# Patient Record
Sex: MALE | Race: WHITE | Employment: OTHER | URBAN - METROPOLITAN AREA
[De-identification: names, ages, dates, MRNs, and addresses within clinical notes are randomized per-mention and may not be internally consistent; named-entity substitution may affect disease eponyms.]

---

## 2018-12-06 ENCOUNTER — HOSPITAL ENCOUNTER (OUTPATIENT)
Dept: GENERAL RADIOLOGY | Age: 78
Discharge: HOME | End: 2018-12-06
Attending: FAMILY MEDICINE
Payer: COMMERCIAL

## 2018-12-06 ENCOUNTER — TELEPHONE (OUTPATIENT)
Dept: FAMILY MEDICINE CLINIC | Facility: CLINIC | Age: 78
End: 2018-12-06

## 2018-12-06 ENCOUNTER — OFFICE VISIT (OUTPATIENT)
Dept: FAMILY MEDICINE CLINIC | Facility: CLINIC | Age: 78
End: 2018-12-06
Payer: COMMERCIAL

## 2018-12-06 VITALS
SYSTOLIC BLOOD PRESSURE: 112 MMHG | HEIGHT: 72 IN | RESPIRATION RATE: 16 BRPM | DIASTOLIC BLOOD PRESSURE: 66 MMHG | WEIGHT: 294 LBS | BODY MASS INDEX: 39.82 KG/M2 | HEART RATE: 56 BPM | TEMPERATURE: 99 F

## 2018-12-06 DIAGNOSIS — M46.20 OSTEOMYELITIS OF SPINE (HCC): ICD-10-CM

## 2018-12-06 DIAGNOSIS — I71.4 AAA (ABDOMINAL AORTIC ANEURYSM) WITHOUT RUPTURE (HCC): ICD-10-CM

## 2018-12-06 DIAGNOSIS — R05.3 PERSISTENT DRY COUGH: ICD-10-CM

## 2018-12-06 DIAGNOSIS — K22.70 BARRETT'S ESOPHAGUS WITHOUT DYSPLASIA: ICD-10-CM

## 2018-12-06 DIAGNOSIS — I87.2 VENOUS INSUFFICIENCY: ICD-10-CM

## 2018-12-06 DIAGNOSIS — K21.00 GASTROESOPHAGEAL REFLUX DISEASE WITH ESOPHAGITIS: ICD-10-CM

## 2018-12-06 DIAGNOSIS — Z86.711 HISTORY OF PULMONARY EMBOLISM: Primary | ICD-10-CM

## 2018-12-06 PROCEDURE — 71046 X-RAY EXAM CHEST 2 VIEWS: CPT | Performed by: FAMILY MEDICINE

## 2018-12-06 PROCEDURE — 90653 IIV ADJUVANT VACCINE IM: CPT | Performed by: FAMILY MEDICINE

## 2018-12-06 PROCEDURE — 99204 OFFICE O/P NEW MOD 45 MIN: CPT | Performed by: FAMILY MEDICINE

## 2018-12-06 PROCEDURE — 90471 IMMUNIZATION ADMIN: CPT | Performed by: FAMILY MEDICINE

## 2018-12-06 RX ORDER — FUROSEMIDE 40 MG/1
20 TABLET ORAL 2 TIMES DAILY
COMMUNITY
End: 2019-08-07 | Stop reason: DRUGHIGH

## 2018-12-06 RX ORDER — LEVOFLOXACIN 500 MG/1
500 TABLET, FILM COATED ORAL DAILY
Qty: 7 TABLET | Refills: 0 | Status: SHIPPED | OUTPATIENT
Start: 2018-12-06 | End: 2018-12-13

## 2018-12-06 RX ORDER — SILODOSIN 8 MG/1
1 CAPSULE ORAL EVERY EVENING
COMMUNITY
Start: 2016-12-19 | End: 2021-03-10 | Stop reason: ALTCHOICE

## 2018-12-06 RX ORDER — ACETAMINOPHEN 500 MG
1500 TABLET ORAL DAILY
Status: ON HOLD | COMMUNITY
Start: 2016-03-01 | End: 2020-07-15

## 2018-12-06 RX ORDER — FUROSEMIDE 20 MG/1
1 TABLET ORAL
COMMUNITY
Start: 2016-03-01 | End: 2019-08-07

## 2018-12-06 RX ORDER — PANTOPRAZOLE SODIUM 40 MG/1
40 TABLET, DELAYED RELEASE ORAL 2 TIMES DAILY
Qty: 180 TABLET | Refills: 3 | Status: SHIPPED | OUTPATIENT
Start: 2018-12-06 | End: 2019-10-31

## 2018-12-06 RX ORDER — PRAMIPEXOLE DIHYDROCHLORIDE 0.5 MG/1
TABLET ORAL
COMMUNITY
End: 2019-06-20 | Stop reason: ALTCHOICE

## 2018-12-06 RX ORDER — PANTOPRAZOLE SODIUM 40 MG/1
1 TABLET, DELAYED RELEASE ORAL 2 TIMES DAILY
COMMUNITY
Start: 2018-09-11 | End: 2018-12-06

## 2018-12-06 NOTE — PATIENT INSTRUCTIONS
Cholesterol level should be fasting lab test within the next 6 months     Try nasal saline spray for post nasal drip and cough  Honey and tea can help with chronic cough    Wear compression socks and elevate your legs to decrease swelling

## 2018-12-06 NOTE — TELEPHONE ENCOUNTER
Left voice message for patient to return phone call to discuss chest xray. Left phone number and office hours.

## 2018-12-06 NOTE — PROGRESS NOTES
[facility] Family [facility] Office Note  Chief Complaint:   Patient presents with:  Establish Care  Cough: dry cough x2 weeks      HPI:   This is a 69year old male coming in for  HPI    Vertebral osteomyelitis  S/p spinal fusion L3-S1 in 2011 com by mouth daily.  Disp:  Rfl:    furosemide 20 MG Oral Tab Take 1 tablet by mouth every morning before breakfast. Disp:  Rfl:    Pramipexole Dihydrochloride 0.5 MG Oral Tab Take 2 tablets by mouth every morning and 3 tablets by mouth every evening Disp:  Rfl well groomed. Physical Exam   Constitutional: He is oriented to person, place, and time. He appears well-developed and well-nourished.    HENT:   Mouth/Throat: Oropharynx is clear and moist.   Eyes: EOM are normal. Pupils are equal, round, and reactive to understanding. Patient is notified to call with any questions, complications, allergies, or worsening or changing symptoms. Patient is to call with any side effects or complications from the treatments as a result of today.      Problem List:  Patient Acti

## 2018-12-06 NOTE — TELEPHONE ENCOUNTER
Patient returned phone call, informed patient chest xray came back with possible pneumonia, stated new medication was sent to the pharmacy to take daily for the next 7 days. Patient did not have any further questions at this time.

## 2018-12-09 PROBLEM — Z95.828 PRESENCE OF IVC FILTER: Status: ACTIVE | Noted: 2018-12-09

## 2018-12-20 ENCOUNTER — TELEPHONE (OUTPATIENT)
Dept: FAMILY MEDICINE CLINIC | Facility: CLINIC | Age: 78
End: 2018-12-20

## 2018-12-20 NOTE — TELEPHONE ENCOUNTER
Patient signed medical records authorization form for the below Facility to disclose health information to [facility]:      Facility / Provider Name:Dr. Katelyn Cummings Phone:   Facility Fax: [phone]    SPENCER sent to scanning.  Fax confirmation @ 12:5

## 2019-01-09 ENCOUNTER — APPOINTMENT (OUTPATIENT)
Dept: LAB | Age: 79
End: 2019-01-09
Attending: FAMILY MEDICINE
Payer: COMMERCIAL

## 2019-01-09 DIAGNOSIS — I71.4 AAA (ABDOMINAL AORTIC ANEURYSM) WITHOUT RUPTURE (HCC): ICD-10-CM

## 2019-01-09 LAB
CHOLEST SMN-MCNC: 188 MG/DL (ref ?–200)
HDLC SERPL-MCNC: 36 MG/DL (ref 40–59)
LDLC SERPL CALC-MCNC: 116 MG/DL (ref ?–100)
NONHDLC SERPL-MCNC: 152 MG/DL (ref ?–130)
TRIGL SERPL-MCNC: 178 MG/DL (ref 30–149)
VLDLC SERPL CALC-MCNC: 36 MG/DL (ref 0–30)

## 2019-01-09 PROCEDURE — 36415 COLL VENOUS BLD VENIPUNCTURE: CPT | Performed by: FAMILY MEDICINE

## 2019-01-09 PROCEDURE — 80061 LIPID PANEL: CPT | Performed by: FAMILY MEDICINE

## 2019-01-10 ENCOUNTER — TELEPHONE (OUTPATIENT)
Dept: FAMILY MEDICINE CLINIC | Facility: CLINIC | Age: 79
End: 2019-01-10

## 2019-01-10 DIAGNOSIS — I71.4 AAA (ABDOMINAL AORTIC ANEURYSM) WITHOUT RUPTURE (HCC): ICD-10-CM

## 2019-01-10 DIAGNOSIS — E78.2 HYPERLIPEMIA, MIXED: Primary | ICD-10-CM

## 2019-01-10 RX ORDER — ROSUVASTATIN CALCIUM 10 MG/1
10 TABLET, COATED ORAL NIGHTLY
Qty: 14 TABLET | Refills: 0 | Status: SHIPPED | OUTPATIENT
Start: 2019-01-10 | End: 2019-01-24

## 2019-01-10 RX ORDER — ROSUVASTATIN CALCIUM 10 MG/1
10 TABLET, COATED ORAL NIGHTLY
Qty: 90 TABLET | Refills: 0 | Status: SHIPPED | OUTPATIENT
Start: 2019-01-10 | End: 2019-04-05

## 2019-01-10 NOTE — TELEPHONE ENCOUNTER
----- Message from Marvel Negro MD sent at 1/10/2019 11:17 AM CST -----  Results reviewed.  Please inform patient with history of AAA - needs tight control of cholesterol   Start crestor 10mg qhs

## 2019-01-22 ENCOUNTER — TELEPHONE (OUTPATIENT)
Dept: FAMILY MEDICINE CLINIC | Facility: CLINIC | Age: 79
End: 2019-01-22

## 2019-01-22 NOTE — TELEPHONE ENCOUNTER
Patient signed medical records authorization form for the below Facility to disclose health information to [facility]:      Facility / Provider Name: Dr. Aydin Lopez Phone:   Facility Fax: [phone]    SPENCER sent to scanning.  Fax confirmation @ 2:03om

## 2019-02-11 ENCOUNTER — TELEPHONE (OUTPATIENT)
Dept: FAMILY MEDICINE CLINIC | Facility: CLINIC | Age: 79
End: 2019-02-11

## 2019-02-11 NOTE — TELEPHONE ENCOUNTER
Patient signed medical records authorization form for the below Facility to disclose health information to [facility]:      Facility / Provider Name:Dr. Ilsa [address] Phone:   Facility Fax: [phone]    SPENCER sent to scanning.  Fax confirmation 2/11/19 @

## 2019-02-27 ENCOUNTER — MED REC SCAN ONLY (OUTPATIENT)
Dept: FAMILY MEDICINE CLINIC | Facility: CLINIC | Age: 79
End: 2019-02-27

## 2019-03-13 ENCOUNTER — TELEPHONE (OUTPATIENT)
Dept: FAMILY MEDICINE CLINIC | Facility: CLINIC | Age: 79
End: 2019-03-13

## 2019-03-13 NOTE — TELEPHONE ENCOUNTER
Pt is having surgery in [facility] and then wants to do rehab here. He was informed to get an order from the surgeon but can use [id number]. Pt given the number to schedule and will use the [place] site.

## 2019-03-13 NOTE — TELEPHONE ENCOUNTER
Pt called and said that he will be having back surgery next week and will be in the hospital for a couple weeks.  He would like Dr. Lotus Wilder to recommend a physical therapist in the area that he can go to after he gets out of the hospital

## 2019-03-18 ENCOUNTER — MED REC SCAN ONLY (OUTPATIENT)
Dept: FAMILY MEDICINE CLINIC | Facility: CLINIC | Age: 79
End: 2019-03-18

## 2019-04-01 ENCOUNTER — OFFICE VISIT (OUTPATIENT)
Dept: FAMILY MEDICINE CLINIC | Facility: CLINIC | Age: 79
End: 2019-04-01
Payer: COMMERCIAL

## 2019-04-01 ENCOUNTER — APPOINTMENT (OUTPATIENT)
Dept: LAB | Age: 79
End: 2019-04-01
Attending: FAMILY MEDICINE
Payer: COMMERCIAL

## 2019-04-01 VITALS
HEIGHT: 72 IN | OXYGEN SATURATION: 99 % | HEART RATE: 87 BPM | SYSTOLIC BLOOD PRESSURE: 120 MMHG | BODY MASS INDEX: 42.66 KG/M2 | DIASTOLIC BLOOD PRESSURE: 60 MMHG | WEIGHT: 315 LBS | RESPIRATION RATE: 16 BRPM | TEMPERATURE: 98 F

## 2019-04-01 DIAGNOSIS — N17.9 ACUTE RENAL FAILURE, UNSPECIFIED ACUTE RENAL FAILURE TYPE (HCC): ICD-10-CM

## 2019-04-01 DIAGNOSIS — M48.062 SPINAL STENOSIS OF LUMBAR REGION WITH NEUROGENIC CLAUDICATION: ICD-10-CM

## 2019-04-01 DIAGNOSIS — I71.4 AAA (ABDOMINAL AORTIC ANEURYSM) WITHOUT RUPTURE (HCC): ICD-10-CM

## 2019-04-01 DIAGNOSIS — I87.2 VENOUS INSUFFICIENCY: ICD-10-CM

## 2019-04-01 DIAGNOSIS — E78.2 HYPERLIPEMIA, MIXED: ICD-10-CM

## 2019-04-01 DIAGNOSIS — Z86.711 HISTORY OF PULMONARY EMBOLISM: ICD-10-CM

## 2019-04-01 DIAGNOSIS — Z98.1 S/P SPINAL FUSION: Primary | ICD-10-CM

## 2019-04-01 DIAGNOSIS — I82.401 ACUTE DEEP VEIN THROMBOSIS (DVT) OF RIGHT LOWER EXTREMITY, UNSPECIFIED VEIN (HCC): ICD-10-CM

## 2019-04-01 DIAGNOSIS — G25.81 RESTLESS LEGS SYNDROME: ICD-10-CM

## 2019-04-01 DIAGNOSIS — I82.401 ACUTE DEEP VEIN THROMBOSIS (DVT) OF RIGHT LOWER EXTREMITY, UNSPECIFIED VEIN (HCC): Primary | ICD-10-CM

## 2019-04-01 DIAGNOSIS — Z86.718 HISTORY OF DVT (DEEP VEIN THROMBOSIS): ICD-10-CM

## 2019-04-01 PROCEDURE — 99214 OFFICE O/P EST MOD 30 MIN: CPT | Performed by: FAMILY MEDICINE

## 2019-04-01 PROCEDURE — 80048 BASIC METABOLIC PNL TOTAL CA: CPT | Performed by: FAMILY MEDICINE

## 2019-04-01 PROCEDURE — 36415 COLL VENOUS BLD VENIPUNCTURE: CPT | Performed by: FAMILY MEDICINE

## 2019-04-01 PROCEDURE — 84100 ASSAY OF PHOSPHORUS: CPT | Performed by: FAMILY MEDICINE

## 2019-04-01 RX ORDER — OXYCODONE HYDROCHLORIDE 5 MG/1
TABLET ORAL
COMMUNITY
Start: 2019-03-26 | End: 2019-06-20 | Stop reason: ALTCHOICE

## 2019-04-01 RX ORDER — PSEUDOEPHEDRINE HCL 30 MG
100 TABLET ORAL
COMMUNITY
Start: 2019-03-26

## 2019-04-01 NOTE — PROGRESS NOTES
HPI:    Ac Gupta is a 69year old male here today for hospital follow up. [facility]   Admit Date: 3/17/19  Discharge Date: 3/26/19  Hospital Discharge Diagnoses:      1.  Revision spinal fusion   Re minimal assistance            Allergies:  He has No Known Allergies.     Current Meds:    Current Outpatient Medications on File Prior to Visit:  apixaban (ELIQUIS) 5 MG Oral Tab Take two tablets (10mg) 2 times a day starting tonight through 4/1/19, then de vision  HEENT: denies nasal congestion, sinus pain or ST  LUNGS: denies shortness of breath with exertion  CARDIOVASCULAR: denies chest pain on exertion or palpitations  GI: denies abdominal pain, denies heartburn, denies diarrhea  MUSCULOSKELETAL: denies vein thrombosis (DVT) of right lower extremity, unspecified vein (HCC)  CONT eliquis 5mg bid    Restless legs syndrome  Cont pramipexole       Orders Placed This Encounter      Basic Metabolic Panel (8) [E]      Phosphorus [E]      Meds & Refills for this

## 2019-04-02 ENCOUNTER — MED REC SCAN ONLY (OUTPATIENT)
Dept: FAMILY MEDICINE CLINIC | Facility: CLINIC | Age: 79
End: 2019-04-02

## 2019-04-02 ENCOUNTER — TELEPHONE (OUTPATIENT)
Dept: FAMILY MEDICINE CLINIC | Facility: CLINIC | Age: 79
End: 2019-04-02

## 2019-04-02 DIAGNOSIS — N17.9 AKI (ACUTE KIDNEY INJURY) (HCC): Primary | ICD-10-CM

## 2019-04-02 DIAGNOSIS — R33.9 RETENTION OF URINE: ICD-10-CM

## 2019-04-02 NOTE — TELEPHONE ENCOUNTER
Renal function stable from discharge - goal was to diurese down to weight of 295 lbs  Maintenance furosemide is 20mg bid - add additional 20 for swelling daily until down to 295  Repeat bmp in 2 weeks

## 2019-04-02 NOTE — TELEPHONE ENCOUNTER
I called pt. He thinks he only took 20mg this am.  I spok to Dr Catalino Apodaca who states take 40mg am starting tomorrow and 20mg in the evening until weight is down to 295. (He was supposed to start that today.)   Then maintenance dose of 20mg bid.   BMP in 2 week

## 2019-04-03 NOTE — TELEPHONE ENCOUNTER
Medication(s) to Refill:   Requested Prescriptions     Pending Prescriptions Disp Refills   • ROSUVASTATIN CALCIUM 10 MG Oral Tab [Pharmacy Med Name: ROSUVASTATIN TABS 10MG] 90 tablet 0     Sig: TAKE 1 TABLET NIGHTLY         Reason for Medication Refill be

## 2019-04-05 RX ORDER — ROSUVASTATIN CALCIUM 10 MG/1
TABLET, COATED ORAL
Qty: 90 TABLET | Refills: 3 | Status: SHIPPED | OUTPATIENT
Start: 2019-04-05 | End: 2020-02-26

## 2019-04-06 PROBLEM — N17.9 ACUTE RENAL FAILURE (HCC): Status: ACTIVE | Noted: 2019-04-06

## 2019-04-06 PROBLEM — N17.9 ACUTE RENAL FAILURE: Status: ACTIVE | Noted: 2019-04-06

## 2019-04-06 PROBLEM — Z98.1 S/P SPINAL FUSION: Status: ACTIVE | Noted: 2019-04-06

## 2019-04-06 PROBLEM — I82.401 ACUTE DEEP VEIN THROMBOSIS (DVT) OF RIGHT LOWER EXTREMITY (HCC): Status: ACTIVE | Noted: 2019-04-06

## 2019-04-08 ENCOUNTER — TELEPHONE (OUTPATIENT)
Dept: FAMILY MEDICINE CLINIC | Facility: CLINIC | Age: 79
End: 2019-04-08

## 2019-04-08 NOTE — TELEPHONE ENCOUNTER
I called and explained to wife that they would not be able to use the rebate card with mail order. Its $50 for 1 month so not sure how much mail order would charge for 3 months.   She states her  did not understand that when I spoke to him earlier a

## 2019-04-08 NOTE — TELEPHONE ENCOUNTER
apixaban (ELIQUIS) 5 MG Oral Tab    This should have been sent to the mail order. Spouse would like this changed to mail order.  Would like a call when complete

## 2019-04-08 NOTE — TELEPHONE ENCOUNTER
PA needed for Eliquis. PA completed [email or web address] and was approved. Date:03/09/2019; Coverage End Date:04/07/2020    Pharmacy informed and they states it is approved for a $50 copay.   I have a rebate card ready at the  would make it

## 2019-04-15 DIAGNOSIS — Z86.711 HISTORY OF PULMONARY EMBOLISM: Primary | ICD-10-CM

## 2019-04-16 ENCOUNTER — LAB ENCOUNTER (OUTPATIENT)
Dept: LAB | Age: 79
End: 2019-04-16
Attending: FAMILY MEDICINE
Payer: COMMERCIAL

## 2019-04-16 DIAGNOSIS — E78.2 HYPERLIPEMIA, MIXED: ICD-10-CM

## 2019-04-16 DIAGNOSIS — N17.9 AKI (ACUTE KIDNEY INJURY) (HCC): ICD-10-CM

## 2019-04-16 DIAGNOSIS — I71.4 AAA (ABDOMINAL AORTIC ANEURYSM) WITHOUT RUPTURE (HCC): ICD-10-CM

## 2019-04-16 DIAGNOSIS — R33.9 RETENTION OF URINE: ICD-10-CM

## 2019-04-16 DIAGNOSIS — Z86.711 HISTORY OF PULMONARY EMBOLISM: ICD-10-CM

## 2019-04-16 PROCEDURE — 80061 LIPID PANEL: CPT | Performed by: FAMILY MEDICINE

## 2019-04-16 PROCEDURE — 80048 BASIC METABOLIC PNL TOTAL CA: CPT | Performed by: FAMILY MEDICINE

## 2019-04-16 PROCEDURE — 85025 COMPLETE CBC W/AUTO DIFF WBC: CPT | Performed by: FAMILY MEDICINE

## 2019-04-16 PROCEDURE — 36415 COLL VENOUS BLD VENIPUNCTURE: CPT | Performed by: FAMILY MEDICINE

## 2019-04-17 ENCOUNTER — PATIENT MESSAGE (OUTPATIENT)
Dept: FAMILY MEDICINE CLINIC | Facility: CLINIC | Age: 79
End: 2019-04-17

## 2019-04-18 ENCOUNTER — TELEPHONE (OUTPATIENT)
Dept: FAMILY MEDICINE CLINIC | Facility: CLINIC | Age: 79
End: 2019-04-18

## 2019-04-18 DIAGNOSIS — D64.9 ANEMIA, UNSPECIFIED TYPE: Primary | ICD-10-CM

## 2019-04-18 NOTE — TELEPHONE ENCOUNTER
----- Message from Joanna Shaw MD sent at 4/18/2019 12:35 PM CDT -----  Results reviewed. Tests show no significant abnormalities. Renal function improved. But anemia still present. Order occult stool test. Pt on eliquis. Repeat cbc in 2 weeks.  Please inf

## 2019-04-18 NOTE — TELEPHONE ENCOUNTER
Spoke with patient regarding results and recommendations below. Stool kit placed at . Patient verbalizes understanding to all instructions and has no further questions.

## 2019-04-18 NOTE — TELEPHONE ENCOUNTER
From: Aspen Recio  To: Donte Cat MD  Sent: 4/17/2019 7:02 PM CDT  Subject: Test Results Question    On Tuesday, April 16th my wife and I both had blood drawn. She has received her results but I have heard nothing? Is there a problem?

## 2019-04-22 ENCOUNTER — LAB ENCOUNTER (OUTPATIENT)
Dept: LAB | Age: 79
End: 2019-04-22
Attending: FAMILY MEDICINE
Payer: COMMERCIAL

## 2019-04-22 DIAGNOSIS — D64.9 ANEMIA, UNSPECIFIED TYPE: ICD-10-CM

## 2019-04-22 PROCEDURE — 82272 OCCULT BLD FECES 1-3 TESTS: CPT | Performed by: FAMILY MEDICINE

## 2019-05-01 ENCOUNTER — MED REC SCAN ONLY (OUTPATIENT)
Dept: FAMILY MEDICINE CLINIC | Facility: CLINIC | Age: 79
End: 2019-05-01

## 2019-05-12 ENCOUNTER — PATIENT MESSAGE (OUTPATIENT)
Dept: FAMILY MEDICINE CLINIC | Facility: CLINIC | Age: 79
End: 2019-05-12

## 2019-05-13 NOTE — PROGRESS NOTES
He should NOT wear at night  This is will increase risk of dvt  Elevate legs when sitting or lying   If he has any pain/redness of calf needs to go to IC or ER to rule out acute dvt

## 2019-05-13 NOTE — TELEPHONE ENCOUNTER
From: Cailin Recio  To: Janis Friedman MD  Sent: 5/12/2019 9:24 PM CDT  Subject: Non-Urgent Medical Question    I wear compression stockings - how many hours a day should I wear them?  I have been wearing them 24/7; however I feel they cause my legs to swell

## 2019-05-13 NOTE — TELEPHONE ENCOUNTER
Please see above message. Patient has been wearing compression stockings. Patient has been wearing them 24/7. However, patient read he should only wear them during the day and removed at night. Patient is asking for your recommendation on this.   [place]

## 2019-06-05 ENCOUNTER — LAB ENCOUNTER (OUTPATIENT)
Dept: LAB | Age: 79
End: 2019-06-05
Attending: FAMILY MEDICINE
Payer: COMMERCIAL

## 2019-06-05 DIAGNOSIS — D64.9 ANEMIA, UNSPECIFIED TYPE: ICD-10-CM

## 2019-06-05 PROCEDURE — 36415 COLL VENOUS BLD VENIPUNCTURE: CPT | Performed by: FAMILY MEDICINE

## 2019-06-05 PROCEDURE — 85025 COMPLETE CBC W/AUTO DIFF WBC: CPT | Performed by: FAMILY MEDICINE

## 2019-06-20 ENCOUNTER — OFFICE VISIT (OUTPATIENT)
Dept: FAMILY MEDICINE CLINIC | Facility: CLINIC | Age: 79
End: 2019-06-20
Payer: COMMERCIAL

## 2019-06-20 VITALS
OXYGEN SATURATION: 98 % | RESPIRATION RATE: 16 BRPM | WEIGHT: 305 LBS | HEART RATE: 65 BPM | HEIGHT: 72 IN | DIASTOLIC BLOOD PRESSURE: 60 MMHG | TEMPERATURE: 98 F | BODY MASS INDEX: 41.31 KG/M2 | SYSTOLIC BLOOD PRESSURE: 120 MMHG

## 2019-06-20 DIAGNOSIS — I83.019 VENOUS ULCER OF RIGHT LEG (HCC): ICD-10-CM

## 2019-06-20 DIAGNOSIS — Z97.4 DOES USE HEARING AID: ICD-10-CM

## 2019-06-20 DIAGNOSIS — K22.70 BARRETT'S ESOPHAGUS WITHOUT DYSPLASIA: Primary | ICD-10-CM

## 2019-06-20 DIAGNOSIS — L57.0 AK (ACTINIC KERATOSIS): ICD-10-CM

## 2019-06-20 DIAGNOSIS — Z12.11 SCREENING FOR COLON CANCER: ICD-10-CM

## 2019-06-20 DIAGNOSIS — L97.919 VENOUS ULCER OF RIGHT LEG (HCC): ICD-10-CM

## 2019-06-20 DIAGNOSIS — K63.5 HYPERPLASTIC COLONIC POLYP, UNSPECIFIED PART OF COLON: ICD-10-CM

## 2019-06-20 DIAGNOSIS — I89.0 LYMPHEDEMA OF BOTH LOWER EXTREMITIES: ICD-10-CM

## 2019-06-20 PROCEDURE — 99214 OFFICE O/P EST MOD 30 MIN: CPT | Performed by: FAMILY MEDICINE

## 2019-06-20 RX ORDER — LATANOPROST 50 UG/ML
1 SOLUTION/ DROPS OPHTHALMIC NIGHTLY
COMMUNITY

## 2019-06-20 NOTE — PATIENT INSTRUCTIONS
Keep an eye on blood pressure at home   If under 100/60 - call the office    Increase the furosemide to 40mg in the morning and 20mg at night for 1 week   Continue to monitor your weight daily   Monitor for fevers  Use non-stick gauze, rolled gauze and ACE

## 2019-06-20 NOTE — PROGRESS NOTES
[facility] Family Medicine Office Note  Chief Complaint:   Patient presents with:  Cholesterol: f/u  Derm Problem: cut right lower leg      HPI:   This is a 69year old male coming in for  HPI   Back pain  S/p spinal fusion - with chronic staph i acetaminophen 500 MG Oral Tab Take 1,500 mg by mouth daily. Disp:  Rfl:    aspirin 81 MG Oral Tab Take 81 mg by mouth daily. Disp:  Rfl:    Cholecalciferol (VITAMIN D) 1000 units Oral Tab Take 1,000 Units by mouth daily.  Disp:  Rfl:    furosemide 20 MG Ora Mouth/Throat: Oropharynx is clear and moist.   Eyes: Pupils are equal, round, and reactive to light. EOM are normal.   Neck: Neck supple. Cardiovascular: Normal rate and regular rhythm. No murmur heard.   Pulmonary/Chest: Effort normal and breath sounds Postoperative anemia due to acute blood loss     Spinal stenosis of lumbar region with neurogenic claudication     Venous insufficiency     Status post lumbar surgery     ALYSSA (acute kidney injury) ([facility] [address]     GERD (gastroesophageal reflux disease)     Cristiano

## 2019-08-05 ENCOUNTER — TELEPHONE (OUTPATIENT)
Dept: FAMILY MEDICINE CLINIC | Facility: CLINIC | Age: 79
End: 2019-08-05

## 2019-08-05 NOTE — TELEPHONE ENCOUNTER
LOV 6/20/19  \"5. Lymphedema of both lower extremities  6.  Venous ulcer of right leg (HCC)  Monitor weight  Inc diuretic x1 week  Elevate legs   Use ACE wrap over ulcer for compression     Return in about 2 weeks (around 7/3/2019), or if symptoms worsen or

## 2019-08-05 NOTE — TELEPHONE ENCOUNTER
Pt needs the following prescriptions that Dr. Ralph Erwin hasn't filled before. Furosemide 20 mg x2day  Pramieexole Dihydro 0.5mg 5xday  [facility].

## 2019-08-07 ENCOUNTER — PATIENT MESSAGE (OUTPATIENT)
Dept: FAMILY MEDICINE CLINIC | Facility: CLINIC | Age: 79
End: 2019-08-07

## 2019-08-07 RX ORDER — PRAMIPEXOLE DIHYDROCHLORIDE 0.5 MG/1
TABLET ORAL
Qty: 450 TABLET | Refills: 1 | Status: SHIPPED | OUTPATIENT
Start: 2019-08-07 | End: 2020-01-24

## 2019-08-07 RX ORDER — FUROSEMIDE 20 MG/1
20 TABLET ORAL
Qty: 90 TABLET | Refills: 1 | Status: SHIPPED | OUTPATIENT
Start: 2019-08-07 | End: 2019-08-07

## 2019-08-07 RX ORDER — FUROSEMIDE 20 MG/1
20 TABLET ORAL 2 TIMES DAILY
Qty: 180 TABLET | Refills: 1 | Status: SHIPPED | OUTPATIENT
Start: 2019-08-07 | End: 2020-01-24

## 2019-08-07 NOTE — TELEPHONE ENCOUNTER
From: Laina Recio  To: Sugar Gonzalez MD  Sent: 8/7/2019 11:14 AM CDT  Subject: Prescription Question    Dr. Obinna Du - I recently called your office and left a message asking that you place refill prescriptions with [facility].  I need you to place the o

## 2019-08-07 NOTE — TELEPHONE ENCOUNTER
Pt aware. I called [facility] and spoke to [place]. I cancelled the furosemide once daily RX and they will fill the bid RX.

## 2019-08-07 NOTE — TELEPHONE ENCOUNTER
The diuretic should be bid for up to 1 week at a time     If he notices increased swelling or gain of > 5lbs in a day then start the bid for 3-7 days     Pramipexole - ok to refill   0.5mg tab   2 tab every AM and 3 tab every PM

## 2019-08-07 NOTE — TELEPHONE ENCOUNTER
RX sent. I called pt to go over the furosemide dose. Pt and wife states he was doing 20mg bid for years.   Looking at the Pt Instructions section in June, it looks like you told him:  \"Increase the furosemide to 40mg in the morning and 20mg at night

## 2019-08-13 ENCOUNTER — MED REC SCAN ONLY (OUTPATIENT)
Dept: FAMILY MEDICINE CLINIC | Facility: CLINIC | Age: 79
End: 2019-08-13

## 2019-08-20 ENCOUNTER — OFFICE VISIT (OUTPATIENT)
Dept: SURGERY | Facility: CLINIC | Age: 79
End: 2019-08-20
Payer: COMMERCIAL

## 2019-08-20 VITALS
HEART RATE: 60 BPM | TEMPERATURE: 98 F | WEIGHT: 296 LBS | DIASTOLIC BLOOD PRESSURE: 68 MMHG | SYSTOLIC BLOOD PRESSURE: 109 MMHG | BODY MASS INDEX: 40 KG/M2

## 2019-08-20 DIAGNOSIS — E66.01 MORBID OBESITY WITH BODY MASS INDEX OF 40.0-44.9 IN ADULT (HCC): ICD-10-CM

## 2019-08-20 DIAGNOSIS — K21.00 GASTROESOPHAGEAL REFLUX DISEASE WITH ESOPHAGITIS: ICD-10-CM

## 2019-08-20 DIAGNOSIS — I71.4 AAA (ABDOMINAL AORTIC ANEURYSM) WITHOUT RUPTURE (HCC): ICD-10-CM

## 2019-08-20 DIAGNOSIS — Z86.010 PERSONAL HISTORY OF COLONIC POLYPS: Primary | ICD-10-CM

## 2019-08-20 DIAGNOSIS — K22.70 BARRETT'S ESOPHAGUS WITHOUT DYSPLASIA: ICD-10-CM

## 2019-08-20 PROBLEM — Z86.0100 PERSONAL HISTORY OF COLONIC POLYPS: Status: ACTIVE | Noted: 2019-08-20

## 2019-08-20 PROCEDURE — 99243 OFF/OP CNSLTJ NEW/EST LOW 30: CPT | Performed by: SURGERY

## 2019-08-20 NOTE — H&P
New Patient Visit Note       Active Problems      1. Personal history of colonic polyps    2. Bull's esophagus without dysplasia    3. Morbid obesity with body mass index of 40.0-44.9 in adult ([facility]    4.  Gastroesophageal reflux disease with esophagitis Spouse name: Not on file      Number of children: Not on file      Years of education: Not on file      Highest education level: Not on file    Tobacco Use      Smoking status: Former Smoker        Quit date: 2/3/1987        Years since quittin.5 swallowing. Respiratory: Negative for apnea, cough, shortness of breath and wheezing. Cardiovascular: Negative for chest pain, palpitations and leg swelling.    Gastrointestinal: Negative for abdominal distention, abdominal pain, anal bleeding, blood the patient and printed instructions provided. · Will need Eliquis management prior to the procedure if patient does not come off his Eliquis in 9/2019. · All questions answered. Wife present.                No orders of the defined types were placed in

## 2019-08-21 ENCOUNTER — TELEPHONE (OUTPATIENT)
Dept: FAMILY MEDICINE CLINIC | Facility: CLINIC | Age: 79
End: 2019-08-21

## 2019-08-21 DIAGNOSIS — I82.401 DVT, LOWER EXTREMITY, RECURRENT, RIGHT (HCC): Primary | ICD-10-CM

## 2019-08-21 NOTE — TELEPHONE ENCOUNTER
Please advise on POC-OV?  US? Dr Norma Baldwin plan:  Plan   · [facility] · Procedure discussed with risks, benefits, alternatives.   · Risks include but are not exclusive to infection, bleeding, p

## 2019-08-21 NOTE — TELEPHONE ENCOUNTER
Ordering venous US right leg - complete end of September - if clot resolved.  Then ok to stop eliquis - resume aspirin only (which he would hold 5 days prior to procedure)     Best to get US completed at same facility as last US if possible for comparison s

## 2019-08-21 NOTE — TELEPHONE ENCOUNTER
Patient is scheduled for a colonoscopy and endoscopy and surgeon would like him off of Eloquis prior to that. Patient's wife states that in March they talked about taking patient off of eloquis in 6 months and having an ultrasound of his leg.  They forgot t

## 2019-08-21 NOTE — TELEPHONE ENCOUNTER
The US was done at [facility]. Wife wants to know if you realize its that far? Or if she can get the films sent here?

## 2019-08-22 NOTE — TELEPHONE ENCOUNTER
Spoke to pt's wife Dex Bryan in person (on hipaa) regarding information below. SPENCER given to her for the patient to complete - in order to get previous US results from [facility].      Re-iterated instructions for repeat US at end of Sept and [place]

## 2019-08-27 ENCOUNTER — TELEPHONE (OUTPATIENT)
Dept: FAMILY MEDICINE CLINIC | Facility: CLINIC | Age: 79
End: 2019-08-27

## 2019-08-27 NOTE — TELEPHONE ENCOUNTER
Patient signed medical records authorization form for the below Facility to disclose health information to [facility]:      Facility / Provider Name: [address] [place] [address] Phone:   Facility Fax: [phone]    SPENCER sent to scanning.  Fax confirmation @ 8:55am

## 2019-09-06 ENCOUNTER — MED REC SCAN ONLY (OUTPATIENT)
Dept: FAMILY MEDICINE CLINIC | Facility: CLINIC | Age: 79
End: 2019-09-06

## 2019-09-27 ENCOUNTER — HOSPITAL ENCOUNTER (OUTPATIENT)
Dept: ULTRASOUND IMAGING | Age: 79
Discharge: HOME | End: 2019-09-27
Attending: FAMILY MEDICINE
Payer: COMMERCIAL

## 2019-09-27 DIAGNOSIS — I82.401 DVT, LOWER EXTREMITY, RECURRENT, RIGHT (HCC): ICD-10-CM

## 2019-09-27 PROCEDURE — 93971 EXTREMITY STUDY: CPT | Performed by: FAMILY MEDICINE

## 2019-10-01 ENCOUNTER — PATIENT MESSAGE (OUTPATIENT)
Dept: FAMILY MEDICINE CLINIC | Facility: CLINIC | Age: 79
End: 2019-10-01

## 2019-10-02 NOTE — TELEPHONE ENCOUNTER
From: Yamilet Recio  To: Jacquelyn West MD  Sent: 10/1/2019 4:42 PM CDT  Subject: Prescription Question    Dr. Felicia Moy - since my ultrasound last week showed no DVT / blood clots should I continue to take Eliquis?  If you tell me to stop taking it do I stop [place]

## 2019-10-02 NOTE — TELEPHONE ENCOUNTER
Pt would like to know if he should continue taking eliquis since his doppler ultrasound results were normal.   If pt is to stop eliquis please advise if he should wean off the Rx or not.    Thank you

## 2019-10-10 ENCOUNTER — PATIENT MESSAGE (OUTPATIENT)
Dept: FAMILY MEDICINE CLINIC | Facility: CLINIC | Age: 79
End: 2019-10-10

## 2019-10-10 NOTE — TELEPHONE ENCOUNTER
Spoke to pt to clarify symptoms - his wife noticed oozing and a foul odor from his right foot 1st digit 1 week ago when she was trying to cut his toenails. Pt was unable to tell me the color of the drainage because he said he's unable to bend over.  His wif

## 2019-10-10 NOTE — TELEPHONE ENCOUNTER
From: Yamilet Recio  To: Jacquelyn West MD  Sent: 10/10/2019 7:40 AM CDT  Subject: Referral Request    Doctor Patricia Alves Morning! Could provide me with the name of a podiatrist that you would recommend? Thank you!

## 2019-10-11 ENCOUNTER — PATIENT MESSAGE (OUTPATIENT)
Dept: FAMILY MEDICINE CLINIC | Facility: CLINIC | Age: 79
End: 2019-10-11

## 2019-10-11 ENCOUNTER — LAB ENCOUNTER (OUTPATIENT)
Dept: LAB | Age: 79
End: 2019-10-11
Attending: FAMILY MEDICINE
Payer: COMMERCIAL

## 2019-10-11 ENCOUNTER — OFFICE VISIT (OUTPATIENT)
Dept: FAMILY MEDICINE CLINIC | Facility: CLINIC | Age: 79
End: 2019-10-11
Payer: COMMERCIAL

## 2019-10-11 VITALS
WEIGHT: 307 LBS | OXYGEN SATURATION: 98 % | BODY MASS INDEX: 41.58 KG/M2 | HEART RATE: 83 BPM | TEMPERATURE: 98 F | RESPIRATION RATE: 16 BRPM | SYSTOLIC BLOOD PRESSURE: 120 MMHG | DIASTOLIC BLOOD PRESSURE: 64 MMHG | HEIGHT: 72 IN

## 2019-10-11 DIAGNOSIS — B35.1 ONYCHOMYCOSIS: Primary | ICD-10-CM

## 2019-10-11 DIAGNOSIS — L60.8 ONYCHOMADESIS: ICD-10-CM

## 2019-10-11 DIAGNOSIS — B35.1 ONYCHOMYCOSIS: ICD-10-CM

## 2019-10-11 PROCEDURE — 80076 HEPATIC FUNCTION PANEL: CPT | Performed by: FAMILY MEDICINE

## 2019-10-11 PROCEDURE — 85025 COMPLETE CBC W/AUTO DIFF WBC: CPT | Performed by: FAMILY MEDICINE

## 2019-10-11 PROCEDURE — 36415 COLL VENOUS BLD VENIPUNCTURE: CPT | Performed by: FAMILY MEDICINE

## 2019-10-11 PROCEDURE — 99214 OFFICE O/P EST MOD 30 MIN: CPT | Performed by: FAMILY MEDICINE

## 2019-10-11 RX ORDER — TERBINAFINE HYDROCHLORIDE 250 MG/1
250 TABLET ORAL DAILY
Qty: 90 TABLET | Refills: 1 | Status: ON HOLD | OUTPATIENT
Start: 2019-10-11 | End: 2020-07-15

## 2019-10-11 NOTE — TELEPHONE ENCOUNTER
From: Evette Recio  To: David Goins MD  Sent: 10/11/2019 5:09 PM CDT  Subject: Non-Urgent Medical Question    Doctor Ashleigh Simms - just picked up the medicine you prescribed for me today - TERBINAFINE 250 MG / 90 DAY SUPPLY.     I have a question I should have

## 2019-10-14 NOTE — PROGRESS NOTES
[facility] Family Medicine Office Note  Chief Complaint:   Patient presents with:   Toe Pain: x5 days, right toe, some discharge and odor      HPI:   This is a 67year old male coming in for  HPI  Toe nail issue  The patient complains of toenail i Disp: 180 tablet, Rfl: 1  latanoprost 0.005 % Ophthalmic Solution, Place 1 drop into the left eye nightly., Disp: , Rfl:   ROSUVASTATIN CALCIUM 10 MG Oral Tab, TAKE 1 TABLET NIGHTLY, Disp: 90 tablet, Rfl: 3  docusate sodium 100 MG Oral Cap, Take 100 mg by well-nourished. No distress. Cardiovascular: Normal rate and regular rhythm. Pulmonary/Chest: Effort normal and breath sounds normal.   Neurological: He is alert and oriented to person, place, and time. Psychiatric: He has a normal mood and affect.

## 2019-10-17 ENCOUNTER — TELEPHONE (OUTPATIENT)
Dept: SURGERY | Facility: CLINIC | Age: 79
End: 2019-10-17

## 2019-10-31 RX ORDER — PANTOPRAZOLE SODIUM 40 MG/1
TABLET, DELAYED RELEASE ORAL
Qty: 180 TABLET | Refills: 4 | Status: SHIPPED | OUTPATIENT
Start: 2019-10-31 | End: 2020-07-23

## 2019-10-31 NOTE — TELEPHONE ENCOUNTER
Medication(s) to Refill:   Requested Prescriptions     Pending Prescriptions Disp Refills   • PANTOPRAZOLE SODIUM 40 MG Oral Tab EC [Pharmacy Med Name: PANTOPRAZOLE SOD DR TABS 40MG] 180 tablet 4     Sig: TAKE 1 TABLET TWICE A DAY         Reason for Medica

## 2019-11-08 ENCOUNTER — LAB REQUISITION (OUTPATIENT)
Dept: LAB | Facility: HOSPITAL | Age: 79
End: 2019-11-08
Payer: COMMERCIAL

## 2019-11-08 DIAGNOSIS — K63.5 POLYP OF COLON: ICD-10-CM

## 2019-11-08 PROCEDURE — 88305 TISSUE EXAM BY PATHOLOGIST: CPT | Performed by: SURGERY

## 2019-11-08 PROCEDURE — 88342 IMHCHEM/IMCYTCHM 1ST ANTB: CPT | Performed by: SURGERY

## 2019-11-25 ENCOUNTER — PATIENT MESSAGE (OUTPATIENT)
Dept: FAMILY MEDICINE CLINIC | Facility: CLINIC | Age: 79
End: 2019-11-25

## 2019-11-26 NOTE — TELEPHONE ENCOUNTER
From: Anderson Recio  To: Bharat Busch MD  Sent: 11/25/2019 12:17 PM CST  Subject: Non-Urgent Medical Question    Sorry to bother you, but I have on my calendar that I have a Blood Draw scheduled for today Nov 25th.  I just had one on October 11th   Is there

## 2019-11-27 ENCOUNTER — APPOINTMENT (OUTPATIENT)
Dept: LAB | Age: 79
End: 2019-11-27
Attending: FAMILY MEDICINE
Payer: COMMERCIAL

## 2019-11-27 DIAGNOSIS — B35.1 ONYCHOMYCOSIS: ICD-10-CM

## 2019-11-27 PROCEDURE — 36415 COLL VENOUS BLD VENIPUNCTURE: CPT | Performed by: FAMILY MEDICINE

## 2019-11-27 PROCEDURE — 80076 HEPATIC FUNCTION PANEL: CPT | Performed by: FAMILY MEDICINE

## 2020-01-01 ENCOUNTER — EXTERNAL RECORD (OUTPATIENT)
Dept: CARDIOLOGY | Age: 80
End: 2020-01-01

## 2020-01-23 NOTE — TELEPHONE ENCOUNTER
Medication(s) to Refill:   Requested Prescriptions     Pending Prescriptions Disp Refills   • Pramipexole Dihydrochloride 0.5 MG Oral Tab [Pharmacy Med Name: PRAMIPEXOLE DIHYDRO TABS 0.5MG] 450 tablet 4     Sig: TAKE 2 TABLETS EVERY MORNING AND 3 TABLETS E

## 2020-01-24 RX ORDER — PRAMIPEXOLE DIHYDROCHLORIDE 0.5 MG/1
TABLET ORAL
Qty: 450 TABLET | Refills: 4 | Status: SHIPPED | OUTPATIENT
Start: 2020-01-24 | End: 2020-07-23

## 2020-01-24 RX ORDER — FUROSEMIDE 20 MG/1
TABLET ORAL
Qty: 180 TABLET | Refills: 4 | Status: SHIPPED | OUTPATIENT
Start: 2020-01-24 | End: 2020-07-23

## 2020-02-26 ENCOUNTER — PATIENT MESSAGE (OUTPATIENT)
Dept: FAMILY MEDICINE CLINIC | Facility: CLINIC | Age: 80
End: 2020-02-26

## 2020-02-26 RX ORDER — ROSUVASTATIN CALCIUM 10 MG/1
TABLET, COATED ORAL
Qty: 90 TABLET | Refills: 0 | Status: SHIPPED | OUTPATIENT
Start: 2020-02-26 | End: 2020-07-16

## 2020-02-26 NOTE — TELEPHONE ENCOUNTER
From: Erskin Brittle Nahs  To: Lee Vargas MD  Sent: 2/26/2020 11:44 AM CST  Subject: Non-Urgent Medical Question    I currently have an appointment scheduled for March 3rd @ 9:00 a.m.  I was not at home when I made this appointment and when I went to add to my c

## 2020-02-29 ENCOUNTER — PATIENT MESSAGE (OUTPATIENT)
Dept: FAMILY MEDICINE CLINIC | Facility: CLINIC | Age: 80
End: 2020-02-29

## 2020-03-02 NOTE — TELEPHONE ENCOUNTER
From: Stevie Recio  To: Andre Hooper MD  Sent: 2/29/2020 6:34 PM CST  Subject: Prescription Question    I am wondering if Dr. Radha Richard has sent a prescription refill to [facility] for ROVISTATIN / 10mg / 90 day supply. Thank you!

## 2020-07-02 ENCOUNTER — VIRTUAL PHONE E/M (OUTPATIENT)
Dept: FAMILY MEDICINE CLINIC | Facility: CLINIC | Age: 80
End: 2020-07-02
Payer: MEDICARE

## 2020-07-02 DIAGNOSIS — N40.1 BENIGN PROSTATIC HYPERPLASIA WITH URINARY OBSTRUCTION: Primary | ICD-10-CM

## 2020-07-02 DIAGNOSIS — M54.42 ACUTE MIDLINE LOW BACK PAIN WITH BILATERAL SCIATICA: ICD-10-CM

## 2020-07-02 DIAGNOSIS — N13.8 BENIGN PROSTATIC HYPERPLASIA WITH URINARY OBSTRUCTION: Primary | ICD-10-CM

## 2020-07-02 DIAGNOSIS — M54.41 ACUTE MIDLINE LOW BACK PAIN WITH BILATERAL SCIATICA: ICD-10-CM

## 2020-07-02 PROCEDURE — 99442 PHONE E/M BY PHYS 11-20 MIN: CPT | Performed by: FAMILY MEDICINE

## 2020-07-02 RX ORDER — TRAMADOL HYDROCHLORIDE 50 MG/1
TABLET ORAL EVERY 6 HOURS PRN
Qty: 30 TABLET | Refills: 0 | Status: SHIPPED | OUTPATIENT
Start: 2020-07-02 | End: 2020-08-20 | Stop reason: ALTCHOICE

## 2020-07-02 RX ORDER — CYCLOBENZAPRINE HCL 10 MG
10 TABLET ORAL 3 TIMES DAILY
Qty: 30 TABLET | Refills: 1 | Status: ON HOLD | OUTPATIENT
Start: 2020-07-02 | End: 2020-07-15

## 2020-07-02 NOTE — PROGRESS NOTES
Virtual Telephone Check-In    Mari Wilson verbally consents to a Virtual/Telephone Check-In visit on 07/02/20. Patient has been referred to the [facility] website at [email or web address] to review the yearly Consent to Treat document.     Patient understand Andre Hooper MD

## 2020-07-06 ENCOUNTER — TELEMEDICINE (OUTPATIENT)
Dept: FAMILY MEDICINE CLINIC | Facility: CLINIC | Age: 80
End: 2020-07-06
Payer: MEDICARE

## 2020-07-06 ENCOUNTER — APPOINTMENT (OUTPATIENT)
Dept: CT IMAGING | Age: 80
DRG: 270 | End: 2020-07-06
Attending: EMERGENCY MEDICINE
Payer: MEDICARE

## 2020-07-06 ENCOUNTER — APPOINTMENT (OUTPATIENT)
Dept: GENERAL RADIOLOGY | Age: 80
DRG: 270 | End: 2020-07-06
Attending: PHYSICIAN ASSISTANT
Payer: MEDICARE

## 2020-07-06 ENCOUNTER — HOSPITAL ENCOUNTER (INPATIENT)
Facility: HOSPITAL | Age: 80
LOS: 9 days | Discharge: HOME HEALTH | DRG: 270 | End: 2020-07-15
Attending: EMERGENCY MEDICINE | Admitting: HOSPITALIST
Payer: MEDICARE

## 2020-07-06 ENCOUNTER — APPOINTMENT (OUTPATIENT)
Dept: ULTRASOUND IMAGING | Age: 80
DRG: 270 | End: 2020-07-06
Attending: PHYSICIAN ASSISTANT
Payer: MEDICARE

## 2020-07-06 DIAGNOSIS — K59.00 CONSTIPATION, UNSPECIFIED CONSTIPATION TYPE: ICD-10-CM

## 2020-07-06 DIAGNOSIS — M79.662 PAIN AND SWELLING OF LEFT LOWER LEG: Primary | ICD-10-CM

## 2020-07-06 DIAGNOSIS — M79.89 PAIN AND SWELLING OF LEFT LOWER LEG: Primary | ICD-10-CM

## 2020-07-06 DIAGNOSIS — I87.2 VENOUS INSUFFICIENCY: ICD-10-CM

## 2020-07-06 DIAGNOSIS — M54.42 ACUTE MIDLINE LOW BACK PAIN WITH BILATERAL SCIATICA: ICD-10-CM

## 2020-07-06 DIAGNOSIS — I26.94 MULTIPLE SUBSEGMENTAL PULMONARY EMBOLI WITHOUT ACUTE COR PULMONALE (HCC): ICD-10-CM

## 2020-07-06 DIAGNOSIS — Z98.890 STATUS POST LUMBAR SURGERY: ICD-10-CM

## 2020-07-06 DIAGNOSIS — N30.01 ACUTE CYSTITIS WITH HEMATURIA: ICD-10-CM

## 2020-07-06 DIAGNOSIS — I82.4Y3 ACUTE DEEP VEIN THROMBOSIS (DVT) OF PROXIMAL VEIN OF BOTH LOWER EXTREMITIES (HCC): Primary | ICD-10-CM

## 2020-07-06 DIAGNOSIS — M54.41 ACUTE MIDLINE LOW BACK PAIN WITH BILATERAL SCIATICA: ICD-10-CM

## 2020-07-06 PROBLEM — I82.403 LEG DVT (DEEP VENOUS THROMBOEMBOLISM), ACUTE, BILATERAL (HCC): Status: ACTIVE | Noted: 2020-07-06

## 2020-07-06 PROBLEM — I82.413 DVT FEMORAL (DEEP VENOUS THROMBOSIS) WITH THROMBOPHLEBITIS, BILATERAL (HCC): Status: ACTIVE | Noted: 2020-07-06

## 2020-07-06 LAB
ALBUMIN SERPL-MCNC: 3.1 G/DL (ref 3.4–5)
ALBUMIN/GLOB SERPL: 0.7 {RATIO} (ref 1–2)
ALP LIVER SERPL-CCNC: 175 U/L (ref 45–117)
ALT SERPL-CCNC: 21 U/L (ref 16–61)
ANION GAP SERPL CALC-SCNC: 1 MMOL/L (ref 0–18)
ANION GAP SERPL CALC-SCNC: 6 MMOL/L (ref 0–18)
APTT PPP: 183.1 SECONDS (ref 25.4–36.1)
APTT PPP: 44.8 SECONDS (ref 25.4–36.1)
AST SERPL-CCNC: 20 U/L (ref 15–37)
ATRIAL RATE: 87 BPM
BASOPHILS # BLD AUTO: 0.02 X10(3) UL (ref 0–0.2)
BASOPHILS NFR BLD AUTO: 0.2 %
BILIRUB SERPL-MCNC: 1.3 MG/DL (ref 0.1–2)
BUN BLD-MCNC: 22 MG/DL (ref 7–18)
BUN BLD-MCNC: 23 MG/DL (ref 7–18)
BUN/CREAT SERPL: 12.6 (ref 10–20)
BUN/CREAT SERPL: 15.4 (ref 10–20)
CALCIUM BLD-MCNC: 9 MG/DL (ref 8.5–10.1)
CALCIUM BLD-MCNC: 9.1 MG/DL (ref 8.5–10.1)
CHLORIDE SERPL-SCNC: 100 MMOL/L (ref 98–112)
CHLORIDE SERPL-SCNC: 102 MMOL/L (ref 98–112)
CO2 SERPL-SCNC: 27 MMOL/L (ref 21–32)
CO2 SERPL-SCNC: 29 MMOL/L (ref 21–32)
COLOR UR AUTO: YELLOW
CREAT BLD-MCNC: 1.49 MG/DL (ref 0.7–1.3)
CREAT BLD-MCNC: 1.75 MG/DL (ref 0.7–1.3)
DEPRECATED RDW RBC AUTO: 52.5 FL (ref 35.1–46.3)
EOSINOPHIL # BLD AUTO: 0.2 X10(3) UL (ref 0–0.7)
EOSINOPHIL NFR BLD AUTO: 2.2 %
ERYTHROCYTE [DISTWIDTH] IN BLOOD BY AUTOMATED COUNT: 17 % (ref 11–15)
GLOBULIN PLAS-MCNC: 4.5 G/DL (ref 2.8–4.4)
GLUCOSE BLD-MCNC: 121 MG/DL (ref 70–99)
GLUCOSE BLD-MCNC: 148 MG/DL (ref 70–99)
GLUCOSE UR STRIP.AUTO-MCNC: NEGATIVE MG/DL
HCT VFR BLD AUTO: 36.4 % (ref 39–53)
HGB BLD-MCNC: 10.9 G/DL (ref 13–17.5)
HYALINE CASTS #/AREA URNS AUTO: PRESENT /LPF
IMM GRANULOCYTES # BLD AUTO: 0.07 X10(3) UL (ref 0–1)
IMM GRANULOCYTES NFR BLD: 0.8 %
KETONES UR STRIP.AUTO-MCNC: NEGATIVE MG/DL
LYMPHOCYTES # BLD AUTO: 0.43 X10(3) UL (ref 1–4)
LYMPHOCYTES NFR BLD AUTO: 4.7 %
M PROTEIN MFR SERPL ELPH: 7.6 G/DL (ref 6.4–8.2)
MCH RBC QN AUTO: 25.9 PG (ref 26–34)
MCHC RBC AUTO-ENTMCNC: 29.9 G/DL (ref 31–37)
MCV RBC AUTO: 86.5 FL (ref 80–100)
MONOCYTES # BLD AUTO: 0.76 X10(3) UL (ref 0.1–1)
MONOCYTES NFR BLD AUTO: 8.2 %
NEUTROPHILS # BLD AUTO: 7.75 X10 (3) UL (ref 1.5–7.7)
NEUTROPHILS # BLD AUTO: 7.75 X10(3) UL (ref 1.5–7.7)
NEUTROPHILS NFR BLD AUTO: 83.9 %
NITRITE UR QL STRIP.AUTO: NEGATIVE
NT-PROBNP SERPL-MCNC: 130 PG/ML (ref ?–450)
OSMOLALITY SERPL CALC.SUM OF ELEC: 279 MOSM/KG (ref 275–295)
OSMOLALITY SERPL CALC.SUM OF ELEC: 282 MOSM/KG (ref 275–295)
P AXIS: 49 DEGREES
P-R INTERVAL: 208 MS
PH UR STRIP.AUTO: 5.5 [PH] (ref 4.5–8)
PLATELET # BLD AUTO: 113 10(3)UL (ref 150–450)
POTASSIUM SERPL-SCNC: 4.4 MMOL/L (ref 3.5–5.1)
POTASSIUM SERPL-SCNC: 4.5 MMOL/L (ref 3.5–5.1)
Q-T INTERVAL: 352 MS
QRS DURATION: 90 MS
QTC CALCULATION (BEZET): 423 MS
R AXIS: -9 DEGREES
RBC # BLD AUTO: 4.21 X10(6)UL (ref 3.8–5.8)
RBC #/AREA URNS AUTO: >10 /HPF
SARS-COV-2 RNA RESP QL NAA+PROBE: NOT DETECTED
SODIUM SERPL-SCNC: 132 MMOL/L (ref 136–145)
SODIUM SERPL-SCNC: 133 MMOL/L (ref 136–145)
SP GR UR STRIP.AUTO: 1.01 (ref 1–1.03)
T AXIS: 22 DEGREES
TROPONIN I SERPL-MCNC: <0.045 NG/ML (ref ?–0.04)
UROBILINOGEN UR STRIP.AUTO-MCNC: 0.2 MG/DL
VENTRICULAR RATE: 87 BPM
WBC # BLD AUTO: 9.2 X10(3) UL (ref 4–11)

## 2020-07-06 PROCEDURE — 74177 CT ABD & PELVIS W/CONTRAST: CPT | Performed by: EMERGENCY MEDICINE

## 2020-07-06 PROCEDURE — 71045 X-RAY EXAM CHEST 1 VIEW: CPT | Performed by: PHYSICIAN ASSISTANT

## 2020-07-06 PROCEDURE — 93970 EXTREMITY STUDY: CPT | Performed by: PHYSICIAN ASSISTANT

## 2020-07-06 PROCEDURE — 99214 OFFICE O/P EST MOD 30 MIN: CPT | Performed by: FAMILY MEDICINE

## 2020-07-06 PROCEDURE — 99223 1ST HOSP IP/OBS HIGH 75: CPT | Performed by: HOSPITALIST

## 2020-07-06 PROCEDURE — 71275 CT ANGIOGRAPHY CHEST: CPT | Performed by: EMERGENCY MEDICINE

## 2020-07-06 RX ORDER — ASPIRIN 81 MG/1
81 TABLET, CHEWABLE ORAL DAILY
Status: DISCONTINUED | OUTPATIENT
Start: 2020-07-07 | End: 2020-07-10

## 2020-07-06 RX ORDER — PANTOPRAZOLE SODIUM 40 MG/1
40 TABLET, DELAYED RELEASE ORAL
Status: DISCONTINUED | OUTPATIENT
Start: 2020-07-07 | End: 2020-07-15

## 2020-07-06 RX ORDER — HEPARIN SODIUM 5000 [USP'U]/ML
80 INJECTION INTRAVENOUS; SUBCUTANEOUS ONCE
Status: COMPLETED | OUTPATIENT
Start: 2020-07-06 | End: 2020-07-06

## 2020-07-06 RX ORDER — ROSUVASTATIN CALCIUM 10 MG/1
10 TABLET, COATED ORAL NIGHTLY
Status: DISCONTINUED | OUTPATIENT
Start: 2020-07-06 | End: 2020-07-15

## 2020-07-06 RX ORDER — HYDROMORPHONE HYDROCHLORIDE 1 MG/ML
0.5 INJECTION, SOLUTION INTRAMUSCULAR; INTRAVENOUS; SUBCUTANEOUS ONCE
Status: COMPLETED | OUTPATIENT
Start: 2020-07-06 | End: 2020-07-06

## 2020-07-06 RX ORDER — TAMSULOSIN HYDROCHLORIDE 0.4 MG/1
0.4 CAPSULE ORAL EVERY EVENING
Status: DISCONTINUED | OUTPATIENT
Start: 2020-07-06 | End: 2020-07-07

## 2020-07-06 RX ORDER — TRAMADOL HYDROCHLORIDE 50 MG/1
TABLET ORAL EVERY 6 HOURS PRN
Status: DISCONTINUED | OUTPATIENT
Start: 2020-07-06 | End: 2020-07-15

## 2020-07-06 RX ORDER — HEPARIN SODIUM AND DEXTROSE 10000; 5 [USP'U]/100ML; G/100ML
18 INJECTION INTRAVENOUS ONCE
Status: COMPLETED | OUTPATIENT
Start: 2020-07-06 | End: 2020-07-06

## 2020-07-06 RX ORDER — ONDANSETRON 2 MG/ML
4 INJECTION INTRAMUSCULAR; INTRAVENOUS EVERY 6 HOURS PRN
Status: DISCONTINUED | OUTPATIENT
Start: 2020-07-06 | End: 2020-07-15

## 2020-07-06 RX ORDER — ACETAMINOPHEN 325 MG/1
650 TABLET ORAL EVERY 6 HOURS PRN
Status: DISCONTINUED | OUTPATIENT
Start: 2020-07-06 | End: 2020-07-15

## 2020-07-06 RX ORDER — TERBINAFINE HYDROCHLORIDE 250 MG/1
250 TABLET ORAL DAILY
Status: DISCONTINUED | OUTPATIENT
Start: 2020-07-06 | End: 2020-07-07

## 2020-07-06 RX ORDER — LATANOPROST 50 UG/ML
1 SOLUTION/ DROPS OPHTHALMIC NIGHTLY
Status: DISCONTINUED | OUTPATIENT
Start: 2020-07-06 | End: 2020-07-15

## 2020-07-06 RX ORDER — HEPARIN SODIUM AND DEXTROSE 10000; 5 [USP'U]/100ML; G/100ML
INJECTION INTRAVENOUS CONTINUOUS
Status: DISCONTINUED | OUTPATIENT
Start: 2020-07-06 | End: 2020-07-15

## 2020-07-06 RX ORDER — METOCLOPRAMIDE HYDROCHLORIDE 5 MG/ML
10 INJECTION INTRAMUSCULAR; INTRAVENOUS EVERY 8 HOURS PRN
Status: DISCONTINUED | OUTPATIENT
Start: 2020-07-06 | End: 2020-07-15

## 2020-07-06 RX ORDER — CEFAZOLIN SODIUM/WATER 2 G/20 ML
2 SYRINGE (ML) INTRAVENOUS EVERY 8 HOURS
Status: DISCONTINUED | OUTPATIENT
Start: 2020-07-07 | End: 2020-07-14

## 2020-07-06 RX ORDER — CYCLOBENZAPRINE HCL 10 MG
10 TABLET ORAL 3 TIMES DAILY
Status: DISCONTINUED | OUTPATIENT
Start: 2020-07-06 | End: 2020-07-13

## 2020-07-06 RX ORDER — DICLOXACILLIN SODIUM 250 MG/1
500 CAPSULE ORAL 3 TIMES DAILY
Status: DISCONTINUED | OUTPATIENT
Start: 2020-07-06 | End: 2020-07-07

## 2020-07-06 RX ORDER — PRAMIPEXOLE DIHYDROCHLORIDE 0.25 MG/1
1 TABLET ORAL DAILY
Status: DISCONTINUED | OUTPATIENT
Start: 2020-07-07 | End: 2020-07-15

## 2020-07-06 RX ORDER — TRAZODONE HYDROCHLORIDE 50 MG/1
50 TABLET ORAL NIGHTLY PRN
Status: DISCONTINUED | OUTPATIENT
Start: 2020-07-06 | End: 2020-07-15

## 2020-07-06 NOTE — PROGRESS NOTES
This is a telemedicine visit with live, interactive video and audio. Patient understands and accepts financial responsibility for any deductible, co-insurance and/or co-pays associated with this service.     SUBJECTIVE    66 yo male with hyperlipidemia, date: 2/3/1987        Years since quittin.4      Smokeless tobacco: Never Used    Alcohol use: Yes      Comment: rarely    Drug use: No       No Known Allergies   Current Outpatient Medications   Medication Sig Dispense Refill   • traMADol HCl 50 MG O visit:    Pain and swelling of left lower leg  -     US VENOUS DOPPLER LEG LEFT - DIAG IMG (CPT=93971);  Future    Venous insufficiency    Acute midline low back pain with bilateral sciatica    Status post lumbar surgery    Complicated PMH with hx of verteb

## 2020-07-06 NOTE — ED INITIAL ASSESSMENT (HPI)
Patient here with swelling to bilateral legs, history of DVT/PE. Patient states SOB with exertion which is normal.  Patient is not on any blood thinners.

## 2020-07-06 NOTE — ED PROVIDER NOTES
Patient Seen in: [facility] Emergency Department In [place]      History   Patient presents with:  Swelling Edema    Stated Complaint: LEG SWELLING - BACK PAIN    HPI    73-year-old white male who presents emergency room today for complaint of leg swellin systems reviewed and negative except as noted above.     Physical Exam     ED Triage Vitals [07/06/20 1248]   /64   Pulse 95   Resp 24   Temp 97.8 °F (36.6 °C)   Temp src Temporal   SpO2 100 %   O2 Device None (Room air)       Current:/73   Puls PTT 44.8 (*)     All other components within normal limits   URINE MICROSCOPIC W REFLEX CULTURE - Abnormal; Notable for the following components:    WBC Urine 5-10 (*)     RBC URINE >10 (*)     Bacteria Urine 1+ (*)     Hyaline Casts Present (*)     All ot persistent clinical concern then consider CT. CT scan of chest abdomen pelvis reveals:    FINDINGS:    LUNGS:  Scattered atelectasis/scarring in the bilateral lungs, most pronounced in the lower lobes. No lobar consolidation.   Large airways are wid Urinary bladder wall is thickened and there is air in the urinary bladder. There is surrounding fatty induration. This may be due to underlying cystitis, however clinical correlation is   recommended. Unremarkable prostate gland.   LYMPH NODES:  There ar vein, and right calf veins. COMPRESSION:  Noncompressible segments as described above. OTHER:  Some portions of the IVC were imaged and are grossly patent although evaluation is limited due to body habitus.      CONCLUSION:  Extensive bilateral lower extr without acute cor pulmonale (HCC)  Acute cystitis with hematuria    Disposition:  Admit  7/6/2020  5:18 pm    Follow-up:  No follow-up provider specified.         Medications Prescribed:  Current Discharge Medication List                       Present on Ad

## 2020-07-06 NOTE — ED NOTES
Report called to [facility] [place]. Called placed to [facility] for transport. Covid specimen to go with patient.

## 2020-07-06 NOTE — ED PROVIDER NOTES
Patient Seen in: [facility] In [place]      History   Patient presents with:  Swelling Edema    Stated Complaint: LEG SWELLING - BACK PAIN    HPI    71-year-old male with past medical history noted below presents to the ED for evaluat Smoking status: Former Smoker        Quit date: 2/3/1987        Years since quittin.4      Smokeless tobacco: Never Used    Alcohol use: Yes      Comment: rarely    Drug use:  No             Review of Systems    Positive for stated complaint: LEG SWELL (BLE) present. Neurological:      Mental Status: He is alert and oriented to person, place, and time. Sensory: No sensory deficit. Motor: No weakness.       Coordination: Coordination normal.   Psychiatric:         Mood and Affect: Mood normal. Status                     ---------                               -----------         ------                     CBC W/ DIFFERENTIAL[[id number]]          Abnormal            Final result                 Please view results for these tests on the individua deep vein thrombosis (DVT) of proximal vein of both lower extremities ([facility] [address]  (primary encounter diagnosis)    Disposition:  Admit  7/6/2020  5:18 pm    Follow-up:  No follow-up provider specified.         Medications Prescribed:  Current Discharge Medicatio

## 2020-07-07 ENCOUNTER — APPOINTMENT (OUTPATIENT)
Dept: CV DIAGNOSTICS | Facility: HOSPITAL | Age: 80
DRG: 270 | End: 2020-07-07
Attending: HOSPITALIST
Payer: MEDICARE

## 2020-07-07 LAB
ANION GAP SERPL CALC-SCNC: 3 MMOL/L (ref 0–18)
APTT PPP: 117.1 SECONDS (ref 25.4–36.1)
APTT PPP: 77.5 SECONDS (ref 25.4–36.1)
BASOPHILS # BLD AUTO: 0.03 X10(3) UL (ref 0–0.2)
BASOPHILS NFR BLD AUTO: 0.3 %
BUN BLD-MCNC: 23 MG/DL (ref 7–18)
BUN/CREAT SERPL: 13.4 (ref 10–20)
CALCIUM BLD-MCNC: 9.2 MG/DL (ref 8.5–10.1)
CHLORIDE SERPL-SCNC: 102 MMOL/L (ref 98–112)
CO2 SERPL-SCNC: 29 MMOL/L (ref 21–32)
CREAT BLD-MCNC: 1.72 MG/DL (ref 0.7–1.3)
CRP SERPL-MCNC: 18.7 MG/DL (ref ?–0.3)
DEPRECATED HBV CORE AB SER IA-ACNC: 104.7 NG/ML (ref 30–530)
DEPRECATED RDW RBC AUTO: 52.1 FL (ref 35.1–46.3)
EOSINOPHIL # BLD AUTO: 0.33 X10(3) UL (ref 0–0.7)
EOSINOPHIL NFR BLD AUTO: 3.6 %
ERYTHROCYTE [DISTWIDTH] IN BLOOD BY AUTOMATED COUNT: 17 % (ref 11–15)
GLUCOSE BLD-MCNC: 111 MG/DL (ref 70–99)
HCT VFR BLD AUTO: 34 % (ref 39–53)
HGB BLD-MCNC: 10.1 G/DL (ref 13–17.5)
HGB RETIC QN AUTO: 28.9 PG (ref 28.2–36.6)
IMM GRANULOCYTES # BLD AUTO: 0.06 X10(3) UL (ref 0–1)
IMM GRANULOCYTES NFR BLD: 0.7 %
IMM RETICS NFR: 0.33 RATIO (ref 0.1–0.3)
LDH SERPL L TO P-CCNC: 290 U/L
LYMPHOCYTES # BLD AUTO: 0.59 X10(3) UL (ref 1–4)
LYMPHOCYTES NFR BLD AUTO: 6.4 %
MCH RBC QN AUTO: 25.6 PG (ref 26–34)
MCHC RBC AUTO-ENTMCNC: 29.7 G/DL (ref 31–37)
MCV RBC AUTO: 86.3 FL (ref 80–100)
MONOCYTES # BLD AUTO: 1.01 X10(3) UL (ref 0.1–1)
MONOCYTES NFR BLD AUTO: 11 %
NEUTROPHILS # BLD AUTO: 7.2 X10 (3) UL (ref 1.5–7.7)
NEUTROPHILS # BLD AUTO: 7.2 X10(3) UL (ref 1.5–7.7)
NEUTROPHILS NFR BLD AUTO: 78 %
OSMOLALITY SERPL CALC.SUM OF ELEC: 282 MOSM/KG (ref 275–295)
PLATELET # BLD AUTO: 128 10(3)UL (ref 150–450)
POTASSIUM SERPL-SCNC: 4.7 MMOL/L (ref 3.5–5.1)
RBC # BLD AUTO: 3.94 X10(6)UL (ref 3.8–5.8)
RETICS # AUTO: 76 X10(3) UL (ref 22.5–147.5)
RETICS/RBC NFR AUTO: 1.9 % (ref 0.5–2.5)
SED RATE-ML: 23 MM/HR (ref 0–12)
SODIUM SERPL-SCNC: 134 MMOL/L (ref 136–145)
WBC # BLD AUTO: 9.2 X10(3) UL (ref 4–11)

## 2020-07-07 PROCEDURE — 99232 SBSQ HOSP IP/OBS MODERATE 35: CPT | Performed by: HOSPITALIST

## 2020-07-07 PROCEDURE — 99223 1ST HOSP IP/OBS HIGH 75: CPT | Performed by: INTERNAL MEDICINE

## 2020-07-07 PROCEDURE — 99223 1ST HOSP IP/OBS HIGH 75: CPT | Performed by: UROLOGY

## 2020-07-07 PROCEDURE — 93306 TTE W/DOPPLER COMPLETE: CPT | Performed by: HOSPITALIST

## 2020-07-07 RX ORDER — SILODOSIN 8 MG/1
8 CAPSULE ORAL DAILY
Status: DISCONTINUED | OUTPATIENT
Start: 2020-07-07 | End: 2020-07-15

## 2020-07-07 RX ORDER — BISACODYL 10 MG
10 SUPPOSITORY, RECTAL RECTAL
Status: DISCONTINUED | OUTPATIENT
Start: 2020-07-07 | End: 2020-07-15

## 2020-07-07 RX ORDER — POLYETHYLENE GLYCOL 3350 17 G/17G
17 POWDER, FOR SOLUTION ORAL DAILY
Status: DISCONTINUED | OUTPATIENT
Start: 2020-07-07 | End: 2020-07-15

## 2020-07-07 RX ORDER — FINASTERIDE 5 MG/1
5 TABLET, FILM COATED ORAL DAILY
Status: DISCONTINUED | OUTPATIENT
Start: 2020-07-07 | End: 2020-07-15

## 2020-07-07 NOTE — H&P
[facility]  History and Physical     Florentin Randy Recio Patient Status:  Inpatient    1940 MRN [id number]   [facility] 7NE-A Attending Greta Gregory MD   Hosp Day # 0 PCP Viet Iniguez MD     Chief Complaint: back pain    History tablet, Rfl: 4  FUROSEMIDE 20 MG Oral Tab, TAKE 1 TABLET TWICE A DAY (CANCEL PRESCRIPTION FOR ONE DAILY), Disp: 180 tablet, Rfl: 4  PANTOPRAZOLE SODIUM 40 MG Oral Tab EC, TAKE 1 TABLET TWICE A DAY, Disp: 180 tablet, Rfl: 4  latanoprost 0.005 % Ophthalmic S WBC 9.2   HGB 10.9*   MCV 86.5   .0*       Recent Labs   Lab 07/06/20  1328   *   BUN 23*   CREATSERUM 1.49*   GFRAA 51*   GFRNAA 44*   CA 9.0   ALB 3.1*   *   K 4.5      CO2 27.0   ALKPHO 175*   AST 20   ALT 21   BILT 1.3   TP

## 2020-07-07 NOTE — ED NOTES
[facility] given to [facility].  Pt transferred to [facility] at this time by ambulance

## 2020-07-07 NOTE — CONSULTS
Hematology/Oncology Initial Consultation Note    Patient Name: Valery Chaudhry  Medical Record Number: [id number]    YOB: 1940   Date of Consultation: 7/7/2020   Physician requesting consultation: Dr. Akhil Greene    Reason for Consultation:  D Oglesby catheter in the urinary bladder. Urinary bladder wall is thickened and there is air in the urinary bladder. There is surrounding fatty induration. This may be due to underlying cystitis, however clinical correlation is recommended.  Enlarged pelvic IVC filter 02/09/2015    cook celect filter.   Had 3 different IVC filters placed previous to current filter   • Pulmonary embolism ([address] 2011     Past Surgical History:   Procedure Laterality Date   • CATARACT  2012   • COLONOSCOPY  09/12/2016   • KNEE REP Pantoprazole Sodium  40 mg Oral BID AC   • Pramipexole Dihydrochloride  1 mg Oral Daily   • Rosuvastatin Calcium  10 mg Oral Nightly   • Silodosin  8 mg Oral QPM   • cyclobenzaprine  10 mg Oral TID   • Pramipexole Dihydrochloride  1.5 mg Oral Nightly     • Examination:  General: Patient is alert and oriented, not in acute distress  Psych: Mood and affect are appropriate  Eyes: EOMI  ENT: Oropharynx is clear  CV: Regular rate and rhythm, no murmurs  Respiratory: Lungs clear to auscultation bilaterally  GI/Abd LDH  -repeat CBC tomorrow AM    *thrombocytopenia  -relatively mild, may be due to consumption from clot. -monitor CBC    *CKD  -baseline cre 1.3-1.9. *chronic spinal hardware infection   -no recent issues per discussion with wife.  On suppressive dic

## 2020-07-07 NOTE — CONSULTS
[facility]  Report of Consultation    Janny Cancer Nahs Patient Status:  Inpatient    1940 MRN [id number]   [facility] 7NE-A Attending Анна Reeves MD   Hosp Day # 1 PCP Jose Parada MD     Reason for Consultation:  Bilateral DVT 09/12/2016   • KNEE REPLACEMENT SURGERY Right 04/04/2016   • SPINAL FUSION  10/12/2018   • TONSILLECTOMY       Family History   Problem Relation Age of Onset   • Cancer Father    • Cancer Mother    • Cancer Brother       reports that he quit smoking about Max:98.9 °F (37.2 °C)    Wt Readings from Last 3 Encounters:  07/06/20 : (!) 312 lb (141.5 kg)  10/11/19 : (!) 307 lb (139.3 kg)  08/20/19 : 296 lb (134.3 kg)      Telemetry: Sinus  General: Alert and oriented in no apparent distress.   HEENT: EOMI, no scle may require multiple sessions on multiple days. On exam today, patient has bright red urine out of his Oglesby. This may be Oglesby trauma.   Given this, I think we need to have the patient seen by urology before proceeding with lytic therapy.   -Await urolog

## 2020-07-07 NOTE — PROGRESS NOTES
Case discussed with patient and wife. They would like an attempt at CDT.        Will schedule for am

## 2020-07-07 NOTE — PLAN OF CARE
Pt is alert and oriented x4, NSR, heparin gtt infusing. PTT therapeutic. Small amount of bright red blood noted in german this AM. Dr. Shelby Ellis notified, urology consulted. Per Dr. Mahad Sanders, urine cx repeated, stat lock moved, proscar started.  BLE +3-4 edema,

## 2020-07-07 NOTE — CONSULTS
[facility]     Report of Consultation    [address]  [phone]       Sebas Recio Patient Status:  Inpatient    1940 MRN [id number]   [facility] 7NE-A Attending Joceline Rivera MD   Hosp Day # 1 PCP Estephania Stearns MD 04/04/2016   • OTHER  06/2012    aortic and iliac artery stent   • SPINAL FUSION  10/12/2018   • TONSILLECTOMY       Family History   Problem Relation Age of Onset   • Cancer Father    • Cancer Mother    • Cancer Brother    • DVT/VTE Neg       reports that Intake/Output Summary (Last 24 hours) at 7/7/2020 1329  Last data filed at 7/7/2020 1300  Gross per 24 hour   Intake 380 ml   Output 950 ml   Net -570 ml       General Appearance:    Alert, cooperative, obese white male resting in bed with no distress, 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 Unremarkable left adrenal gland. KIDNEYS:  2.4 cm probable cyst in the upper pole left kidney.   AORTA/VASCULAR:  Endovascular aortic stent graft noted in the infrarenal abd this exam due to phase of imaging. IVC thrombus is not entirely excluded. The common iliac veins and external iliac veins are dilated/distended and demonstrate surrounding fatty infiltration which   may be due to underlying thrombus.      3. Oglesby cathete

## 2020-07-07 NOTE — OCCUPATIONAL THERAPY NOTE
Received order for OT evaluation. Positive for PE and B LE DVT. Per department protocol, hold therapy for 24 hrs from initial dose of heparin. Per Epic, initial heparin dose was ordered on 15:45 on 7/6.

## 2020-07-07 NOTE — PROGRESS NOTES
[facility]  Progress Note     Matthew Recio Patient Status:  Inpatient    1940 MRN [id number]   [facility] 7NE-A Attending Raissa Olivarez MD   Hosp Day # 1 PCP Rachel Fernández MD     Chief Complaint: DVT, hematuria    S: Patient h hours. Recent Labs   Lab 07/06/20  1328   TROP <0.045            Imaging: Imaging data reviewed in Epic.     Medications:   • Silodosin  8 mg Oral Daily   • PEG 3350  17 g Oral Daily   • finasteride  5 mg Oral Daily   • ceFAZolin  2 g Intravenous Q8H   •

## 2020-07-07 NOTE — PROGRESS NOTES
[facility] Pharmacy Note: Antimicrobial Weight Based Dose Adjustment for: cefazolin (ANCEF)    Theodora Patterson is a 67year old patient who has been prescribed cefazolin (ANCEF) 1 g every 8 hours.     Estimated Creatinine Clearance: 47.5 mL/min (A) (based on SCr of 1

## 2020-07-08 ENCOUNTER — APPOINTMENT (OUTPATIENT)
Dept: INTERVENTIONAL RADIOLOGY/VASCULAR | Facility: HOSPITAL | Age: 80
DRG: 270 | End: 2020-07-08
Attending: NURSE PRACTITIONER
Payer: MEDICARE

## 2020-07-08 LAB
ANION GAP SERPL CALC-SCNC: 5 MMOL/L (ref 0–18)
ANTIBODY SCREEN: NEGATIVE
APTT PPP: 59.8 SECONDS (ref 25.4–36.1)
APTT PPP: 63.2 SECONDS (ref 25.4–36.1)
APTT PPP: 70.2 SECONDS (ref 25.4–36.1)
BASOPHILS # BLD AUTO: 0.01 X10(3) UL (ref 0–0.2)
BASOPHILS NFR BLD AUTO: 0.1 %
BILIRUB UR QL STRIP.AUTO: NEGATIVE
BUN BLD-MCNC: 20 MG/DL (ref 7–18)
BUN/CREAT SERPL: 13.7 (ref 10–20)
CALCIUM BLD-MCNC: 9.4 MG/DL (ref 8.5–10.1)
CHLORIDE SERPL-SCNC: 101 MMOL/L (ref 98–112)
CO2 SERPL-SCNC: 27 MMOL/L (ref 21–32)
CREAT BLD-MCNC: 1.46 MG/DL (ref 0.7–1.3)
CREAT UR-SCNC: 42.7 MG/DL
DEPRECATED RDW RBC AUTO: 52.3 FL (ref 35.1–46.3)
DEPRECATED RDW RBC AUTO: 52.7 FL (ref 35.1–46.3)
DEPRECATED RDW RBC AUTO: 53.1 FL (ref 35.1–46.3)
EOSINOPHIL # BLD AUTO: 0.42 X10(3) UL (ref 0–0.7)
EOSINOPHIL NFR BLD AUTO: 4.9 %
ERYTHROCYTE [DISTWIDTH] IN BLOOD BY AUTOMATED COUNT: 16.8 % (ref 11–15)
ERYTHROCYTE [DISTWIDTH] IN BLOOD BY AUTOMATED COUNT: 16.9 % (ref 11–15)
ERYTHROCYTE [DISTWIDTH] IN BLOOD BY AUTOMATED COUNT: 16.9 % (ref 11–15)
FIBRINOGEN: 168 MG/DL (ref 200–446)
FIBRINOGEN: 240 MG/DL (ref 200–446)
GLUCOSE BLD-MCNC: 104 MG/DL (ref 70–99)
GLUCOSE UR STRIP.AUTO-MCNC: 50 MG/DL
HCT VFR BLD AUTO: 31.1 % (ref 39–53)
HCT VFR BLD AUTO: 32.9 % (ref 39–53)
HCT VFR BLD AUTO: 34.4 % (ref 39–53)
HGB BLD-MCNC: 10.2 G/DL (ref 13–17.5)
HGB BLD-MCNC: 10.3 G/DL (ref 13–17.5)
HGB BLD-MCNC: 9.5 G/DL (ref 13–17.5)
IMM GRANULOCYTES # BLD AUTO: 0.08 X10(3) UL (ref 0–1)
IMM GRANULOCYTES NFR BLD: 0.9 %
INR BLD: 1.52 (ref 0.89–1.11)
INR BLD: 1.53 (ref 0.89–1.11)
KETONES UR STRIP.AUTO-MCNC: NEGATIVE MG/DL
LEUKOCYTE ESTERASE UR QL STRIP.AUTO: NEGATIVE
LYMPHOCYTES # BLD AUTO: 0.55 X10(3) UL (ref 1–4)
LYMPHOCYTES NFR BLD AUTO: 6.4 %
MCH RBC QN AUTO: 25.9 PG (ref 26–34)
MCH RBC QN AUTO: 26 PG (ref 26–34)
MCH RBC QN AUTO: 27.1 PG (ref 26–34)
MCHC RBC AUTO-ENTMCNC: 29.9 G/DL (ref 31–37)
MCHC RBC AUTO-ENTMCNC: 30.5 G/DL (ref 31–37)
MCHC RBC AUTO-ENTMCNC: 31 G/DL (ref 31–37)
MCV RBC AUTO: 85.2 FL (ref 80–100)
MCV RBC AUTO: 86.4 FL (ref 80–100)
MCV RBC AUTO: 87.5 FL (ref 80–100)
MONOCYTES # BLD AUTO: 0.87 X10(3) UL (ref 0.1–1)
MONOCYTES NFR BLD AUTO: 10.1 %
NEUTROPHILS # BLD AUTO: 6.72 X10 (3) UL (ref 1.5–7.7)
NEUTROPHILS # BLD AUTO: 6.72 X10(3) UL (ref 1.5–7.7)
NEUTROPHILS NFR BLD AUTO: 77.6 %
NITRITE UR QL STRIP.AUTO: NEGATIVE
OSMOLALITY SERPL CALC.SUM OF ELEC: 279 MOSM/KG (ref 275–295)
PH UR STRIP.AUTO: 6 [PH] (ref 4.5–8)
PLATELET # BLD AUTO: 138 10(3)UL (ref 150–450)
PLATELET # BLD AUTO: 143 10(3)UL (ref 150–450)
PLATELET # BLD AUTO: 154 10(3)UL (ref 150–450)
POTASSIUM SERPL-SCNC: 4.5 MMOL/L (ref 3.5–5.1)
PROT UR STRIP.AUTO-MCNC: 100 MG/DL
PROT UR-MCNC: 684.2 MG/DL
PSA SERPL DL<=0.01 NG/ML-MCNC: 18.7 SECONDS (ref 12.4–14.6)
PSA SERPL DL<=0.01 NG/ML-MCNC: 18.8 SECONDS (ref 12.4–14.6)
RBC # BLD AUTO: 3.65 X10(6)UL (ref 3.8–5.8)
RBC # BLD AUTO: 3.76 X10(6)UL (ref 3.8–5.8)
RBC # BLD AUTO: 3.98 X10(6)UL (ref 3.8–5.8)
RBC #/AREA URNS AUTO: >10 /HPF
RH BLOOD TYPE: NEGATIVE
SODIUM SERPL-SCNC: 133 MMOL/L (ref 136–145)
SP GR UR STRIP.AUTO: 1.01 (ref 1–1.03)
UROBILINOGEN UR STRIP.AUTO-MCNC: <2 MG/DL
WBC # BLD AUTO: 14.1 X10(3) UL (ref 4–11)
WBC # BLD AUTO: 14.2 X10(3) UL (ref 4–11)
WBC # BLD AUTO: 8.7 X10(3) UL (ref 4–11)

## 2020-07-08 PROCEDURE — 06C03ZZ EXTIRPATION OF MATTER FROM INFERIOR VENA CAVA, PERCUTANEOUS APPROACH: ICD-10-PCS | Performed by: INTERNAL MEDICINE

## 2020-07-08 PROCEDURE — B51HYZZ FLUOROSCOPY OF BILATERAL PELVIC (ILIAC) VEINS USING OTHER CONTRAST: ICD-10-PCS | Performed by: INTERNAL MEDICINE

## 2020-07-08 PROCEDURE — 99152 MOD SED SAME PHYS/QHP 5/>YRS: CPT | Performed by: INTERNAL MEDICINE

## 2020-07-08 PROCEDURE — 06CY3ZZ EXTIRPATION OF MATTER FROM LOWER VEIN, PERCUTANEOUS APPROACH: ICD-10-PCS | Performed by: INTERNAL MEDICINE

## 2020-07-08 PROCEDURE — 36005 INJECTION EXT VENOGRAPHY: CPT | Performed by: INTERNAL MEDICINE

## 2020-07-08 PROCEDURE — 99232 SBSQ HOSP IP/OBS MODERATE 35: CPT | Performed by: INTERNAL MEDICINE

## 2020-07-08 PROCEDURE — 06CN3ZZ EXTIRPATION OF MATTER FROM LEFT FEMORAL VEIN, PERCUTANEOUS APPROACH: ICD-10-PCS | Performed by: INTERNAL MEDICINE

## 2020-07-08 PROCEDURE — 99222 1ST HOSP IP/OBS MODERATE 55: CPT | Performed by: INTERNAL MEDICINE

## 2020-07-08 PROCEDURE — 75825 VEIN X-RAY TRUNK: CPT | Performed by: INTERNAL MEDICINE

## 2020-07-08 PROCEDURE — 36010 PLACE CATHETER IN VEIN: CPT | Performed by: INTERNAL MEDICINE

## 2020-07-08 PROCEDURE — 06CF3ZZ EXTIRPATION OF MATTER FROM RIGHT EXTERNAL ILIAC VEIN, PERCUTANEOUS APPROACH: ICD-10-PCS | Performed by: INTERNAL MEDICINE

## 2020-07-08 PROCEDURE — B51DYZZ FLUOROSCOPY OF BILATERAL LOWER EXTREMITY VEINS USING OTHER CONTRAST: ICD-10-PCS | Performed by: INTERNAL MEDICINE

## 2020-07-08 PROCEDURE — B519YZZ FLUOROSCOPY OF INFERIOR VENA CAVA USING OTHER CONTRAST: ICD-10-PCS | Performed by: INTERNAL MEDICINE

## 2020-07-08 PROCEDURE — 06CM3ZZ EXTIRPATION OF MATTER FROM RIGHT FEMORAL VEIN, PERCUTANEOUS APPROACH: ICD-10-PCS | Performed by: INTERNAL MEDICINE

## 2020-07-08 PROCEDURE — 3E03317 INTRODUCTION OF OTHER THROMBOLYTIC INTO PERIPHERAL VEIN, PERCUTANEOUS APPROACH: ICD-10-PCS | Performed by: INTERNAL MEDICINE

## 2020-07-08 PROCEDURE — 75822 VEIN X-RAY ARMS/LEGS: CPT | Performed by: INTERNAL MEDICINE

## 2020-07-08 PROCEDURE — 37212 THROMBOLYTIC VENOUS THERAPY: CPT | Performed by: INTERNAL MEDICINE

## 2020-07-08 PROCEDURE — 99232 SBSQ HOSP IP/OBS MODERATE 35: CPT | Performed by: HOSPITALIST

## 2020-07-08 PROCEDURE — 37187 VENOUS MECH THROMBECTOMY: CPT | Performed by: INTERNAL MEDICINE

## 2020-07-08 RX ORDER — MIDAZOLAM HYDROCHLORIDE 1 MG/ML
INJECTION INTRAMUSCULAR; INTRAVENOUS
Status: DISCONTINUED
Start: 2020-07-08 | End: 2020-07-08 | Stop reason: WASHOUT

## 2020-07-08 RX ORDER — HEPARIN SODIUM 1000 [USP'U]/ML
INJECTION, SOLUTION INTRAVENOUS; SUBCUTANEOUS
Status: COMPLETED
Start: 2020-07-08 | End: 2020-07-08

## 2020-07-08 RX ORDER — HEPARIN SODIUM 5000 [USP'U]/ML
INJECTION, SOLUTION INTRAVENOUS; SUBCUTANEOUS
Status: COMPLETED
Start: 2020-07-08 | End: 2020-07-08

## 2020-07-08 RX ORDER — HYDROMORPHONE HYDROCHLORIDE 1 MG/ML
0.5 INJECTION, SOLUTION INTRAMUSCULAR; INTRAVENOUS; SUBCUTANEOUS EVERY 2 HOUR PRN
Status: DISCONTINUED | OUTPATIENT
Start: 2020-07-08 | End: 2020-07-15

## 2020-07-08 RX ORDER — MIDAZOLAM HYDROCHLORIDE 1 MG/ML
INJECTION INTRAMUSCULAR; INTRAVENOUS
Status: COMPLETED
Start: 2020-07-08 | End: 2020-07-08

## 2020-07-08 RX ORDER — LIDOCAINE HYDROCHLORIDE 10 MG/ML
INJECTION, SOLUTION EPIDURAL; INFILTRATION; INTRACAUDAL; PERINEURAL
Status: COMPLETED
Start: 2020-07-08 | End: 2020-07-08

## 2020-07-08 RX ORDER — SODIUM CHLORIDE 9 MG/ML
INJECTION, SOLUTION INTRAVENOUS CONTINUOUS
Status: DISCONTINUED | OUTPATIENT
Start: 2020-07-08 | End: 2020-07-09

## 2020-07-08 NOTE — PLAN OF CARE
Assumed care at [address]. AOx4. C/o chronic back pain. Tramadol given. Still constipated. Doculax suppository given. Continue on Heparin gtt. Scheduled for thrombolytics tomorrow. NPO after midnight. Plan of care discussed with pt.  Call light within reach emptying  7/7/2020 2352 by Eulalia Sy, RN  Outcome: Progressing  7/7/2020 2352 by Eulalia Sy, RN  Outcome: Progressing     Problem: METABOLIC/FLUID AND ELECTROLYTES - ADULT  Goal: Electrolytes maintained within normal limits  Description  INTERVENTIONS adequate hemostasis  - Assess for signs and symptoms of internal bleeding  - Monitor lab trends  - Patient is to report abnormal signs of bleeding to staff  - Avoid use of toothpicks and dental floss  - Use electric shaver for shaving  - Use soft bristle t INFECTION - ADULT  Goal: Absence of fever/infection during anticipated neutropenic period  Description  INTERVENTIONS  - Monitor WBC  - Administer growth factors as ordered  - Implement neutropenic guidelines  7/7/2020 2352 by Aniyah Weiss, RN  Outcome: Pr RN  Outcome: Progressing  7/7/2020 2352 by Cherelle Agee RN  Outcome: Progressing

## 2020-07-08 NOTE — PROGRESS NOTES
Hematology Progress Note    Patient Name: Jordana Egan  Medical Record Number: [id number]    YOB: 1940     Reason for Consultation:  Jordana Egan was seen today for the diagnosis of recurrent DVT/PE    Interval events: Patient reports feeling 10.3*   HCT 36.4* 34.0* 34.4*   .0* 128.0* 154.0   MCV 86.5 86.3 86.4   RDW 17.0* 17.0* 16.9*   NEPRELIM 7.75* 7.20 6.72       Recent Labs   Lab 07/06/20  1328 07/06/20  2119 07/07/20  0338 07/08/20  0531   * 132* 134* 133*   K 4.5 4.4 4.7 4.5 warfarin with dose adjustment to INR 2-3. Given his CKD and weight greater than 120 kg a DOAC is not favored.   -last IVC filter placed in 2015. Given how long this has been in place it will not be able to be removed.      *normocytic anemia  -likely d/t

## 2020-07-08 NOTE — PROGRESS NOTES
Urology progress note. Patient is unavailable for examination at the time of rounding. He does have a history of urinary retention recently with an indwelling Oglesby catheter.   Under the current circumstances patient needs aggressive therapy for his D

## 2020-07-08 NOTE — PROGRESS NOTES
[facility]  Progress Note     Cleveland Serranoick Almita Patient Status:  Inpatient    1940 MRN [id number]   [facility] 7NE-A Attending Cristiana Day MD   Hosp Day # 2 PCP Kevin Gallegos MD     Chief Complaint: DVT, hematuria    S: Patient w SCr of 1.46 mg/dL (H)). No results for input(s): PTP, INR in the last 168 hours. Recent Labs   Lab 07/06/20  1328   TROP <0.045            Imaging: Imaging data reviewed in Epic.     Medications:   • Silodosin  8 mg Oral Daily   • PEG 3350  17 g Oral

## 2020-07-08 NOTE — OCCUPATIONAL THERAPY NOTE
Pt is scheduled for CDT procedure today. OT will initiate therapy once activity level is clarified post-procedure. Spoke with RN.

## 2020-07-08 NOTE — PLAN OF CARE
Assumed care of patient at 0700  Patient scheduled for cath at 1000  NPO status maintained, sips of water with morning meds  PT/OT to see patient post procedure  Denies pain but is very constipated.  Per hospitalist, holding off on miralax until post proced

## 2020-07-08 NOTE — PHYSICAL THERAPY NOTE
Consult received, chart reviewed. Pt with acute DVTs and PE, undergoing CDT today.   PT to hold and evaluate when pt able to participate in mobility safely  AE PT DPT NCS

## 2020-07-08 NOTE — OPERATIVE REPORT
Full note dictated,    Bilateral popliteal vein access    Complete occlusion from the IVC filter down into the superficial femoral veins into the thigh    S/P recanalization with AngioJet thrombectomy with return of flow on the right but improvement but no

## 2020-07-08 NOTE — CONSULTS
[facility]  Report of Consultation    Mary Recio Patient Status:  Inpatient    1940 MRN [id number]   [facility] 6NE-A Attending Joan Holm MD   Hosp Day # 2 PCP Lenin Alaniz MD       Assessment / Plan:    1) CKD 3- baseline Procedure Laterality Date   • CATARACT  2012   • COLONOSCOPY  09/12/2016   • KNEE REPLACEMENT SURGERY Right 04/04/2016   • OTHER  06/2012    aortic and iliac artery stent   • SPINAL FUSION  10/12/2018   • TONSILLECTOMY       Family History   Problem Rela injection 10 mg, 10 mg, Intravenous, Q8H PRN  •  aspirin chewable tab 81 mg, 81 mg, Oral, Daily  •  latanoprost (XALATAN) 0.005 % ophthalmic solution 1 drop, 1 drop, Left Eye, Nightly  •  Pantoprazole Sodium (PROTONIX) EC tab 40 mg, 40 mg, Oral, BID AC  • 07/08/2020     07/08/2020    K 4.5 07/08/2020     07/08/2020    CO2 27.0 07/08/2020     07/08/2020    CA 9.4 07/08/2020    PTT 70.2 07/08/2020       Imaging: All imaging studies reviewed.       Thank you for allowing me to participate in

## 2020-07-08 NOTE — PLAN OF CARE
Received patient from cath lab at [phone] bilateral fountain catheters in popiteal vein. TPA at 0.5 mg/hr and Heparin at 100 units/hr in both fountain catheters. Upper airway wheezes. Oglesby with cherry colored urine. Page out to Dr. Drew Mccullough.   Wife at beds blowing  Outcome: Progressing  Goal: Free from bleeding injury  Description  (Example usage: patient with low platelets)  INTERVENTIONS:  - Avoid intramuscular injections, enemas and rectal medication administration  - Ensure safe mobilization of patient

## 2020-07-09 ENCOUNTER — APPOINTMENT (OUTPATIENT)
Dept: INTERVENTIONAL RADIOLOGY/VASCULAR | Facility: HOSPITAL | Age: 80
DRG: 270 | End: 2020-07-09
Attending: NURSE PRACTITIONER
Payer: MEDICARE

## 2020-07-09 LAB
ANION GAP SERPL CALC-SCNC: 2 MMOL/L (ref 0–18)
ANION GAP SERPL CALC-SCNC: 3 MMOL/L (ref 0–18)
APTT PPP: 51.2 SECONDS (ref 25.4–36.1)
APTT PPP: 54.1 SECONDS (ref 25.4–36.1)
APTT PPP: 59.3 SECONDS (ref 25.4–36.1)
APTT PPP: 62.6 SECONDS (ref 25.4–36.1)
APTT PPP: 63 SECONDS (ref 25.4–36.1)
BASOPHILS # BLD AUTO: 0.02 X10(3) UL (ref 0–0.2)
BASOPHILS NFR BLD AUTO: 0.2 %
BUN BLD-MCNC: 20 MG/DL (ref 7–18)
BUN BLD-MCNC: 24 MG/DL (ref 7–18)
BUN/CREAT SERPL: 12.8 (ref 10–20)
BUN/CREAT SERPL: 17 (ref 10–20)
CALCIUM BLD-MCNC: 8.5 MG/DL (ref 8.5–10.1)
CALCIUM BLD-MCNC: 8.9 MG/DL (ref 8.5–10.1)
CHLORIDE SERPL-SCNC: 104 MMOL/L (ref 98–112)
CHLORIDE SERPL-SCNC: 106 MMOL/L (ref 98–112)
CO2 SERPL-SCNC: 28 MMOL/L (ref 21–32)
CO2 SERPL-SCNC: 32 MMOL/L (ref 21–32)
CREAT BLD-MCNC: 1.41 MG/DL (ref 0.7–1.3)
CREAT BLD-MCNC: 1.56 MG/DL (ref 0.7–1.3)
CREAT UR-SCNC: <13 MG/DL
DEPRECATED RDW RBC AUTO: 51.4 FL (ref 35.1–46.3)
DEPRECATED RDW RBC AUTO: 51.9 FL (ref 35.1–46.3)
DEPRECATED RDW RBC AUTO: 51.9 FL (ref 35.1–46.3)
DEPRECATED RDW RBC AUTO: 52.8 FL (ref 35.1–46.3)
EOSINOPHIL # BLD AUTO: 0.35 X10(3) UL (ref 0–0.7)
EOSINOPHIL NFR BLD AUTO: 3.3 %
ERYTHROCYTE [DISTWIDTH] IN BLOOD BY AUTOMATED COUNT: 16.9 % (ref 11–15)
ERYTHROCYTE [DISTWIDTH] IN BLOOD BY AUTOMATED COUNT: 17 % (ref 11–15)
FIBRINOGEN: 101 MG/DL (ref 200–446)
FIBRINOGEN: 132 MG/DL (ref 200–446)
FIBRINOGEN: 91 MG/DL (ref 200–446)
GLUCOSE BLD-MCNC: 102 MG/DL (ref 70–99)
GLUCOSE BLD-MCNC: 95 MG/DL (ref 70–99)
HCT VFR BLD AUTO: 29.2 % (ref 39–53)
HCT VFR BLD AUTO: 30 % (ref 39–53)
HCT VFR BLD AUTO: 30.1 % (ref 39–53)
HCT VFR BLD AUTO: 30.1 % (ref 39–53)
HGB BLD-MCNC: 8.7 G/DL (ref 13–17.5)
HGB BLD-MCNC: 9.1 G/DL (ref 13–17.5)
HGB BLD-MCNC: 9.1 G/DL (ref 13–17.5)
HGB BLD-MCNC: 9.2 G/DL (ref 13–17.5)
IMM GRANULOCYTES # BLD AUTO: 0.1 X10(3) UL (ref 0–1)
IMM GRANULOCYTES NFR BLD: 0.9 %
INR BLD: 1.56 (ref 0.89–1.11)
INR BLD: 1.61 (ref 0.89–1.11)
INR BLD: 1.62 (ref 0.89–1.11)
ISTAT ACTIVATED CLOTTING TIME: 142 SECONDS (ref 74–137)
LYMPHOCYTES # BLD AUTO: 0.4 X10(3) UL (ref 1–4)
LYMPHOCYTES NFR BLD AUTO: 3.7 %
MCH RBC QN AUTO: 25.7 PG (ref 26–34)
MCH RBC QN AUTO: 26.1 PG (ref 26–34)
MCHC RBC AUTO-ENTMCNC: 29.8 G/DL (ref 31–37)
MCHC RBC AUTO-ENTMCNC: 30.2 G/DL (ref 31–37)
MCHC RBC AUTO-ENTMCNC: 30.2 G/DL (ref 31–37)
MCHC RBC AUTO-ENTMCNC: 30.7 G/DL (ref 31–37)
MCV RBC AUTO: 85 FL (ref 80–100)
MCV RBC AUTO: 86.1 FL (ref 80–100)
MCV RBC AUTO: 86.2 FL (ref 80–100)
MCV RBC AUTO: 86.2 FL (ref 80–100)
MICROALBUMIN UR-MCNC: 1.77 MG/DL
MONOCYTES # BLD AUTO: 0.99 X10(3) UL (ref 0.1–1)
MONOCYTES NFR BLD AUTO: 9.3 %
NEUTROPHILS # BLD AUTO: 8.81 X10 (3) UL (ref 1.5–7.7)
NEUTROPHILS # BLD AUTO: 8.81 X10(3) UL (ref 1.5–7.7)
NEUTROPHILS NFR BLD AUTO: 82.6 %
OSMOLALITY SERPL CALC.SUM OF ELEC: 288 MOSM/KG (ref 275–295)
OSMOLALITY SERPL CALC.SUM OF ELEC: 289 MOSM/KG (ref 275–295)
PLATELET # BLD AUTO: 126 10(3)UL (ref 150–450)
PLATELET # BLD AUTO: 132 10(3)UL (ref 150–450)
PLATELET # BLD AUTO: 132 10(3)UL (ref 150–450)
PLATELET # BLD AUTO: 135 10(3)UL (ref 150–450)
POTASSIUM SERPL-SCNC: 3.5 MMOL/L (ref 3.5–5.1)
POTASSIUM SERPL-SCNC: 4 MMOL/L (ref 3.5–5.1)
POTASSIUM SERPL-SCNC: 4 MMOL/L (ref 3.5–5.1)
PSA SERPL DL<=0.01 NG/ML-MCNC: 19.1 SECONDS (ref 12.4–14.6)
PSA SERPL DL<=0.01 NG/ML-MCNC: 19.6 SECONDS (ref 12.4–14.6)
PSA SERPL DL<=0.01 NG/ML-MCNC: 19.7 SECONDS (ref 12.4–14.6)
RBC # BLD AUTO: 3.39 X10(6)UL (ref 3.8–5.8)
RBC # BLD AUTO: 3.49 X10(6)UL (ref 3.8–5.8)
RBC # BLD AUTO: 3.49 X10(6)UL (ref 3.8–5.8)
RBC # BLD AUTO: 3.53 X10(6)UL (ref 3.8–5.8)
SODIUM SERPL-SCNC: 137 MMOL/L (ref 136–145)
SODIUM SERPL-SCNC: 138 MMOL/L (ref 136–145)
WBC # BLD AUTO: 10 X10(3) UL (ref 4–11)
WBC # BLD AUTO: 10.7 X10(3) UL (ref 4–11)
WBC # BLD AUTO: 10.7 X10(3) UL (ref 4–11)
WBC # BLD AUTO: 12.2 X10(3) UL (ref 4–11)

## 2020-07-09 PROCEDURE — B51HYZZ FLUOROSCOPY OF BILATERAL PELVIC (ILIAC) VEINS USING OTHER CONTRAST: ICD-10-PCS | Performed by: INTERNAL MEDICINE

## 2020-07-09 PROCEDURE — 37214 CESSJ THERAPY CATH REMOVAL: CPT | Performed by: INTERNAL MEDICINE

## 2020-07-09 PROCEDURE — B549ZZ3 ULTRASONOGRAPHY OF INFERIOR VENA CAVA, INTRAVASCULAR: ICD-10-PCS | Performed by: INTERNAL MEDICINE

## 2020-07-09 PROCEDURE — 99233 SBSQ HOSP IP/OBS HIGH 50: CPT | Performed by: INTERNAL MEDICINE

## 2020-07-09 PROCEDURE — B519YZZ FLUOROSCOPY OF INFERIOR VENA CAVA USING OTHER CONTRAST: ICD-10-PCS | Performed by: INTERNAL MEDICINE

## 2020-07-09 PROCEDURE — 99232 SBSQ HOSP IP/OBS MODERATE 35: CPT | Performed by: INTERNAL MEDICINE

## 2020-07-09 PROCEDURE — B51DYZZ FLUOROSCOPY OF BILATERAL LOWER EXTREMITY VEINS USING OTHER CONTRAST: ICD-10-PCS | Performed by: INTERNAL MEDICINE

## 2020-07-09 PROCEDURE — 99232 SBSQ HOSP IP/OBS MODERATE 35: CPT | Performed by: HOSPITALIST

## 2020-07-09 PROCEDURE — 37252 INTRVASC US NONCORONARY 1ST: CPT | Performed by: INTERNAL MEDICINE

## 2020-07-09 PROCEDURE — 99152 MOD SED SAME PHYS/QHP 5/>YRS: CPT | Performed by: INTERNAL MEDICINE

## 2020-07-09 RX ORDER — HEPARIN SODIUM 5000 [USP'U]/ML
30 INJECTION, SOLUTION INTRAVENOUS; SUBCUTANEOUS ONCE
Status: COMPLETED | OUTPATIENT
Start: 2020-07-10 | End: 2020-07-10

## 2020-07-09 RX ORDER — FUROSEMIDE 10 MG/ML
40 INJECTION INTRAMUSCULAR; INTRAVENOUS ONCE
Status: COMPLETED | OUTPATIENT
Start: 2020-07-09 | End: 2020-07-09

## 2020-07-09 RX ORDER — LIDOCAINE HYDROCHLORIDE 10 MG/ML
INJECTION, SOLUTION EPIDURAL; INFILTRATION; INTRACAUDAL; PERINEURAL
Status: COMPLETED
Start: 2020-07-09 | End: 2020-07-09

## 2020-07-09 RX ORDER — MIDAZOLAM HYDROCHLORIDE 1 MG/ML
INJECTION INTRAMUSCULAR; INTRAVENOUS
Status: COMPLETED
Start: 2020-07-09 | End: 2020-07-09

## 2020-07-09 RX ORDER — POTASSIUM CHLORIDE 20 MEQ/1
40 TABLET, EXTENDED RELEASE ORAL EVERY 4 HOURS
Status: COMPLETED | OUTPATIENT
Start: 2020-07-09 | End: 2020-07-10

## 2020-07-09 RX ORDER — HEPARIN SODIUM 5000 [USP'U]/ML
INJECTION, SOLUTION INTRAVENOUS; SUBCUTANEOUS
Status: COMPLETED
Start: 2020-07-09 | End: 2020-07-09

## 2020-07-09 RX ORDER — CEFAZOLIN SODIUM/WATER 2 G/20 ML
SYRINGE (ML) INTRAVENOUS
Status: COMPLETED
Start: 2020-07-09 | End: 2020-07-09

## 2020-07-09 NOTE — PLAN OF CARE
Received pt at 1930. Pt alert and oriented x4. Pt on 3LNC, lungs diminished with some upper respiratory congestion. Pt VSS, +3 pitting edema in LE. Pt catheter insertion sites have fluids infusing per protocol.  R site has some old drainage and is unchanged

## 2020-07-09 NOTE — PHYSICAL THERAPY NOTE
Physical Therapy    Attempted eval of pt today, but currently having procedure and remains on bedrest at this time. Will re-assess medical stability tomorrow for evaluation.

## 2020-07-09 NOTE — OCCUPATIONAL THERAPY NOTE
Attempted eval of pt today, but currently having procedure and remains on bedrest at this time. Will re-assess medical stability tomorrow for evaluation.

## 2020-07-09 NOTE — PLAN OF CARE
0730-received pt awakea lert oriented x4, denies headache or other pains at this time. tpa via bilat fountain catheters, heparin via right pop sheath. Right site oozing noted at insertion site-reinforced.   Left insertion site with pressure dressing intac

## 2020-07-09 NOTE — PROGRESS NOTES
[facility]  Nephrology Progress Note    Kalen Recio Attending:  Nora Tanner MD       Assessment and Plan:    1) CKD 3- baseline Cr approx 1.5 mg/dl likely due to hypertensive and age-related nephrosclerosis; does not have other risk factors for ki 126.0 07/09/2020    CREATSERUM 1.41 07/09/2020    BUN 24 07/09/2020     07/09/2020    K 4.0 07/09/2020     07/09/2020    CO2 28.0 07/09/2020    GLU 95 07/09/2020    CA 8.5 07/09/2020    PTT 59.3 07/09/2020    INR 1.61 07/09/2020    PTP 19.6 07/

## 2020-07-09 NOTE — OPERATIVE REPORT
Full note dictated,    Widely patent superficial femoral, common femoral, iliac and common iliac veins as well as inferior vena cava. Some thrombus is noted with the tip of the filter. Flow was noted around the filter.   Patient will need to be anticoagul

## 2020-07-09 NOTE — PROGRESS NOTES
[facility]  Progress Note     Delta Recio Patient Status:  Inpatient    1940 MRN [id number]   [facility] 7NE-A Attending Emily Salas MD   Hosp Day # 3 PCP Jacquelin Farias MD     Chief Complaint: DVT, hematuria    S: Patient w 106   CO2 27.0   < > 29.0 27.0 28.0   ALKPHO 175*  --   --   --   --    AST 20  --   --   --   --    ALT 21  --   --   --   --    BILT 1.3  --   --   --   --    TP 7.6  --   --   --   --     < > = values in this interval not displayed.        Estimated Crea

## 2020-07-09 NOTE — PROGRESS NOTES
[facility]    Progress Note    Robe Recio Patient Status:  Inpatient    1940 MRN [id number]   [facility] 6NE-A Attending Herminio Rossi MD   Hosp Day # 3 PCP Raina Hylton MD        Subjective:   Pt feels well today.  No complaint expect hematuria during this time. If catheter becomes obstructed can switch to 24 or 25 F three-way german catheter and initiate CBI.   - Continue rapaflo, finasteride  - Could perhaps attempt voiding trial prior to discharge if urine clear and patient is a

## 2020-07-09 NOTE — PROGRESS NOTES
Hematology Progress Note    Patient Name: Kristie Oviedo  Medical Record Number: [id number]    YOB: 1940     Reason for Consultation:  Kristie Oviedo was seen today for the diagnosis of recurrent DVT/PE    Interval events: Patient reports feeling oriented, not in acute distress  CV: Regular rate and rhythm, no murmurs  Respiratory: Lungs clear to auscultation bilaterally  GI/Abd: Soft, non-tender  Skin: +mild erythematous skin changes at distal BLE, L>R  Ext: +BLE edema, L>R.   CDT catheters in plac Ascending aorta diameter was 4cm.   3. Aortic root: The aortic root was at the upper limits of normal.  4. Right ventricle: Systolic function was normal.  5. Pulmonary arteries: Systolic pressure was at the upper limits of normal to, at most, mildly increas

## 2020-07-09 NOTE — PROCEDURES
[facility]    PATIENT'S NAME: VANESA SANTIAGO J   ATTENDING PHYSICIAN: Rishabh Farias M.D. OPERATING PHYSICIAN: Elizabeth Moncada M.D.    PATIENT ACCOUNT#:   [de-identified]    LOCATION:  [address] [place] [address]  MEDICAL RECORD #:   [id number]       DATE OF BIRTH:  [birth year] micropuncture sheath was placed. Injections through the sheath revealed that we were in the popliteal vein. Next, this was upsized to a 5-Greek sheath.   Similarly, the right popliteal vein was also identified under ultrasound and entered using direct vi Magic Torque wires. Next, an 8-Bhutanese AngioJet thrombectomy catheter was prepared and brought into the level of the filter from the left popliteal vein access.   Approximately 60 mL of saline infused with tPA were infused from the filter down into the supe secured, and the patient transported back to the intensive care unit. There were no apparent acute complications noted, and the patient tolerated the procedure well.     SUMMARY:  In summary, the patient today was demonstrated to have a completely occlud

## 2020-07-10 LAB
ANION GAP SERPL CALC-SCNC: 3 MMOL/L (ref 0–18)
APTT PPP: 41.4 SECONDS (ref 25.4–36.1)
APTT PPP: 52.8 SECONDS (ref 25.4–36.1)
BASOPHILS # BLD AUTO: 0.01 X10(3) UL (ref 0–0.2)
BASOPHILS NFR BLD AUTO: 0.1 %
BUN BLD-MCNC: 16 MG/DL (ref 7–18)
BUN/CREAT SERPL: 11.9 (ref 10–20)
CALCIUM BLD-MCNC: 8.4 MG/DL (ref 8.5–10.1)
CHLORIDE SERPL-SCNC: 106 MMOL/L (ref 98–112)
CO2 SERPL-SCNC: 29 MMOL/L (ref 21–32)
CREAT BLD-MCNC: 1.35 MG/DL (ref 0.7–1.3)
DEPRECATED RDW RBC AUTO: 52.3 FL (ref 35.1–46.3)
DEPRECATED RDW RBC AUTO: 52.5 FL (ref 35.1–46.3)
EOSINOPHIL # BLD AUTO: 0.48 X10(3) UL (ref 0–0.7)
EOSINOPHIL NFR BLD AUTO: 4.5 %
ERYTHROCYTE [DISTWIDTH] IN BLOOD BY AUTOMATED COUNT: 17 % (ref 11–15)
ERYTHROCYTE [DISTWIDTH] IN BLOOD BY AUTOMATED COUNT: 17.2 % (ref 11–15)
GLUCOSE BLD-MCNC: 101 MG/DL (ref 70–99)
HCT VFR BLD AUTO: 28.5 % (ref 39–53)
HCT VFR BLD AUTO: 31 % (ref 39–53)
HGB BLD-MCNC: 8.8 G/DL (ref 13–17.5)
HGB BLD-MCNC: 9.3 G/DL (ref 13–17.5)
IMM GRANULOCYTES # BLD AUTO: 0.13 X10(3) UL (ref 0–1)
IMM GRANULOCYTES NFR BLD: 1.2 %
INR BLD: 1.13 (ref 0.89–1.11)
ISTAT ACTIVATED CLOTTING TIME: 158 SECONDS (ref 74–137)
LYMPHOCYTES # BLD AUTO: 0.48 X10(3) UL (ref 1–4)
LYMPHOCYTES NFR BLD AUTO: 4.5 %
MCH RBC QN AUTO: 25.8 PG (ref 26–34)
MCH RBC QN AUTO: 26.4 PG (ref 26–34)
MCHC RBC AUTO-ENTMCNC: 30 G/DL (ref 31–37)
MCHC RBC AUTO-ENTMCNC: 30.9 G/DL (ref 31–37)
MCV RBC AUTO: 85.6 FL (ref 80–100)
MCV RBC AUTO: 85.9 FL (ref 80–100)
MONOCYTES # BLD AUTO: 0.93 X10(3) UL (ref 0.1–1)
MONOCYTES NFR BLD AUTO: 8.6 %
NEUTROPHILS # BLD AUTO: 8.75 X10 (3) UL (ref 1.5–7.7)
NEUTROPHILS # BLD AUTO: 8.75 X10(3) UL (ref 1.5–7.7)
NEUTROPHILS NFR BLD AUTO: 81.1 %
OSMOLALITY SERPL CALC.SUM OF ELEC: 287 MOSM/KG (ref 275–295)
PLATELET # BLD AUTO: 143 10(3)UL (ref 150–450)
PLATELET # BLD AUTO: 163 10(3)UL (ref 150–450)
POTASSIUM SERPL-SCNC: 4 MMOL/L (ref 3.5–5.1)
PSA SERPL DL<=0.01 NG/ML-MCNC: 14.9 SECONDS (ref 12.4–14.6)
RBC # BLD AUTO: 3.33 X10(6)UL (ref 3.8–5.8)
RBC # BLD AUTO: 3.61 X10(6)UL (ref 3.8–5.8)
SODIUM SERPL-SCNC: 138 MMOL/L (ref 136–145)
WBC # BLD AUTO: 10.8 X10(3) UL (ref 4–11)
WBC # BLD AUTO: 11 X10(3) UL (ref 4–11)

## 2020-07-10 PROCEDURE — 99233 SBSQ HOSP IP/OBS HIGH 50: CPT | Performed by: INTERNAL MEDICINE

## 2020-07-10 PROCEDURE — 99232 SBSQ HOSP IP/OBS MODERATE 35: CPT | Performed by: INTERNAL MEDICINE

## 2020-07-10 PROCEDURE — 99232 SBSQ HOSP IP/OBS MODERATE 35: CPT | Performed by: HOSPITALIST

## 2020-07-10 RX ORDER — HEPARIN SODIUM 5000 [USP'U]/ML
30 INJECTION, SOLUTION INTRAVENOUS; SUBCUTANEOUS ONCE
Status: COMPLETED | OUTPATIENT
Start: 2020-07-10 | End: 2020-07-10

## 2020-07-10 RX ORDER — WARFARIN SODIUM 5 MG/1
5 TABLET ORAL NIGHTLY
Status: DISCONTINUED | OUTPATIENT
Start: 2020-07-10 | End: 2020-07-13

## 2020-07-10 RX ORDER — FUROSEMIDE 10 MG/ML
40 INJECTION INTRAMUSCULAR; INTRAVENOUS
Status: DISCONTINUED | OUTPATIENT
Start: 2020-07-10 | End: 2020-07-11

## 2020-07-10 RX ORDER — POTASSIUM CHLORIDE 20 MEQ/1
40 TABLET, EXTENDED RELEASE ORAL ONCE
Status: DISCONTINUED | OUTPATIENT
Start: 2020-07-10 | End: 2020-07-13

## 2020-07-10 NOTE — PLAN OF CARE
Assumed care of pt who is A & Ox 4  Pt states he has not had BM since June 29, Up to commode with 1 assist. Slight light headedness when first getting up. Resolved. Physical therapy worked with pt & he is up in chair.  Upper airway wheeze which resolved wit

## 2020-07-10 NOTE — OCCUPATIONAL THERAPY NOTE
OCCUPATIONAL THERAPY EVALUATION - INPATIENT     Room Number: 3125/1391-S  Evaluation Date: 7/10/2020  Type of Evaluation: Initial  Presenting Problem: acute B LE DVT, PE     Physician Order: IP Consult to Occupational Therapy  Reason for Therapy: ADL/IADL CATARACT  2012   • COLONOSCOPY  09/12/2016   • KNEE REPLACEMENT SURGERY Right 04/04/2016   • OTHER  06/2012    aortic and iliac artery stent   • SPINAL FUSION  10/12/2018   • TONSILLECTOMY         OCCUPATIONAL PROFILE    HOME SITUATION  Type of Home: House regular lower body clothing?: A Little  -   Bathing (including washing, rinsing, drying)?: A Little  -   Toileting, which includes using toilet, bedpan or urinal? : A Little  -   Putting on and taking off regular upper body clothing?: None  -   Taking care impact the patient’s ability to participate in self-care, functional mobility, instrumental activities of daily living, rest and sleep, work, leisure and social participation.      The patient is functioning below his previous functional level and would arturo

## 2020-07-10 NOTE — PHYSICAL THERAPY NOTE
PHYSICAL THERAPY EVALUATION - INPATIENT     Room Number: 1072/7962-I  Evaluation Date: 7/10/2020  Type of Evaluation: Initial  Physician Order: PT Eval and Treat    Presenting Problem: B PEs and DVTs  Reason for Therapy: Mobility Dysfunction and Disc 04/04/2016   • OTHER  06/2012    aortic and iliac artery stent   • SPINAL FUSION  10/12/2018   • TONSILLECTOMY         HOME SITUATION  Type of Home: House   Home Layout: One level(3 steps in)  Stairs to Enter : 3  Railing: No  Stairs to Bedroom: 0       Grayce Expose over in bed (including adjusting bedclothes, sheets and blankets)?: A Little   -   Sitting down on and standing up from a chair with arms (e.g., wheelchair, bedside commode, etc.): A Lot   -   Moving from lying on back to sitting on the side of the bed?: A comorbidities and personal factors impacting therapy include multiple spinal surgeries, s/p AAA stent, R TKR, PE, obesity.   In this PT evaluation, the patient presents with the following impairments decreased cardiopulmonary endurance, le edema, decreased

## 2020-07-10 NOTE — PROGRESS NOTES
[facility]  Nephrology Progress Note    Yany Recio Attending:  Bean Amador MD       Assessment and Plan:    1) CKD 3- baseline Cr approx 1.5 mg/dl likely due to hypertensive and age-related nephrosclerosis; does not have other risk factors for ki 07/10/2020    HCT 28.5 07/10/2020    .0 07/10/2020    CREATSERUM 1.35 07/10/2020    BUN 16 07/10/2020     07/10/2020    K 4.0 07/10/2020     07/10/2020    CO2 29.0 07/10/2020     07/10/2020    CA 8.4 07/10/2020    PTT 41.4 07/10/2

## 2020-07-10 NOTE — PLAN OF CARE
Pt up to [facility] with 2 assist. States he was slightly lightheaded which resolved quickly. Will place pt in chair if he is agreeable.

## 2020-07-10 NOTE — PROCEDURES
[address]    PATIENT'S NAME: VANESA SANTIAGO   ATTENDING PHYSICIAN: Mackenzie Norris M.D. OPERATING PHYSICIAN: Sabine Santos M.D.    PATIENT ACCOUNT#:   [de-identified]    LOCATION:  [address] [place] [address]  MEDICAL RECORD #:   [id number]       DATE OF BIRTH:  [birth year] and below the filter there was no thrombus. There was minimal, if any, thrombus in the vena cava and iliac and common femoral systems.   Next, the same 6-Persian catheter was placed into the right common femoral.  Injection again revealed no significant dis

## 2020-07-10 NOTE — PROGRESS NOTES
[facility]  Progress Note     Luis Mixons Patient Status:  Inpatient    1940 MRN [id number]   [facility] 7NE-A Attending Graciela Jackman MD   Hosp Day # 4 PCP Britton Andrew MD     Chief Complaint: DVT, hematuria    S: Patient w *   < > 137  --  138 138   K 4.5   < > 4.0 4.0 3.5 4.0      < > 106  --  104 106   CO2 27.0   < > 28.0  --  32.0 29.0   ALKPHO 175*  --   --   --   --   --    AST 20  --   --   --   --   --    ALT 21  --   --   --   --   --    BILT 1.3  -- yes  Estimated date of discharge: TBD  Discharge is dependent on: progress  At this point Mr. Recio is expected to be discharge to: TBD, will need PT re-eval    Plan of care discussed with patient, RN.     Ngozi Hernandez MD  [facility]

## 2020-07-10 NOTE — PROGRESS NOTES
[facility]  Cardiology Critical Care Progress Note    Son Recio Patient Status:  Inpatient    1940 MRN [id number]   [facility] 6NE-A Attending Moncho Joe MD   Hosp Day # 4 PCP Isela Martin MD       Subjective:  Denies any c 1 drop Left Eye Nightly   • Pantoprazole Sodium  40 mg Oral BID AC   • Pramipexole Dihydrochloride  1 mg Oral Daily   • Rosuvastatin Calcium  10 mg Oral Nightly   • cyclobenzaprine  10 mg Oral TID   • Pramipexole Dihydrochloride  1.5 mg Oral Nightly     •

## 2020-07-10 NOTE — PROGRESS NOTES
Hematology Progress Note    Patient Name: Dora Gayle  Medical Record Number: [id number]    YOB: 1940     Reason for Consultation:  Dora Gayle was seen today for the diagnosis of recurrent DVT/PE    Interval events: Patient reports feeling skin changes at distal BLE, L>R  Ext: +BLE edema.   Dressings from sites of prior CDT catheters are c/d/i.   yellow urine in Oglesby bag    Laboratory:  Recent Labs   Lab 07/08/20  0531  07/09/20  0420 07/09/20  0757 07/10/20  0641   WBC 8.7   < > 10.7  10.7 The aortic root was at the upper limits of normal.  4. Right ventricle: Systolic function was normal.  5. Pulmonary arteries: Systolic pressure was at the upper limits of normal to, at most, mildly increased; in the range of 35 mm Hg to 40 mm Hg.  Estimated

## 2020-07-10 NOTE — PLAN OF CARE
Assumed care of pt at [address] [place] [address]. Hemodynamically stable, neuros intact. SBP in low 100's all night. HR in 80s. BLE are kaila, with +3 edema, see flowsheet. Pt states he has not had a bowel movement since 6/29, tried multiple times during night, no success.  Julieta

## 2020-07-10 NOTE — PLAN OF CARE
Cardiology will sign off for the weekend and see again Monday. Please call if any acute issues or needs before then.      Cali Wood NP  7/10/2020  5:02 PM  [phone]

## 2020-07-11 LAB
ANION GAP SERPL CALC-SCNC: 2 MMOL/L (ref 0–18)
APTT PPP: 60.1 SECONDS (ref 25.4–36.1)
APTT PPP: 73.4 SECONDS (ref 25.4–36.1)
BASOPHILS # BLD AUTO: 0.02 X10(3) UL (ref 0–0.2)
BASOPHILS NFR BLD AUTO: 0.2 %
BUN BLD-MCNC: 19 MG/DL (ref 7–18)
BUN/CREAT SERPL: 13.5 (ref 10–20)
CALCIUM BLD-MCNC: 8.6 MG/DL (ref 8.5–10.1)
CHLORIDE SERPL-SCNC: 104 MMOL/L (ref 98–112)
CO2 SERPL-SCNC: 32 MMOL/L (ref 21–32)
CREAT BLD-MCNC: 1.41 MG/DL (ref 0.7–1.3)
CREAT UR-SCNC: 126 MG/DL
DEPRECATED RDW RBC AUTO: 53.7 FL (ref 35.1–46.3)
EOSINOPHIL # BLD AUTO: 0.48 X10(3) UL (ref 0–0.7)
EOSINOPHIL NFR BLD AUTO: 4.5 %
ERYTHROCYTE [DISTWIDTH] IN BLOOD BY AUTOMATED COUNT: 17.3 % (ref 11–15)
GLUCOSE BLD-MCNC: 111 MG/DL (ref 70–99)
HCT VFR BLD AUTO: 29.6 % (ref 39–53)
HGB BLD-MCNC: 8.7 G/DL (ref 13–17.5)
IMM GRANULOCYTES # BLD AUTO: 0.12 X10(3) UL (ref 0–1)
IMM GRANULOCYTES NFR BLD: 1.1 %
INR BLD: 1.11 (ref 0.89–1.11)
LYMPHOCYTES # BLD AUTO: 0.61 X10(3) UL (ref 1–4)
LYMPHOCYTES NFR BLD AUTO: 5.7 %
MCH RBC QN AUTO: 25.4 PG (ref 26–34)
MCHC RBC AUTO-ENTMCNC: 29.4 G/DL (ref 31–37)
MCV RBC AUTO: 86.5 FL (ref 80–100)
MONOCYTES # BLD AUTO: 0.89 X10(3) UL (ref 0.1–1)
MONOCYTES NFR BLD AUTO: 8.3 %
NEUTROPHILS # BLD AUTO: 8.54 X10 (3) UL (ref 1.5–7.7)
NEUTROPHILS # BLD AUTO: 8.54 X10(3) UL (ref 1.5–7.7)
NEUTROPHILS NFR BLD AUTO: 80.2 %
OSMOLALITY SERPL CALC.SUM OF ELEC: 289 MOSM/KG (ref 275–295)
PLATELET # BLD AUTO: 179 10(3)UL (ref 150–450)
POTASSIUM SERPL-SCNC: 3.8 MMOL/L (ref 3.5–5.1)
PROT UR-MCNC: 73.4 MG/DL
PSA SERPL DL<=0.01 NG/ML-MCNC: 14.6 SECONDS (ref 12.4–14.6)
RBC # BLD AUTO: 3.42 X10(6)UL (ref 3.8–5.8)
SODIUM SERPL-SCNC: 138 MMOL/L (ref 136–145)
WBC # BLD AUTO: 10.7 X10(3) UL (ref 4–11)

## 2020-07-11 PROCEDURE — 99232 SBSQ HOSP IP/OBS MODERATE 35: CPT | Performed by: INTERNAL MEDICINE

## 2020-07-11 PROCEDURE — 99233 SBSQ HOSP IP/OBS HIGH 50: CPT | Performed by: INTERNAL MEDICINE

## 2020-07-11 RX ORDER — FUROSEMIDE 10 MG/ML
40 INJECTION INTRAMUSCULAR; INTRAVENOUS 3 TIMES DAILY
Status: DISCONTINUED | OUTPATIENT
Start: 2020-07-11 | End: 2020-07-15

## 2020-07-11 RX ORDER — POTASSIUM CHLORIDE 20 MEQ/1
40 TABLET, EXTENDED RELEASE ORAL ONCE
Status: COMPLETED | OUTPATIENT
Start: 2020-07-11 | End: 2020-07-11

## 2020-07-11 RX ORDER — SPIRONOLACTONE 25 MG/1
25 TABLET ORAL DAILY
Status: DISCONTINUED | OUTPATIENT
Start: 2020-07-11 | End: 2020-07-15

## 2020-07-11 NOTE — PROGRESS NOTES
[facility]  Nephrology Progress Note    Magalis Recio Attending:  Elizabeth Blum MD       Assessment and Plan:    1) CKD 3- baseline Cr approx 1.5 mg/dl likely due to hypertensive and age-related nephrosclerosis; does not have other risk factors for ki 10.7 07/11/2020    HGB 8.7 07/11/2020    HCT 29.6 07/11/2020    .0 07/11/2020    CREATSERUM 1.41 07/11/2020    BUN 19 07/11/2020     07/11/2020    K 3.8 07/11/2020     07/11/2020    CO2 32.0 07/11/2020     07/11/2020    CA 8.6 07/ were discussed with patient and/or family by bedside.           Isaiah Shin  7/11/2020  727 AM

## 2020-07-11 NOTE — PROGRESS NOTES
[facility]    Progress Note    Kalen Recio Patient Status:  Inpatient    1940 MRN [id number]   [facility] 6NE-A Attending Nora Tanner MD   Hosp Day # 5 PCP Tom Cancino MD        Subjective:   Pt feels good.  Still undergoing d Maintain german catheter while undergoing diuresis. Urine is currently lizette/blood tinged but if catheter becomes obstructed can switch to 24 or 22 F three-way german catheter and initiate CBI.   - Continue rapaflo, finasteride  - Could perhaps attempt voidin

## 2020-07-11 NOTE — PROGRESS NOTES
Hematology Progress Note    Patient Name: Ac Gupta  Medical Record Number: [id number]    YOB: 1940     Reason for Consultation:  Ac Gupta was seen today for the diagnosis of recurrent DVT/PE    Interval events: Patient reports feeling +mild erythematous skin changes at distal BLE, L>R  Ext: +BLE edema.       Laboratory:  Recent Labs   Lab 07/09/20  0420  07/10/20  0641 07/10/20  1954 07/11/20  0202   WBC 10.7  10.7   < > 10.8 11.0 10.7   HGB 9.1*  9.1*   < > 8.8* 9.3* 8.7*   HCT 30.1*  3 was normal.  5. Pulmonary arteries: Systolic pressure was at the upper limits of normal to, at most, mildly increased; in the range of 35 mm Hg to 40 mm Hg. Estimated pulmonary artery diastolic pressure was 02DU Hg.   6. Pericardium, extracardiac: A trivial

## 2020-07-11 NOTE — CM/SW NOTE
Patient wants to wait to decide on [address] closer to dc. If he is agreeable to [address], [facility] is available in his area.

## 2020-07-11 NOTE — HOME CARE LIAISON
Received referral from Alessia Tripp. Spoke with patient regarding home health services at discharge. Patient & his spouse undecided at this time. Patient would like follow up closer to discharge. Informed Amara BARBOUR.

## 2020-07-11 NOTE — PROGRESS NOTES
[facility]  Progress Note    Aspen Recio Patient Status:  Inpatient    1940 MRN [id number]   [facility] 8NE-A Attending Ajay Campo MD   Hosp Day # 5 PCP Cesar Barton MD       Assessment and Plan:    Asked to see patient fo 07/10/20  0641 07/10/20  1544 07/11/20  0202 07/11/20  0900   PTP 19.6*  --  14.9*  --  14.6  --    INR 1.61*  --  1.13*  --  1.11  --    PTT 59.3*   < > 41.4* 52.8* 60.1* 73.4*    < > = values in this interval not displayed.        Recent Labs   Lab 07/06/ Pantoprazole Sodium  40 mg Oral BID AC   • Pramipexole Dihydrochloride  1 mg Oral Daily   • Rosuvastatin Calcium  10 mg Oral Nightly   • cyclobenzaprine  10 mg Oral TID   • Pramipexole Dihydrochloride  1.5 mg Oral Nightly           Pilo Hinson MD

## 2020-07-11 NOTE — PROGRESS NOTES
[facility]  Progress Note     Son Recio Patient Status:  Inpatient    1940 MRN [id number]   [facility] 8NE-A Attending Ilene Flores MD   Hosp Day # 5 PCP Isela Martin MD     Chief Complaint: LE edema     S: Patient without 104 106 104   CO2 27.0   < > 32.0 29.0 32.0   ALKPHO 175*  --   --   --   --    AST 20  --   --   --   --    ALT 21  --   --   --   --    BILT 1.3  --   --   --   --    TP 7.6  --   --   --   --     < > = values in this interval not displayed.        Estima discharge: TBD  Discharge is dependent on: progress  At this point Mr. Recio is expected to be discharge to: TBD, will need PT re-eval     Plan of care discussed with patient, RN.  Mj Puentes MD

## 2020-07-11 NOTE — PROGRESS NOTES
Assumed care at 1200. Patient laying in bed, denies CP, SOB and dizziness. Heparin gtt infusing per protocol. Bilateral pressure dressing to popliteal site, site soft, bilateral pedal pulse palpable +1. Edema noted to BLE.  Venous stasis changes to BLE, LLE

## 2020-07-12 LAB
ANION GAP SERPL CALC-SCNC: 3 MMOL/L (ref 0–18)
APTT PPP: 72.5 SECONDS (ref 25.4–36.1)
BASOPHILS # BLD AUTO: 0.01 X10(3) UL (ref 0–0.2)
BASOPHILS NFR BLD AUTO: 0.1 %
BUN BLD-MCNC: 17 MG/DL (ref 7–18)
BUN/CREAT SERPL: 12.4 (ref 10–20)
CALCIUM BLD-MCNC: 8.6 MG/DL (ref 8.5–10.1)
CHLORIDE SERPL-SCNC: 102 MMOL/L (ref 98–112)
CO2 SERPL-SCNC: 32 MMOL/L (ref 21–32)
CREAT BLD-MCNC: 1.37 MG/DL (ref 0.7–1.3)
DEPRECATED RDW RBC AUTO: 53.8 FL (ref 35.1–46.3)
EOSINOPHIL # BLD AUTO: 0.46 X10(3) UL (ref 0–0.7)
EOSINOPHIL NFR BLD AUTO: 5.2 %
ERYTHROCYTE [DISTWIDTH] IN BLOOD BY AUTOMATED COUNT: 17.3 % (ref 11–15)
GLUCOSE BLD-MCNC: 107 MG/DL (ref 70–99)
HCT VFR BLD AUTO: 28.3 % (ref 39–53)
HGB BLD-MCNC: 8.6 G/DL (ref 13–17.5)
IMM GRANULOCYTES # BLD AUTO: 0.11 X10(3) UL (ref 0–1)
IMM GRANULOCYTES NFR BLD: 1.2 %
INR BLD: 1.81 (ref 0.89–1.11)
LYMPHOCYTES # BLD AUTO: 0.65 X10(3) UL (ref 1–4)
LYMPHOCYTES NFR BLD AUTO: 7.4 %
MCH RBC QN AUTO: 26.1 PG (ref 26–34)
MCHC RBC AUTO-ENTMCNC: 30.4 G/DL (ref 31–37)
MCV RBC AUTO: 85.8 FL (ref 80–100)
MONOCYTES # BLD AUTO: 0.72 X10(3) UL (ref 0.1–1)
MONOCYTES NFR BLD AUTO: 8.2 %
NEUTROPHILS # BLD AUTO: 6.88 X10 (3) UL (ref 1.5–7.7)
NEUTROPHILS # BLD AUTO: 6.88 X10(3) UL (ref 1.5–7.7)
NEUTROPHILS NFR BLD AUTO: 77.9 %
OSMOLALITY SERPL CALC.SUM OF ELEC: 286 MOSM/KG (ref 275–295)
PLATELET # BLD AUTO: 227 10(3)UL (ref 150–450)
POTASSIUM SERPL-SCNC: 3.6 MMOL/L (ref 3.5–5.1)
PSA SERPL DL<=0.01 NG/ML-MCNC: 21.5 SECONDS (ref 12.4–14.6)
RBC # BLD AUTO: 3.3 X10(6)UL (ref 3.8–5.8)
SODIUM SERPL-SCNC: 137 MMOL/L (ref 136–145)
WBC # BLD AUTO: 8.8 X10(3) UL (ref 4–11)

## 2020-07-12 PROCEDURE — 99232 SBSQ HOSP IP/OBS MODERATE 35: CPT | Performed by: INTERNAL MEDICINE

## 2020-07-12 PROCEDURE — 99233 SBSQ HOSP IP/OBS HIGH 50: CPT | Performed by: UROLOGY

## 2020-07-12 PROCEDURE — 51700 IRRIGATION OF BLADDER: CPT | Performed by: UROLOGY

## 2020-07-12 RX ORDER — POTASSIUM CHLORIDE 20 MEQ/1
40 TABLET, EXTENDED RELEASE ORAL ONCE
Status: COMPLETED | OUTPATIENT
Start: 2020-07-12 | End: 2020-07-12

## 2020-07-12 NOTE — PLAN OF CARE
Patient alert and oriented x4. Wife was at bedside today. Vital signs stable. Saturating at 96% on room air, lung sounds diminished. Patient converted to Afib at 0836: Converted to Afib with rates ranging from  at rest, EKG to confirm.  Dr. Aneesh Azar void and empty bladder  - Monitor intake/output and perform bladder scan as needed  - Follow urinary retention protocol/standard of care  - Consider collaborating with pharmacy to review patient's medication profile  - Implement strategies to promote bladd

## 2020-07-12 NOTE — PROGRESS NOTES
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           Isaiah Shin  7/12/2020  726 AM

## 2020-07-12 NOTE — PROGRESS NOTES
Hematology Progress Note    Patient Name: Brittanie Vázquez  Medical Record Number: [id number]    YOB: 1940     Reason for Consultation:  Brittanie Vázquez was seen today for the diagnosis of recurrent DVT/PE    Interval events: No new events.     Songza L>R  Ext: +BLE edema improving.      Laboratory:  Recent Labs   Lab 07/10/20  0641 07/10/20  1954 07/11/20  0202 07/12/20  0549   WBC 10.8 11.0 10.7 8.8   HGB 8.8* 9.3* 8.7* 8.6*   HCT 28.5* 31.0* 29.6* 28.3*   .0* 163.0 179.0 227.0   MCV 85.6 85.9 8 increased; in the range of 35 mm Hg to 40 mm Hg. Estimated pulmonary artery diastolic pressure was 58DE Hg. 6. Pericardium, extracardiac: A trivial pericardial effusion was identified anterior to the heart. There was no evidence of hemodynamic compromise.

## 2020-07-12 NOTE — PROGRESS NOTES
[facility]  Progress Note     Kalen Ray Apurvas Patient Status:  Inpatient    1940 MRN [id number]   [facility] 8NE-A Attending Lyndon Bauer MD   Hosp Day # 6 PCP Tom Cancino MD     Chief Complaint: VTE    S: Patient without CP/SO 100   < > 106 104 102   CO2 27.0   < > 29.0 32.0 32.0   ALKPHO 175*  --   --   --   --    AST 20  --   --   --   --    ALT 21  --   --   --   --    BILT 1.3  --   --   --   --    TP 7.6  --   --   --   --     < > = values in this interval not displayed. referred to [facility] on discharge?: yes  Estimated date of discharge: TBD  Discharge is dependent on: progress  At this point Mr. Recio is expected to be discharge to: TBD, will need PT re-eval     Plan of care discussed with patient, RN.  Kaylee Toure MD

## 2020-07-12 NOTE — PLAN OF CARE
Assumed care of pt at 0730. Denies any pain or SOB. Sinus rhythm. Sats 96% on RA. Heparin gtt infusing at 1500 units/hr. PTT therapeutic 72.5. Oglesby catheter draining yellow to light pink urine. UO adequate. No clots noted. Will continue to monitor.

## 2020-07-12 NOTE — PLAN OF CARE
Assumed care @0030. Patient currently without complaints. Patient with hematuria. All MD's aware. No clots noted and draining well. Patient with IV heparin infusing per protocol. Coumadin/heparin bridge. INR currently 1.1. Patient up in chair.  Will monitor

## 2020-07-12 NOTE — PROGRESS NOTES
07/12/20 0659 07/12/20 0700 07/12/20 0701   Oxygen Therapy   SpO2 (!) 84 % (!) 82 % (!) 69 %   O2 Device None (Room air) None (Room air) None (Room air)      07/12/20 0702 07/12/20 0703   Oxygen Therapy   SpO2 (!) 64 % (!) 64 %   O2 Device None (Room ai

## 2020-07-12 NOTE — PROGRESS NOTES
[facility]    Progress and Bedside Procedure Note    Matt Recio Patient Status:  Inpatient    1940 MRN [id number]   [facility] 6NE-A Attending Vanessa Miller MD   Hosp Day # 6 PCP Kitty Duckworth MD        Subjective:   Pt feels OK prior IVC filter placements), chronic spinal hardware fusion, CKD. 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 History of weak stream (on rapaflo for awhile) and went into urinary retention on 7/3.  Urol

## 2020-07-13 LAB
ANION GAP SERPL CALC-SCNC: 6 MMOL/L (ref 0–18)
APTT PPP: 80.4 SECONDS (ref 25.4–36.1)
ATRIAL RATE: 127 BPM
ATRIAL RATE: 70 BPM
BUN BLD-MCNC: 16 MG/DL (ref 7–18)
BUN/CREAT SERPL: 11.1 (ref 10–20)
CALCIUM BLD-MCNC: 8.9 MG/DL (ref 8.5–10.1)
CHLORIDE SERPL-SCNC: 101 MMOL/L (ref 98–112)
CO2 SERPL-SCNC: 31 MMOL/L (ref 21–32)
CREAT BLD-MCNC: 1.44 MG/DL (ref 0.7–1.3)
DEPRECATED RDW RBC AUTO: 53.8 FL (ref 35.1–46.3)
ERYTHROCYTE [DISTWIDTH] IN BLOOD BY AUTOMATED COUNT: 17.2 % (ref 11–15)
GLUCOSE BLD-MCNC: 113 MG/DL (ref 70–99)
HCT VFR BLD AUTO: 30.4 % (ref 39–53)
HGB BLD-MCNC: 9.3 G/DL (ref 13–17.5)
INR BLD: 2.28 (ref 0.89–1.11)
MCH RBC QN AUTO: 26.3 PG (ref 26–34)
MCHC RBC AUTO-ENTMCNC: 30.6 G/DL (ref 31–37)
MCV RBC AUTO: 85.9 FL (ref 80–100)
OSMOLALITY SERPL CALC.SUM OF ELEC: 288 MOSM/KG (ref 275–295)
P AXIS: 38 DEGREES
P-R INTERVAL: 214 MS
PLATELET # BLD AUTO: 284 10(3)UL (ref 150–450)
POTASSIUM SERPL-SCNC: 3.8 MMOL/L (ref 3.5–5.1)
PSA SERPL DL<=0.01 NG/ML-MCNC: 25.7 SECONDS (ref 12.4–14.6)
Q-T INTERVAL: 368 MS
Q-T INTERVAL: 402 MS
QRS DURATION: 90 MS
QRS DURATION: 90 MS
QTC CALCULATION (BEZET): 434 MS
QTC CALCULATION (BEZET): 437 MS
R AXIS: -10 DEGREES
R AXIS: -6 DEGREES
RBC # BLD AUTO: 3.54 X10(6)UL (ref 3.8–5.8)
SODIUM SERPL-SCNC: 138 MMOL/L (ref 136–145)
T AXIS: 11 DEGREES
T AXIS: 23 DEGREES
VENTRICULAR RATE: 70 BPM
VENTRICULAR RATE: 85 BPM
WBC # BLD AUTO: 8.9 X10(3) UL (ref 4–11)

## 2020-07-13 PROCEDURE — 99232 SBSQ HOSP IP/OBS MODERATE 35: CPT | Performed by: INTERNAL MEDICINE

## 2020-07-13 PROCEDURE — 99232 SBSQ HOSP IP/OBS MODERATE 35: CPT | Performed by: UROLOGY

## 2020-07-13 RX ORDER — WARFARIN SODIUM 2.5 MG/1
2.5 TABLET ORAL NIGHTLY
Status: DISCONTINUED | OUTPATIENT
Start: 2020-07-13 | End: 2020-07-15

## 2020-07-13 RX ORDER — POTASSIUM CHLORIDE 20 MEQ/1
40 TABLET, EXTENDED RELEASE ORAL ONCE
Status: COMPLETED | OUTPATIENT
Start: 2020-07-13 | End: 2020-07-13

## 2020-07-13 NOTE — PROGRESS NOTES
[facility]  Nephrology Progress Note    Laina Recio Patient Status:  Inpatient    1940 MRN [id number]   [facility] 8NE-A Attending Ese Saavedra MD   Hosp Day # 7 PCP Prince Cunha MD       SUBJECTIVE:  Feels that edema is cont t 07/11/20  0202 07/12/20  0549 07/13/20  0501    138 138 137 138   K 3.5 4.0 3.8 3.6 3.8    106 104 102 101   CO2 32.0 29.0 32.0 32.0 31.0   BUN 20* 16 19* 17 16   CREATSERUM 1.56* 1.35* 1.41* 1.37* 1.44*   CA 8.9 8.4* 8.6 8.6 8.9   * 101 III- longstanding and due to HTN with b/l creat 1.5 mg/dl or so. Cont to follow with diuresis    #2. Edema- has ongoing sig BLE edema in setting of DVTs/CKD/venous insuff. Wt better and edema slowly improving. Cont tid IV lasix    #3.  Urinary retention

## 2020-07-13 NOTE — PLAN OF CARE
Pt A&O x 4, SPO2 93% on RA, sinus rhythm with 1st degree AVB & occ PVC on tele, up with standby assist with walker. Ambulated in the hallway, tolerated well. Maintained on Heparin infusion, PTT therapeutic.  On Coumadin, INR 2.28 today, will bridge with Hep Follow urinary retention protocol/standard of care  - Consider collaborating with pharmacy to review patient's medication profile  - Implement strategies to promote bladder emptying  Outcome: Progressing     Problem: METABOLIC/FLUID AND ELECTROLYTES - ADUL floss  - Use electric shaver for shaving  - Use soft bristle tooth brush  - Limit straining and forceful nose blowing  Outcome: Progressing  Goal: Free from bleeding injury  Description  (Example usage: patient with low platelets)  INTERVENTIONS:  - Avoid patient reports new pain  - Anticipate increased pain with activity and pre-medicate as appropriate  Outcome: Progressing     Problem: RISK FOR INFECTION - ADULT  Goal: Absence of fever/infection during anticipated neutropenic period  Description  INTERVEN Living  Goal: Achieve highest/safest level of independence in self care  Description  Interventions:  - Assess ability and encourage patient to participate in ADLs to maximize function  - Promote sitting position while performing ADLs such as feeding, groo

## 2020-07-13 NOTE — DIETARY NOTE
[address] [facility]     Admitting diagnosis:  Acute cystitis with hematuria [N30.01]  Acute deep vein thrombosis (DVT) of proximal vein of both lower extremities (HCC) [I82.4Y3]  Multiple subsegmental pulmonary emboli witho

## 2020-07-13 NOTE — PROGRESS NOTES
[facility] [place] [facility]  Progress Note     Svitlana Recio Patient Status:  Inpatient    1940 MRN [id number]   [facility] 8NE-A Attending Aaliyah Styles MD   Hosp Day # 7 PCP Radha Hernandez MD     Chief Complaint: Melissa Pressman edema     S: Patient without < > 32.0 32.0 31.0   ALKPHO 175*  --   --   --   --    AST 20  --   --   --   --    ALT 21  --   --   --   --    BILT 1.3  --   --   --   --    TP 7.6  --   --   --   --     < > = values in this interval not displayed.        Estimated Creatinine Clearance: discharge?: yes  Estimated date of discharge: ~2 days  Discharge is dependent on: progress  At this point Mr. Recio is expected to be discharge to: TBD     Plan of care discussed with patient, RN.  Ara Romero MD

## 2020-07-13 NOTE — PROGRESS NOTES
Pt. Alert and o x 4 , breathing pattern easy and non labored , denies SOB. Tele shows SR on the monitor. Heparin gtt / Coumadin for PE and DVT. Cont. IV Ancef for left leg cellulitis. Still on IV Laisx TID. Cont. Monitor per tele/labs/v/s.  Fa

## 2020-07-13 NOTE — PROGRESS NOTES
[facility]  Cardiology Progress Note    Subjective:  No chest pain or shortness of breath.     Objective:  BP 95/41 (BP Location: Right arm)   Pulse 63   Temp 98.7 °F (37.1 °C) (Oral)   Resp 18   Ht 6' 2\" (1.88 m)   Wt (!) 319 lb 3.6 oz (144.8 kg)   S DANGELO Albert  7/13/2020  1:36 PM  Patient seen and examined. Note reviewed and labs reviewed. Agree. No sob. Patient feels well;. INR therapeutic but still needs overlap with heparin 1-2 more days.    Annie Fox MD

## 2020-07-13 NOTE — PROGRESS NOTES
Hematology Progress Note    Patient Name: Eliane Medeiros  Medical Record Number: [id number]    YOB: 1940     Reason for Consultation:  Eliane Medeiros was seen today for the diagnosis of recurrent DVT/PE    Interval events: Patient reports feeling bilaterally  GI/Abd: Soft, non-tender  Skin: +mild erythematous skin changes at distal BLE, L>R  Ext: +mild symmetric BLE edema.    Light yellow urine in Oglesby bag    Laboratory:  Recent Labs   Lab 07/10/20  0641  07/11/20  0202 07/12/20  0549 07/13/20  050 Ascending aorta diameter was 4cm.   3. Aortic root: The aortic root was at the upper limits of normal.  4. Right ventricle: Systolic function was normal.  5. Pulmonary arteries: Systolic pressure was at the upper limits of normal to, at most, mildly increas suppressive dicloxacillin.        Ivan Lundborg, MD  Hematology/Medical Oncology  [facility]

## 2020-07-13 NOTE — PROGRESS NOTES
[facility]    Progress Note    Nia Recio Patient Status:  Inpatient    1940 MRN [id number]   [facility] 6NE-A Attending Gadiel Valdez MD   Hosp Day # 7 PCP Parvez Rodriguez MD        Subjective:   Pt feels OK.  Urine is now clear s (multiple prior IVC filter placements), chronic spinal hardware fusion, CKD. 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  History of weak stream (on rapaflo for awhile) and went into urinary retention on

## 2020-07-14 LAB
ANION GAP SERPL CALC-SCNC: 5 MMOL/L (ref 0–18)
APTT PPP: 99 SECONDS (ref 25.4–36.1)
BUN BLD-MCNC: 16 MG/DL (ref 7–18)
BUN/CREAT SERPL: 11.3 (ref 10–20)
CALCIUM BLD-MCNC: 9 MG/DL (ref 8.5–10.1)
CHLORIDE SERPL-SCNC: 100 MMOL/L (ref 98–112)
CO2 SERPL-SCNC: 32 MMOL/L (ref 21–32)
CREAT BLD-MCNC: 1.41 MG/DL (ref 0.7–1.3)
GLUCOSE BLD-MCNC: 117 MG/DL (ref 70–99)
INR BLD: 2.65 (ref 0.89–1.11)
OSMOLALITY SERPL CALC.SUM OF ELEC: 286 MOSM/KG (ref 275–295)
POTASSIUM SERPL-SCNC: 4 MMOL/L (ref 3.5–5.1)
PSA SERPL DL<=0.01 NG/ML-MCNC: 28.9 SECONDS (ref 12.4–14.6)
SODIUM SERPL-SCNC: 137 MMOL/L (ref 136–145)

## 2020-07-14 PROCEDURE — 99232 SBSQ HOSP IP/OBS MODERATE 35: CPT | Performed by: INTERNAL MEDICINE

## 2020-07-14 PROCEDURE — 99232 SBSQ HOSP IP/OBS MODERATE 35: CPT | Performed by: UROLOGY

## 2020-07-14 NOTE — HOME CARE LIAISON
Received referral from [place], [place]   . Met with patient and wife at the bedside to discuss home health services and offer choice. Patient is agreeable to [facility] services at discharge-PT only. Brochure and contact information provided.  Any questions addressed

## 2020-07-14 NOTE — PROGRESS NOTES
[facility]    Progress Note    Cailin Recio Patient Status:  Inpatient    1940 MRN [id number]   [facility] 6NE-A Attending Alissa Vilchis MD   Hosp Day # 8 PCP Bakari Marin MD        Subjective:   Pt feels OK.  Urine clear x 48 octavia 7/12.    Recommendations:  - Voiding trial today. - Continue rapaflo, finasteride.  - Encourage ambulation.  - Will continue to follow while in house. Please call if questions.        Buddy Cantu MD, FACS  Urologist  [facility]  Office: [phone]

## 2020-07-14 NOTE — PROGRESS NOTES
[facility]  Nephrology Progress Note    Nelltimothy Danni Almita Patient Status:  Inpatient    1940 MRN [id number]   [facility] 8NE-A Attending Leo Munoz MD   Hosp Day # 8 PCP Karissa Mota MD       SUBJECTIVE:  Up in chair, feels better Recent Labs   Lab 07/10/20  0646 07/11/20  0202 07/12/20  0549 07/13/20  0501 07/14/20  0622    138 137 138 137   K 4.0 3.8 3.6 3.8 4.0    104 102 101 100   CO2 29.0 32.0 32.0 31.0 32.0   BUN 16 19* 17 16 16   CREATSERUM 1.35* 1.41* 1.37* mg,  mg, Oral, Q6H PRN  Pramipexole Dihydrochloride (MIRAPEX) tab 1.5 mg, 1.5 mg, Oral, Nightly          Impression/Plan:    #1. CKD III- longstanding and due to HTN with b/l creat 1.5 mg/dl or so. Cont to follow with diuresis    #2.   Edema- has on

## 2020-07-14 NOTE — OCCUPATIONAL THERAPY NOTE
OCCUPATIONAL THERAPY TREATMENT NOTE - INPATIENT     Room Number: 9500/6235-T  Session: 1   Number of Visits to Meet Established Goals: 4    Presenting Problem: acute B LE DVT, PE     History related to current admission: Patient is a 80y/o male admitted 7/ OTHER  06/2012    aortic and iliac artery stent   • SPINAL FUSION  10/12/2018   • TONSILLECTOMY         SUBJECTIVE  \"I use that all the time. \" Referring to hip kit. Patient self-stated goal is go home.     OBJECTIVE  Precautions: Hard of hearing;Cardia performing ADL at modified independent level. For fall prevention purposes, supervision. Recommend discharge home with home OT and family assistance for household tasks.       OT Discharge Recommendations: Home with home health PT/OT  OT Device Recommenda

## 2020-07-14 NOTE — CM/SW NOTE
SW met with pt and spouse at bedside to follow up on PT recommendation. Pt agreeable to [facility] at time of discharge. Noted that [facility] saw pt to provide services. SW informed liaison via bubble chat for services. AVS updated.     [facility]  P:8

## 2020-07-14 NOTE — PLAN OF CARE
Assumed care of patient at [address]. Patient resting in bed, AOX4. Oxygenating >90% on RA. NSR w/ 1st degree AVB on tele. VSS. Denies pain or SOB. Heparin gtt infusing per DVT/PE protocol. Coumadin given. Continuing IV Ancef per orders.  Oglesby in place, jean RN  Outcome: Progressing  7/14/2020 0020 by Tashia Galeana RN  Outcome: Progressing     Problem: GASTROINTESTINAL - ADULT  Goal: Maintains or returns to baseline bowel function  Description  INTERVENTIONS:  - Assess bowel function  - Maintain adequate risk factors for pressure ulcer development  - Assess and document skin integrity  - Monitor for areas of redness and/or skin breakdown  - Initiate interventions, skin care algorithm/standards of care as needed  7/14/2020 0023 by SIENA Cruz report abnormal signs of bleeding to staff  - Avoid use of toothpicks and dental floss  - Use electric shaver for shaving  - Use soft bristle tooth brush  - Limit straining and forceful nose blowing  7/14/2020 0023 by Nabor Lazcano RN  Outcome: Progr neutropenic period  Description  INTERVENTIONS  - Monitor WBC  - Administer growth factors as ordered  - Implement neutropenic guidelines  7/14/2020 0023 by Alex Dent RN  Outcome: Progressing  7/14/2020 0020 by Alex Dent, RN  Outcome: Pr 0020 by Nabor Lazcano RN  Outcome: Progressing     Problem: Impaired Activities of Daily Living  Goal: Achieve highest/safest level of independence in self care  Description  Interventions:  - Assess ability and encourage patient to participate in AD

## 2020-07-14 NOTE — PROGRESS NOTES
Hematology Progress Note    Patient Name: Garrison Chiu  Medical Record Number: [id number]    YOB: 1940     Reason for Consultation:  Garrison Chiu was seen today for the diagnosis of recurrent DVT/PE    Interval events: Patient reports feeling bilaterally  GI/Abd: Soft, non-tender  Skin: +erythematous skin changes at distal BLE, L>R  Ext: +mild symmetric BLE edema.    Light yellow urine in Oglesby bag    Laboratory:  Recent Labs   Lab 07/10/20  0641  07/11/20  0202 07/12/20  0549 07/13/20  0501   W Pericardium, extracardiac: A trivial pericardial effusion was identified anterior to the heart. There was no evidence of hemodynamic compromise.     Impression & Plan:     *Extensive BLE DVT to iliac veins + PE  -While his other recurrent events were provok

## 2020-07-14 NOTE — PROGRESS NOTES
[facility]  Cardiology Progress Note    Subjective:  No chest pain or shortness of breath.     Objective:  /62 (BP Location: Left arm)   Pulse 66   Temp 98.3 °F (36.8 °C) (Oral)   Resp 18   Ht 6' 2\" (1.88 m)   Wt (!) 314 lb 9.5 oz (142.7 kg)   S AM    Patient seen and examined    Feels significantly better. States this is the best he has felt in a while.    : Clear, CV: Regular rate and rhythm, abdomen: Soft, extremity: Less edema    Will defer diuretics to Dr. Issac Jules. INR is therapeutic.   We will

## 2020-07-14 NOTE — PROGRESS NOTES
[facility]  Progress Note     Stevie Uzmatanya Recio Patient Status:  Inpatient    1940 MRN [id number]   [place] 8NE-A Attending Sebastián Bedolla MD   Hosp Day # 8 PCP Andre Hooper MD     Chief Complaint: No complains     S: Patient had (H)).    Recent Labs   Lab 07/12/20  0549 07/13/20  0501 07/14/20  0622   PTP 21.5* 25.7* 28.9*   INR 1.81* 2.28* 2.65*       No results for input(s): TROP, CK in the last 168 hours. Imaging: Imaging data reviewed in Epic.     Medications:   • Warfa

## 2020-07-14 NOTE — PLAN OF CARE
Pt A&O x 4, SPO2 96% on RA, sinus rhythm with 1st degree AV block, up with standby assist with walker. Maintained on Heparin infusion, PTT therapeutic, INR 2.65. Getting Lasix 40 mg IVP TID, diuresing well, creatinine 1.41.  Reny discontinued @ 0845, due t PO as ordered and tolerated  - Evaluate effectiveness of GI medications  - Encourage mobilization and activity  - Obtain nutritional consult as needed  - Establish a toileting routine/schedule  - Consider collaborating with pharmacy to review patient's med and appropriate  Outcome: Progressing  Goal: Free from bleeding injury  Description  (Example usage: patient with low platelets)  INTERVENTIONS:  - Avoid intramuscular injections, enemas and rectal medication administration  - Ensure safe mobilization of p goal  Description  INTERVENTIONS:  - Encourage pt to monitor pain and request assistance  - Assess pain using appropriate pain scale  - Administer analgesics based on type and severity of pain and evaluate response  - Implement non-pharmacological measures to assist at discharge as needed  - Consider post-discharge preferences of patient/family/discharge partner  - Complete POLST form as appropriate  - Assess patient's ability to be responsible for managing their own health  - Refer to Case Management Depart

## 2020-07-15 ENCOUNTER — TELEPHONE (OUTPATIENT)
Dept: SURGERY | Facility: CLINIC | Age: 80
End: 2020-07-15

## 2020-07-15 VITALS
DIASTOLIC BLOOD PRESSURE: 39 MMHG | HEART RATE: 72 BPM | SYSTOLIC BLOOD PRESSURE: 100 MMHG | RESPIRATION RATE: 18 BRPM | WEIGHT: 313.06 LBS | OXYGEN SATURATION: 93 % | TEMPERATURE: 99 F | BODY MASS INDEX: 40.18 KG/M2 | HEIGHT: 74 IN

## 2020-07-15 DIAGNOSIS — I82.4Y3 ACUTE DEEP VEIN THROMBOSIS (DVT) OF PROXIMAL VEIN OF BOTH LOWER EXTREMITIES (HCC): Primary | ICD-10-CM

## 2020-07-15 DIAGNOSIS — D64.9 NORMOCYTIC ANEMIA: ICD-10-CM

## 2020-07-15 DIAGNOSIS — R74.02 ELEVATED LDH: ICD-10-CM

## 2020-07-15 LAB
ANION GAP SERPL CALC-SCNC: 3 MMOL/L (ref 0–18)
APTT PPP: 175.1 SECONDS (ref 25.4–36.1)
BUN BLD-MCNC: 17 MG/DL (ref 7–18)
BUN/CREAT SERPL: 11.5 (ref 10–20)
CALCIUM BLD-MCNC: 9.1 MG/DL (ref 8.5–10.1)
CHLORIDE SERPL-SCNC: 100 MMOL/L (ref 98–112)
CO2 SERPL-SCNC: 33 MMOL/L (ref 21–32)
CREAT BLD-MCNC: 1.48 MG/DL (ref 0.7–1.3)
GLUCOSE BLD-MCNC: 106 MG/DL (ref 70–99)
INR BLD: 2.64 (ref 0.89–1.11)
OSMOLALITY SERPL CALC.SUM OF ELEC: 284 MOSM/KG (ref 275–295)
POTASSIUM SERPL-SCNC: 4 MMOL/L (ref 3.5–5.1)
PSA SERPL DL<=0.01 NG/ML-MCNC: 28.8 SECONDS (ref 12.4–14.6)
SODIUM SERPL-SCNC: 136 MMOL/L (ref 136–145)

## 2020-07-15 PROCEDURE — 99239 HOSP IP/OBS DSCHRG MGMT >30: CPT | Performed by: INTERNAL MEDICINE

## 2020-07-15 PROCEDURE — 99232 SBSQ HOSP IP/OBS MODERATE 35: CPT | Performed by: INTERNAL MEDICINE

## 2020-07-15 RX ORDER — FINASTERIDE 5 MG/1
5 TABLET, FILM COATED ORAL DAILY
Qty: 30 TABLET | Refills: 0 | Status: SHIPPED | OUTPATIENT
Start: 2020-07-16 | End: 2020-08-20

## 2020-07-15 RX ORDER — WARFARIN SODIUM 5 MG/1
5 TABLET ORAL NIGHTLY
Qty: 90 TABLET | Refills: 3 | Status: SHIPPED | OUTPATIENT
Start: 2020-07-15 | End: 2020-08-27

## 2020-07-15 RX ORDER — ACETAMINOPHEN 500 MG
500 TABLET ORAL EVERY 6 HOURS PRN
Refills: 0 | Status: SHIPPED | COMMUNITY
Start: 2020-07-15

## 2020-07-15 NOTE — PROGRESS NOTES
[facility]    Progress Note    Mary Civil Nahs Patient Status:  Inpatient    1940 MRN [id number]   [facility] 6NE-A Attending Joan Holm MD   Hosp Day # 9 PCP Lenin Alaniz MD        Subjective:   Pt feels well.  Voiding clear urin Patient should f/u with me in a few weeks for urinary symptom check and office cysto. Our office will call. -  signing off. Thank you again for this consult. Please call if questions.        Anderson Mayers MD, [facility]  Urologist  [facility]

## 2020-07-15 NOTE — PROGRESS NOTES
Hematology Progress Note    Patient Name: Deon Wharton  Medical Record Number: [id number]    YOB: 1940     Reason for Consultation:  Deon Wharton was seen today for the diagnosis of recurrent DVT/PE    Interval events: Patient reports feeling edema.      Laboratory:  Recent Labs   Lab 07/10/20  0641  07/11/20  0202 07/12/20  0549 07/13/20  0501   WBC 10.8   < > 10.7 8.8 8.9   HGB 8.8*   < > 8.7* 8.6* 9.3*   HCT 28.5*   < > 29.6* 28.3* 30.4*   .0*   < > 179.0 227.0 284.0   MCV 85.6   < > 8 compromise. Impression & Plan:     *Extensive BLE DVT to iliac veins + PE  -While his other recurrent events were provoked by surgery, current event is unprovoked.   Based on his prior history, I recommend indefinite duration anticoagulation going forwar

## 2020-07-15 NOTE — DISCHARGE SUMMARY
[facility]  DISCHARGE SUMMARY     Moriah Recio Patient Status:  Inpatient    1940 MRN [id number]   [facility] 8NE-A Attending No att. providers found   Hosp Day # 9 PCP Alejandro Chester MD     Date of Admission: 2020  Date of D 25 MG Tabs  Commonly known as:  LOPRESSOR      Take 1 tablet (25 mg total) by mouth 2 (two) times daily.    Quantity:  60 tablet  Refills:  3     Warfarin Sodium 5 MG Tabs  Commonly known as:  COUMADIN  Notes to patient:  2.5mg: tues and sat  5mg: cristela goodson, total) by mouth every 6 (six) hours as needed for Pain.    Quantity:  30 tablet  Refills:  0        STOP taking these medications    aspirin 81 MG Tabs        cyclobenzaprine 10 MG Tabs  Commonly known as:  FLEXERIL        Terbinafine HCl 250 MG Tabs  Commo

## 2020-07-15 NOTE — PLAN OF CARE
Assumed care of patient at [address]. Patient resting in bed, AOX4. Oxygenating >90% on RA. NSR with 1st degree AVB on tele. C/o mild generalized achiness controlled with PRN Tylenol. Heparin gtt infusing per DVT/PE protocol. Coumadin given per orders.  Reny garcia tolerated  - Evaluate effectiveness of GI medications  - Encourage mobilization and activity  - Obtain nutritional consult as needed  - Establish a toileting routine/schedule  - Consider collaborating with pharmacy to review patient's medication profile  O appropriate  Outcome: Progressing  Goal: Free from bleeding injury  Description  (Example usage: patient with low platelets)  INTERVENTIONS:  - Avoid intramuscular injections, enemas and rectal medication administration  - Ensure safe mobilization of patie goal  Description  INTERVENTIONS:  - Encourage pt to monitor pain and request assistance  - Assess pain using appropriate pain scale  - Administer analgesics based on type and severity of pain and evaluate response  - Implement non-pharmacological measures to assist at discharge as needed  - Consider post-discharge preferences of patient/family/discharge partner  - Complete POLST form as appropriate  - Assess patient's ability to be responsible for managing their own health  - Refer to Case Management Depart

## 2020-07-15 NOTE — TELEPHONE ENCOUNTER
Hi,  This patient is probably being discharged today or tomorrow. Please call to schedule a CYSTO appointment in ~ 2-3 weeks time with PVR.  Thanks

## 2020-07-15 NOTE — PROGRESS NOTES
Explained discharge instructions including medications, warfarin labs and follow ups to the patient and his wife, verbalize understanding. Three medications provided to pt by meds-to-beds service. Home medication returned to wife.  PIV removed, tele monitor

## 2020-07-15 NOTE — PLAN OF CARE
Assumed patient care as 0730 this AM. Patient A&O x4, glasses and hearing aids. Reviewed safety precautions, alarms in place, pt verbalize understanding. SPO2 maintained on RA, no c/o SOB. Mild BLE edema- diuresing with IV Lasix and PO spironolactone.  NSR function  Description  INTERVENTIONS:  - Assess bowel function  - Maintain adequate hydration with IV or PO as ordered and tolerated  - Evaluate effectiveness of GI medications  - Encourage mobilization and activity  - Obtain nutritional consult as needed and medications as ordered and appropriate  - Administer supportive blood products/factors as ordered and appropriate  Outcome: Progressing  Goal: Free from bleeding injury  Description  (Example usage: patient with low platelets)  INTERVENTIONS:  - Avoid Activities of Daily Living  Goal: Achieve highest/safest level of independence in self care  Description  Interventions:  - Assess ability and encourage patient to participate in ADLs to maximize function  - Promote sitting position while performing ADLs s frequently for physical needs  - Identify cognitive and physical deficits and behaviors that affect risk of falls.   - Haskell fall precautions as indicated by assessment.  - Educate pt/family on patient safety including physical limitations  - Instruct p

## 2020-07-15 NOTE — PROGRESS NOTES
[facility]  Nephrology Progress Note    Too Recio Patient Status:  Inpatient    1940 MRN [id number]   [facility] 8NE-A Attending Lukas Cantor MD   Hosp Day # 9 PCP Carmen Ya MD       SUBJECTIVE:  Up in chair, feels better 163.0 179.0 227.0 284.0  --   --    INR 1.13*  --  1.11 1.81* 2.28* 2.65* 2.64*       Recent Labs   Lab 07/11/20  0202 07/12/20  0549 07/13/20  0501 07/14/20  0622 07/15/20  0541    137 138 137 136   K 3.8 3.6 3.8 4.0 4.0    102 101 100 100   C Nightly          Impression/Plan:    #1. CKD III- longstanding and due to HTN with b/l creat 1.5 mg/dl or so. Cont to follow with diuresis    #2. Edema- has ongoing sig BLE edema in setting of DVTs/CKD/venous insuff.   Wt better and edema slowly improvin

## 2020-07-16 ENCOUNTER — PATIENT OUTREACH (OUTPATIENT)
Dept: CASE MANAGEMENT | Age: 80
End: 2020-07-16

## 2020-07-16 ENCOUNTER — TELEPHONE (OUTPATIENT)
Dept: FAMILY MEDICINE CLINIC | Facility: CLINIC | Age: 80
End: 2020-07-16

## 2020-07-16 DIAGNOSIS — Z02.9 ENCOUNTERS FOR UNSPECIFIED ADMINISTRATIVE PURPOSE: ICD-10-CM

## 2020-07-16 PROCEDURE — 1111F DSCHRG MED/CURRENT MED MERGE: CPT

## 2020-07-16 NOTE — TELEPHONE ENCOUNTER
RN called and spoke to patient. Arranged appointment with Dr Kelsey Swann on 8/4 11:30am cystoscopy. Cysto Procedure Instructions sent by mail. Patient agreeable to plans and verbalized understanding. No questions at this time.

## 2020-07-16 NOTE — PROGRESS NOTES
Initial Post Discharge Follow Up   Discharge Date: 7/15/20  Contact Date: 7/16/2020    Consent Verification:  Assessment Completed With: Patient  HIPAA Verified?   Yes    Discharge Dx:     recurrent DVT/PE    Was TCC ordered: yes    General:   • How have TABLETS EVERY EVENING 450 tablet 4   • FUROSEMIDE 20 MG Oral Tab TAKE 1 TABLET TWICE A DAY (CANCEL PRESCRIPTION FOR ONE DAILY) 180 tablet 4   • PANTOPRAZOLE SODIUM 40 MG Oral Tab EC TAKE 1 TABLET TWICE A  tablet 4   • latanoprost 0.005 % Ophthalmic your PCP?  (DME, meds, disease concerns, Etc): No     Follow up appointments:      Your appointments     Date & Time Appointment Department ([facility])    Jul 20, 2020 10:00 AM CDT PT/INR VISIT with STEWART Sutherland 46 [place] [facility] - hospital?  excellent)        Interventions by NCM:   All d/c instructions reviewed with pt. Reviewed when to call MD vs when to go to ER/call 911. Educated pt on the importance of taking all meds as prescribed as well as close f/u with PCP/specialists.

## 2020-07-16 NOTE — TELEPHONE ENCOUNTER
PSR: can you confirm the mail order, we see pt uses [facility]. Please double check.   thanks        LOV 7/2/20  Last lipid 4/16/19  LF 2/26/20

## 2020-07-16 NOTE — TELEPHONE ENCOUNTER
Spoke to pt for TCM today. Pt does not have HFU appt scheduled at this time. TCM/HFU appt recommended by 7/29 as pt is a moderate risk for readmission. Please advise. BOOK BY DATE (last date for TCM): 7/29    TRIAGE:  Please f/u with pt and try to get them to schedule as pt would greatly benefit from TCM/HFU appt.   Thank you

## 2020-07-17 ENCOUNTER — TELEPHONE (OUTPATIENT)
Dept: FAMILY MEDICINE CLINIC | Facility: CLINIC | Age: 80
End: 2020-07-17

## 2020-07-17 RX ORDER — MOMETASONE FUROATE 1 MG/G
OINTMENT TOPICAL
COMMUNITY
Start: 2020-05-11

## 2020-07-17 RX ORDER — ROSUVASTATIN CALCIUM 10 MG/1
10 TABLET, COATED ORAL NIGHTLY
Qty: 90 TABLET | Refills: 1 | Status: SHIPPED | OUTPATIENT
Start: 2020-07-17 | End: 2020-11-30

## 2020-07-17 NOTE — TELEPHONE ENCOUNTER
Jagdeep Espinoza informed of Dr Qi Alexander response. Jagdeep Espinoza states pt only needs PT, no nursing care needed so he has an appt with the coumadin clinic. FYI.

## 2020-07-17 NOTE — TELEPHONE ENCOUNTER
Agree with HH. No concerns with medication interaction - recommended by specialists. Will they be drawing INR's?  Will they report to Heme/Onc?

## 2020-07-17 NOTE — TELEPHONE ENCOUNTER
Received a call from [address] [facility]     1. Will you sign HH orders (okay given per Dr. Jesus Peres)  2. Que Peres that the POC is 2 times a week for a total of 2 weeks  3. He received a level 2 interaction between warfarin and dicloxacillin. Do you have any concerns with this?     Triage: If there are any concerns with #3 - call Jamie Salazar back at [phone]

## 2020-07-20 ENCOUNTER — ANTI-COAG VISIT (OUTPATIENT)
Dept: HEMATOLOGY/ONCOLOGY | Age: 80
End: 2020-07-20
Attending: INTERNAL MEDICINE
Payer: MEDICARE

## 2020-07-20 DIAGNOSIS — I26.94 MULTIPLE SUBSEGMENTAL PULMONARY EMBOLI WITHOUT ACUTE COR PULMONALE (HCC): ICD-10-CM

## 2020-07-20 DIAGNOSIS — I82.4Y3 ACUTE DEEP VEIN THROMBOSIS (DVT) OF PROXIMAL VEIN OF BOTH LOWER EXTREMITIES (HCC): ICD-10-CM

## 2020-07-20 LAB — INR: 2.2 (ref 0.8–1.3)

## 2020-07-20 PROCEDURE — 36416 COLLJ CAPILLARY BLOOD SPEC: CPT

## 2020-07-20 PROCEDURE — 85610 PROTHROMBIN TIME: CPT

## 2020-07-21 PROBLEM — I82.441 ACUTE DEEP VEIN THROMBOSIS (DVT) OF TIBIAL VEIN OF RIGHT LOWER EXTREMITY (HCC): Status: ACTIVE | Noted: 2019-03-21

## 2020-07-23 ENCOUNTER — OFFICE VISIT (OUTPATIENT)
Dept: FAMILY MEDICINE CLINIC | Facility: CLINIC | Age: 80
End: 2020-07-23
Payer: MEDICARE

## 2020-07-23 VITALS
RESPIRATION RATE: 18 BRPM | OXYGEN SATURATION: 96 % | SYSTOLIC BLOOD PRESSURE: 120 MMHG | WEIGHT: 314 LBS | DIASTOLIC BLOOD PRESSURE: 68 MMHG | TEMPERATURE: 98 F | BODY MASS INDEX: 40 KG/M2 | HEART RATE: 65 BPM

## 2020-07-23 DIAGNOSIS — I26.99 ACUTE PULMONARY EMBOLISM, UNSPECIFIED PULMONARY EMBOLISM TYPE, UNSPECIFIED WHETHER ACUTE COR PULMONALE PRESENT (HCC): ICD-10-CM

## 2020-07-23 DIAGNOSIS — N18.30 CKD (CHRONIC KIDNEY DISEASE) STAGE 3, GFR 30-59 ML/MIN (HCC): ICD-10-CM

## 2020-07-23 DIAGNOSIS — R60.0 PEDAL EDEMA: ICD-10-CM

## 2020-07-23 DIAGNOSIS — G25.81 RESTLESS LEGS SYNDROME: ICD-10-CM

## 2020-07-23 DIAGNOSIS — I26.99 PULMONARY EMBOLISM, UNSPECIFIED CHRONICITY, UNSPECIFIED PULMONARY EMBOLISM TYPE, UNSPECIFIED WHETHER ACUTE COR PULMONALE PRESENT (HCC): ICD-10-CM

## 2020-07-23 DIAGNOSIS — I82.401 DVT, LOWER EXTREMITY, RECURRENT, RIGHT (HCC): Primary | ICD-10-CM

## 2020-07-23 DIAGNOSIS — I48.0 PAROXYSMAL ATRIAL FIBRILLATION (HCC): ICD-10-CM

## 2020-07-23 PROCEDURE — 1111F DSCHRG MED/CURRENT MED MERGE: CPT | Performed by: EMERGENCY MEDICINE

## 2020-07-23 PROCEDURE — 99495 TRANSJ CARE MGMT MOD F2F 14D: CPT | Performed by: EMERGENCY MEDICINE

## 2020-07-23 RX ORDER — PANTOPRAZOLE SODIUM 40 MG/1
40 TABLET, DELAYED RELEASE ORAL 2 TIMES DAILY
Qty: 180 TABLET | Refills: 1 | Status: SHIPPED | OUTPATIENT
Start: 2020-07-23 | End: 2020-11-30

## 2020-07-23 RX ORDER — PRAMIPEXOLE DIHYDROCHLORIDE 0.5 MG/1
TABLET ORAL
Qty: 450 TABLET | Refills: 4 | Status: SHIPPED | OUTPATIENT
Start: 2020-07-23 | End: 2021-02-17

## 2020-07-23 RX ORDER — FAMOTIDINE 20 MG
TABLET ORAL
COMMUNITY

## 2020-07-23 RX ORDER — FUROSEMIDE 20 MG/1
TABLET ORAL
Qty: 120 TABLET | Refills: 0 | Status: SHIPPED | OUTPATIENT
Start: 2020-07-23 | End: 2020-08-20

## 2020-07-23 NOTE — PROGRESS NOTES
HPI:    Katya Mena is a 79year old male here today for hospital follow up. He was discharged from Inpatient hospital, [facility] to Home   Admission Date: 7/6/20   Discharge Date: 7/15/20    Hospital Discharge Diagnoses (since 6/23/2020)       1. D, Cholecalciferol, 25 MCG (1000 UT) Oral Cap, Take by mouth. Rosuvastatin Calcium 10 MG Oral Tab, Take 1 tablet (10 mg total) by mouth nightly.   mometasone 0.1 % External Ointment, ABIMAEL 1 APPLICATION TO SKIN ON HANDS BID  Warfarin Sodium 5 MG Oral Tab, Ta does not use drugs.      ROS:   GENERAL: weight stable, energy stable, no sweating  SKIN: denies any unusual skin lesions  EYES: denies blurred vision or double vision  HEENT: denies nasal congestion, sinus pain or ST  LUNGS: denies shortness of breath with eliquis  Follow-up with heme-onc as scheduled. 3. Paroxysmal atrial fibrillation ([address]  Will refer to cardiology. - CARDIO - INTERNAL    4.  Restless legs syndrome  - Pramipexole Dihydrochloride 0.5 MG Oral Tab; TAKE 2 TABLETS EVERY MORNING AND 3 TABLET severe    Overall Risk:   severe    Patient seen within 14 days from date of discharge.      Mackenzie García MD, 7/23/2020

## 2020-07-23 NOTE — PATIENT INSTRUCTIONS
Thank you for choosing [address] [place] [address] Group  To Do:  FOR VANESA SANTIAGO        1. Follow up with Cardiology, Dr. Ledy Zuniga, or Dr. Melissa Funes  2. Continue with all medicatons  3. Follow up in 1 month for Medicare annual wellness physical  4. Dr. Arleth Santos.  Donita Issa rate  · Preventing the risk for blood clot and stroke using blood-thinning medicines, such as aspirin or clopidogrel. · Preventing the risk of blood clot and stroke with procedures such as left atrial appendage closure.  In this procedure, a small pouch in AFib. If you are taking blood-thinner medicines, alcohol may interfere with them by increasing their effect. · Never take stimulants such as amphetamines or cocaine. These drugs can speed up your heart rate and trigger AFib. · Manage your weight.  If you and lungs. With AFib, the atria receive abnormal signals. This causes them to contract in a fast and irregular way, and out of sync with the ventricles. When this happens, the atria also have a harder time moving blood into the ventricles.  Blood may then the brain and causing a stroke. · Electrical cardioversion. Your healthcare provider uses special pads or paddles to send one or more brief electrical shocks to the heart. This can help reset the heartbeat to normal.  · Ablation.  Long, thin tubes (cathet blood well. When should I call my healthcare provider?   Call your healthcare provider right away if you have any of these:  · Symptoms that don’t get better with treatment, or get worse  · New symptoms  Emiliano last reviewed this educational content on 4 larger. · Thrombolysis. Thrombolytic medicines are used to quickly dissolve a blood clot.  A long, narrow tube (catheter) is used to deliver medicine directly to the clot. Thrombolytic medicines increase the risk of bleeding so they are used very carefully clots. Call your healthcare provider if you have signs or symptoms of bleeding.  This includes:  · Blood in the urine  · Bleeding with bowel movements  · Bleeding from the nose, gums, a cut, or vagina   Emiliano last reviewed this educational content on 2/ period, such as when you’re in the hospital, or traveling by plane or car  · Injury to a vein from an accident, a broken bone, or surgery  · Having blood clots in the past  · Personal or family history of a blood-clotting disorder  · Recent surgery  · Canc may or may not be necessary. · Surgery for some people, like those who can't take blood-thinning medicines. Preventing DVT  Many people who are in the hospital are at an increased risk of developing blood clots.  So, preventing blood clots is an important

## 2020-07-24 ENCOUNTER — TELEPHONE (OUTPATIENT)
Dept: CARDIOLOGY | Age: 80
End: 2020-07-24

## 2020-07-24 ENCOUNTER — TELEPHONE (OUTPATIENT)
Dept: FAMILY MEDICINE CLINIC | Facility: CLINIC | Age: 80
End: 2020-07-24

## 2020-07-24 ENCOUNTER — PATIENT MESSAGE (OUTPATIENT)
Dept: FAMILY MEDICINE CLINIC | Facility: CLINIC | Age: 80
End: 2020-07-24

## 2020-07-24 NOTE — TELEPHONE ENCOUNTER
From: Jeanna Recio  To: Jannifer Crigler, MD  Sent: 7/24/2020 3:42 PM CDT  Subject: Other    There is an order written for me to have an X-ray of my spine.  Please cancel thios request. Thank!!

## 2020-07-24 NOTE — TELEPHONE ENCOUNTER
Leslie Valencia from [facility] stated that patient will be discharged from home health next week because he is doing very well and meeting his goals.

## 2020-07-28 ENCOUNTER — OFFICE VISIT (OUTPATIENT)
Dept: HEMATOLOGY/ONCOLOGY | Age: 80
End: 2020-07-28
Attending: INTERNAL MEDICINE
Payer: MEDICARE

## 2020-07-28 ENCOUNTER — ANTI-COAG VISIT (OUTPATIENT)
Dept: HEMATOLOGY/ONCOLOGY | Facility: HOSPITAL | Age: 80
End: 2020-07-28

## 2020-07-28 VITALS
HEART RATE: 69 BPM | SYSTOLIC BLOOD PRESSURE: 124 MMHG | RESPIRATION RATE: 20 BRPM | WEIGHT: 314.81 LBS | TEMPERATURE: 98 F | DIASTOLIC BLOOD PRESSURE: 71 MMHG | OXYGEN SATURATION: 95 % | BODY MASS INDEX: 40 KG/M2

## 2020-07-28 DIAGNOSIS — I82.4Y3 ACUTE DEEP VEIN THROMBOSIS (DVT) OF PROXIMAL VEIN OF BOTH LOWER EXTREMITIES (HCC): Primary | ICD-10-CM

## 2020-07-28 DIAGNOSIS — N18.30 CKD (CHRONIC KIDNEY DISEASE) STAGE 3, GFR 30-59 ML/MIN (HCC): ICD-10-CM

## 2020-07-28 DIAGNOSIS — I26.99 OTHER ACUTE PULMONARY EMBOLISM WITHOUT ACUTE COR PULMONALE (HCC): ICD-10-CM

## 2020-07-28 DIAGNOSIS — I82.409 RECURRENT DEEP VEIN THROMBOSIS (DVT) (HCC): ICD-10-CM

## 2020-07-28 DIAGNOSIS — R74.02 ELEVATED LDH: ICD-10-CM

## 2020-07-28 DIAGNOSIS — D64.9 NORMOCYTIC ANEMIA: ICD-10-CM

## 2020-07-28 DIAGNOSIS — R60.0 BILATERAL LEG EDEMA: ICD-10-CM

## 2020-07-28 DIAGNOSIS — Z95.828 PRESENCE OF IVC FILTER: ICD-10-CM

## 2020-07-28 PROBLEM — I26.94 MULTIPLE SUBSEGMENTAL PULMONARY EMBOLI WITHOUT ACUTE COR PULMONALE (HCC): Status: RESOLVED | Noted: 2020-07-06 | Resolved: 2020-07-28

## 2020-07-28 LAB
ALBUMIN SERPL-MCNC: 3.4 G/DL (ref 3.4–5)
ALBUMIN/GLOB SERPL: 0.8 {RATIO} (ref 1–2)
ALP LIVER SERPL-CCNC: 129 U/L (ref 45–117)
ALT SERPL-CCNC: 12 U/L (ref 16–61)
ANION GAP SERPL CALC-SCNC: 1 MMOL/L (ref 0–18)
AST SERPL-CCNC: 14 U/L (ref 15–37)
BASOPHILS # BLD AUTO: 0.06 X10(3) UL (ref 0–0.2)
BASOPHILS NFR BLD AUTO: 1 %
BILIRUB SERPL-MCNC: 0.5 MG/DL (ref 0.1–2)
BUN BLD-MCNC: 26 MG/DL (ref 7–18)
BUN/CREAT SERPL: 22 (ref 10–20)
CALCIUM BLD-MCNC: 9.4 MG/DL (ref 8.5–10.1)
CHLORIDE SERPL-SCNC: 110 MMOL/L (ref 98–112)
CO2 SERPL-SCNC: 29 MMOL/L (ref 21–32)
CREAT BLD-MCNC: 1.18 MG/DL (ref 0.7–1.3)
DEPRECATED RDW RBC AUTO: 55.8 FL (ref 35.1–46.3)
EOSINOPHIL # BLD AUTO: 0.27 X10(3) UL (ref 0–0.7)
EOSINOPHIL NFR BLD AUTO: 4.4 %
ERYTHROCYTE [DISTWIDTH] IN BLOOD BY AUTOMATED COUNT: 17.2 % (ref 11–15)
GLOBULIN PLAS-MCNC: 4.2 G/DL (ref 2.8–4.4)
GLUCOSE BLD-MCNC: 99 MG/DL (ref 70–99)
HCT VFR BLD AUTO: 38.3 % (ref 39–53)
HGB BLD-MCNC: 11.2 G/DL (ref 13–17.5)
IMM GRANULOCYTES # BLD AUTO: 0.01 X10(3) UL (ref 0–1)
IMM GRANULOCYTES NFR BLD: 0.2 %
INR BLD: 1.73 (ref 0.88–1.11)
LDH SERPL L TO P-CCNC: 183 U/L
LYMPHOCYTES # BLD AUTO: 0.76 X10(3) UL (ref 1–4)
LYMPHOCYTES NFR BLD AUTO: 12.3 %
M PROTEIN MFR SERPL ELPH: 7.6 G/DL (ref 6.4–8.2)
MCH RBC QN AUTO: 26 PG (ref 26–34)
MCHC RBC AUTO-ENTMCNC: 29.2 G/DL (ref 31–37)
MCV RBC AUTO: 89.1 FL (ref 80–100)
MONOCYTES # BLD AUTO: 0.58 X10(3) UL (ref 0.1–1)
MONOCYTES NFR BLD AUTO: 9.4 %
NEUTROPHILS # BLD AUTO: 4.48 X10 (3) UL (ref 1.5–7.7)
NEUTROPHILS # BLD AUTO: 4.48 X10(3) UL (ref 1.5–7.7)
NEUTROPHILS NFR BLD AUTO: 72.7 %
OSMOLALITY SERPL CALC.SUM OF ELEC: 295 MOSM/KG (ref 275–295)
PATIENT FASTING Y/N/NP: NO
PLATELET # BLD AUTO: 229 10(3)UL (ref 150–450)
POTASSIUM SERPL-SCNC: 4.4 MMOL/L (ref 3.5–5.1)
PSA SERPL DL<=0.01 NG/ML-MCNC: 20.1 SECONDS (ref 12–14.3)
RBC # BLD AUTO: 4.3 X10(6)UL (ref 3.8–5.8)
SODIUM SERPL-SCNC: 140 MMOL/L (ref 136–145)
WBC # BLD AUTO: 6.2 X10(3) UL (ref 4–11)

## 2020-07-28 PROCEDURE — 99215 OFFICE O/P EST HI 40 MIN: CPT | Performed by: INTERNAL MEDICINE

## 2020-07-28 NOTE — PROGRESS NOTES
Education Record    Learner:  Patient and Spouse    Disease / Javier Safer VTE    Barriers / Limitations:  None   Comments:    Method:  Brief focused   Comments:    General Topics:  Plan of care reviewed   Comments:    Outcome:  Shows understanding

## 2020-07-28 NOTE — PROGRESS NOTES
Hematology Clinic Follow Up Visit    Patient Name: Edison Hoffmann  Medical Record Number: [id number]    YOB: 1940    PCP: Dr. Abigail Huertas   Other providers:  Dr. Nelida Ontiveros (urology), Dr. Beatriz Jain (renal)    Reason for Consultation:  Neil Harmon may be due to underlying thrombus. . Oglesby catheter in the urinary bladder.  Urinary bladder wall is thickened and there is air in the urinary bladder. Maliha Dragon is surrounding fatty induration.  This may be due to underlying cystitis, however clinical correla Procedure Laterality Date   • CATARACT  2012   • COLONOSCOPY  09/12/2016   • KNEE REPLACEMENT SURGERY Right 04/04/2016   • OTHER  06/2012    aortic and iliac artery stent   • SPINAL FUSION  10/12/2018   • TONSILLECTOMY       Home Medications:  Vitamin D, History:  Social History    Patient does not qualify to have social determinant information on file (likely too young). Social History Narrative      , lives with wife. Has 4 children, 1 of which is with his current wife.   Has grandchildren and changes noted    Laboratory:  Lab Results   Component Value Date    WBC 6.2 07/28/2020    WBC 8.9 07/13/2020    WBC 8.8 07/12/2020    HGB 11.2 (L) 07/28/2020    HGB 9.3 (L) 07/13/2020    HGB 8.6 (L) 07/12/2020    HCT 38.3 (L) 07/28/2020    MCV 89.1 07/28/2 found for: T3TOT      Imaging:    As reviewed above    Impression & Plan:     *Extensive BLE DVT to iliac veins + PE  -While his other recurrent events were provoked by surgery, most recent event is unprovoked.   Indefinite duration anticoagulation going fo

## 2020-07-29 PROBLEM — R31.0 GROSS HEMATURIA: Status: ACTIVE | Noted: 2020-07-29

## 2020-07-29 PROBLEM — R59.1 LYMPHADENOPATHY: Status: ACTIVE | Noted: 2020-07-29

## 2020-07-29 NOTE — PROGRESS NOTES
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 We discussed that it may take up to 6 mo to reach full effect. He is pretty unhappy with urinary symptoms at this time.     He also has small bladder lesions which are hopefully just inflammatory/reactive but malign was examined and the scope was retroflexed to examine the bladder neck. Findings: Office cysto today shows moderate BPH with bladder trabeculation noted.  He also has several (~ 10) small (~2-3 mm) erythematous lesions on the posterior bladder wall whic Dispense Refill   • finasteride 5 MG Oral Tab Take 1 tablet (5 mg total) by mouth daily. 90 tablet 6   • Vitamin D, Cholecalciferol, 25 MCG (1000 UT) Oral Cap Take by mouth.      • furosemide 20 MG Oral Tab TAKE 1 TABLET TWICE A DAY (CANCEL PRESCRIPTION FOR non-icteric  EARS: hearing intact  ORAL CAVITY: mucosa moist  NECK/THYROID: no obvious goiter or masses  LUNGS: nonlabored breathing  ABDOMEN: soft, no obvious masses or tenderness  SKIN: no obvious rashes     ASSESSMENT/PLAN:   Diagnoses and all orders fo

## 2020-08-03 ENCOUNTER — TELEPHONE (OUTPATIENT)
Dept: SURGERY | Facility: CLINIC | Age: 80
End: 2020-08-03

## 2020-08-03 NOTE — TELEPHONE ENCOUNTER
Patient has cysto scheduled in the office tomorrow 8/4. Patient wanted Dr Joaquin Garrett to know that he has artificial joints. He takes amoxicillin everyday due to a staph infection he had several years ago from back surgery.   I told him I would call him back with

## 2020-08-03 NOTE — TELEPHONE ENCOUNTER
Per pt has an artificial joint replacement on the right knee and back had 4 metal rods that goes from neck area to buttocks. Has procedure scheduled for 8/4/20.  Please advise

## 2020-08-03 NOTE — TELEPHONE ENCOUNTER
If he takes amoxicillin every day then he should be fine. He would not need to take the cipro we typically give as long as he takes his amoxicillin tomorrow.     Thanks,  MPH

## 2020-08-04 ENCOUNTER — PROCEDURE (OUTPATIENT)
Dept: SURGERY | Facility: CLINIC | Age: 80
End: 2020-08-04
Payer: MEDICARE

## 2020-08-04 ENCOUNTER — TELEPHONE (OUTPATIENT)
Dept: SURGERY | Facility: CLINIC | Age: 80
End: 2020-08-04

## 2020-08-04 DIAGNOSIS — R33.9 URINARY RETENTION: ICD-10-CM

## 2020-08-04 DIAGNOSIS — N40.1 BPH WITH OBSTRUCTION/LOWER URINARY TRACT SYMPTOMS: ICD-10-CM

## 2020-08-04 DIAGNOSIS — R59.1 LYMPHADENOPATHY: ICD-10-CM

## 2020-08-04 DIAGNOSIS — R31.0 GROSS HEMATURIA: Primary | ICD-10-CM

## 2020-08-04 DIAGNOSIS — N13.8 BPH WITH OBSTRUCTION/LOWER URINARY TRACT SYMPTOMS: ICD-10-CM

## 2020-08-04 DIAGNOSIS — Z12.5 SCREENING PSA (PROSTATE SPECIFIC ANTIGEN): ICD-10-CM

## 2020-08-04 LAB
APPEARANCE: CLEAR
MULTISTIX LOT#: 1044 NUMERIC
PH, URINE: 6 (ref 4.5–8)
SPECIFIC GRAVITY: 1.02 (ref 1–1.03)
URINE-COLOR: YELLOW
UROBILINOGEN,SEMI-QN: 0.2 MG/DL (ref 0–1.9)

## 2020-08-04 PROCEDURE — 52000 CYSTOURETHROSCOPY: CPT | Performed by: UROLOGY

## 2020-08-04 PROCEDURE — 99215 OFFICE O/P EST HI 40 MIN: CPT | Performed by: UROLOGY

## 2020-08-04 PROCEDURE — 81003 URINALYSIS AUTO W/O SCOPE: CPT | Performed by: UROLOGY

## 2020-08-04 PROCEDURE — 51798 US URINE CAPACITY MEASURE: CPT | Performed by: UROLOGY

## 2020-08-04 RX ORDER — FINASTERIDE 5 MG/1
5 TABLET, FILM COATED ORAL DAILY
Qty: 90 TABLET | Refills: 6 | Status: SHIPPED | OUTPATIENT
Start: 2020-08-04 | End: 2021-01-11

## 2020-08-04 NOTE — TELEPHONE ENCOUNTER
Guanakito Bull,  Could you please schedule this patient for surgery? This is not urgent and he will need clearance from Dr Moncho Vann and Landy Harrison due to complex medical issues.  They may also only allow him to do TURBT at this time rather than TURBT with TURP as h

## 2020-08-06 ENCOUNTER — APPOINTMENT (OUTPATIENT)
Dept: LAB | Age: 80
End: 2020-08-06
Attending: UROLOGY
Payer: MEDICARE

## 2020-08-06 ENCOUNTER — OFFICE VISIT (OUTPATIENT)
Dept: CARDIOLOGY | Age: 80
End: 2020-08-06

## 2020-08-06 VITALS
DIASTOLIC BLOOD PRESSURE: 64 MMHG | HEIGHT: 74 IN | WEIGHT: 315 LBS | BODY MASS INDEX: 40.43 KG/M2 | SYSTOLIC BLOOD PRESSURE: 110 MMHG | HEART RATE: 62 BPM

## 2020-08-06 DIAGNOSIS — Z12.5 SCREENING PSA (PROSTATE SPECIFIC ANTIGEN): ICD-10-CM

## 2020-08-06 DIAGNOSIS — I48.91 ATRIAL FIBRILLATION, UNSPECIFIED TYPE (CMD): Primary | ICD-10-CM

## 2020-08-06 LAB — COMPLEXED PSA SERPL-MCNC: 3.23 NG/ML (ref ?–4)

## 2020-08-06 PROCEDURE — 99205 OFFICE O/P NEW HI 60 MIN: CPT | Performed by: INTERNAL MEDICINE

## 2020-08-06 PROCEDURE — 36415 COLL VENOUS BLD VENIPUNCTURE: CPT

## 2020-08-06 PROCEDURE — 93000 ELECTROCARDIOGRAM COMPLETE: CPT | Performed by: INTERNAL MEDICINE

## 2020-08-06 RX ORDER — PSEUDOEPHEDRINE HCL 30 MG
100 TABLET ORAL
COMMUNITY
Start: 2019-03-26

## 2020-08-06 RX ORDER — PANTOPRAZOLE SODIUM 40 MG/1
40 TABLET, DELAYED RELEASE ORAL 2 TIMES DAILY
COMMUNITY
Start: 2016-03-01

## 2020-08-06 RX ORDER — TRAMADOL HYDROCHLORIDE 50 MG/1
TABLET ORAL
COMMUNITY
Start: 2020-07-02 | End: 2020-08-06 | Stop reason: ALTCHOICE

## 2020-08-06 RX ORDER — ROSUVASTATIN CALCIUM 10 MG/1
10 TABLET, COATED ORAL AT BEDTIME
COMMUNITY
Start: 2020-07-17

## 2020-08-06 RX ORDER — SILODOSIN 8 MG/1
1 CAPSULE ORAL AT BEDTIME
COMMUNITY
Start: 2016-12-19

## 2020-08-06 RX ORDER — ACETAMINOPHEN 500 MG
500 TABLET ORAL PRN
COMMUNITY
Start: 2016-03-01

## 2020-08-06 RX ORDER — MELATONIN
1000 DAILY
COMMUNITY

## 2020-08-06 RX ORDER — PRAMIPEXOLE DIHYDROCHLORIDE 0.5 MG/1
TABLET ORAL
COMMUNITY
Start: 2020-07-23

## 2020-08-06 RX ORDER — FUROSEMIDE 20 MG/1
TABLET ORAL 2 TIMES DAILY
COMMUNITY
Start: 2018-07-03 | End: 2020-10-07 | Stop reason: DRUGHIGH

## 2020-08-06 RX ORDER — MOMETASONE FUROATE 1 MG/G
OINTMENT TOPICAL
COMMUNITY
Start: 2020-05-11

## 2020-08-06 RX ORDER — FINASTERIDE 5 MG/1
TABLET, FILM COATED ORAL
COMMUNITY
Start: 2020-07-15

## 2020-08-06 RX ORDER — LATANOPROST 50 UG/ML
1 SOLUTION/ DROPS OPHTHALMIC
COMMUNITY

## 2020-08-06 RX ORDER — WARFARIN SODIUM 5 MG/1
TABLET ORAL
COMMUNITY
Start: 2020-07-15

## 2020-08-06 SDOH — SOCIAL STABILITY: SOCIAL NETWORK: ARE YOU MARRIED, WIDOWED, DIVORCED, SEPARATED, NEVER MARRIED, OR LIVING WITH A PARTNER?: MARRIED

## 2020-08-06 SDOH — HEALTH STABILITY: PHYSICAL HEALTH: ON AVERAGE, HOW MANY DAYS PER WEEK DO YOU ENGAGE IN MODERATE TO STRENUOUS EXERCISE (LIKE A BRISK WALK)?: 5 DAYS

## 2020-08-06 SDOH — HEALTH STABILITY: PHYSICAL HEALTH: ON AVERAGE, HOW MANY MINUTES DO YOU ENGAGE IN EXERCISE AT THIS LEVEL?: 10 MIN

## 2020-08-06 ASSESSMENT — ENCOUNTER SYMPTOMS
HEMOPTYSIS: 0
WEIGHT GAIN: 0
BRUISES/BLEEDS EASILY: 0
COUGH: 0
ALLERGIC/IMMUNOLOGIC COMMENTS: NO NEW FOOD ALLERGIES
HEMATOCHEZIA: 0
CHILLS: 0
SUSPICIOUS LESIONS: 0
WEIGHT LOSS: 0
FEVER: 0

## 2020-08-06 ASSESSMENT — PATIENT HEALTH QUESTIONNAIRE - PHQ9
1. LITTLE INTEREST OR PLEASURE IN DOING THINGS: NOT AT ALL
CLINICAL INTERPRETATION OF PHQ2 SCORE: NO FURTHER SCREENING NEEDED
SUM OF ALL RESPONSES TO PHQ9 QUESTIONS 1 AND 2: 0
2. FEELING DOWN, DEPRESSED OR HOPELESS: NOT AT ALL
SUM OF ALL RESPONSES TO PHQ9 QUESTIONS 1 AND 2: 0
CLINICAL INTERPRETATION OF PHQ9 SCORE: NO FURTHER SCREENING NEEDED

## 2020-08-07 ENCOUNTER — ANTI-COAG VISIT (OUTPATIENT)
Dept: HEMATOLOGY/ONCOLOGY | Age: 80
End: 2020-08-07
Attending: INTERNAL MEDICINE
Payer: MEDICARE

## 2020-08-07 DIAGNOSIS — I82.4Y3 ACUTE DEEP VEIN THROMBOSIS (DVT) OF PROXIMAL VEIN OF BOTH LOWER EXTREMITIES (HCC): ICD-10-CM

## 2020-08-07 LAB — INR: 1.8 (ref 0.8–1.3)

## 2020-08-07 PROCEDURE — 36416 COLLJ CAPILLARY BLOOD SPEC: CPT

## 2020-08-07 PROCEDURE — 85610 PROTHROMBIN TIME: CPT

## 2020-08-12 ENCOUNTER — HOSPITAL ENCOUNTER (OUTPATIENT)
Dept: CV DIAGNOSTICS | Age: 80
Discharge: HOME | End: 2020-08-12
Attending: INTERNAL MEDICINE
Payer: MEDICARE

## 2020-08-12 DIAGNOSIS — I48.91 ATRIAL FIBRILLATION, UNSPECIFIED TYPE (HCC): ICD-10-CM

## 2020-08-14 ENCOUNTER — HOSPITAL ENCOUNTER (OUTPATIENT)
Dept: CV DIAGNOSTICS | Age: 80
Discharge: HOME | End: 2020-08-14
Attending: INTERNAL MEDICINE
Payer: MEDICARE

## 2020-08-14 PROCEDURE — 93272 ECG/REVIEW INTERPRET ONLY: CPT | Performed by: INTERNAL MEDICINE

## 2020-08-14 PROCEDURE — 93270 REMOTE 30 DAY ECG REV/REPORT: CPT | Performed by: INTERNAL MEDICINE

## 2020-08-18 ENCOUNTER — ANTI-COAG VISIT (OUTPATIENT)
Dept: HEMATOLOGY/ONCOLOGY | Age: 80
End: 2020-08-18
Attending: INTERNAL MEDICINE
Payer: MEDICARE

## 2020-08-18 DIAGNOSIS — I82.4Y3 ACUTE DEEP VEIN THROMBOSIS (DVT) OF PROXIMAL VEIN OF BOTH LOWER EXTREMITIES (HCC): ICD-10-CM

## 2020-08-18 LAB — INR: 1.5 (ref 0.8–1.3)

## 2020-08-18 PROCEDURE — 36416 COLLJ CAPILLARY BLOOD SPEC: CPT

## 2020-08-18 PROCEDURE — 85610 PROTHROMBIN TIME: CPT

## 2020-08-20 ENCOUNTER — OFFICE VISIT (OUTPATIENT)
Dept: NEPHROLOGY | Facility: CLINIC | Age: 80
End: 2020-08-20
Payer: MEDICARE

## 2020-08-20 VITALS
SYSTOLIC BLOOD PRESSURE: 122 MMHG | WEIGHT: 315 LBS | HEART RATE: 78 BPM | TEMPERATURE: 98 F | DIASTOLIC BLOOD PRESSURE: 70 MMHG | BODY MASS INDEX: 41 KG/M2 | OXYGEN SATURATION: 98 % | RESPIRATION RATE: 16 BRPM

## 2020-08-20 DIAGNOSIS — R60.0 PEDAL EDEMA: ICD-10-CM

## 2020-08-20 DIAGNOSIS — I10 ESSENTIAL HYPERTENSION: ICD-10-CM

## 2020-08-20 DIAGNOSIS — N18.30 CKD (CHRONIC KIDNEY DISEASE) STAGE 3, GFR 30-59 ML/MIN (HCC): Primary | ICD-10-CM

## 2020-08-20 DIAGNOSIS — R60.9 EDEMA, UNSPECIFIED TYPE: ICD-10-CM

## 2020-08-20 PROCEDURE — 99214 OFFICE O/P EST MOD 30 MIN: CPT | Performed by: INTERNAL MEDICINE

## 2020-08-20 RX ORDER — FUROSEMIDE 20 MG/1
TABLET ORAL
Qty: 180 TABLET | Refills: 0 | Status: SHIPPED | OUTPATIENT
Start: 2020-08-20 | End: 2020-09-08 | Stop reason: ALTCHOICE

## 2020-08-20 NOTE — PROGRESS NOTES
Nephrology Progress Note      ASSESSMENT/PLAN:        1) CKD 3- baseline Cr approx 1.5 mg/dl due to hypertensive + age-related nephrosclerosis. UA bland; no other risk factors for CKD.  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

## 2020-08-20 NOTE — TELEPHONE ENCOUNTER
Medication(s) to Refill:   Requested Prescriptions     Pending Prescriptions Disp Refills   • furosemide 20 MG Oral Tab [Pharmacy Med Name: FUROSEMIDE  20MG  TAB] 120 tablet 0     Sig: TAKE 1 TABLET BY MOUTH  TWICE DAILY         Reason for Medication Refil

## 2020-08-25 ENCOUNTER — ANTI-COAG VISIT (OUTPATIENT)
Dept: HEMATOLOGY/ONCOLOGY | Age: 80
End: 2020-08-25
Attending: INTERNAL MEDICINE
Payer: MEDICARE

## 2020-08-25 DIAGNOSIS — I82.4Y3 ACUTE DEEP VEIN THROMBOSIS (DVT) OF PROXIMAL VEIN OF BOTH LOWER EXTREMITIES (HCC): ICD-10-CM

## 2020-08-25 LAB — INR: 1.7 (ref 0.8–1.3)

## 2020-08-25 PROCEDURE — 85610 PROTHROMBIN TIME: CPT

## 2020-08-25 PROCEDURE — 36416 COLLJ CAPILLARY BLOOD SPEC: CPT

## 2020-08-27 ENCOUNTER — TELEPHONE (OUTPATIENT)
Dept: NEPHROLOGY | Facility: CLINIC | Age: 80
End: 2020-08-27

## 2020-08-27 DIAGNOSIS — I82.4Y3 ACUTE DEEP VEIN THROMBOSIS (DVT) OF PROXIMAL VEIN OF BOTH LOWER EXTREMITIES (HCC): ICD-10-CM

## 2020-08-27 RX ORDER — FUROSEMIDE 40 MG/1
80 TABLET ORAL 2 TIMES DAILY
Qty: 120 TABLET | Refills: 11 | Status: SHIPPED | OUTPATIENT
Start: 2020-08-27 | End: 2021-02-17

## 2020-08-27 RX ORDER — WARFARIN SODIUM 5 MG/1
5 TABLET ORAL NIGHTLY
Qty: 90 TABLET | Refills: 3 | Status: SHIPPED | OUTPATIENT
Start: 2020-08-27 | End: 2020-09-18

## 2020-09-01 ENCOUNTER — ANTI-COAG VISIT (OUTPATIENT)
Dept: HEMATOLOGY/ONCOLOGY | Age: 80
End: 2020-09-01
Attending: INTERNAL MEDICINE
Payer: MEDICARE

## 2020-09-01 DIAGNOSIS — I82.4Y3 ACUTE DEEP VEIN THROMBOSIS (DVT) OF PROXIMAL VEIN OF BOTH LOWER EXTREMITIES (HCC): ICD-10-CM

## 2020-09-01 LAB — INR: 2.1 (ref 0.8–1.3)

## 2020-09-01 PROCEDURE — 85610 PROTHROMBIN TIME: CPT

## 2020-09-01 PROCEDURE — 36416 COLLJ CAPILLARY BLOOD SPEC: CPT

## 2020-09-08 ENCOUNTER — OFFICE VISIT (OUTPATIENT)
Dept: FAMILY MEDICINE CLINIC | Facility: CLINIC | Age: 80
End: 2020-09-08
Payer: MEDICARE

## 2020-09-08 ENCOUNTER — APPOINTMENT (OUTPATIENT)
Dept: HEMATOLOGY/ONCOLOGY | Age: 80
End: 2020-09-08
Attending: INTERNAL MEDICINE
Payer: MEDICARE

## 2020-09-08 ENCOUNTER — ANTI-COAG VISIT (OUTPATIENT)
Dept: HEMATOLOGY/ONCOLOGY | Age: 80
End: 2020-09-08
Attending: INTERNAL MEDICINE
Payer: MEDICARE

## 2020-09-08 ENCOUNTER — TELEPHONE (OUTPATIENT)
Dept: NEPHROLOGY | Facility: CLINIC | Age: 80
End: 2020-09-08

## 2020-09-08 VITALS
TEMPERATURE: 97 F | OXYGEN SATURATION: 96 % | SYSTOLIC BLOOD PRESSURE: 124 MMHG | WEIGHT: 304 LBS | HEART RATE: 68 BPM | DIASTOLIC BLOOD PRESSURE: 70 MMHG | RESPIRATION RATE: 17 BRPM | BODY MASS INDEX: 40.73 KG/M2 | HEIGHT: 72.5 IN

## 2020-09-08 DIAGNOSIS — I82.4Y3 ACUTE DEEP VEIN THROMBOSIS (DVT) OF PROXIMAL VEIN OF BOTH LOWER EXTREMITIES (HCC): ICD-10-CM

## 2020-09-08 DIAGNOSIS — N18.30 CKD (CHRONIC KIDNEY DISEASE) STAGE 3, GFR 30-59 ML/MIN (HCC): ICD-10-CM

## 2020-09-08 DIAGNOSIS — Z13.228 SCREENING FOR ENDOCRINE, NUTRITIONAL, METABOLIC AND IMMUNITY DISORDER: ICD-10-CM

## 2020-09-08 DIAGNOSIS — Z13.0 SCREENING FOR ENDOCRINE, NUTRITIONAL, METABOLIC AND IMMUNITY DISORDER: ICD-10-CM

## 2020-09-08 DIAGNOSIS — Z23 NEED FOR VACCINATION: ICD-10-CM

## 2020-09-08 DIAGNOSIS — I82.401 DVT, LOWER EXTREMITY, RECURRENT, RIGHT (HCC): ICD-10-CM

## 2020-09-08 DIAGNOSIS — Z00.00 ENCOUNTER FOR ANNUAL HEALTH EXAMINATION: Primary | ICD-10-CM

## 2020-09-08 DIAGNOSIS — Z95.828 HISTORY OF ENDOVASCULAR STENT GRAFT FOR ABDOMINAL AORTIC ANEURYSM (AAA): ICD-10-CM

## 2020-09-08 DIAGNOSIS — Z13.29 SCREENING FOR ENDOCRINE, NUTRITIONAL, METABOLIC AND IMMUNITY DISORDER: ICD-10-CM

## 2020-09-08 DIAGNOSIS — E66.01 MORBID OBESITY WITH BODY MASS INDEX OF 40.0-44.9 IN ADULT (HCC): ICD-10-CM

## 2020-09-08 DIAGNOSIS — Z23 NEEDS FLU SHOT: ICD-10-CM

## 2020-09-08 DIAGNOSIS — I26.99 ACUTE PULMONARY EMBOLISM, UNSPECIFIED PULMONARY EMBOLISM TYPE, UNSPECIFIED WHETHER ACUTE COR PULMONALE PRESENT (HCC): ICD-10-CM

## 2020-09-08 DIAGNOSIS — I48.0 PAROXYSMAL ATRIAL FIBRILLATION (HCC): ICD-10-CM

## 2020-09-08 DIAGNOSIS — R29.818 SUSPECTED SLEEP APNEA: ICD-10-CM

## 2020-09-08 DIAGNOSIS — Z13.21 SCREENING FOR ENDOCRINE, NUTRITIONAL, METABOLIC AND IMMUNITY DISORDER: ICD-10-CM

## 2020-09-08 LAB — INR: 1.9 (ref 0.8–1.3)

## 2020-09-08 PROCEDURE — 99214 OFFICE O/P EST MOD 30 MIN: CPT | Performed by: EMERGENCY MEDICINE

## 2020-09-08 PROCEDURE — G0008 ADMIN INFLUENZA VIRUS VAC: HCPCS | Performed by: EMERGENCY MEDICINE

## 2020-09-08 PROCEDURE — 36416 COLLJ CAPILLARY BLOOD SPEC: CPT

## 2020-09-08 PROCEDURE — 90662 IIV NO PRSV INCREASED AG IM: CPT | Performed by: EMERGENCY MEDICINE

## 2020-09-08 PROCEDURE — 85610 PROTHROMBIN TIME: CPT

## 2020-09-08 NOTE — TELEPHONE ENCOUNTER
Pt's weight at home today was 309. He was also weighed today at his doctor's office. That weight was 304.

## 2020-09-08 NOTE — PROGRESS NOTES
HPI:   Kamlesh Ritter is a 79year old male who presents for a Medicare Subsequent Annual Wellness visit (Pt already had Initial Annual Wellness).       His last annual assessment has been over 1 year: Annual Physical due on 07/30/1943        CARDIAC    Britt Valente status.    Hearing Problems?: Yes      Depression Screening (PHQ-2/PHQ-9): Over the LAST 2 WEEKS   Little interest or pleasure in doing things (over the last two weeks)?: Not at all  Feeling down, depressed, or hopeless (over the last two weeks)?: Not at al Restless legs syndrome     Urinary retention     Presence of IVC filter     Acute deep vein thrombosis (DVT) of tibial vein of right lower extremity (HCC)     Acute renal failure (HCC)     S/P spinal fusion     Personal history of colonic polyps     Lidnen Take by mouth. •  Pramipexole Dihydrochloride 0.5 MG Oral Tab, TAKE 2 TABLETS EVERY MORNING AND 3 TABLETS EVERY EVENING    •  Pantoprazole Sodium 40 MG Oral Tab EC, Take 1 tablet (40 mg total) by mouth 2 (two) times daily.     •  Rosuvastatin Calcium 10 pain or ST  LUNGS: denies shortness of breath with exertion  CARDIOVASCULAR: denies chest pain on exertion  GI: denies abdominal pain, denies heartburn  :  no complaint of urinary incontinence  MUSCULOSKELETAL: denies back pain  NEURO: denies headaches Pulses: 2+ and symmetric   Skin: Skin color, texture, turgor normal, no rashes or lesions   Lymph nodes: Cervical, supraclavicular, and axillary nodes normal   Neurologic: Normal            Vaccination History     Immunization History   Administered Date plan.  Reinforced healthy diet, lifestyle, and exercise. No follow-ups on file.      Edith Waggoner MD, 9/8/2020     General Health     In the past six months, have you lost more than 10 pounds without trying?: 2 - No  Has your appetite been poor?: No 01/21/2016 Please get once after your 65th birthday    Pneumococcal 23 (Pneumovax)  Covered Once after 65 11/02/2011 Please get once after your 65th birthday    Hepatitis B for Moderate/High Risk No vaccine history found Medium/high risk factors:   End-sta

## 2020-09-08 NOTE — PATIENT INSTRUCTIONS
Thank you for choosing [address] [place] [address] Group  To Do:  FOR VANESA SANTIAGO        1. Contionue with all meds and follow up with various specialists  2. Ok to follow up in 6 months  3. Consider sleep study  4. Have blood tests done af ter fasting  5.  Need to los patients who meet one of the following criteria:   • Men who are 73-68 years old and have smoked more than 100 cigarettes in their lifetime   • Anyone with a family history    Colorectal Cancer Screening Covered up to Age 76     Colonoscopy Screen   Covere mentally retarded   Persons who live in the same house as a HepB virus carrier   Homosexual men   Illicit injectable drug abusers     Tetanus Toxoid- Only covered with a cut with metal- TD and TDaP Not covered by Medicare Part B) No orders found for this o

## 2020-09-09 ENCOUNTER — LAB ENCOUNTER (OUTPATIENT)
Dept: LAB | Age: 80
End: 2020-09-09
Attending: EMERGENCY MEDICINE
Payer: MEDICARE

## 2020-09-09 ENCOUNTER — TELEPHONE (OUTPATIENT)
Dept: FAMILY MEDICINE CLINIC | Facility: CLINIC | Age: 80
End: 2020-09-09

## 2020-09-09 DIAGNOSIS — Z13.21 SCREENING FOR ENDOCRINE, NUTRITIONAL, METABOLIC AND IMMUNITY DISORDER: ICD-10-CM

## 2020-09-09 DIAGNOSIS — E78.2 HYPERLIPEMIA, MIXED: ICD-10-CM

## 2020-09-09 DIAGNOSIS — E78.2 HYPERLIPEMIA, MIXED: Primary | ICD-10-CM

## 2020-09-09 DIAGNOSIS — Z13.228 SCREENING FOR ENDOCRINE, NUTRITIONAL, METABOLIC AND IMMUNITY DISORDER: ICD-10-CM

## 2020-09-09 DIAGNOSIS — Z13.29 SCREENING FOR ENDOCRINE, NUTRITIONAL, METABOLIC AND IMMUNITY DISORDER: ICD-10-CM

## 2020-09-09 DIAGNOSIS — Z13.0 SCREENING FOR ENDOCRINE, NUTRITIONAL, METABOLIC AND IMMUNITY DISORDER: ICD-10-CM

## 2020-09-09 DIAGNOSIS — I48.0 PAROXYSMAL ATRIAL FIBRILLATION (HCC): ICD-10-CM

## 2020-09-09 LAB
CHOLEST SMN-MCNC: 119 MG/DL (ref ?–200)
HDLC SERPL-MCNC: 40 MG/DL (ref 40–59)
LDLC SERPL CALC-MCNC: 51 MG/DL (ref ?–100)
NONHDLC SERPL-MCNC: 79 MG/DL (ref ?–130)
PATIENT FASTING Y/N/NP: YES
TRIGL SERPL-MCNC: 142 MG/DL (ref 30–149)
TSI SER-ACNC: 2.29 MIU/ML (ref 0.36–3.74)
VLDLC SERPL CALC-MCNC: 28 MG/DL (ref 0–30)

## 2020-09-09 PROCEDURE — 80061 LIPID PANEL: CPT

## 2020-09-09 PROCEDURE — 36415 COLL VENOUS BLD VENIPUNCTURE: CPT

## 2020-09-09 PROCEDURE — 84443 ASSAY THYROID STIM HORMONE: CPT

## 2020-09-09 NOTE — TELEPHONE ENCOUNTER
D/w pt- still edematous but weight better and edema less than on admit- will increase lasix to 80 mg bid- neftali montes

## 2020-09-14 ENCOUNTER — TELEPHONE (OUTPATIENT)
Dept: NEPHROLOGY | Facility: CLINIC | Age: 80
End: 2020-09-14

## 2020-09-14 NOTE — TELEPHONE ENCOUNTER
D/w pt- weight stable approx 310#; minimal edema per pt- continue lasix 80 mg bid to call if weight creeps up- thx simon

## 2020-09-16 ENCOUNTER — TELEPHONE (OUTPATIENT)
Dept: HEMATOLOGY/ONCOLOGY | Facility: HOSPITAL | Age: 80
End: 2020-09-16

## 2020-09-16 NOTE — TELEPHONE ENCOUNTER
Elissa from opt called for further clarification on the patient's warfarin. She says they were told 7.5 Mon, Wed, Fri, and 5 mg every other day, but they do not have an rx for 7.5 mg.  Reference number [id number]

## 2020-09-18 ENCOUNTER — TELEPHONE (OUTPATIENT)
Dept: NEPHROLOGY | Facility: CLINIC | Age: 80
End: 2020-09-18

## 2020-09-18 DIAGNOSIS — I82.4Y3 ACUTE DEEP VEIN THROMBOSIS (DVT) OF PROXIMAL VEIN OF BOTH LOWER EXTREMITIES (HCC): ICD-10-CM

## 2020-09-18 RX ORDER — WARFARIN SODIUM 5 MG/1
5 TABLET ORAL NIGHTLY
Qty: 36 TABLET | Refills: 0 | Status: SHIPPED | OUTPATIENT
Start: 2020-09-18 | End: 2020-09-18

## 2020-09-18 RX ORDER — WARFARIN SODIUM 5 MG/1
5 TABLET ORAL NIGHTLY
Qty: 108 TABLET | Refills: 0 | Status: SHIPPED | OUTPATIENT
Start: 2020-09-18 | End: 2020-12-07

## 2020-09-22 ENCOUNTER — ANTI-COAG VISIT (OUTPATIENT)
Dept: HEMATOLOGY/ONCOLOGY | Age: 80
End: 2020-09-22
Attending: INTERNAL MEDICINE
Payer: MEDICARE

## 2020-09-22 DIAGNOSIS — I82.4Y3 ACUTE DEEP VEIN THROMBOSIS (DVT) OF PROXIMAL VEIN OF BOTH LOWER EXTREMITIES (HCC): ICD-10-CM

## 2020-09-22 LAB — INR: 1.9 (ref 0.8–1.3)

## 2020-09-22 PROCEDURE — 36416 COLLJ CAPILLARY BLOOD SPEC: CPT

## 2020-09-22 PROCEDURE — 85610 PROTHROMBIN TIME: CPT

## 2020-09-25 ENCOUNTER — TELEPHONE (OUTPATIENT)
Dept: NEPHROLOGY | Facility: CLINIC | Age: 80
End: 2020-09-25

## 2020-09-29 NOTE — TELEPHONE ENCOUNTER
Spoke to pt- weight stable, minimal edema per pt- continue lasix 80 mg bid ongoing; to call if weight / edema goes up- neftali montes

## 2020-10-02 ENCOUNTER — HOSPITAL ENCOUNTER (OUTPATIENT)
Dept: ULTRASOUND IMAGING | Age: 80
Discharge: HOME | End: 2020-10-02
Attending: FAMILY MEDICINE
Payer: MEDICARE

## 2020-10-02 DIAGNOSIS — M79.662 PAIN AND SWELLING OF LEFT LOWER LEG: ICD-10-CM

## 2020-10-02 DIAGNOSIS — M79.89 PAIN AND SWELLING OF LEFT LOWER LEG: ICD-10-CM

## 2020-10-02 PROCEDURE — 93971 EXTREMITY STUDY: CPT | Performed by: FAMILY MEDICINE

## 2020-10-06 ENCOUNTER — ANTI-COAG VISIT (OUTPATIENT)
Dept: HEMATOLOGY/ONCOLOGY | Facility: HOSPITAL | Age: 80
End: 2020-10-06

## 2020-10-06 ENCOUNTER — OFFICE VISIT (OUTPATIENT)
Dept: HEMATOLOGY/ONCOLOGY | Age: 80
End: 2020-10-06
Attending: INTERNAL MEDICINE
Payer: MEDICARE

## 2020-10-06 VITALS
SYSTOLIC BLOOD PRESSURE: 113 MMHG | RESPIRATION RATE: 20 BRPM | DIASTOLIC BLOOD PRESSURE: 57 MMHG | TEMPERATURE: 97 F | OXYGEN SATURATION: 94 % | HEART RATE: 63 BPM | WEIGHT: 312 LBS | BODY MASS INDEX: 42 KG/M2

## 2020-10-06 DIAGNOSIS — I82.403 LEG DVT (DEEP VENOUS THROMBOEMBOLISM), ACUTE, BILATERAL (HCC): ICD-10-CM

## 2020-10-06 DIAGNOSIS — Z95.828 PRESENCE OF IVC FILTER: ICD-10-CM

## 2020-10-06 DIAGNOSIS — M79.89 LEG SWELLING: ICD-10-CM

## 2020-10-06 DIAGNOSIS — I26.99 OTHER ACUTE PULMONARY EMBOLISM WITHOUT ACUTE COR PULMONALE (HCC): ICD-10-CM

## 2020-10-06 DIAGNOSIS — I82.409 RECURRENT DEEP VEIN THROMBOSIS (DVT) (HCC): Primary | ICD-10-CM

## 2020-10-06 DIAGNOSIS — I82.4Y3 ACUTE DEEP VEIN THROMBOSIS (DVT) OF PROXIMAL VEIN OF BOTH LOWER EXTREMITIES (HCC): ICD-10-CM

## 2020-10-06 DIAGNOSIS — D64.9 NORMOCYTIC ANEMIA: ICD-10-CM

## 2020-10-06 DIAGNOSIS — D69.6 THROMBOCYTOPENIA (HCC): ICD-10-CM

## 2020-10-06 DIAGNOSIS — Z86.711 HISTORY OF PULMONARY EMBOLISM: ICD-10-CM

## 2020-10-06 DIAGNOSIS — N18.30 STAGE 3 CHRONIC KIDNEY DISEASE, UNSPECIFIED WHETHER STAGE 3A OR 3B CKD (HCC): ICD-10-CM

## 2020-10-06 PROBLEM — N17.9 ACUTE RENAL FAILURE: Status: RESOLVED | Noted: 2019-04-06 | Resolved: 2020-10-06

## 2020-10-06 PROBLEM — N17.9 ACUTE RENAL FAILURE (HCC): Status: RESOLVED | Noted: 2019-04-06 | Resolved: 2020-10-06

## 2020-10-06 PROBLEM — I82.413 DVT FEMORAL (DEEP VENOUS THROMBOSIS) WITH THROMBOPHLEBITIS, BILATERAL (HCC): Status: RESOLVED | Noted: 2020-07-06 | Resolved: 2020-10-06

## 2020-10-06 PROCEDURE — 99214 OFFICE O/P EST MOD 30 MIN: CPT | Performed by: INTERNAL MEDICINE

## 2020-10-06 NOTE — PROGRESS NOTES
Education Record    Learner:  Patient and Spouse    Disease / Diagnosis:DVT/PE    Barriers / Limitations:  None   Comments:    Method:  Brief focused   Comments:    General Topics:  Plan of care reviewed   Comments:    Outcome:  Shows understanding   Sloan Lira

## 2020-10-06 NOTE — PROGRESS NOTES
Hematology Clinic Follow Up Visit    Patient Name: Eliane Medeiros  Medical Record Number: [id number]    YOB: 1940    PCP: Dr. Shana Albert   Other providers:  Dr. Paulette Reddy (urology), Dr. Gina Colorado (renal)    Reason for Consultation:  Dolores Nogueira may be due to underlying thrombus. . Oglesby catheter in the urinary bladder.  Urinary bladder wall is thickened and there is air in the urinary bladder. Jaelyn Brochure is surrounding fatty induration.  This may be due to underlying cystitis, however clinical correla pulm emb-IVC filter Overview:  3/11 after back surgery pulm emb-IVC filter   • Hx of endoscopy 09/12/2016   • Migraines    • Pleurisy with effusion    • Presence of IVC filter 02/09/2015    cook celect filter.   Had 3 different IVC filters placed previous t Cap, Take 100 mg by mouth., Disp: , Rfl:     •  Dicloxacillin Sodium 500 MG Oral Cap, Take 1 capsule by mouth 3 (three) times daily. , Disp: , Rfl:     •  Silodosin 8 MG Oral Cap, Take 1 capsule by mouth every evening., Disp: , Rfl:       Allergies:   No Kn hepatosplenomegaly  Neurological: Grossly intact   Lymphatics: No palpable lymphadenopathy  Skin: no rashes or petechiae  Ext: 1-2+ symmetric BLE edema. +Chronic venous stasis skin changes noted    Laboratory:  Lab Results   Component Value Date    WBC 5. for: ELECTMS1  No results found for: Lawanda Marrero   No results found for: [facility]  No results found for: [facility]  Lab Results   Component Value Date    TSH 2.290 09/09/2020     No results found for: T4F  No results found for: T3TOT    Lab Results   Co

## 2020-10-07 ENCOUNTER — OFFICE VISIT (OUTPATIENT)
Dept: CARDIOLOGY | Age: 80
End: 2020-10-07

## 2020-10-07 ENCOUNTER — HOSPITAL ENCOUNTER (OUTPATIENT)
Dept: ULTRASOUND IMAGING | Age: 80
Discharge: HOME | End: 2020-10-07
Attending: INTERNAL MEDICINE
Payer: MEDICARE

## 2020-10-07 VITALS
BODY MASS INDEX: 40.3 KG/M2 | DIASTOLIC BLOOD PRESSURE: 64 MMHG | HEART RATE: 56 BPM | WEIGHT: 314 LBS | SYSTOLIC BLOOD PRESSURE: 126 MMHG | HEIGHT: 74 IN

## 2020-10-07 DIAGNOSIS — Z95.828 PRESENCE OF IVC FILTER: ICD-10-CM

## 2020-10-07 DIAGNOSIS — I82.409 RECURRENT DEEP VEIN THROMBOSIS (DVT) (HCC): ICD-10-CM

## 2020-10-07 DIAGNOSIS — Z86.711 HISTORY OF PULMONARY EMBOLISM: ICD-10-CM

## 2020-10-07 DIAGNOSIS — M79.89 LEG SWELLING: ICD-10-CM

## 2020-10-07 DIAGNOSIS — I48.0 PAROXYSMAL ATRIAL FIBRILLATION (CMD): Primary | ICD-10-CM

## 2020-10-07 DIAGNOSIS — I82.4Y3 ACUTE DEEP VEIN THROMBOSIS (DVT) OF PROXIMAL VEIN OF BOTH LOWER EXTREMITIES (HCC): ICD-10-CM

## 2020-10-07 PROCEDURE — 93971 EXTREMITY STUDY: CPT | Performed by: INTERNAL MEDICINE

## 2020-10-07 PROCEDURE — 99213 OFFICE O/P EST LOW 20 MIN: CPT | Performed by: NURSE PRACTITIONER

## 2020-10-07 RX ORDER — FUROSEMIDE 40 MG/1
40 TABLET ORAL 2 TIMES DAILY
COMMUNITY

## 2020-10-07 RX ORDER — FUROSEMIDE 40 MG/1
TABLET ORAL DAILY
COMMUNITY
Start: 2020-08-27 | End: 2020-10-07 | Stop reason: DRUGHIGH

## 2020-10-07 ASSESSMENT — ENCOUNTER SYMPTOMS
NEUROLOGICAL NEGATIVE: 1
WEIGHT LOSS: 0
DECREASED APPETITE: 0
GASTROINTESTINAL NEGATIVE: 1
DIAPHORESIS: 0
SHORTNESS OF BREATH: 0
SYNCOPE: 0
WEIGHT GAIN: 0

## 2020-10-07 ASSESSMENT — PATIENT HEALTH QUESTIONNAIRE - PHQ9
1. LITTLE INTEREST OR PLEASURE IN DOING THINGS: NOT AT ALL
CLINICAL INTERPRETATION OF PHQ9 SCORE: NO FURTHER SCREENING NEEDED
CLINICAL INTERPRETATION OF PHQ2 SCORE: NO FURTHER SCREENING NEEDED
SUM OF ALL RESPONSES TO PHQ9 QUESTIONS 1 AND 2: 0
2. FEELING DOWN, DEPRESSED OR HOPELESS: NOT AT ALL
SUM OF ALL RESPONSES TO PHQ9 QUESTIONS 1 AND 2: 0

## 2020-10-12 ENCOUNTER — TELEPHONE (OUTPATIENT)
Dept: NEPHROLOGY | Facility: CLINIC | Age: 80
End: 2020-10-12

## 2020-10-19 ENCOUNTER — TELEPHONE (OUTPATIENT)
Dept: NEPHROLOGY | Facility: CLINIC | Age: 80
End: 2020-10-19

## 2020-11-03 ENCOUNTER — ANTI-COAG VISIT (OUTPATIENT)
Dept: HEMATOLOGY/ONCOLOGY | Age: 80
End: 2020-11-03
Attending: INTERNAL MEDICINE
Payer: MEDICARE

## 2020-11-03 DIAGNOSIS — I82.4Y3 ACUTE DEEP VEIN THROMBOSIS (DVT) OF PROXIMAL VEIN OF BOTH LOWER EXTREMITIES (HCC): ICD-10-CM

## 2020-11-03 PROCEDURE — 36416 COLLJ CAPILLARY BLOOD SPEC: CPT

## 2020-11-03 PROCEDURE — 85610 PROTHROMBIN TIME: CPT

## 2020-11-30 RX ORDER — ROSUVASTATIN CALCIUM 10 MG/1
TABLET, COATED ORAL
Qty: 90 TABLET | Refills: 3 | Status: SHIPPED | OUTPATIENT
Start: 2020-11-30 | End: 2021-01-19

## 2020-11-30 RX ORDER — PANTOPRAZOLE SODIUM 40 MG/1
40 TABLET, DELAYED RELEASE ORAL 2 TIMES DAILY
Qty: 180 TABLET | Refills: 1 | Status: SHIPPED | OUTPATIENT
Start: 2020-11-30 | End: 2021-01-28

## 2020-12-01 ENCOUNTER — APPOINTMENT (OUTPATIENT)
Dept: HEMATOLOGY/ONCOLOGY | Age: 80
End: 2020-12-01
Attending: INTERNAL MEDICINE
Payer: MEDICARE

## 2020-12-02 ENCOUNTER — ANTI-COAG VISIT (OUTPATIENT)
Dept: HEMATOLOGY/ONCOLOGY | Age: 80
End: 2020-12-02
Attending: INTERNAL MEDICINE
Payer: MEDICARE

## 2020-12-02 DIAGNOSIS — I82.4Y3 ACUTE DEEP VEIN THROMBOSIS (DVT) OF PROXIMAL VEIN OF BOTH LOWER EXTREMITIES (HCC): ICD-10-CM

## 2020-12-02 PROCEDURE — 36416 COLLJ CAPILLARY BLOOD SPEC: CPT

## 2020-12-02 PROCEDURE — 85610 PROTHROMBIN TIME: CPT

## 2020-12-05 DIAGNOSIS — I82.4Y3 ACUTE DEEP VEIN THROMBOSIS (DVT) OF PROXIMAL VEIN OF BOTH LOWER EXTREMITIES (HCC): ICD-10-CM

## 2020-12-07 RX ORDER — WARFARIN SODIUM 5 MG/1
TABLET ORAL
Qty: 108 TABLET | Refills: 0 | Status: SHIPPED | OUTPATIENT
Start: 2020-12-07 | End: 2020-12-14

## 2020-12-09 ENCOUNTER — ANTI-COAG VISIT (OUTPATIENT)
Dept: HEMATOLOGY/ONCOLOGY | Age: 80
End: 2020-12-09
Attending: INTERNAL MEDICINE
Payer: MEDICARE

## 2020-12-09 DIAGNOSIS — I82.4Y3 ACUTE DEEP VEIN THROMBOSIS (DVT) OF PROXIMAL VEIN OF BOTH LOWER EXTREMITIES (HCC): ICD-10-CM

## 2020-12-09 PROCEDURE — 36416 COLLJ CAPILLARY BLOOD SPEC: CPT

## 2020-12-09 PROCEDURE — 85610 PROTHROMBIN TIME: CPT

## 2020-12-14 DIAGNOSIS — I82.4Y3 ACUTE DEEP VEIN THROMBOSIS (DVT) OF PROXIMAL VEIN OF BOTH LOWER EXTREMITIES (HCC): ICD-10-CM

## 2020-12-14 RX ORDER — WARFARIN SODIUM 5 MG/1
TABLET ORAL
Qty: 108 TABLET | Refills: 0 | Status: SHIPPED | OUTPATIENT
Start: 2020-12-14 | End: 2021-03-09

## 2020-12-16 ENCOUNTER — ANTI-COAG VISIT (OUTPATIENT)
Dept: HEMATOLOGY/ONCOLOGY | Age: 80
End: 2020-12-16
Attending: INTERNAL MEDICINE
Payer: MEDICARE

## 2020-12-16 DIAGNOSIS — I82.4Y3 ACUTE DEEP VEIN THROMBOSIS (DVT) OF PROXIMAL VEIN OF BOTH LOWER EXTREMITIES (HCC): ICD-10-CM

## 2020-12-16 PROCEDURE — 85610 PROTHROMBIN TIME: CPT

## 2020-12-16 PROCEDURE — 36416 COLLJ CAPILLARY BLOOD SPEC: CPT

## 2020-12-28 ENCOUNTER — PATIENT MESSAGE (OUTPATIENT)
Dept: FAMILY MEDICINE CLINIC | Facility: CLINIC | Age: 80
End: 2020-12-28

## 2020-12-28 NOTE — TELEPHONE ENCOUNTER
Pt requesting for XR Lumbar Spine to be removed d/t having xray done with orthopedic doctor which was okay-no broken rods. Okay to cancel order? Please advise. Thank you.    KATIE:[id number]

## 2020-12-28 NOTE — TELEPHONE ENCOUNTER
From: Elysia Recio  To: Dandre Nelson MD  Sent: 12/28/2020 10:59 AM CST  Subject: Other    Good Morning! Back in July, Dr. Li Arellano placed an order for me to get a spinal X-ray. After this order was placed I went into the hospital and never got the X-ray.  [facility]

## 2020-12-30 ENCOUNTER — ANTI-COAG VISIT (OUTPATIENT)
Dept: HEMATOLOGY/ONCOLOGY | Age: 80
End: 2020-12-30
Attending: INTERNAL MEDICINE
Payer: MEDICARE

## 2020-12-30 DIAGNOSIS — I82.4Y3 ACUTE DEEP VEIN THROMBOSIS (DVT) OF PROXIMAL VEIN OF BOTH LOWER EXTREMITIES (HCC): ICD-10-CM

## 2020-12-30 PROCEDURE — 85610 PROTHROMBIN TIME: CPT

## 2020-12-30 PROCEDURE — 36416 COLLJ CAPILLARY BLOOD SPEC: CPT

## 2020-12-30 NOTE — TELEPHONE ENCOUNTER
Regarding: Other  Contact: [phone]  ----- Message from Marvel Negro MD sent at 12/29/2020 10:07 PM CST -----       ----- Message from Ed Dave to Sugar Gonzalez MD sent at 12/28/2020 10:59 AM -----   Good Morning!   Back in July, Dr. Obinna Du placed a

## 2020-12-30 NOTE — TELEPHONE ENCOUNTER
Ok to cancel order    If unable to remove - just reassure patient that it will  in 365 days from order.

## 2021-01-11 ENCOUNTER — TELEPHONE (OUTPATIENT)
Dept: SURGERY | Facility: CLINIC | Age: 81
End: 2021-01-11

## 2021-01-11 RX ORDER — FINASTERIDE 5 MG/1
5 TABLET, FILM COATED ORAL DAILY
Qty: 90 TABLET | Refills: 3 | Status: SHIPPED | OUTPATIENT
Start: 2021-01-11 | End: 2021-06-30

## 2021-01-11 NOTE — TELEPHONE ENCOUNTER
Per wife 90day refill of finesteride 5mg tablet needs to be faxed in to [facility]  Fax: [phone]; phone: [phone]

## 2021-01-11 NOTE — TELEPHONE ENCOUNTER
Per pt request I resent prescription  of finesteride 5mg tablet to [facility]  Fax: [phone]; phone: [phone]

## 2021-01-18 RX ORDER — ROSUVASTATIN CALCIUM 10 MG/1
10 TABLET, COATED ORAL NIGHTLY
Qty: 90 TABLET | Refills: 3 | OUTPATIENT
Start: 2021-01-18

## 2021-01-27 ENCOUNTER — ANTI-COAG VISIT (OUTPATIENT)
Dept: HEMATOLOGY/ONCOLOGY | Age: 81
End: 2021-01-27
Attending: INTERNAL MEDICINE
Payer: MEDICARE

## 2021-01-27 DIAGNOSIS — I82.4Y3 ACUTE DEEP VEIN THROMBOSIS (DVT) OF PROXIMAL VEIN OF BOTH LOWER EXTREMITIES (HCC): ICD-10-CM

## 2021-01-27 LAB — INR: 3 (ref 0.8–1.3)

## 2021-01-27 PROCEDURE — 85610 PROTHROMBIN TIME: CPT

## 2021-01-27 PROCEDURE — 36416 COLLJ CAPILLARY BLOOD SPEC: CPT

## 2021-01-28 RX ORDER — PANTOPRAZOLE SODIUM 40 MG/1
40 TABLET, DELAYED RELEASE ORAL 2 TIMES DAILY
Qty: 180 TABLET | Refills: 1 | Status: SHIPPED | OUTPATIENT
Start: 2021-01-28 | End: 2021-07-06

## 2021-02-11 ENCOUNTER — OFFICE VISIT (OUTPATIENT)
Dept: CARDIOLOGY | Age: 81
End: 2021-02-11

## 2021-02-11 VITALS
SYSTOLIC BLOOD PRESSURE: 130 MMHG | HEART RATE: 72 BPM | DIASTOLIC BLOOD PRESSURE: 68 MMHG | WEIGHT: 315 LBS | BODY MASS INDEX: 40.44 KG/M2

## 2021-02-11 DIAGNOSIS — I48.0 PAROXYSMAL ATRIAL FIBRILLATION (CMD): Primary | ICD-10-CM

## 2021-02-11 PROCEDURE — 99215 OFFICE O/P EST HI 40 MIN: CPT | Performed by: INTERNAL MEDICINE

## 2021-02-11 ASSESSMENT — ENCOUNTER SYMPTOMS
BRUISES/BLEEDS EASILY: 0
FEVER: 0
ALLERGIC/IMMUNOLOGIC COMMENTS: NO NEW FOOD ALLERGIES
CHILLS: 0
WEIGHT GAIN: 0
HEMATOCHEZIA: 0
COUGH: 0
HEMOPTYSIS: 0
SUSPICIOUS LESIONS: 0
WEIGHT LOSS: 0

## 2021-02-11 ASSESSMENT — PATIENT HEALTH QUESTIONNAIRE - PHQ9
SUM OF ALL RESPONSES TO PHQ9 QUESTIONS 1 AND 2: 0
2. FEELING DOWN, DEPRESSED OR HOPELESS: NOT AT ALL
CLINICAL INTERPRETATION OF PHQ2 SCORE: NO FURTHER SCREENING NEEDED
CLINICAL INTERPRETATION OF PHQ9 SCORE: NO FURTHER SCREENING NEEDED
SUM OF ALL RESPONSES TO PHQ9 QUESTIONS 1 AND 2: 0
1. LITTLE INTEREST OR PLEASURE IN DOING THINGS: NOT AT ALL

## 2021-02-16 DIAGNOSIS — G25.81 RESTLESS LEGS SYNDROME: ICD-10-CM

## 2021-02-16 RX ORDER — PRAMIPEXOLE DIHYDROCHLORIDE 0.5 MG/1
TABLET ORAL
Qty: 450 TABLET | Refills: 0 | Status: CANCELLED | OUTPATIENT
Start: 2021-02-16

## 2021-02-16 NOTE — TELEPHONE ENCOUNTER
Medication(s) to Refill:   Requested Prescriptions     Pending Prescriptions Disp Refills   • Pramipexole Dihydrochloride 0.5 MG Oral Tab 450 tablet 0     Sig: TAKE 2 TABLETS EVERY MORNING AND 3 TABLETS EVERY EVENING         Reason for Medication Refill be

## 2021-02-17 ENCOUNTER — TELEPHONE (OUTPATIENT)
Dept: FAMILY MEDICINE CLINIC | Facility: CLINIC | Age: 81
End: 2021-02-17

## 2021-02-17 DIAGNOSIS — G25.81 RESTLESS LEGS SYNDROME: ICD-10-CM

## 2021-02-17 RX ORDER — FUROSEMIDE 40 MG/1
80 TABLET ORAL 2 TIMES DAILY
Qty: 360 TABLET | Refills: 1 | Status: SHIPPED | OUTPATIENT
Start: 2021-02-17 | End: 2021-08-08

## 2021-02-17 RX ORDER — PRAMIPEXOLE DIHYDROCHLORIDE 0.5 MG/1
TABLET ORAL
Qty: 150 TABLET | Refills: 0 | Status: SHIPPED | OUTPATIENT
Start: 2021-02-17 | End: 2021-03-10

## 2021-02-17 NOTE — TELEPHONE ENCOUNTER
Can approve a 30 day at local rx until pt seen on 3/10/21 and pt verbalized understanding.   Refill sent to [facility]

## 2021-02-17 NOTE — TELEPHONE ENCOUNTER
Last office visit:   9/8/20    Appointment scheduled with: 3/10/21    Requested medication: Pramipexole Dihydrochloride 0.5 MG Oral Tab    Pharmacy: [facility] [address].

## 2021-02-24 ENCOUNTER — ANTI-COAG VISIT (OUTPATIENT)
Dept: HEMATOLOGY/ONCOLOGY | Age: 81
End: 2021-02-24
Attending: INTERNAL MEDICINE
Payer: MEDICARE

## 2021-02-24 DIAGNOSIS — I82.4Y3 ACUTE DEEP VEIN THROMBOSIS (DVT) OF PROXIMAL VEIN OF BOTH LOWER EXTREMITIES (HCC): ICD-10-CM

## 2021-02-24 LAB — INR: 3 (ref 0.8–1.3)

## 2021-02-24 PROCEDURE — 36416 COLLJ CAPILLARY BLOOD SPEC: CPT

## 2021-02-24 PROCEDURE — 85610 PROTHROMBIN TIME: CPT

## 2021-03-01 RX ORDER — SILODOSIN 8 MG/1
8 CAPSULE ORAL DAILY
Qty: 90 CAPSULE | Refills: 5 | Status: SHIPPED | OUTPATIENT
Start: 2021-03-01 | End: 2021-06-30

## 2021-03-01 NOTE — TELEPHONE ENCOUNTER
I called the pt and discussed message from MPH:    \"I refilled. We should probably make an appointment to talk as it has been a bit since I last saw him.  He had bladder lesions on cystoscopy and we talked about cysto with biopsy of those lesions last visi

## 2021-03-01 NOTE — TELEPHONE ENCOUNTER
Per pt requesting refill on silodosin 8mg tablets, states it was not originally prescribed by Dr. Zonia Gilford, but states he is aware he was taking this medication. Please advise thank you.

## 2021-03-03 ENCOUNTER — E-ADVICE (OUTPATIENT)
Dept: CARDIOLOGY | Age: 81
End: 2021-03-03

## 2021-03-03 ENCOUNTER — OFFICE VISIT (OUTPATIENT)
Dept: CARDIOLOGY | Age: 81
End: 2021-03-03

## 2021-03-03 VITALS
SYSTOLIC BLOOD PRESSURE: 128 MMHG | WEIGHT: 315 LBS | BODY MASS INDEX: 40.43 KG/M2 | DIASTOLIC BLOOD PRESSURE: 64 MMHG | HEART RATE: 66 BPM | HEIGHT: 74 IN

## 2021-03-03 DIAGNOSIS — R60.9 EDEMA, UNSPECIFIED TYPE: ICD-10-CM

## 2021-03-03 DIAGNOSIS — Z86.711 HISTORY OF PULMONARY EMBOLISM: ICD-10-CM

## 2021-03-03 DIAGNOSIS — I82.5Y3 CHRONIC DEEP VEIN THROMBOSIS (DVT) OF PROXIMAL VEIN OF BOTH LOWER EXTREMITIES (CMD): ICD-10-CM

## 2021-03-03 DIAGNOSIS — I48.0 PAROXYSMAL ATRIAL FIBRILLATION (CMD): Primary | ICD-10-CM

## 2021-03-03 DIAGNOSIS — Z95.828 PRESENCE OF IVC FILTER: ICD-10-CM

## 2021-03-03 PROCEDURE — 99214 OFFICE O/P EST MOD 30 MIN: CPT | Performed by: INTERNAL MEDICINE

## 2021-03-03 RX ORDER — FUROSEMIDE 40 MG/1
80 TABLET ORAL 2 TIMES DAILY
COMMUNITY
Start: 2021-02-17

## 2021-03-03 SDOH — SOCIAL STABILITY: SOCIAL NETWORK: ARE YOU MARRIED, WIDOWED, DIVORCED, SEPARATED, NEVER MARRIED, OR LIVING WITH A PARTNER?: MARRIED

## 2021-03-03 SDOH — HEALTH STABILITY: PHYSICAL HEALTH: ON AVERAGE, HOW MANY DAYS PER WEEK DO YOU ENGAGE IN MODERATE TO STRENUOUS EXERCISE (LIKE A BRISK WALK)?: 4 DAYS

## 2021-03-03 SDOH — HEALTH STABILITY: PHYSICAL HEALTH: ON AVERAGE, HOW MANY MINUTES DO YOU ENGAGE IN EXERCISE AT THIS LEVEL?: 20 MIN

## 2021-03-03 ASSESSMENT — PATIENT HEALTH QUESTIONNAIRE - PHQ9
CLINICAL INTERPRETATION OF PHQ2 SCORE: NO FURTHER SCREENING NEEDED
SUM OF ALL RESPONSES TO PHQ9 QUESTIONS 1 AND 2: 0
1. LITTLE INTEREST OR PLEASURE IN DOING THINGS: NOT AT ALL
2. FEELING DOWN, DEPRESSED OR HOPELESS: NOT AT ALL
CLINICAL INTERPRETATION OF PHQ9 SCORE: NO FURTHER SCREENING NEEDED
SUM OF ALL RESPONSES TO PHQ9 QUESTIONS 1 AND 2: 0

## 2021-03-03 ASSESSMENT — ENCOUNTER SYMPTOMS
CHILLS: 0
HEMOPTYSIS: 0
WEIGHT GAIN: 0
ALLERGIC/IMMUNOLOGIC COMMENTS: NO NEW FOOD ALLERGIES
HEMATOCHEZIA: 0
BRUISES/BLEEDS EASILY: 0
WEIGHT LOSS: 0
SUSPICIOUS LESIONS: 0
COUGH: 0
FEVER: 0

## 2021-03-05 DIAGNOSIS — Z23 NEED FOR VACCINATION: ICD-10-CM

## 2021-03-09 ENCOUNTER — TELEPHONE (OUTPATIENT)
Dept: HEMATOLOGY/ONCOLOGY | Facility: HOSPITAL | Age: 81
End: 2021-03-09

## 2021-03-09 DIAGNOSIS — I82.4Y3 ACUTE DEEP VEIN THROMBOSIS (DVT) OF PROXIMAL VEIN OF BOTH LOWER EXTREMITIES (HCC): ICD-10-CM

## 2021-03-09 RX ORDER — WARFARIN SODIUM 5 MG/1
TABLET ORAL
Qty: 108 TABLET | Refills: 5 | Status: SHIPPED | OUTPATIENT
Start: 2021-03-09

## 2021-03-09 NOTE — TELEPHONE ENCOUNTER
Nae Richards says they switched mail order pharmacies to [facility]. He wanted to let the office know.

## 2021-03-10 ENCOUNTER — LAB ENCOUNTER (OUTPATIENT)
Dept: LAB | Age: 81
End: 2021-03-10
Attending: FAMILY MEDICINE
Payer: MEDICARE

## 2021-03-10 ENCOUNTER — OFFICE VISIT (OUTPATIENT)
Dept: FAMILY MEDICINE CLINIC | Facility: CLINIC | Age: 81
End: 2021-03-10
Payer: MEDICARE

## 2021-03-10 VITALS
WEIGHT: 313 LBS | DIASTOLIC BLOOD PRESSURE: 78 MMHG | SYSTOLIC BLOOD PRESSURE: 122 MMHG | TEMPERATURE: 97 F | RESPIRATION RATE: 18 BRPM | HEART RATE: 60 BPM | OXYGEN SATURATION: 97 % | HEIGHT: 72.5 IN | BODY MASS INDEX: 41.93 KG/M2

## 2021-03-10 DIAGNOSIS — I82.4Y3 ACUTE DEEP VEIN THROMBOSIS (DVT) OF PROXIMAL VEIN OF BOTH LOWER EXTREMITIES (HCC): ICD-10-CM

## 2021-03-10 DIAGNOSIS — I26.99 OTHER ACUTE PULMONARY EMBOLISM WITHOUT ACUTE COR PULMONALE (HCC): ICD-10-CM

## 2021-03-10 DIAGNOSIS — I48.0 PAROXYSMAL ATRIAL FIBRILLATION (HCC): ICD-10-CM

## 2021-03-10 DIAGNOSIS — I87.2 VENOUS INSUFFICIENCY: Primary | ICD-10-CM

## 2021-03-10 DIAGNOSIS — R60.0 LOCALIZED EDEMA: ICD-10-CM

## 2021-03-10 DIAGNOSIS — I87.2 VENOUS INSUFFICIENCY: ICD-10-CM

## 2021-03-10 DIAGNOSIS — I82.409 RECURRENT DEEP VEIN THROMBOSIS (DVT) (HCC): ICD-10-CM

## 2021-03-10 DIAGNOSIS — D64.9 NORMOCYTIC ANEMIA: ICD-10-CM

## 2021-03-10 DIAGNOSIS — R60.0 BILATERAL LEG EDEMA: ICD-10-CM

## 2021-03-10 DIAGNOSIS — G25.81 RESTLESS LEGS SYNDROME: ICD-10-CM

## 2021-03-10 DIAGNOSIS — I87.2 VENOUS STASIS DERMATITIS OF BOTH LOWER EXTREMITIES: ICD-10-CM

## 2021-03-10 LAB
ALBUMIN SERPL-MCNC: 3.5 G/DL (ref 3.4–5)
ALBUMIN/GLOB SERPL: 0.9 {RATIO} (ref 1–2)
ALP LIVER SERPL-CCNC: 120 U/L
ALT SERPL-CCNC: 19 U/L
ANION GAP SERPL CALC-SCNC: 5 MMOL/L (ref 0–18)
AST SERPL-CCNC: 16 U/L (ref 15–37)
BASOPHILS # BLD AUTO: 0.04 X10(3) UL (ref 0–0.2)
BASOPHILS NFR BLD AUTO: 0.6 %
BILIRUB SERPL-MCNC: 0.4 MG/DL (ref 0.1–2)
BUN BLD-MCNC: 22 MG/DL (ref 7–18)
BUN/CREAT SERPL: 17.9 (ref 10–20)
CALCIUM BLD-MCNC: 9.8 MG/DL (ref 8.5–10.1)
CHLORIDE SERPL-SCNC: 107 MMOL/L (ref 98–112)
CO2 SERPL-SCNC: 30 MMOL/L (ref 21–32)
CREAT BLD-MCNC: 1.23 MG/DL
DEPRECATED RDW RBC AUTO: 49.6 FL (ref 35.1–46.3)
EOSINOPHIL # BLD AUTO: 0.28 X10(3) UL (ref 0–0.7)
EOSINOPHIL NFR BLD AUTO: 4.1 %
ERYTHROCYTE [DISTWIDTH] IN BLOOD BY AUTOMATED COUNT: 16 % (ref 11–15)
GLOBULIN PLAS-MCNC: 4 G/DL (ref 2.8–4.4)
GLUCOSE BLD-MCNC: 92 MG/DL (ref 70–99)
HCT VFR BLD AUTO: 40.9 %
HGB BLD-MCNC: 12.4 G/DL
IMM GRANULOCYTES # BLD AUTO: 0.01 X10(3) UL (ref 0–1)
IMM GRANULOCYTES NFR BLD: 0.1 %
INR BLD: 2.26 (ref 0.89–1.11)
LYMPHOCYTES # BLD AUTO: 0.69 X10(3) UL (ref 1–4)
LYMPHOCYTES NFR BLD AUTO: 10.2 %
M PROTEIN MFR SERPL ELPH: 7.5 G/DL (ref 6.4–8.2)
MCH RBC QN AUTO: 25.6 PG (ref 26–34)
MCHC RBC AUTO-ENTMCNC: 30.3 G/DL (ref 31–37)
MCV RBC AUTO: 84.5 FL
MONOCYTES # BLD AUTO: 0.58 X10(3) UL (ref 0.1–1)
MONOCYTES NFR BLD AUTO: 8.6 %
NEUTROPHILS # BLD AUTO: 5.17 X10 (3) UL (ref 1.5–7.7)
NEUTROPHILS # BLD AUTO: 5.17 X10(3) UL (ref 1.5–7.7)
NEUTROPHILS NFR BLD AUTO: 76.4 %
OSMOLALITY SERPL CALC.SUM OF ELEC: 297 MOSM/KG (ref 275–295)
PATIENT FASTING Y/N/NP: NO
PLATELET # BLD AUTO: 221 10(3)UL (ref 150–450)
POTASSIUM SERPL-SCNC: 4.1 MMOL/L (ref 3.5–5.1)
PSA SERPL DL<=0.01 NG/ML-MCNC: 25.5 SECONDS (ref 12.4–14.6)
RBC # BLD AUTO: 4.84 X10(6)UL
SODIUM SERPL-SCNC: 142 MMOL/L (ref 136–145)
WBC # BLD AUTO: 6.8 X10(3) UL (ref 4–11)

## 2021-03-10 PROCEDURE — 80053 COMPREHEN METABOLIC PANEL: CPT

## 2021-03-10 PROCEDURE — 99214 OFFICE O/P EST MOD 30 MIN: CPT | Performed by: FAMILY MEDICINE

## 2021-03-10 PROCEDURE — 36415 COLL VENOUS BLD VENIPUNCTURE: CPT

## 2021-03-10 PROCEDURE — 85025 COMPLETE CBC W/AUTO DIFF WBC: CPT

## 2021-03-10 PROCEDURE — 85610 PROTHROMBIN TIME: CPT

## 2021-03-10 RX ORDER — PRAMIPEXOLE DIHYDROCHLORIDE 0.5 MG/1
TABLET ORAL
Qty: 60 TABLET | Refills: 0 | Status: SHIPPED | OUTPATIENT
Start: 2021-03-10 | End: 2021-03-17

## 2021-03-10 NOTE — PATIENT INSTRUCTIONS
Follow up with nephology - Dr. Rakan Laura an EKG at [address]     We can change the pramipexole to a different medication after weaning off

## 2021-03-10 NOTE — PROGRESS NOTES
[facility] Family Medicine Office Note  Chief Complaint:   Patient presents with:   Follow - Up      HPI:   This is a 79year old male coming in for  HPI    History of PE and multiple DVT's   Saw cardiology - paroxysmal afib, on metoprolol   Event Meds:  Current Outpatient Medications   Medication Sig Dispense Refill   • Pramipexole Dihydrochloride 0.5 MG Oral Tab Take 1 tablet (0.5 mg total) by mouth every morning AND 2 tablets (1 mg total) every evening. Do all this for 7 days.  then 1 tablet twice pain, nausea and vomiting. Genitourinary: Negative for dysuria, frequency and urgency. Musculoskeletal: Negative for arthralgias and myalgias. Skin: Negative for pallor and rash. Neurological: Negative for dizziness and headaches.         EXAM:   BP INTERNAL  - BASIC METABOLIC PANEL (8); Future  - US VEIN MAP LOWER EXTREMITY BILAT (CPT=93970); Future    3. Paroxysmal atrial fibrillation (HCC)  - EKG 12-LEAD; Future    4.  Recurrent deep vein thrombosis (DVT) (HCC)  - CBC WITH DIFFERENTIAL WITH PLATELET

## 2021-03-11 ENCOUNTER — TELEPHONE (OUTPATIENT)
Dept: SURGERY | Facility: CLINIC | Age: 81
End: 2021-03-11

## 2021-03-11 NOTE — TELEPHONE ENCOUNTER
I left VM for him. I'm OK keeping appointment as it stands. Vasectomy add on may need to wait a little bit. I also offered to do video visit for this patient tomorrow if he'd like (OK to add on) or he can come in for a visit too if he'd like.  Told him to josue

## 2021-03-11 NOTE — TELEPHONE ENCOUNTER
I called the pt to ask if his video visit could be moved from 3/19/2021 at 10:00am to 12:00pm. Per pt he is picking up his grandkids at that time. Per pt he could move to late afternoon after 2:00. MPH please advise, thanks.

## 2021-03-19 ENCOUNTER — OFFICE VISIT (OUTPATIENT)
Dept: SURGERY | Facility: CLINIC | Age: 81
End: 2021-03-19
Payer: MEDICARE

## 2021-03-19 ENCOUNTER — TELEMEDICINE (OUTPATIENT)
Dept: SURGERY | Facility: CLINIC | Age: 81
End: 2021-03-19
Payer: MEDICARE

## 2021-03-19 VITALS — TEMPERATURE: 97 F | DIASTOLIC BLOOD PRESSURE: 74 MMHG | HEART RATE: 63 BPM | SYSTOLIC BLOOD PRESSURE: 120 MMHG

## 2021-03-19 DIAGNOSIS — R59.1 LYMPHADENOPATHY: ICD-10-CM

## 2021-03-19 DIAGNOSIS — N13.8 BPH WITH OBSTRUCTION/LOWER URINARY TRACT SYMPTOMS: Primary | ICD-10-CM

## 2021-03-19 DIAGNOSIS — N39.41 URGE INCONTINENCE: ICD-10-CM

## 2021-03-19 DIAGNOSIS — N40.1 BPH WITH OBSTRUCTION/LOWER URINARY TRACT SYMPTOMS: Primary | ICD-10-CM

## 2021-03-19 DIAGNOSIS — N39.0 RECURRENT UTI: ICD-10-CM

## 2021-03-19 DIAGNOSIS — R31.0 GROSS HEMATURIA: ICD-10-CM

## 2021-03-19 DIAGNOSIS — R33.9 URINARY RETENTION: ICD-10-CM

## 2021-03-19 DIAGNOSIS — N13.8 BPH WITH OBSTRUCTION/LOWER URINARY TRACT SYMPTOMS: ICD-10-CM

## 2021-03-19 DIAGNOSIS — R31.0 GROSS HEMATURIA: Primary | ICD-10-CM

## 2021-03-19 DIAGNOSIS — N40.1 BPH WITH OBSTRUCTION/LOWER URINARY TRACT SYMPTOMS: ICD-10-CM

## 2021-03-19 PROCEDURE — 99214 OFFICE O/P EST MOD 30 MIN: CPT | Performed by: UROLOGY

## 2021-03-19 RX ORDER — MIRABEGRON 50 MG/1
50 TABLET, FILM COATED, EXTENDED RELEASE ORAL DAILY
Qty: 90 TABLET | Refills: 5 | Status: SHIPPED | OUTPATIENT
Start: 2021-03-19 | End: 2021-06-30

## 2021-03-19 RX ORDER — MIRABEGRON 50 MG/1
50 TABLET, FILM COATED, EXTENDED RELEASE ORAL DAILY
Qty: 30 TABLET | Refills: 11 | Status: SHIPPED | OUTPATIENT
Start: 2021-03-19 | End: 2021-03-19

## 2021-03-19 NOTE — PROGRESS NOTES
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 - Also discussed kegels again and PFT. He does not seem interested in PFT right now but requests referral in case he changes his mind.   - For bladder lesions on prior cysto he would like to check again in a couple months with another off are hopefully just inflammatory/reactive but malignancy is possible. He would like to proceed with cysto, TUR of bladder lesions when safe to do so from medical/cardiac standpoint.  He would need to stop coumadin prior but he could probably resume as soon a erythematous lesions on the posterior bladder wall which may be inflammatory lesions versus malignancy.     The patient tolerated the procedure well, suffered no complications, was able to void spontaneously after completion of the procedure in the office, CLINIC 108 tablet 5   • Silodosin 8 MG Oral Cap Take 1 capsule (8 mg total) by mouth daily. 90 capsule 5   • furosemide 40 MG Oral Tab Take 2 tablets (80 mg total) by mouth 2 (two) times daily.  360 tablet 1   • Pantoprazole Sodium 40 MG Oral Tab EC Take 1 and PFT. He does not seem interested in PFT right now but requests referral in case he changes his mind. - For bladder lesions on prior cysto he would like to check again in a couple months with another office cysto.  If these persist he would be open to b

## 2021-03-24 ENCOUNTER — ANTI-COAG VISIT (OUTPATIENT)
Dept: HEMATOLOGY/ONCOLOGY | Age: 81
End: 2021-03-24
Attending: INTERNAL MEDICINE
Payer: MEDICARE

## 2021-03-24 ENCOUNTER — EKG ENCOUNTER (OUTPATIENT)
Dept: LAB | Age: 81
End: 2021-03-24
Attending: FAMILY MEDICINE
Payer: MEDICARE

## 2021-03-24 DIAGNOSIS — I48.0 PAROXYSMAL ATRIAL FIBRILLATION (HCC): Primary | ICD-10-CM

## 2021-03-24 DIAGNOSIS — I82.4Y3 ACUTE DEEP VEIN THROMBOSIS (DVT) OF PROXIMAL VEIN OF BOTH LOWER EXTREMITIES (HCC): ICD-10-CM

## 2021-03-24 LAB — INR: 2.9 (ref 0.8–1.3)

## 2021-03-24 PROCEDURE — 93005 ELECTROCARDIOGRAM TRACING: CPT

## 2021-03-24 PROCEDURE — 85610 PROTHROMBIN TIME: CPT

## 2021-03-24 PROCEDURE — 36416 COLLJ CAPILLARY BLOOD SPEC: CPT

## 2021-03-25 ENCOUNTER — TELEPHONE (OUTPATIENT)
Dept: FAMILY MEDICINE CLINIC | Facility: CLINIC | Age: 81
End: 2021-03-25

## 2021-03-25 NOTE — TELEPHONE ENCOUNTER
----- Message from Rama Pizano MD sent at 3/25/2021  1:12 PM CDT -----  Results reviewed. Released to [address] Tests show no significant abnormalities.   Recommend decreasing metoprolol to 12.5mg bid     Dose change sent to rx

## 2021-03-26 ENCOUNTER — PATIENT MESSAGE (OUTPATIENT)
Dept: FAMILY MEDICINE CLINIC | Facility: CLINIC | Age: 81
End: 2021-03-26

## 2021-03-26 NOTE — TELEPHONE ENCOUNTER
From: Anderson Recio  To: Bharat Busch MD  Sent: 3/26/2021 2:40 PM CDT  Subject: Prescription Question    Reviewing my EKG 12 LEAD test results you indicated that I have a slightly low hear rate and suggested that I decrease the metoprotol medication I am ta

## 2021-03-26 NOTE — TELEPHONE ENCOUNTER
See TE message 3/25/21    ----- Message from Heather Johnston MD sent at 3/25/2021  1:12 PM CDT -----  Results reviewed. Released to [facility]. Tests show no significant abnormalities.   Recommend decreasing metoprolol to 12.5mg bid     New rx sent

## 2021-04-01 ENCOUNTER — HOSPITAL ENCOUNTER (OUTPATIENT)
Dept: CT IMAGING | Age: 81
Discharge: HOME | End: 2021-04-01
Attending: UROLOGY
Payer: MEDICARE

## 2021-04-01 DIAGNOSIS — R59.1 LYMPHADENOPATHY: ICD-10-CM

## 2021-04-01 PROCEDURE — 74178 CT ABD&PLV WO CNTR FLWD CNTR: CPT | Performed by: UROLOGY

## 2021-04-01 NOTE — PROGRESS NOTES
Jim Joel,  I just spoke to this patient regarding his CT. He still has pelvic lymph nodes. Slightly decreased in size from CT in July.  He is supposed to see you later this month and told him to talk to you about what, if any, further work-up or follow up sh

## 2021-04-07 ENCOUNTER — HOSPITAL ENCOUNTER (OUTPATIENT)
Dept: ULTRASOUND IMAGING | Age: 81
Discharge: HOME | End: 2021-04-07
Attending: INTERNAL MEDICINE
Payer: MEDICARE

## 2021-04-07 DIAGNOSIS — I82.4Y3 ACUTE DEEP VEIN THROMBOSIS (DVT) OF PROXIMAL VEIN OF BOTH LOWER EXTREMITIES (HCC): ICD-10-CM

## 2021-04-07 PROCEDURE — 93970 EXTREMITY STUDY: CPT | Performed by: INTERNAL MEDICINE

## 2021-04-09 ENCOUNTER — APPOINTMENT (OUTPATIENT)
Dept: HEMATOLOGY/ONCOLOGY | Age: 81
End: 2021-04-09
Attending: INTERNAL MEDICINE
Payer: MEDICARE

## 2021-04-21 ENCOUNTER — ANTI-COAG VISIT (OUTPATIENT)
Dept: HEMATOLOGY/ONCOLOGY | Age: 81
End: 2021-04-21
Attending: INTERNAL MEDICINE
Payer: MEDICARE

## 2021-04-21 DIAGNOSIS — I82.4Y3 ACUTE DEEP VEIN THROMBOSIS (DVT) OF PROXIMAL VEIN OF BOTH LOWER EXTREMITIES (HCC): ICD-10-CM

## 2021-04-21 PROCEDURE — 85610 PROTHROMBIN TIME: CPT

## 2021-04-21 PROCEDURE — 36416 COLLJ CAPILLARY BLOOD SPEC: CPT

## 2021-04-27 ENCOUNTER — OFFICE VISIT (OUTPATIENT)
Dept: HEMATOLOGY/ONCOLOGY | Age: 81
End: 2021-04-27
Attending: INTERNAL MEDICINE
Payer: MEDICARE

## 2021-04-27 VITALS
TEMPERATURE: 98 F | DIASTOLIC BLOOD PRESSURE: 73 MMHG | HEART RATE: 72 BPM | SYSTOLIC BLOOD PRESSURE: 120 MMHG | RESPIRATION RATE: 20 BRPM | OXYGEN SATURATION: 94 %

## 2021-04-27 DIAGNOSIS — D64.9 NORMOCYTIC ANEMIA: Primary | ICD-10-CM

## 2021-04-27 DIAGNOSIS — R59.1 LYMPHADENOPATHY: ICD-10-CM

## 2021-04-27 DIAGNOSIS — R60.0 BILATERAL LEG EDEMA: ICD-10-CM

## 2021-04-27 DIAGNOSIS — Z95.828 PRESENCE OF IVC FILTER: ICD-10-CM

## 2021-04-27 DIAGNOSIS — D69.6 THROMBOCYTOPENIA (HCC): ICD-10-CM

## 2021-04-27 DIAGNOSIS — N18.30 STAGE 3 CHRONIC KIDNEY DISEASE, UNSPECIFIED WHETHER STAGE 3A OR 3B CKD (HCC): ICD-10-CM

## 2021-04-27 DIAGNOSIS — I82.409 RECURRENT DEEP VEIN THROMBOSIS (DVT) (HCC): ICD-10-CM

## 2021-04-27 DIAGNOSIS — Z86.711 HISTORY OF PULMONARY EMBOLISM: ICD-10-CM

## 2021-04-27 DIAGNOSIS — N32.9 LESION OF BLADDER: ICD-10-CM

## 2021-04-27 PROCEDURE — G2212 PROLONG OUTPT/OFFICE VIS: HCPCS | Performed by: INTERNAL MEDICINE

## 2021-04-27 PROCEDURE — 99215 OFFICE O/P EST HI 40 MIN: CPT | Performed by: INTERNAL MEDICINE

## 2021-04-27 NOTE — PROGRESS NOTES
Hematology Clinic Follow Up Visit    Patient Name: Liana Carlton  Medical Record Number: [id number]    YOB: 1940    PCP: Dr. Masood Winkler   Other providers:  Dr. Samantha Lilly (urology), Dr. Kelly Frias (renal)    Reason for Consultation:  Khalif Cuevas may be due to underlying thrombus. . Oglesby catheter in the urinary bladder.  Urinary bladder wall is thickened and there is air in the urinary bladder. Kemi [place] is surrounding fatty induration.  This may be due to underlying cystitis, however clinical correla pains or changes from his baseline. He has follow-up with vascular clinic. Of note he has a history of an aortic and iliac arterial stent that was placed in 2012. 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   He remains on Calcium 10 MG Oral Tab, Take 1 tablet (10 mg total) by mouth nightly., Disp: 90 tablet, Rfl: 2  finasteride 5 MG Oral Tab, Take 1 tablet (5 mg total) by mouth daily. , Disp: 90 tablet, Rfl: 3  Vitamin D, Cholecalciferol, 25 MCG (1000 UT) Oral Cap, Take by m : (!) 141.5 kg (312 lb)  03/10/21 : (!) 142 kg (313 lb)  10/06/20 : (!) 141.5 kg (312 lb)  09/08/20 : (!) 137.9 kg (304 lb)  08/20/20 : (!) 144.2 kg (318 lb)  07/28/20 : (!) 142.8 kg (314 lb 12.8 oz)    Physical Examination:  General: Patient is alert and RETHE 28.9 07/07/2020     Lab Results   Component Value Date    KAREN 104.7 07/07/2020     No results found for: SAT  Soluble transferrin receptor  No results found for: STFRNR  No results found for: B12  No results found for: MMA  No results found for: F are stable.  No free fluid in the pelvis. BONES:  Stable postsurgical changes of gabriele and pedicle screw fixation of the visualized spine.    Impression   CONCLUSION:  Slight interval regression in dominant left external iliac and inguinal lymph nodes when are still enlarged. Will monitor- repeat CT a/p in 6 mths, if stable/improving at that point can stop monitoring scans for this. *erythematous bladder lesion  -seen on cysto on 3/19/21. Upcoming repeat cysto with possible biopsy with Dr. Marielle Ruby.   Pt ad

## 2021-04-27 NOTE — PROGRESS NOTES
Education Record     Learner:  Patient and Spouse     Disease / Diagnosis:DVT/PE     Barriers / Limitations:  None                Comments:     Method:  Brief focused                Comments:     General Topics:  Plan of care reviewed                Commen

## 2021-05-07 ENCOUNTER — TELEPHONE (OUTPATIENT)
Dept: HEMATOLOGY/ONCOLOGY | Facility: HOSPITAL | Age: 81
End: 2021-05-07

## 2021-05-07 DIAGNOSIS — D64.9 NORMOCYTIC ANEMIA: Primary | ICD-10-CM

## 2021-05-07 DIAGNOSIS — Z86.711 HISTORY OF PULMONARY EMBOLISM: ICD-10-CM

## 2021-05-07 DIAGNOSIS — D47.2 MGUS (MONOCLONAL GAMMOPATHY OF UNKNOWN SIGNIFICANCE): ICD-10-CM

## 2021-05-07 DIAGNOSIS — D50.0 IRON DEFICIENCY ANEMIA SECONDARY TO BLOOD LOSS (CHRONIC): ICD-10-CM

## 2021-05-07 NOTE — TELEPHONE ENCOUNTER
Labs reviewed. Ferritin came back low indicating iron deficiency. 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 He is no longer anemic. I called Catheryn Ganser and spoke to him and his wife.   He is fatigued and therefore it makes sense to replace his

## 2021-05-13 ENCOUNTER — OFFICE VISIT (OUTPATIENT)
Dept: HEMATOLOGY/ONCOLOGY | Age: 81
End: 2021-05-13
Attending: INTERNAL MEDICINE
Payer: MEDICARE

## 2021-05-13 VITALS
RESPIRATION RATE: 20 BRPM | HEART RATE: 63 BPM | DIASTOLIC BLOOD PRESSURE: 79 MMHG | TEMPERATURE: 96 F | OXYGEN SATURATION: 96 % | SYSTOLIC BLOOD PRESSURE: 130 MMHG

## 2021-05-13 DIAGNOSIS — D50.0 IRON DEFICIENCY ANEMIA SECONDARY TO BLOOD LOSS (CHRONIC): Primary | ICD-10-CM

## 2021-05-13 PROCEDURE — 96376 TX/PRO/DX INJ SAME DRUG ADON: CPT

## 2021-05-13 PROCEDURE — 96365 THER/PROPH/DIAG IV INF INIT: CPT

## 2021-05-13 NOTE — PROGRESS NOTES
Education Record    Learner:  Patient    Disease / Diagnosis: INFED    Barriers / Limitations:  None   Comments:    Method:  Discussion   Comments:    General Topics:  Medication, Side effects and symptom management and Plan of care reviewed   Comments:

## 2021-05-19 ENCOUNTER — ANTI-COAG VISIT (OUTPATIENT)
Dept: HEMATOLOGY/ONCOLOGY | Age: 81
End: 2021-05-19
Attending: INTERNAL MEDICINE
Payer: MEDICARE

## 2021-05-19 DIAGNOSIS — I82.4Y3 ACUTE DEEP VEIN THROMBOSIS (DVT) OF PROXIMAL VEIN OF BOTH LOWER EXTREMITIES (HCC): ICD-10-CM

## 2021-05-19 PROCEDURE — 36416 COLLJ CAPILLARY BLOOD SPEC: CPT

## 2021-05-19 PROCEDURE — 85610 PROTHROMBIN TIME: CPT

## 2021-05-24 ENCOUNTER — TELEPHONE (OUTPATIENT)
Dept: SURGERY | Facility: CLINIC | Age: 81
End: 2021-05-24

## 2021-05-24 NOTE — TELEPHONE ENCOUNTER
Patient is scheduled for in office cystoscopy CPT 02281 on Wednesday, June 30th.     Patient insurance is Medicare part B primary with [employer] supplemental. Prior authorization is not required for CPT 61896 when medicare prime with supplemental. Justin

## 2021-05-27 ENCOUNTER — PATIENT OUTREACH (OUTPATIENT)
Dept: CASE MANAGEMENT | Age: 81
End: 2021-05-27

## 2021-05-28 ENCOUNTER — ANTI-COAG VISIT (OUTPATIENT)
Dept: HEMATOLOGY/ONCOLOGY | Age: 81
End: 2021-05-28
Attending: INTERNAL MEDICINE
Payer: MEDICARE

## 2021-05-28 DIAGNOSIS — I82.4Y3 ACUTE DEEP VEIN THROMBOSIS (DVT) OF PROXIMAL VEIN OF BOTH LOWER EXTREMITIES (HCC): ICD-10-CM

## 2021-05-28 PROCEDURE — 36416 COLLJ CAPILLARY BLOOD SPEC: CPT

## 2021-05-28 PROCEDURE — 85610 PROTHROMBIN TIME: CPT

## 2021-06-04 ENCOUNTER — ANTI-COAG VISIT (OUTPATIENT)
Dept: HEMATOLOGY/ONCOLOGY | Age: 81
End: 2021-06-04
Attending: INTERNAL MEDICINE
Payer: MEDICARE

## 2021-06-04 DIAGNOSIS — I82.4Y3 ACUTE DEEP VEIN THROMBOSIS (DVT) OF PROXIMAL VEIN OF BOTH LOWER EXTREMITIES (HCC): ICD-10-CM

## 2021-06-04 PROCEDURE — 85610 PROTHROMBIN TIME: CPT

## 2021-06-04 PROCEDURE — 36416 COLLJ CAPILLARY BLOOD SPEC: CPT

## 2021-06-11 ENCOUNTER — ANTI-COAG VISIT (OUTPATIENT)
Dept: HEMATOLOGY/ONCOLOGY | Age: 81
End: 2021-06-11
Attending: INTERNAL MEDICINE
Payer: MEDICARE

## 2021-06-11 DIAGNOSIS — I82.4Y3 ACUTE DEEP VEIN THROMBOSIS (DVT) OF PROXIMAL VEIN OF BOTH LOWER EXTREMITIES (HCC): ICD-10-CM

## 2021-06-11 PROCEDURE — 85610 PROTHROMBIN TIME: CPT

## 2021-06-11 PROCEDURE — 36416 COLLJ CAPILLARY BLOOD SPEC: CPT

## 2021-06-16 ENCOUNTER — APPOINTMENT (OUTPATIENT)
Dept: HEMATOLOGY/ONCOLOGY | Age: 81
End: 2021-06-16
Attending: INTERNAL MEDICINE
Payer: MEDICARE

## 2021-06-21 ENCOUNTER — ANTI-COAG VISIT (OUTPATIENT)
Dept: HEMATOLOGY/ONCOLOGY | Age: 81
End: 2021-06-21
Attending: INTERNAL MEDICINE
Payer: MEDICARE

## 2021-06-21 ENCOUNTER — OFFICE VISIT (OUTPATIENT)
Dept: FAMILY MEDICINE CLINIC | Facility: CLINIC | Age: 81
End: 2021-06-21
Payer: MEDICARE

## 2021-06-21 VITALS
OXYGEN SATURATION: 97 % | BODY MASS INDEX: 40.43 KG/M2 | WEIGHT: 315 LBS | SYSTOLIC BLOOD PRESSURE: 110 MMHG | HEART RATE: 65 BPM | HEIGHT: 74 IN | TEMPERATURE: 98 F | RESPIRATION RATE: 19 BRPM | DIASTOLIC BLOOD PRESSURE: 70 MMHG

## 2021-06-21 DIAGNOSIS — M86.68 OTHER CHRONIC OSTEOMYELITIS, OTHER SITE (HCC): ICD-10-CM

## 2021-06-21 DIAGNOSIS — R60.0 BILATERAL LEG EDEMA: ICD-10-CM

## 2021-06-21 DIAGNOSIS — G89.29 CHRONIC MIDLINE BACK PAIN, UNSPECIFIED BACK LOCATION: ICD-10-CM

## 2021-06-21 DIAGNOSIS — I82.4Y3 ACUTE DEEP VEIN THROMBOSIS (DVT) OF PROXIMAL VEIN OF BOTH LOWER EXTREMITIES (HCC): ICD-10-CM

## 2021-06-21 DIAGNOSIS — I87.2 VENOUS INSUFFICIENCY: ICD-10-CM

## 2021-06-21 DIAGNOSIS — I82.409 RECURRENT DEEP VEIN THROMBOSIS (DVT) (HCC): ICD-10-CM

## 2021-06-21 DIAGNOSIS — E78.2 HYPERLIPEMIA, MIXED: Primary | ICD-10-CM

## 2021-06-21 DIAGNOSIS — M54.9 CHRONIC MIDLINE BACK PAIN, UNSPECIFIED BACK LOCATION: ICD-10-CM

## 2021-06-21 PROCEDURE — 36416 COLLJ CAPILLARY BLOOD SPEC: CPT

## 2021-06-21 PROCEDURE — 99214 OFFICE O/P EST MOD 30 MIN: CPT | Performed by: FAMILY MEDICINE

## 2021-06-21 PROCEDURE — 85610 PROTHROMBIN TIME: CPT

## 2021-06-21 NOTE — PROGRESS NOTES
[facility] Family Medicine Office Note  Chief Complaint:   Patient presents with:   Follow - Up      HPI:   This is a 79year old male coming in for  HPI    Follow up - back pain, BP, afib,   Wt Readings from Last 6 Encounters:  06/21/21 : (!) 317 since quittin.4      Smokeless tobacco: Never Used    Vaping Use      Vaping Use: Never used    Alcohol use: Yes      Comment: rarely, no hx of excessive use    Drug use: No    Family History:  Family History   Problem Relation Age of Onset   • Cancer Eyes: Negative for pain and visual disturbance. Respiratory: Negative for cough and shortness of breath. Cardiovascular: Negative for chest pain and palpitations. Gastrointestinal: Negative for abdominal pain, nausea and vomiting.    Genitourinary: ordered in this encounter           Return in about 3 months (around 9/21/2021) for medicare wellness.

## 2021-06-22 NOTE — PROGRESS NOTES
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     Following for: 7/6/20: 2.4 cm LUP renal cyst and enlarged pelvic and inguinal lymph nodes measuring up to 3 cm which may be reactive. CT A/P with IVC 4/1/21: stable to slightly decreased iliac and inguinal LN size.   He has another CT planned with Dr Sumit Sandhu in Oct and if note:    LOV 8/4/20. Recommended he increase water intake, start Kegels. He wanted to continue rapaflo/proscar. He feels well. Reports no further instances of hematuria. Reports no interim UTIs.   Obstructive symptoms are better with rapaflo, proscar but taking both rapaflo and proscar and reports urinary stream is still pretty weak. He has had three UTIs over the past year. AUA SS is 25/35 with 4/5 frequency, intermittency, urgency, weak stream; 3/5 nocturia, straining, MARIA T. He is unhappy with LUTS. Vaping Use: Never used    Alcohol use: Yes      Comment: rarely, no hx of excessive use    Drug use: No       Medications (Active prior to today's visit):  Current Outpatient Medications   Medication Sig Dispense Refill   • Silodosin 8 MG Oral Cap Take 1 c moist  NECK/THYROID: no obvious goiter or masses  LUNGS: nonlabored breathing  ABDOMEN: soft, no obvious masses or tenderness  SKIN: no obvious rashes     ASSESSMENT/PLAN:   Diagnoses and all orders for this visit:    BPH with obstruction/lower urinary tra

## 2021-06-28 ENCOUNTER — ANTI-COAG VISIT (OUTPATIENT)
Dept: HEMATOLOGY/ONCOLOGY | Age: 81
End: 2021-06-28
Attending: INTERNAL MEDICINE
Payer: MEDICARE

## 2021-06-28 DIAGNOSIS — I82.4Y3 ACUTE DEEP VEIN THROMBOSIS (DVT) OF PROXIMAL VEIN OF BOTH LOWER EXTREMITIES (HCC): ICD-10-CM

## 2021-06-28 PROCEDURE — 36416 COLLJ CAPILLARY BLOOD SPEC: CPT

## 2021-06-28 PROCEDURE — 85610 PROTHROMBIN TIME: CPT

## 2021-06-30 ENCOUNTER — PROCEDURE (OUTPATIENT)
Dept: SURGERY | Facility: CLINIC | Age: 81
End: 2021-06-30
Payer: MEDICARE

## 2021-06-30 VITALS — TEMPERATURE: 97 F | HEART RATE: 62 BPM | SYSTOLIC BLOOD PRESSURE: 106 MMHG | DIASTOLIC BLOOD PRESSURE: 67 MMHG

## 2021-06-30 DIAGNOSIS — N40.1 BPH WITH OBSTRUCTION/LOWER URINARY TRACT SYMPTOMS: Primary | ICD-10-CM

## 2021-06-30 DIAGNOSIS — N39.0 RECURRENT UTI: ICD-10-CM

## 2021-06-30 DIAGNOSIS — N39.41 URGE INCONTINENCE: ICD-10-CM

## 2021-06-30 DIAGNOSIS — N13.8 BPH WITH OBSTRUCTION/LOWER URINARY TRACT SYMPTOMS: Primary | ICD-10-CM

## 2021-06-30 DIAGNOSIS — R31.0 GROSS HEMATURIA: ICD-10-CM

## 2021-06-30 DIAGNOSIS — R33.9 URINARY RETENTION: ICD-10-CM

## 2021-06-30 DIAGNOSIS — R59.1 LYMPHADENOPATHY: ICD-10-CM

## 2021-06-30 PROCEDURE — 99214 OFFICE O/P EST MOD 30 MIN: CPT | Performed by: UROLOGY

## 2021-06-30 PROCEDURE — 81002 URINALYSIS NONAUTO W/O SCOPE: CPT | Performed by: UROLOGY

## 2021-06-30 PROCEDURE — 52000 CYSTOURETHROSCOPY: CPT | Performed by: UROLOGY

## 2021-06-30 RX ORDER — SILODOSIN 8 MG/1
8 CAPSULE ORAL DAILY
Qty: 90 CAPSULE | Refills: 5 | Status: SHIPPED | OUTPATIENT
Start: 2021-06-30 | End: 2022-01-19

## 2021-06-30 RX ORDER — CIPROFLOXACIN 500 MG/1
500 TABLET, FILM COATED ORAL ONCE
Status: COMPLETED | OUTPATIENT
Start: 2021-06-30 | End: 2021-06-30

## 2021-06-30 RX ORDER — FINASTERIDE 5 MG/1
5 TABLET, FILM COATED ORAL DAILY
Qty: 90 TABLET | Refills: 3 | Status: SHIPPED | OUTPATIENT
Start: 2021-06-30 | End: 2022-01-19

## 2021-06-30 RX ORDER — MIRABEGRON 50 MG/1
50 TABLET, FILM COATED, EXTENDED RELEASE ORAL DAILY
Qty: 90 TABLET | Refills: 5 | Status: SHIPPED | OUTPATIENT
Start: 2021-06-30 | End: 2022-01-19

## 2021-06-30 RX ADMIN — CIPROFLOXACIN 500 MG: 500 TABLET, FILM COATED ORAL at 11:00:00

## 2021-07-01 ENCOUNTER — TELEPHONE (OUTPATIENT)
Dept: SURGERY | Facility: CLINIC | Age: 81
End: 2021-07-01

## 2021-07-01 NOTE — PROGRESS NOTES
Results reviewed. Urine cytology is negative for cancer which is great news. No changes to plan as we had discussed in the office. Please let patient know.     Thank you,  MPH

## 2021-07-01 NOTE — TELEPHONE ENCOUNTER
Called patient, relayed msg, patient understood     ----- Message from Anthony Arnold MD sent at 7/1/2021 11:06 AM CDT -----  Results reviewed. Urine cytology is negative for cancer which is great news. No changes to plan as we had discussed in the office.

## 2021-07-06 ENCOUNTER — ANTI-COAG VISIT (OUTPATIENT)
Dept: HEMATOLOGY/ONCOLOGY | Age: 81
End: 2021-07-06
Attending: INTERNAL MEDICINE
Payer: MEDICARE

## 2021-07-06 DIAGNOSIS — I82.4Y3 ACUTE DEEP VEIN THROMBOSIS (DVT) OF PROXIMAL VEIN OF BOTH LOWER EXTREMITIES (HCC): ICD-10-CM

## 2021-07-06 LAB — INR: 3.1 (ref 0.8–1.3)

## 2021-07-06 PROCEDURE — 85610 PROTHROMBIN TIME: CPT

## 2021-07-06 PROCEDURE — 36416 COLLJ CAPILLARY BLOOD SPEC: CPT

## 2021-07-06 RX ORDER — PANTOPRAZOLE SODIUM 40 MG/1
TABLET, DELAYED RELEASE ORAL
Qty: 180 TABLET | Refills: 1 | Status: SHIPPED | OUTPATIENT
Start: 2021-07-06 | End: 2021-11-29

## 2021-07-06 NOTE — TELEPHONE ENCOUNTER
Medication(s) to Refill:   Requested Prescriptions     Pending Prescriptions Disp Refills   • PANTOPRAZOLE SODIUM 40 MG Oral Tab EC [Pharmacy Med Name: PANTOPRAZOLE TAB 40MG DR] 180 tablet 1     Sig: TAKE 1 TABLET TWICE A DAY         Reason for Medication

## 2021-07-20 ENCOUNTER — ANTI-COAG VISIT (OUTPATIENT)
Dept: HEMATOLOGY/ONCOLOGY | Age: 81
End: 2021-07-20
Attending: INTERNAL MEDICINE
Payer: MEDICARE

## 2021-07-20 DIAGNOSIS — I82.4Y3 ACUTE DEEP VEIN THROMBOSIS (DVT) OF PROXIMAL VEIN OF BOTH LOWER EXTREMITIES (HCC): ICD-10-CM

## 2021-07-20 LAB — INR: 3.2 (ref 0.8–1.3)

## 2021-07-20 PROCEDURE — 36416 COLLJ CAPILLARY BLOOD SPEC: CPT

## 2021-07-20 PROCEDURE — 85610 PROTHROMBIN TIME: CPT

## 2021-07-27 ENCOUNTER — ANTI-COAG VISIT (OUTPATIENT)
Dept: HEMATOLOGY/ONCOLOGY | Age: 81
End: 2021-07-27
Attending: INTERNAL MEDICINE
Payer: MEDICARE

## 2021-07-27 DIAGNOSIS — I82.4Y3 ACUTE DEEP VEIN THROMBOSIS (DVT) OF PROXIMAL VEIN OF BOTH LOWER EXTREMITIES (HCC): ICD-10-CM

## 2021-07-27 LAB — INR: 2.7 (ref 0.8–1.3)

## 2021-07-27 PROCEDURE — 36416 COLLJ CAPILLARY BLOOD SPEC: CPT

## 2021-07-27 PROCEDURE — 85610 PROTHROMBIN TIME: CPT

## 2021-08-03 ENCOUNTER — APPOINTMENT (OUTPATIENT)
Dept: HEMATOLOGY/ONCOLOGY | Age: 81
End: 2021-08-03
Attending: INTERNAL MEDICINE
Payer: MEDICARE

## 2021-08-04 ENCOUNTER — ANTI-COAG VISIT (OUTPATIENT)
Dept: HEMATOLOGY/ONCOLOGY | Age: 81
End: 2021-08-04
Attending: INTERNAL MEDICINE
Payer: MEDICARE

## 2021-08-04 DIAGNOSIS — I82.4Y3 ACUTE DEEP VEIN THROMBOSIS (DVT) OF PROXIMAL VEIN OF BOTH LOWER EXTREMITIES (HCC): ICD-10-CM

## 2021-08-04 LAB — INR: 3.1 (ref 0.8–1.3)

## 2021-08-04 PROCEDURE — 36416 COLLJ CAPILLARY BLOOD SPEC: CPT

## 2021-08-04 PROCEDURE — 85610 PROTHROMBIN TIME: CPT

## 2021-08-08 RX ORDER — FUROSEMIDE 40 MG/1
TABLET ORAL
Qty: 360 TABLET | Refills: 1 | Status: SHIPPED | OUTPATIENT
Start: 2021-08-08 | End: 2021-10-29

## 2021-08-17 ENCOUNTER — ANTI-COAG VISIT (OUTPATIENT)
Dept: HEMATOLOGY/ONCOLOGY | Age: 81
End: 2021-08-17
Attending: INTERNAL MEDICINE
Payer: MEDICARE

## 2021-08-17 DIAGNOSIS — I82.4Y3 ACUTE DEEP VEIN THROMBOSIS (DVT) OF PROXIMAL VEIN OF BOTH LOWER EXTREMITIES (HCC): ICD-10-CM

## 2021-08-17 LAB — INR: 3.1 (ref 0.8–1.3)

## 2021-08-17 PROCEDURE — 36416 COLLJ CAPILLARY BLOOD SPEC: CPT

## 2021-08-17 PROCEDURE — 85610 PROTHROMBIN TIME: CPT

## 2021-08-18 ENCOUNTER — APPOINTMENT (OUTPATIENT)
Dept: HEMATOLOGY/ONCOLOGY | Age: 81
End: 2021-08-18
Attending: INTERNAL MEDICINE
Payer: MEDICARE

## 2021-09-15 ENCOUNTER — ANTI-COAG VISIT (OUTPATIENT)
Dept: HEMATOLOGY/ONCOLOGY | Age: 81
End: 2021-09-15
Attending: INTERNAL MEDICINE
Payer: MEDICARE

## 2021-09-15 DIAGNOSIS — I82.4Y3 ACUTE DEEP VEIN THROMBOSIS (DVT) OF PROXIMAL VEIN OF BOTH LOWER EXTREMITIES (HCC): ICD-10-CM

## 2021-09-15 LAB — INR: 3.6 (ref 0.8–1.3)

## 2021-09-15 PROCEDURE — 85610 PROTHROMBIN TIME: CPT

## 2021-09-15 PROCEDURE — 36416 COLLJ CAPILLARY BLOOD SPEC: CPT

## 2021-09-23 ENCOUNTER — ANTI-COAG VISIT (OUTPATIENT)
Dept: HEMATOLOGY/ONCOLOGY | Age: 81
End: 2021-09-23
Attending: INTERNAL MEDICINE
Payer: MEDICARE

## 2021-09-23 DIAGNOSIS — I82.4Y3 ACUTE DEEP VEIN THROMBOSIS (DVT) OF PROXIMAL VEIN OF BOTH LOWER EXTREMITIES (HCC): ICD-10-CM

## 2021-09-23 LAB — INR: 3.6 (ref 0.8–1.3)

## 2021-09-23 PROCEDURE — 85610 PROTHROMBIN TIME: CPT

## 2021-09-23 PROCEDURE — 36416 COLLJ CAPILLARY BLOOD SPEC: CPT

## 2021-09-30 ENCOUNTER — ANTI-COAG VISIT (OUTPATIENT)
Dept: HEMATOLOGY/ONCOLOGY | Age: 81
End: 2021-09-30
Attending: INTERNAL MEDICINE
Payer: MEDICARE

## 2021-09-30 DIAGNOSIS — I82.4Y3 ACUTE DEEP VEIN THROMBOSIS (DVT) OF PROXIMAL VEIN OF BOTH LOWER EXTREMITIES (HCC): ICD-10-CM

## 2021-09-30 LAB — INR: 2 (ref 0.8–1.3)

## 2021-09-30 PROCEDURE — 36416 COLLJ CAPILLARY BLOOD SPEC: CPT

## 2021-09-30 PROCEDURE — 85610 PROTHROMBIN TIME: CPT

## 2021-10-07 ENCOUNTER — ANTI-COAG VISIT (OUTPATIENT)
Dept: HEMATOLOGY/ONCOLOGY | Age: 81
End: 2021-10-07
Attending: INTERNAL MEDICINE
Payer: MEDICARE

## 2021-10-07 DIAGNOSIS — I82.4Y3 ACUTE DEEP VEIN THROMBOSIS (DVT) OF PROXIMAL VEIN OF BOTH LOWER EXTREMITIES (HCC): ICD-10-CM

## 2021-10-07 PROCEDURE — 85610 PROTHROMBIN TIME: CPT

## 2021-10-07 PROCEDURE — 36416 COLLJ CAPILLARY BLOOD SPEC: CPT

## 2021-10-14 ENCOUNTER — ANTI-COAG VISIT (OUTPATIENT)
Dept: HEMATOLOGY/ONCOLOGY | Age: 81
End: 2021-10-14
Attending: INTERNAL MEDICINE
Payer: MEDICARE

## 2021-10-14 DIAGNOSIS — I82.4Y3 ACUTE DEEP VEIN THROMBOSIS (DVT) OF PROXIMAL VEIN OF BOTH LOWER EXTREMITIES (HCC): ICD-10-CM

## 2021-10-14 PROCEDURE — 85610 PROTHROMBIN TIME: CPT

## 2021-10-14 PROCEDURE — 36416 COLLJ CAPILLARY BLOOD SPEC: CPT

## 2021-10-20 ENCOUNTER — HOSPITAL ENCOUNTER (OUTPATIENT)
Dept: CT IMAGING | Age: 81
Discharge: HOME | End: 2021-10-20
Attending: INTERNAL MEDICINE
Payer: MEDICARE

## 2021-10-20 DIAGNOSIS — R59.1 LYMPHADENOPATHY: ICD-10-CM

## 2021-10-20 PROCEDURE — 74177 CT ABD & PELVIS W/CONTRAST: CPT | Performed by: INTERNAL MEDICINE

## 2021-10-20 PROCEDURE — 82565 ASSAY OF CREATININE: CPT

## 2021-10-21 ENCOUNTER — ANTI-COAG VISIT (OUTPATIENT)
Dept: HEMATOLOGY/ONCOLOGY | Age: 81
End: 2021-10-21
Attending: INTERNAL MEDICINE
Payer: MEDICARE

## 2021-10-21 DIAGNOSIS — I82.4Y3 ACUTE DEEP VEIN THROMBOSIS (DVT) OF PROXIMAL VEIN OF BOTH LOWER EXTREMITIES (HCC): ICD-10-CM

## 2021-10-21 PROCEDURE — 85610 PROTHROMBIN TIME: CPT

## 2021-10-21 PROCEDURE — 36416 COLLJ CAPILLARY BLOOD SPEC: CPT

## 2021-10-26 ENCOUNTER — ANTI-COAG VISIT (OUTPATIENT)
Dept: HEMATOLOGY/ONCOLOGY | Facility: HOSPITAL | Age: 81
End: 2021-10-26

## 2021-10-26 ENCOUNTER — OFFICE VISIT (OUTPATIENT)
Dept: HEMATOLOGY/ONCOLOGY | Age: 81
End: 2021-10-26
Attending: INTERNAL MEDICINE
Payer: MEDICARE

## 2021-10-26 VITALS
OXYGEN SATURATION: 93 % | WEIGHT: 315 LBS | BODY MASS INDEX: 41 KG/M2 | SYSTOLIC BLOOD PRESSURE: 132 MMHG | RESPIRATION RATE: 20 BRPM | DIASTOLIC BLOOD PRESSURE: 73 MMHG | TEMPERATURE: 97 F | HEART RATE: 63 BPM

## 2021-10-26 DIAGNOSIS — I82.4Y3 ACUTE DEEP VEIN THROMBOSIS (DVT) OF PROXIMAL VEIN OF BOTH LOWER EXTREMITIES (HCC): ICD-10-CM

## 2021-10-26 DIAGNOSIS — D50.0 IRON DEFICIENCY ANEMIA SECONDARY TO BLOOD LOSS (CHRONIC): ICD-10-CM

## 2021-10-26 DIAGNOSIS — D64.9 NORMOCYTIC ANEMIA: ICD-10-CM

## 2021-10-26 DIAGNOSIS — Z95.828 PRESENCE OF IVC FILTER: ICD-10-CM

## 2021-10-26 DIAGNOSIS — I82.409 RECURRENT DEEP VEIN THROMBOSIS (DVT) (HCC): Primary | ICD-10-CM

## 2021-10-26 DIAGNOSIS — Z86.711 HISTORY OF PULMONARY EMBOLISM: ICD-10-CM

## 2021-10-26 DIAGNOSIS — I26.99 OTHER ACUTE PULMONARY EMBOLISM WITHOUT ACUTE COR PULMONALE (HCC): ICD-10-CM

## 2021-10-26 DIAGNOSIS — R59.1 LYMPHADENOPATHY: ICD-10-CM

## 2021-10-26 DIAGNOSIS — D47.2 MGUS (MONOCLONAL GAMMOPATHY OF UNKNOWN SIGNIFICANCE): ICD-10-CM

## 2021-10-26 PROCEDURE — 99215 OFFICE O/P EST HI 40 MIN: CPT | Performed by: INTERNAL MEDICINE

## 2021-10-26 PROCEDURE — G2212 PROLONG OUTPT/OFFICE VIS: HCPCS | Performed by: INTERNAL MEDICINE

## 2021-10-26 NOTE — PROGRESS NOTES
Education Record     Learner:  Patient and Spouse     Disease / Diagnosis:DVT/PE     Barriers / Christina Jennings                XORFDVDH:     Method:  Brief focused                GNAHKOYO:     General Mel Iyer of care reviewed                NSAMANTHALANETTE

## 2021-10-26 NOTE — PROGRESS NOTES
Hematology Clinic Follow Up Visit    Patient Name: Gia Sweeney  Medical Record Number: [id number]    YOB: 1940    PCP: Dr. Fabio Campbell   Other providers:  Dr. Earl Oseguera (urology), Dr. Jenna Juárez (renal)    Reason for Consultation:  Dede Oglesby infiltration which may be due to underlying thrombus. . Oglesby catheter in the urinary bladder.  Urinary bladder wall is thickened and there is air in the urinary bladder. Milta Child is surrounding fatty induration.  This may be due to underlying cystitis, howev diminished. Overall his energy is fair. He denies any bleeding or dark stools. Has some baseline dyspnea with exertion that is stable. No chest pain or lightheadedness. His lower extremity swelling is stable. No new aches or pains.   He remains on war MG Oral Tab, Take 1 tablet (5 mg total) by mouth daily. , Disp: 90 tablet, Rfl: 3  metoprolol Tartrate 25 MG Oral Tab, Take 1/2 tablet twice a day, Disp: 45 tablet, Rfl: 0  Warfarin Sodium 5 MG Oral Tab, TAKE 1 AND 1/2 TABLETS  (7.5MG) BY MOUTH  MON/WED/FRI HPI    Vital Signs:  Height: --  Weight: 144.7 kg (319 lb) (10/26 1046)  BSA (Calculated - sq m): --  Pulse: 63 (10/26 1046)  BP: 132/73 (10/26 1046)  Temp: 97.1 °F (36.2 °C) (10/26 1046)  Do Not Use - Resp Rate: --  SpO2: 93 % (10/26 1046)    Wt Readings 04/27/2021     04/27/2021    CO2 30.0 04/27/2021     Lab Results   Component Value Date    .1 (H) 07/15/2020    INR 2.00 (A) 10/21/2021     Lab Results   Component Value Date    REITCPERCENT 1.5 10/26/2021    REITCPERCENT 1.1 04/27/2021    NASRIN BILIARY:  No visible dilatation or calcification.     PANCREAS:  No lesion, fluid collection, ductal dilatation, or atrophy.     SPLEEN:  No enlargement or focal lesion.     KIDNEYS:  No mass, obstruction, or calcification.  Stable upper pole cyst of lef CBC and CMP at least q6mths while remains on anticoagulation     *normocytic anemia- KVNG, AoCD/inflammation  -has a component of AoCD/inflammation with elevated CRP.  Iron deficiency replaced with IV iron with improvement.   -Hemoglobin is now normal, howev

## 2021-10-27 ENCOUNTER — TELEPHONE (OUTPATIENT)
Dept: HEMATOLOGY/ONCOLOGY | Age: 81
End: 2021-10-27

## 2021-10-29 ENCOUNTER — OFFICE VISIT (OUTPATIENT)
Dept: FAMILY MEDICINE CLINIC | Facility: CLINIC | Age: 81
End: 2021-10-29
Payer: MEDICARE

## 2021-10-29 VITALS
SYSTOLIC BLOOD PRESSURE: 110 MMHG | WEIGHT: 315 LBS | HEIGHT: 74 IN | TEMPERATURE: 98 F | OXYGEN SATURATION: 98 % | DIASTOLIC BLOOD PRESSURE: 66 MMHG | RESPIRATION RATE: 20 BRPM | BODY MASS INDEX: 40.43 KG/M2 | HEART RATE: 67 BPM

## 2021-10-29 DIAGNOSIS — M50.90 CERVICAL DISC DISEASE: ICD-10-CM

## 2021-10-29 DIAGNOSIS — K21.00 GASTROESOPHAGEAL REFLUX DISEASE WITH ESOPHAGITIS, UNSPECIFIED WHETHER HEMORRHAGE: ICD-10-CM

## 2021-10-29 DIAGNOSIS — N40.1 BPH WITH OBSTRUCTION/LOWER URINARY TRACT SYMPTOMS: ICD-10-CM

## 2021-10-29 DIAGNOSIS — Z23 NEED FOR VACCINATION: ICD-10-CM

## 2021-10-29 DIAGNOSIS — M48.062 SPINAL STENOSIS OF LUMBAR REGION WITH NEUROGENIC CLAUDICATION: ICD-10-CM

## 2021-10-29 DIAGNOSIS — Z98.890 STATUS POST LUMBAR SURGERY: ICD-10-CM

## 2021-10-29 DIAGNOSIS — I71.4 AAA (ABDOMINAL AORTIC ANEURYSM) WITHOUT RUPTURE (HCC): ICD-10-CM

## 2021-10-29 DIAGNOSIS — R60.0 LOCALIZED EDEMA: ICD-10-CM

## 2021-10-29 DIAGNOSIS — Z86.711 HISTORY OF PULMONARY EMBOLISM: ICD-10-CM

## 2021-10-29 DIAGNOSIS — Z00.00 ENCOUNTER FOR ANNUAL HEALTH EXAMINATION: Primary | ICD-10-CM

## 2021-10-29 DIAGNOSIS — N13.8 BPH WITH OBSTRUCTION/LOWER URINARY TRACT SYMPTOMS: ICD-10-CM

## 2021-10-29 PROCEDURE — G0008 ADMIN INFLUENZA VIRUS VAC: HCPCS | Performed by: FAMILY MEDICINE

## 2021-10-29 PROCEDURE — G0439 PPPS, SUBSEQ VISIT: HCPCS | Performed by: FAMILY MEDICINE

## 2021-10-29 PROCEDURE — 90662 IIV NO PRSV INCREASED AG IM: CPT | Performed by: FAMILY MEDICINE

## 2021-10-29 RX ORDER — FUROSEMIDE 40 MG/1
40 TABLET ORAL 2 TIMES DAILY
Qty: 180 TABLET | Refills: 1 | COMMUNITY
Start: 2021-10-29

## 2021-10-29 NOTE — PATIENT INSTRUCTIONS
Moriah Recio's SCREENING SCHEDULE   Tests on this list are recommended by your physician but may not be covered, or covered at this frequency, by your insurer. Please check with your insurance carrier before scheduling to verify coverage.    PREVENTATIV Pneumococcal Each vaccine ([id number] & [id number]) covered once after 65 Prevnar 13: 01/23/2016    [id number]: 11/02/2011     No recommendations at this time    Hepatitis B One screening covered for patients with certain risk factors   -  No recommend

## 2021-10-29 NOTE — PROGRESS NOTES
HPI:   Mari Wilson is a [de-identified]year old male who presents for a Medicare Subsequent Annual Wellness visit (Pt already had Initial Annual Wellness).     Annual Physical due on 09/08/2021  Influenza Vaccine(1) due on 10/01/2021  Annual Depression Screen due on 0 this document but we do not have it in [facility], and patient is instructed to get our office a copy of it for scanning into Epic. He does NOT have a Power of  for [employer] on file in [facility].    The patient has this document but we do not have it in colonic polyps     Bull's esophagus without dysplasia     Acute deep vein thrombosis (DVT) of proximal vein of both lower extremities (HCC)     Acute cystitis with hematuria     Leg DVT (deep venous thromboembolism), acute, bilateral (HCC)     Pulmonary 10 MG Oral Tab, Take 1 tablet (10 mg total) by mouth nightly.   Vitamin D, Cholecalciferol, 25 MCG (1000 UT) Oral Cap, Take by mouth.  mometasone 0.1 % External Ointment, ABIMAEL 1 APPLICATION TO SKIN ON HANDS BID  acetaminophen 500 MG Oral Tab, Take 1 tablet ( for joint pain. Negative for falls, myalgias and neck pain. Skin: Negative for itching and rash. Neurological: Positive for dizziness. Negative for speech change, focal weakness and loss of consciousness.    Endo/Heme/Allergies: Negative for environment non-tender, bowel sounds active all four quadrants,  no masses, no organomegaly   Extremities: Extremities normal, atraumatic, no cyanosis; +1 edema   Pulses: 2+ and symmetric   Skin: Skin color, texture, turgor normal, no rashes or lesions   Lymph nodes: Sanford Garcia MD, 10/29/2021     General Health     In the past six months, have you lost more than 10 pounds without trying?: 2 - No  Has your appetite been poor?: No  How does the patient maintain a good energy level?: Appropriate Exercise;Daily Walks;Stretchin patient is at high risk or previous colonoscopy was abnormal 11/08/2019    Colonoscopy due on 11/08/2029    Flexible Sigmoidoscopy   Covered every 4 years -    Fecal Occult Blood Test Covered annually -   Prostate Cancer Screening    Prostate-Specific Anti

## 2021-11-04 ENCOUNTER — ANTI-COAG VISIT (OUTPATIENT)
Dept: HEMATOLOGY/ONCOLOGY | Age: 81
End: 2021-11-04
Attending: INTERNAL MEDICINE
Payer: MEDICARE

## 2021-11-04 DIAGNOSIS — I82.4Y3 ACUTE DEEP VEIN THROMBOSIS (DVT) OF PROXIMAL VEIN OF BOTH LOWER EXTREMITIES (HCC): ICD-10-CM

## 2021-11-04 PROCEDURE — 85610 PROTHROMBIN TIME: CPT

## 2021-11-04 PROCEDURE — 36416 COLLJ CAPILLARY BLOOD SPEC: CPT

## 2021-11-10 ENCOUNTER — OFFICE VISIT (OUTPATIENT)
Dept: HEMATOLOGY/ONCOLOGY | Age: 81
End: 2021-11-10
Attending: INTERNAL MEDICINE
Payer: MEDICARE

## 2021-11-10 VITALS
DIASTOLIC BLOOD PRESSURE: 60 MMHG | RESPIRATION RATE: 18 BRPM | SYSTOLIC BLOOD PRESSURE: 112 MMHG | OXYGEN SATURATION: 95 % | TEMPERATURE: 98 F | HEART RATE: 53 BPM

## 2021-11-10 DIAGNOSIS — D50.0 IRON DEFICIENCY ANEMIA SECONDARY TO BLOOD LOSS (CHRONIC): Primary | ICD-10-CM

## 2021-11-10 PROCEDURE — 96365 THER/PROPH/DIAG IV INF INIT: CPT

## 2021-11-10 NOTE — PROGRESS NOTES
Pt here for infed.   Arrives Ambulating independently, accompanied by Self           Pregnancy screening: Not applicable    Modifications in dose or schedule: No     Frequency of blood return and site check throughout administration: Prior to administration

## 2021-11-12 ENCOUNTER — ANTI-COAG VISIT (OUTPATIENT)
Dept: HEMATOLOGY/ONCOLOGY | Age: 81
End: 2021-11-12
Attending: INTERNAL MEDICINE
Payer: MEDICARE

## 2021-11-12 DIAGNOSIS — I82.4Y3 ACUTE DEEP VEIN THROMBOSIS (DVT) OF PROXIMAL VEIN OF BOTH LOWER EXTREMITIES (HCC): ICD-10-CM

## 2021-11-12 PROCEDURE — 36416 COLLJ CAPILLARY BLOOD SPEC: CPT

## 2021-11-12 PROCEDURE — 85610 PROTHROMBIN TIME: CPT

## 2021-11-23 RX ORDER — PANTOPRAZOLE SODIUM 40 MG/1
TABLET, DELAYED RELEASE ORAL
Qty: 180 TABLET | Refills: 1 | OUTPATIENT
Start: 2021-11-23

## 2021-11-24 RX ORDER — ROSUVASTATIN CALCIUM 10 MG/1
10 TABLET, COATED ORAL NIGHTLY
Qty: 90 TABLET | Refills: 3 | Status: SHIPPED | OUTPATIENT
Start: 2021-11-24 | End: 2022-02-09

## 2021-11-26 ENCOUNTER — ANTI-COAG VISIT (OUTPATIENT)
Dept: HEMATOLOGY/ONCOLOGY | Age: 81
End: 2021-11-26
Attending: INTERNAL MEDICINE
Payer: MEDICARE

## 2021-11-26 DIAGNOSIS — I82.4Y3 ACUTE DEEP VEIN THROMBOSIS (DVT) OF PROXIMAL VEIN OF BOTH LOWER EXTREMITIES (HCC): ICD-10-CM

## 2021-11-26 PROCEDURE — 85610 PROTHROMBIN TIME: CPT

## 2021-11-26 PROCEDURE — 36416 COLLJ CAPILLARY BLOOD SPEC: CPT

## 2021-11-29 ENCOUNTER — PATIENT MESSAGE (OUTPATIENT)
Dept: FAMILY MEDICINE CLINIC | Facility: CLINIC | Age: 81
End: 2021-11-29

## 2021-11-29 RX ORDER — PANTOPRAZOLE SODIUM 40 MG/1
40 TABLET, DELAYED RELEASE ORAL 2 TIMES DAILY
Qty: 180 TABLET | Refills: 1 | Status: SHIPPED | OUTPATIENT
Start: 2021-11-29 | End: 2022-04-15

## 2021-11-29 RX ORDER — PANTOPRAZOLE SODIUM 40 MG/1
40 TABLET, DELAYED RELEASE ORAL
Qty: 180 TABLET | Refills: 1 | Status: SHIPPED | OUTPATIENT
Start: 2021-11-29 | End: 2022-01-10

## 2021-11-29 NOTE — TELEPHONE ENCOUNTER
Please call back patient, he is not understanding why his medication was denied       PANTOPRAZOLE SODIUM 40 MG Oral Tab EC

## 2021-11-29 NOTE — TELEPHONE ENCOUNTER
Called [facility], talked with Lenka Browning who states pt does not have any refills available for Pantoprazole Sodium 40 MG Oral Tablet Delayed Release (PROTONIX). Please approve or deny pending Rx. Thank you.    LOV: 10/29/21  LF: 07/06/21

## 2021-11-29 NOTE — TELEPHONE ENCOUNTER
From: Magalis Recio  To: Awa Washington MD  Sent: 11/29/2021 2:51 PM CST  Subject: Refill for Pantoprazole    We use [employer] online prescription service; my wife sent in request for refills on 5 different medications Pantoprazole was one of them.   [employer] [facility]

## 2021-12-03 NOTE — TELEPHONE ENCOUNTER
Spoke with pt he is almost out of medications. Needs from [facility] for short supply and then fills through [facility]. Will fill 30 days at [facility] and 90 day at [facility]. Spoke with [facility] to call pt and advise he can  coumadin today.
normal...

## 2021-12-27 ENCOUNTER — ANTI-COAG VISIT (OUTPATIENT)
Dept: HEMATOLOGY/ONCOLOGY | Age: 81
End: 2021-12-27
Attending: INTERNAL MEDICINE
Payer: MEDICARE

## 2021-12-27 DIAGNOSIS — I82.4Y3 ACUTE DEEP VEIN THROMBOSIS (DVT) OF PROXIMAL VEIN OF BOTH LOWER EXTREMITIES (HCC): ICD-10-CM

## 2021-12-27 PROCEDURE — 85610 PROTHROMBIN TIME: CPT

## 2021-12-27 PROCEDURE — 36416 COLLJ CAPILLARY BLOOD SPEC: CPT

## 2022-01-03 ENCOUNTER — LAB ENCOUNTER (OUTPATIENT)
Dept: LAB | Age: 82
End: 2022-01-03
Attending: STUDENT IN AN ORGANIZED HEALTH CARE EDUCATION/TRAINING PROGRAM
Payer: MEDICARE

## 2022-01-03 DIAGNOSIS — D50.0 IRON DEFICIENCY ANEMIA SECONDARY TO BLOOD LOSS (CHRONIC): ICD-10-CM

## 2022-01-05 ENCOUNTER — ANESTHESIA EVENT (OUTPATIENT)
Dept: ENDOSCOPY | Facility: HOSPITAL | Age: 82
End: 2022-01-05
Payer: MEDICARE

## 2022-01-05 LAB — SARS-COV-2 RNA RESP QL NAA+PROBE: NOT DETECTED

## 2022-01-06 ENCOUNTER — ANESTHESIA (OUTPATIENT)
Dept: ENDOSCOPY | Facility: HOSPITAL | Age: 82
End: 2022-01-06
Payer: MEDICARE

## 2022-01-06 ENCOUNTER — HOSPITAL ENCOUNTER (OUTPATIENT)
Facility: HOSPITAL | Age: 82
Setting detail: HOSPITAL OUTPATIENT SURGERY
Discharge: HOME | End: 2022-01-06
Attending: STUDENT IN AN ORGANIZED HEALTH CARE EDUCATION/TRAINING PROGRAM | Admitting: STUDENT IN AN ORGANIZED HEALTH CARE EDUCATION/TRAINING PROGRAM
Payer: MEDICARE

## 2022-01-06 VITALS
SYSTOLIC BLOOD PRESSURE: 130 MMHG | HEIGHT: 74 IN | OXYGEN SATURATION: 96 % | WEIGHT: 312 LBS | RESPIRATION RATE: 18 BRPM | BODY MASS INDEX: 40.04 KG/M2 | TEMPERATURE: 98 F | HEART RATE: 57 BPM | DIASTOLIC BLOOD PRESSURE: 77 MMHG

## 2022-01-06 DIAGNOSIS — D50.0 IRON DEFICIENCY ANEMIA SECONDARY TO BLOOD LOSS (CHRONIC): Primary | ICD-10-CM

## 2022-01-06 LAB — INR: 1.2 (ref 0.8–1.3)

## 2022-01-06 PROCEDURE — 0DB18ZX EXCISION OF UPPER ESOPHAGUS, VIA NATURAL OR ARTIFICIAL OPENING ENDOSCOPIC, DIAGNOSTIC: ICD-10-PCS | Performed by: STUDENT IN AN ORGANIZED HEALTH CARE EDUCATION/TRAINING PROGRAM

## 2022-01-06 PROCEDURE — 0DB48ZX EXCISION OF ESOPHAGOGASTRIC JUNCTION, VIA NATURAL OR ARTIFICIAL OPENING ENDOSCOPIC, DIAGNOSTIC: ICD-10-PCS | Performed by: STUDENT IN AN ORGANIZED HEALTH CARE EDUCATION/TRAINING PROGRAM

## 2022-01-06 PROCEDURE — 0DB98ZX EXCISION OF DUODENUM, VIA NATURAL OR ARTIFICIAL OPENING ENDOSCOPIC, DIAGNOSTIC: ICD-10-PCS | Performed by: STUDENT IN AN ORGANIZED HEALTH CARE EDUCATION/TRAINING PROGRAM

## 2022-01-06 PROCEDURE — 0DBH8ZX EXCISION OF CECUM, VIA NATURAL OR ARTIFICIAL OPENING ENDOSCOPIC, DIAGNOSTIC: ICD-10-PCS | Performed by: STUDENT IN AN ORGANIZED HEALTH CARE EDUCATION/TRAINING PROGRAM

## 2022-01-06 PROCEDURE — 88305 TISSUE EXAM BY PATHOLOGIST: CPT | Performed by: STUDENT IN AN ORGANIZED HEALTH CARE EDUCATION/TRAINING PROGRAM

## 2022-01-06 PROCEDURE — 88312 SPECIAL STAINS GROUP 1: CPT | Performed by: STUDENT IN AN ORGANIZED HEALTH CARE EDUCATION/TRAINING PROGRAM

## 2022-01-06 PROCEDURE — 0DBK8ZX EXCISION OF ASCENDING COLON, VIA NATURAL OR ARTIFICIAL OPENING ENDOSCOPIC, DIAGNOSTIC: ICD-10-PCS | Performed by: STUDENT IN AN ORGANIZED HEALTH CARE EDUCATION/TRAINING PROGRAM

## 2022-01-06 PROCEDURE — 0DBP8ZX EXCISION OF RECTUM, VIA NATURAL OR ARTIFICIAL OPENING ENDOSCOPIC, DIAGNOSTIC: ICD-10-PCS | Performed by: STUDENT IN AN ORGANIZED HEALTH CARE EDUCATION/TRAINING PROGRAM

## 2022-01-06 PROCEDURE — 0DBL8ZX EXCISION OF TRANSVERSE COLON, VIA NATURAL OR ARTIFICIAL OPENING ENDOSCOPIC, DIAGNOSTIC: ICD-10-PCS | Performed by: STUDENT IN AN ORGANIZED HEALTH CARE EDUCATION/TRAINING PROGRAM

## 2022-01-06 PROCEDURE — 0DBN8ZX EXCISION OF SIGMOID COLON, VIA NATURAL OR ARTIFICIAL OPENING ENDOSCOPIC, DIAGNOSTIC: ICD-10-PCS | Performed by: STUDENT IN AN ORGANIZED HEALTH CARE EDUCATION/TRAINING PROGRAM

## 2022-01-06 PROCEDURE — 0DB78ZX EXCISION OF STOMACH, PYLORUS, VIA NATURAL OR ARTIFICIAL OPENING ENDOSCOPIC, DIAGNOSTIC: ICD-10-PCS | Performed by: STUDENT IN AN ORGANIZED HEALTH CARE EDUCATION/TRAINING PROGRAM

## 2022-01-06 PROCEDURE — 0DBM8ZX EXCISION OF DESCENDING COLON, VIA NATURAL OR ARTIFICIAL OPENING ENDOSCOPIC, DIAGNOSTIC: ICD-10-PCS | Performed by: STUDENT IN AN ORGANIZED HEALTH CARE EDUCATION/TRAINING PROGRAM

## 2022-01-06 RX ORDER — SODIUM CHLORIDE, SODIUM LACTATE, POTASSIUM CHLORIDE, CALCIUM CHLORIDE 600; 310; 30; 20 MG/100ML; MG/100ML; MG/100ML; MG/100ML
INJECTION, SOLUTION INTRAVENOUS CONTINUOUS
Status: DISCONTINUED | OUTPATIENT
Start: 2022-01-06 | End: 2022-01-06

## 2022-01-06 RX ORDER — NALOXONE HYDROCHLORIDE 0.4 MG/ML
80 INJECTION, SOLUTION INTRAMUSCULAR; INTRAVENOUS; SUBCUTANEOUS AS NEEDED
Status: DISCONTINUED | OUTPATIENT
Start: 2022-01-06 | End: 2022-01-06

## 2022-01-06 RX ADMIN — SODIUM CHLORIDE, SODIUM LACTATE, POTASSIUM CHLORIDE, CALCIUM CHLORIDE: 600; 310; 30; 20 INJECTION, SOLUTION INTRAVENOUS at 08:29:00

## 2022-01-06 NOTE — H&P
Gastroenterology H/P  I have personally seen and examined the patient. Patient Name: Milton Javier  CC: Procedures  HPI: Mr Domi Ayala is here for procedures (EGD and colonoscopy). He was last seen in office 11/2021 for iron def anemia while on warfarin.   He urine    • Migraines    • Pleurisy with effusion    • Presence of IVC filter 02/09/2015    cook celect filter.   Had 3 different IVC filters placed previous to current filter   • Pulmonary embolism [facility]) 2011   • Stented coronary artery 2017    aorta / madelaine without obvious depression or anxiety    Labs:   Lab Results   Component Value Date    INR 1.2 01/06/2022     No results for input(s): GLU, BUN, CREATSERUM, GFRAA, GFRNAA, CA, NA, K, CL, CO2 in the last 168 hours.   No results for input(s): RBC, HGB, HCT, M

## 2022-01-06 NOTE — ANESTHESIA PREPROCEDURE EVALUATION
PRE-OP EVALUATION    Patient Name: Weston Quinonez    Admit Diagnosis: Iron deficiency anemia secondary to blood loss (chronic) [D50.0]    Pre-op Diagnosis: Iron deficiency anemia secondary to blood loss (chronic) [D50.0]    ESOPHAGOGASTRODUODENOSCOPY (EGD) C thrombosis (DVT) of proximal vein of both lower extremities (HCC) Acute cystitis with hematuria  Leg DVT (deep venous thromboembolism), acute, bilateral (HCC) Pulmonary embolus (HCC)  Thrombocytopenia (HCC) Normocytic anemia  CKD (chronic kidney disease) s FB  TM distance: 4 - 6 cm  Neck ROM: limited Cardiovascular    Cardiovascular exam normal.         Dental    No notable dental history.          Pulmonary    Pulmonary exam normal.                 Other findings            ASA: 3   Plan: MAC  NPO status omari

## 2022-01-06 NOTE — OPERATIVE REPORT
EGD operative report  Patient Name: Genie Recio  Procedure: Esophagogastroduodenoscopy with biopsy   Indication: iron def anemia, Bull's esophagus  Attending: Justina Eldridge M.D.   Consent:  The risks, benefits, and alternatives were discussed with the obtained from the antrum, body, and fundus, to evaluate for H.pylori. Duodenum: The duodenal bulb, post-bulbar duodenum, and descending duodenum were normal.  Biopsies were obtained from the distal and proximal duodenum to evaluate for Celiac sprue.   Imp

## 2022-01-06 NOTE — ANESTHESIA POSTPROCEDURE EVALUATION
[facility] Patient Status:  Hospital Outpatient Surgery   Age/Gender [de-identified]year old male MRN [id number]   Location [id number] [place] [address] Attending tuMatteo MD   Hosp Day # 0 PCP Macey Jones MD       Anesthesia P

## 2022-01-06 NOTE — PROGRESS NOTES
Patient Instructions:  1. Your upper endoscopy was normal.  There was no obvious cause for your anemia. There was no significant Blul's esophagus changes and certainly no evidence of esophageal cancer.   Biopsies were taken from the [facility] to asses

## 2022-01-06 NOTE — OPERATIVE REPORT
Colon operative report  Patient Name: Aldair Recio  Procedure: Colonoscopy with snare polypectomy  Indication: history of colon polyps  Attending: Renée Jaime M.D. Consent: The risks, benefits, and alternatives were discussed with the patient / POA.   R throughout the colon. 2. 2 mm sessile polyp in the cecum, removed with a cold forceps. 3. Two 4-5 mm sessile polyps in the ascending colon, removed with a cold snare polypectomy (Exacto snare).   4. Two 3-7 mm sessile polyps in the transverse colon, remov

## 2022-01-07 ENCOUNTER — TELEPHONE (OUTPATIENT)
Dept: FAMILY MEDICINE CLINIC | Facility: CLINIC | Age: 82
End: 2022-01-07

## 2022-01-07 ENCOUNTER — HOSPITAL ENCOUNTER (OUTPATIENT)
Dept: GENERAL RADIOLOGY | Age: 82
Discharge: HOME | End: 2022-01-07
Attending: FAMILY MEDICINE
Payer: MEDICARE

## 2022-01-07 DIAGNOSIS — R22.0 MASS OF MANDIBLE: ICD-10-CM

## 2022-01-07 DIAGNOSIS — R22.0 MASS OF MANDIBLE: Primary | ICD-10-CM

## 2022-01-07 PROCEDURE — 70110 X-RAY EXAM OF JAW 4/> VIEWS: CPT | Performed by: FAMILY MEDICINE

## 2022-01-07 NOTE — TELEPHONE ENCOUNTER
Notified the patient of the below response by the provider. Patient verbalized understanding. States that he has not had any fevers, but will monitor temperature. Lump feels solid- \"not fluid filled. \" Scheduled for xray today.  Answered all questions at t

## 2022-01-07 NOTE — TELEPHONE ENCOUNTER
Patient has been experiencing right lower jaw pain since 1/2/2022. Went to the dentist on 1/3/2022 for a check up. Dentist did not find anything wrong. Pain was spontaneous. Pain is constant. Described as sharp. Feels a lump on lower right jaw.  Sergio Bravo

## 2022-01-07 NOTE — TELEPHONE ENCOUNTER
Patient's jaw started to hurt on Monday, he went to dentist on Tuesday but nothing wrong was found. Pt is in a lot of pain now and the jaw is swollen, Pt has been taking Tylenol with no relief. No appts available today, please advise.

## 2022-01-10 ENCOUNTER — OFFICE VISIT (OUTPATIENT)
Dept: FAMILY MEDICINE CLINIC | Facility: CLINIC | Age: 82
End: 2022-01-10
Payer: MEDICARE

## 2022-01-10 VITALS
OXYGEN SATURATION: 96 % | SYSTOLIC BLOOD PRESSURE: 116 MMHG | HEART RATE: 87 BPM | BODY MASS INDEX: 40.43 KG/M2 | RESPIRATION RATE: 21 BRPM | WEIGHT: 315 LBS | TEMPERATURE: 98 F | DIASTOLIC BLOOD PRESSURE: 60 MMHG | HEIGHT: 74 IN

## 2022-01-10 DIAGNOSIS — L03.211 CELLULITIS OF FACE: Primary | ICD-10-CM

## 2022-01-10 PROCEDURE — 99214 OFFICE O/P EST MOD 30 MIN: CPT | Performed by: FAMILY MEDICINE

## 2022-01-10 RX ORDER — CLINDAMYCIN HYDROCHLORIDE 300 MG/1
300 CAPSULE ORAL 3 TIMES DAILY
Qty: 30 CAPSULE | Refills: 0 | Status: SHIPPED | OUTPATIENT
Start: 2022-01-10 | End: 2022-01-20

## 2022-01-10 RX ORDER — HYDROCODONE BITARTRATE AND ACETAMINOPHEN 5; 325 MG/1; MG/1
1 TABLET ORAL EVERY 6 HOURS PRN
Qty: 30 TABLET | Refills: 0 | Status: SHIPPED | OUTPATIENT
Start: 2022-01-10

## 2022-01-10 NOTE — PATIENT INSTRUCTIONS
Cellulitis  Cellulitis is an infection of the deep layers of skin. A break in the skin, such as a cut or scratch, can let bacteria under the skin. If the bacteria get to deep layers of the skin, it can be serious.  If not treated, cellulitis can get int days on antibiotics  Emiliano last reviewed this educational content on 8/1/2019    © 8713-6745 [employer]. All rights reserved. This information is not intended as a substitute for professional medical care.  Always follow your healthcare pr

## 2022-01-10 NOTE — PROGRESS NOTES
Subjective:   Ana Cerna is a [de-identified]year old male who presents for Lump (x1 week, chin, pt is complaining of pain, difficulty sleeping and eating )     Lump - right jaw area   States he first noticed 1/2/22   He woke up and had pain; noticed some mild swell 108 tablet 5   • Vitamin D, Cholecalciferol, 25 MCG (1000 UT) Oral Cap Take by mouth.      • mometasone 0.1 % External Ointment ABIMAEL 1 APPLICATION TO SKIN ON HANDS BID     • acetaminophen 500 MG Oral Tab Take 1 tablet (500 mg total) by mouth every 6 (six) ho exudate. Cardiovascular:      Rate and Rhythm: Normal rate and regular rhythm. Pulmonary:      Effort: Pulmonary effort is normal.      Breath sounds: Normal breath sounds. Musculoskeletal:      Cervical back: Decreased range of motion.    Lymphadenop

## 2022-01-12 ENCOUNTER — ANTI-COAG VISIT (OUTPATIENT)
Dept: HEMATOLOGY/ONCOLOGY | Age: 82
End: 2022-01-12
Attending: INTERNAL MEDICINE
Payer: MEDICARE

## 2022-01-12 DIAGNOSIS — I82.4Y3 ACUTE DEEP VEIN THROMBOSIS (DVT) OF PROXIMAL VEIN OF BOTH LOWER EXTREMITIES (HCC): ICD-10-CM

## 2022-01-12 LAB — INR: 1.8 (ref 0.8–1.3)

## 2022-01-12 PROCEDURE — 36416 COLLJ CAPILLARY BLOOD SPEC: CPT

## 2022-01-12 PROCEDURE — 85610 PROTHROMBIN TIME: CPT

## 2022-01-12 NOTE — PROGRESS NOTES
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     Following for: again encouraged him to drink > 40-60 oz water per day for UTI prevention. UA is neg and PVR is zero. Was 305 mL but had him void again and was 178 mL (was 38, 130 mL prior visits). PSA 3.23 8/6/20    CT A/P with IVC 10/20/21: decreased size of LN. and reviewed SEs. He will call back if he'd like to try. - Also discussed kegels again and PFT. He does not seem interested in PFT right now but requests referral in case he changes his mind.   - For bladder lesions on prior cysto he would like to check ag surgery pulm emb-IVC filter Overview:  3/11 after back surgery pulm emb-IVC filter   • Esophageal reflux    • Frequent urination    • Hearing impairment     bilateral hearing aids   • Hearing loss     Wear hearing aids   • Heartburn 1998   • High cholester Social drinker    Drug use: No       Medications (Active prior to today's visit):  Current Outpatient Medications   Medication Sig Dispense Refill   • Mirabegron ER (MYRBETRIQ) 50 MG Oral Tablet 24 Hr Take 50 mg by mouth daily.  90 tablet 5   • finasteride Reported on 1/19/2022) 200 mL 0   • mometasone 0.1 % External Ointment ABIMAEL 1 APPLICATION TO SKIN ON HANDS BID (Patient not taking: Reported on 1/19/2022)         Allergies:  No Known Allergies      ROS:     A comprehensive 10 point review of systems was co

## 2022-01-19 ENCOUNTER — APPOINTMENT (OUTPATIENT)
Dept: HEMATOLOGY/ONCOLOGY | Age: 82
End: 2022-01-19
Attending: INTERNAL MEDICINE
Payer: MEDICARE

## 2022-01-19 ENCOUNTER — OFFICE VISIT (OUTPATIENT)
Dept: SURGERY | Facility: CLINIC | Age: 82
End: 2022-01-19
Payer: MEDICARE

## 2022-01-19 DIAGNOSIS — N13.8 BPH WITH OBSTRUCTION/LOWER URINARY TRACT SYMPTOMS: Primary | ICD-10-CM

## 2022-01-19 DIAGNOSIS — R59.1 LYMPHADENOPATHY: ICD-10-CM

## 2022-01-19 DIAGNOSIS — N39.41 URGE INCONTINENCE: ICD-10-CM

## 2022-01-19 DIAGNOSIS — N39.0 RECURRENT UTI: ICD-10-CM

## 2022-01-19 DIAGNOSIS — R33.9 URINARY RETENTION: ICD-10-CM

## 2022-01-19 DIAGNOSIS — N40.1 BPH WITH OBSTRUCTION/LOWER URINARY TRACT SYMPTOMS: Primary | ICD-10-CM

## 2022-01-19 LAB
APPEARANCE: CLEAR
BILIRUBIN: NEGATIVE
GLUCOSE (URINE DIPSTICK): NEGATIVE MG/DL
KETONES (URINE DIPSTICK): NEGATIVE MG/DL
LEUKOCYTES: NEGATIVE
MULTISTIX LOT#: ABNORMAL NUMERIC
NITRITE, URINE: NEGATIVE
PH, URINE: 5.5 (ref 4.5–8)
SPECIFIC GRAVITY: 1.02 (ref 1–1.03)
URINE-COLOR: YELLOW
UROBILINOGEN,SEMI-QN: 0.2 MG/DL (ref 0–1.9)

## 2022-01-19 PROCEDURE — 51798 US URINE CAPACITY MEASURE: CPT | Performed by: UROLOGY

## 2022-01-19 PROCEDURE — 81002 URINALYSIS NONAUTO W/O SCOPE: CPT | Performed by: UROLOGY

## 2022-01-19 PROCEDURE — 99214 OFFICE O/P EST MOD 30 MIN: CPT | Performed by: UROLOGY

## 2022-01-19 RX ORDER — MIRABEGRON 50 MG/1
50 TABLET, FILM COATED, EXTENDED RELEASE ORAL DAILY
Qty: 90 TABLET | Refills: 5 | Status: SHIPPED | OUTPATIENT
Start: 2022-01-19

## 2022-01-19 RX ORDER — FINASTERIDE 5 MG/1
5 TABLET, FILM COATED ORAL DAILY
Qty: 90 TABLET | Refills: 3 | Status: SHIPPED | OUTPATIENT
Start: 2022-01-19

## 2022-01-19 RX ORDER — SILODOSIN 8 MG/1
8 CAPSULE ORAL DAILY
Qty: 90 CAPSULE | Refills: 5 | Status: SHIPPED | OUTPATIENT
Start: 2022-01-19

## 2022-01-19 NOTE — PROGRESS NOTES
Rivas Gaines,    Please call pt and review the followin) All colon polyps are benign, but considered potentially pre-cancerous. We should tentatively plan on a repeat exam in 1 year, if his health remains the same by that time.   2) EGD biopsies show reflux

## 2022-01-24 ENCOUNTER — ANTI-COAG VISIT (OUTPATIENT)
Dept: HEMATOLOGY/ONCOLOGY | Age: 82
End: 2022-01-24
Attending: INTERNAL MEDICINE
Payer: MEDICARE

## 2022-01-24 DIAGNOSIS — I82.4Y3 ACUTE DEEP VEIN THROMBOSIS (DVT) OF PROXIMAL VEIN OF BOTH LOWER EXTREMITIES (HCC): ICD-10-CM

## 2022-01-24 LAB — INR: 4.3 (ref 0.8–1.3)

## 2022-01-24 PROCEDURE — 36416 COLLJ CAPILLARY BLOOD SPEC: CPT

## 2022-01-24 PROCEDURE — 85610 PROTHROMBIN TIME: CPT

## 2022-01-28 ENCOUNTER — ANTI-COAG VISIT (OUTPATIENT)
Dept: HEMATOLOGY/ONCOLOGY | Age: 82
End: 2022-01-28
Attending: INTERNAL MEDICINE
Payer: MEDICARE

## 2022-01-28 DIAGNOSIS — I82.4Y3 ACUTE DEEP VEIN THROMBOSIS (DVT) OF PROXIMAL VEIN OF BOTH LOWER EXTREMITIES (HCC): ICD-10-CM

## 2022-01-28 LAB — INR: 2.9 (ref 0.8–1.3)

## 2022-01-28 PROCEDURE — 36416 COLLJ CAPILLARY BLOOD SPEC: CPT

## 2022-01-28 PROCEDURE — 85610 PROTHROMBIN TIME: CPT

## 2022-02-04 ENCOUNTER — ANTI-COAG VISIT (OUTPATIENT)
Dept: HEMATOLOGY/ONCOLOGY | Age: 82
End: 2022-02-04
Attending: INTERNAL MEDICINE
Payer: MEDICARE

## 2022-02-04 DIAGNOSIS — I82.4Y3 ACUTE DEEP VEIN THROMBOSIS (DVT) OF PROXIMAL VEIN OF BOTH LOWER EXTREMITIES (HCC): ICD-10-CM

## 2022-02-04 LAB — INR: 3.3 (ref 0.8–1.3)

## 2022-02-04 PROCEDURE — 85610 PROTHROMBIN TIME: CPT

## 2022-02-04 PROCEDURE — 36416 COLLJ CAPILLARY BLOOD SPEC: CPT

## 2022-02-09 ENCOUNTER — OFFICE VISIT (OUTPATIENT)
Dept: FAMILY MEDICINE CLINIC | Facility: CLINIC | Age: 82
End: 2022-02-09
Payer: MEDICARE

## 2022-02-09 VITALS
DIASTOLIC BLOOD PRESSURE: 70 MMHG | RESPIRATION RATE: 17 BRPM | WEIGHT: 315 LBS | HEART RATE: 64 BPM | SYSTOLIC BLOOD PRESSURE: 116 MMHG | OXYGEN SATURATION: 98 % | TEMPERATURE: 98 F | BODY MASS INDEX: 40.43 KG/M2 | HEIGHT: 74 IN

## 2022-02-09 DIAGNOSIS — E78.2 HYPERLIPEMIA, MIXED: ICD-10-CM

## 2022-02-09 DIAGNOSIS — K21.00 GASTROESOPHAGEAL REFLUX DISEASE WITH ESOPHAGITIS, UNSPECIFIED WHETHER HEMORRHAGE: ICD-10-CM

## 2022-02-09 DIAGNOSIS — I48.0 PAROXYSMAL ATRIAL FIBRILLATION (HCC): Primary | ICD-10-CM

## 2022-02-09 DIAGNOSIS — R73.9 HYPERGLYCEMIA: ICD-10-CM

## 2022-02-09 PROCEDURE — 99214 OFFICE O/P EST MOD 30 MIN: CPT | Performed by: FAMILY MEDICINE

## 2022-02-09 RX ORDER — ROSUVASTATIN CALCIUM 10 MG/1
10 TABLET, COATED ORAL NIGHTLY
Qty: 90 TABLET | Refills: 3 | Status: SHIPPED | OUTPATIENT
Start: 2022-02-09

## 2022-02-09 RX ORDER — POLYETHYLENE GLYCOL-3350 AND ELECTROLYTES WITH FLAVOR PACK 240; 5.84; 2.98; 6.72; 22.72 G/278.26G; G/278.26G; G/278.26G; G/278.26G; G/278.26G
4000 POWDER, FOR SOLUTION ORAL AS DIRECTED
COMMUNITY
Start: 2022-01-24

## 2022-02-11 ENCOUNTER — ANTI-COAG VISIT (OUTPATIENT)
Dept: HEMATOLOGY/ONCOLOGY | Age: 82
End: 2022-02-11
Attending: INTERNAL MEDICINE
Payer: MEDICARE

## 2022-02-11 DIAGNOSIS — I82.4Y3 ACUTE DEEP VEIN THROMBOSIS (DVT) OF PROXIMAL VEIN OF BOTH LOWER EXTREMITIES (HCC): ICD-10-CM

## 2022-02-11 LAB — INR: 2.2 (ref 0.8–1.3)

## 2022-02-11 PROCEDURE — 85610 PROTHROMBIN TIME: CPT

## 2022-02-11 PROCEDURE — 36416 COLLJ CAPILLARY BLOOD SPEC: CPT

## 2022-02-18 ENCOUNTER — ANTI-COAG VISIT (OUTPATIENT)
Dept: HEMATOLOGY/ONCOLOGY | Age: 82
End: 2022-02-18
Attending: INTERNAL MEDICINE
Payer: MEDICARE

## 2022-02-18 DIAGNOSIS — I82.4Y3 ACUTE DEEP VEIN THROMBOSIS (DVT) OF PROXIMAL VEIN OF BOTH LOWER EXTREMITIES (HCC): ICD-10-CM

## 2022-02-18 LAB — INR: 2.6 (ref 0.8–1.3)

## 2022-02-18 PROCEDURE — 36416 COLLJ CAPILLARY BLOOD SPEC: CPT

## 2022-02-18 PROCEDURE — 85610 PROTHROMBIN TIME: CPT

## 2022-03-04 ENCOUNTER — ANTI-COAG VISIT (OUTPATIENT)
Dept: HEMATOLOGY/ONCOLOGY | Age: 82
End: 2022-03-04
Attending: INTERNAL MEDICINE
Payer: MEDICARE

## 2022-03-04 DIAGNOSIS — I82.4Y3 ACUTE DEEP VEIN THROMBOSIS (DVT) OF PROXIMAL VEIN OF BOTH LOWER EXTREMITIES (HCC): ICD-10-CM

## 2022-03-04 LAB — INR: 2.6 (ref 0.8–1.3)

## 2022-03-04 PROCEDURE — 36416 COLLJ CAPILLARY BLOOD SPEC: CPT

## 2022-03-04 PROCEDURE — 85610 PROTHROMBIN TIME: CPT

## 2022-03-31 ENCOUNTER — OFFICE VISIT (OUTPATIENT)
Dept: FAMILY MEDICINE CLINIC | Facility: CLINIC | Age: 82
End: 2022-03-31
Payer: MEDICARE

## 2022-03-31 VITALS
SYSTOLIC BLOOD PRESSURE: 116 MMHG | WEIGHT: 315 LBS | RESPIRATION RATE: 18 BRPM | HEART RATE: 64 BPM | HEIGHT: 74 IN | BODY MASS INDEX: 40.43 KG/M2 | DIASTOLIC BLOOD PRESSURE: 68 MMHG | OXYGEN SATURATION: 97 %

## 2022-03-31 DIAGNOSIS — B35.1 ONYCHOMYCOSIS: Primary | ICD-10-CM

## 2022-03-31 PROCEDURE — 99214 OFFICE O/P EST MOD 30 MIN: CPT | Performed by: FAMILY MEDICINE

## 2022-03-31 RX ORDER — TERBINAFINE HYDROCHLORIDE 250 MG/1
250 TABLET ORAL DAILY
Qty: 90 TABLET | Refills: 1 | Status: SHIPPED | OUTPATIENT
Start: 2022-03-31

## 2022-04-01 ENCOUNTER — ANTI-COAG VISIT (OUTPATIENT)
Dept: HEMATOLOGY/ONCOLOGY | Age: 82
End: 2022-04-01
Attending: INTERNAL MEDICINE
Payer: MEDICARE

## 2022-04-01 DIAGNOSIS — I82.4Y3 ACUTE DEEP VEIN THROMBOSIS (DVT) OF PROXIMAL VEIN OF BOTH LOWER EXTREMITIES (HCC): ICD-10-CM

## 2022-04-01 LAB — INR: 2.1 (ref 0.8–1.3)

## 2022-04-01 PROCEDURE — 85610 PROTHROMBIN TIME: CPT

## 2022-04-01 PROCEDURE — 36416 COLLJ CAPILLARY BLOOD SPEC: CPT

## 2022-04-01 RX ORDER — WARFARIN SODIUM 5 MG/1
5 TABLET ORAL AS DIRECTED
Qty: 90 TABLET | Refills: 5 | Status: SHIPPED | OUTPATIENT
Start: 2022-04-01

## 2022-04-05 ENCOUNTER — TELEPHONE (OUTPATIENT)
Dept: HEMATOLOGY/ONCOLOGY | Age: 82
End: 2022-04-05

## 2022-04-05 NOTE — TELEPHONE ENCOUNTER
[facility] called regarding the Warfin prescription. Please call to clarify. Reference#[id number].   Thank you

## 2022-04-05 NOTE — TELEPHONE ENCOUNTER
Patient called stating [facility] needs doctor to call with authorization for [employer]

## 2022-04-05 NOTE — TELEPHONE ENCOUNTER
20 min on the phone clarification given to [facility]. 90 tablets for a 90 day supply with 5 refills. RX read back and they will process the RX.

## 2022-04-07 ENCOUNTER — LAB ENCOUNTER (OUTPATIENT)
Dept: LAB | Age: 82
End: 2022-04-07
Attending: STUDENT IN AN ORGANIZED HEALTH CARE EDUCATION/TRAINING PROGRAM
Payer: MEDICARE

## 2022-04-07 ENCOUNTER — LAB ENCOUNTER (OUTPATIENT)
Dept: LAB | Age: 82
End: 2022-04-07
Attending: FAMILY MEDICINE
Payer: MEDICARE

## 2022-04-07 DIAGNOSIS — E78.2 HYPERLIPEMIA, MIXED: ICD-10-CM

## 2022-04-07 DIAGNOSIS — R73.9 HYPERGLYCEMIA: ICD-10-CM

## 2022-04-07 DIAGNOSIS — D50.0 IRON DEFICIENCY ANEMIA DUE TO CHRONIC BLOOD LOSS: ICD-10-CM

## 2022-04-07 DIAGNOSIS — D50.0 IRON DEFICIENCY ANEMIA SECONDARY TO BLOOD LOSS (CHRONIC): ICD-10-CM

## 2022-04-07 DIAGNOSIS — I48.0 PAROXYSMAL ATRIAL FIBRILLATION (HCC): ICD-10-CM

## 2022-04-07 DIAGNOSIS — Z86.711 HISTORY OF PULMONARY EMBOLISM: ICD-10-CM

## 2022-04-07 DIAGNOSIS — K21.00 GASTROESOPHAGEAL REFLUX DISEASE WITH ESOPHAGITIS, UNSPECIFIED WHETHER HEMORRHAGE: ICD-10-CM

## 2022-04-07 LAB
ALBUMIN SERPL-MCNC: 3.4 G/DL (ref 3.4–5)
ALBUMIN/GLOB SERPL: 1.1 {RATIO} (ref 1–2)
ALP LIVER SERPL-CCNC: 89 U/L
ALT SERPL-CCNC: 19 U/L
ANION GAP SERPL CALC-SCNC: 3 MMOL/L (ref 0–18)
AST SERPL-CCNC: 14 U/L (ref 15–37)
BASOPHILS # BLD AUTO: 0.05 X10(3) UL (ref 0–0.2)
BASOPHILS NFR BLD AUTO: 0.9 %
BILIRUB SERPL-MCNC: 0.5 MG/DL (ref 0.1–2)
BUN BLD-MCNC: 15 MG/DL (ref 7–18)
CALCIUM BLD-MCNC: 9.6 MG/DL (ref 8.5–10.1)
CHLORIDE SERPL-SCNC: 110 MMOL/L (ref 98–112)
CHOLEST SERPL-MCNC: 131 MG/DL (ref ?–200)
CO2 SERPL-SCNC: 31 MMOL/L (ref 21–32)
CREAT BLD-MCNC: 1.05 MG/DL
DEPRECATED HBV CORE AB SER IA-ACNC: 115.9 NG/ML
EOSINOPHIL # BLD AUTO: 0.14 X10(3) UL (ref 0–0.7)
EOSINOPHIL NFR BLD AUTO: 2.4 %
ERYTHROCYTE [DISTWIDTH] IN BLOOD BY AUTOMATED COUNT: 13.2 %
EST. AVERAGE GLUCOSE BLD GHB EST-MCNC: 117 MG/DL (ref 68–126)
FASTING PATIENT LIPID ANSWER: YES
FASTING STATUS PATIENT QL REPORTED: YES
GLOBULIN PLAS-MCNC: 3.1 G/DL (ref 2.8–4.4)
GLUCOSE BLD-MCNC: 107 MG/DL (ref 70–99)
HBA1C MFR BLD: 5.7 % (ref ?–5.7)
HCT VFR BLD AUTO: 50.5 %
HDLC SERPL-MCNC: 35 MG/DL (ref 40–59)
HGB BLD-MCNC: 15.2 G/DL
IMM GRANULOCYTES # BLD AUTO: 0.01 X10(3) UL (ref 0–1)
IMM GRANULOCYTES NFR BLD: 0.2 %
INR BLD: 2.03 (ref 0.8–1.2)
IRON SATN MFR SERPL: 26 %
IRON SERPL-MCNC: 66 UG/DL
LDLC SERPL CALC-MCNC: 63 MG/DL (ref ?–100)
LYMPHOCYTES # BLD AUTO: 0.84 X10(3) UL (ref 1–4)
LYMPHOCYTES NFR BLD AUTO: 14.4 %
MCH RBC QN AUTO: 29.7 PG (ref 26–34)
MCHC RBC AUTO-ENTMCNC: 30.1 G/DL (ref 31–37)
MCV RBC AUTO: 98.8 FL
MONOCYTES # BLD AUTO: 0.55 X10(3) UL (ref 0.1–1)
MONOCYTES NFR BLD AUTO: 9.4 %
NEUTROPHILS # BLD AUTO: 4.25 X10 (3) UL (ref 1.5–7.7)
NEUTROPHILS # BLD AUTO: 4.25 X10(3) UL (ref 1.5–7.7)
NEUTROPHILS NFR BLD AUTO: 72.7 %
NONHDLC SERPL-MCNC: 96 MG/DL (ref ?–130)
OSMOLALITY SERPL CALC.SUM OF ELEC: 299 MOSM/KG (ref 275–295)
PLATELET # BLD AUTO: 195 10(3)UL (ref 150–450)
POTASSIUM SERPL-SCNC: 4.8 MMOL/L (ref 3.5–5.1)
PROT SERPL-MCNC: 6.5 G/DL (ref 6.4–8.2)
PROTHROMBIN TIME: 23.1 SECONDS (ref 11.6–14.8)
RBC # BLD AUTO: 5.11 X10(6)UL
SODIUM SERPL-SCNC: 144 MMOL/L (ref 136–145)
TIBC SERPL-MCNC: 253 UG/DL (ref 240–450)
TRANSFERRIN SERPL-MCNC: 170 MG/DL (ref 200–360)
TRIGL SERPL-MCNC: 197 MG/DL (ref 30–149)
TSI SER-ACNC: 2.14 MIU/ML (ref 0.36–3.74)
VLDLC SERPL CALC-MCNC: 29 MG/DL (ref 0–30)
WBC # BLD AUTO: 5.8 X10(3) UL (ref 4–11)

## 2022-04-07 PROCEDURE — 83540 ASSAY OF IRON: CPT

## 2022-04-07 PROCEDURE — 85610 PROTHROMBIN TIME: CPT

## 2022-04-07 PROCEDURE — 80053 COMPREHEN METABOLIC PANEL: CPT

## 2022-04-07 PROCEDURE — 83550 IRON BINDING TEST: CPT

## 2022-04-07 PROCEDURE — 82728 ASSAY OF FERRITIN: CPT

## 2022-04-07 PROCEDURE — 83036 HEMOGLOBIN GLYCOSYLATED A1C: CPT

## 2022-04-07 PROCEDURE — 84443 ASSAY THYROID STIM HORMONE: CPT

## 2022-04-07 PROCEDURE — 36415 COLL VENOUS BLD VENIPUNCTURE: CPT

## 2022-04-07 PROCEDURE — 80061 LIPID PANEL: CPT

## 2022-04-07 PROCEDURE — 85025 COMPLETE CBC W/AUTO DIFF WBC: CPT

## 2022-04-08 ENCOUNTER — TELEPHONE (OUTPATIENT)
Dept: FAMILY MEDICINE CLINIC | Facility: CLINIC | Age: 82
End: 2022-04-08

## 2022-04-08 NOTE — TELEPHONE ENCOUNTER
I refilled. We should probably make an appointment to talk as it has been a bit since I last saw him. He had bladder lesions on cystoscopy and we talked about cysto with biopsy of those lesions last visit.  Also talked about possibly doing TURP at same time 5

## 2022-04-08 NOTE — PROGRESS NOTES
Blood work looks great. Will discuss further at your upcoming appt.   Please call with any questions,    Juan Keys MD

## 2022-04-08 NOTE — TELEPHONE ENCOUNTER
----- Message from Brian Prince MD sent at 4/8/2022 10:26 AM CDT -----  Stable LFTs, continue terbinafine for fungal foot infection.

## 2022-04-26 ENCOUNTER — ANTI-COAG VISIT (OUTPATIENT)
Dept: HEMATOLOGY/ONCOLOGY | Facility: HOSPITAL | Age: 82
End: 2022-04-26

## 2022-04-26 ENCOUNTER — APPOINTMENT (OUTPATIENT)
Dept: HEMATOLOGY/ONCOLOGY | Age: 82
End: 2022-04-26
Attending: INTERNAL MEDICINE
Payer: MEDICARE

## 2022-04-26 ENCOUNTER — OFFICE VISIT (OUTPATIENT)
Dept: HEMATOLOGY/ONCOLOGY | Age: 82
End: 2022-04-26
Attending: INTERNAL MEDICINE
Payer: MEDICARE

## 2022-04-26 VITALS
OXYGEN SATURATION: 96 % | DIASTOLIC BLOOD PRESSURE: 71 MMHG | WEIGHT: 315 LBS | RESPIRATION RATE: 20 BRPM | HEART RATE: 56 BPM | SYSTOLIC BLOOD PRESSURE: 110 MMHG | BODY MASS INDEX: 41 KG/M2

## 2022-04-26 DIAGNOSIS — D50.0 IRON DEFICIENCY ANEMIA SECONDARY TO BLOOD LOSS (CHRONIC): ICD-10-CM

## 2022-04-26 DIAGNOSIS — K55.20 AVM (ARTERIOVENOUS MALFORMATION) OF SMALL BOWEL, ACQUIRED: ICD-10-CM

## 2022-04-26 DIAGNOSIS — I26.99 OTHER ACUTE PULMONARY EMBOLISM WITHOUT ACUTE COR PULMONALE (HCC): ICD-10-CM

## 2022-04-26 DIAGNOSIS — I82.4Y3 ACUTE DEEP VEIN THROMBOSIS (DVT) OF PROXIMAL VEIN OF BOTH LOWER EXTREMITIES (HCC): ICD-10-CM

## 2022-04-26 DIAGNOSIS — D47.2 MGUS (MONOCLONAL GAMMOPATHY OF UNKNOWN SIGNIFICANCE): ICD-10-CM

## 2022-04-26 DIAGNOSIS — D64.9 NORMOCYTIC ANEMIA: ICD-10-CM

## 2022-04-26 DIAGNOSIS — K22.70 BARRETT'S ESOPHAGUS WITHOUT DYSPLASIA: ICD-10-CM

## 2022-04-26 DIAGNOSIS — I82.409 RECURRENT DEEP VEIN THROMBOSIS (DVT) (HCC): Primary | ICD-10-CM

## 2022-04-26 LAB — INR: 1.9 (ref 0.8–1.3)

## 2022-04-26 PROCEDURE — 99214 OFFICE O/P EST MOD 30 MIN: CPT | Performed by: INTERNAL MEDICINE

## 2022-04-26 RX ORDER — FUROSEMIDE 20 MG/1
20 TABLET ORAL DAILY
COMMUNITY

## 2022-04-28 PROBLEM — K55.20 AVM (ARTERIOVENOUS MALFORMATION) OF SMALL BOWEL, ACQUIRED: Status: ACTIVE | Noted: 2022-04-28

## 2022-04-29 ENCOUNTER — APPOINTMENT (OUTPATIENT)
Dept: HEMATOLOGY/ONCOLOGY | Age: 82
End: 2022-04-29
Attending: INTERNAL MEDICINE
Payer: MEDICARE

## 2022-05-04 ENCOUNTER — OFFICE VISIT (OUTPATIENT)
Dept: FAMILY MEDICINE CLINIC | Facility: CLINIC | Age: 82
End: 2022-05-04
Payer: MEDICARE

## 2022-05-04 ENCOUNTER — PATIENT MESSAGE (OUTPATIENT)
Dept: FAMILY MEDICINE CLINIC | Facility: CLINIC | Age: 82
End: 2022-05-04

## 2022-05-04 VITALS
OXYGEN SATURATION: 98 % | HEART RATE: 65 BPM | RESPIRATION RATE: 19 BRPM | TEMPERATURE: 98 F | BODY MASS INDEX: 40.43 KG/M2 | DIASTOLIC BLOOD PRESSURE: 70 MMHG | WEIGHT: 315 LBS | SYSTOLIC BLOOD PRESSURE: 108 MMHG | HEIGHT: 74 IN

## 2022-05-04 DIAGNOSIS — E66.01 MORBID OBESITY WITH BODY MASS INDEX OF 40.0-44.9 IN ADULT (HCC): ICD-10-CM

## 2022-05-04 DIAGNOSIS — R73.9 HYPERGLYCEMIA: Primary | ICD-10-CM

## 2022-05-04 DIAGNOSIS — E78.2 HYPERLIPEMIA, MIXED: ICD-10-CM

## 2022-05-04 DIAGNOSIS — M86.68 OTHER CHRONIC OSTEOMYELITIS, OTHER SITE (HCC): ICD-10-CM

## 2022-05-04 DIAGNOSIS — N18.30 STAGE 3 CHRONIC KIDNEY DISEASE, UNSPECIFIED WHETHER STAGE 3A OR 3B CKD (HCC): ICD-10-CM

## 2022-05-04 PROCEDURE — 99214 OFFICE O/P EST MOD 30 MIN: CPT | Performed by: FAMILY MEDICINE

## 2022-05-04 NOTE — TELEPHONE ENCOUNTER
From: Jossie Recio  To: Krista Mckeon MD  Sent: 5/4/2022 1:03 PM CDT  Subject: Next visit    Dr. Tony Souza - you indicated that you wanted to see me in three (3) months. I made an appointment for August; however, my wife would like to know why you want to see me in three (3) months and not six (6) like my other doctors? Thanki you!

## 2022-05-14 ENCOUNTER — MED REC SCAN ONLY (OUTPATIENT)
Dept: FAMILY MEDICINE CLINIC | Facility: CLINIC | Age: 82
End: 2022-05-14

## 2022-05-15 PROBLEM — E66.01 MORBID (SEVERE) OBESITY DUE TO EXCESS CALORIES (HCC): Status: ACTIVE | Noted: 2022-05-15

## 2022-05-15 PROBLEM — I83.019 VENOUS ULCER OF RIGHT LEG (HCC): Status: ACTIVE | Noted: 2022-05-15

## 2022-05-15 PROBLEM — L97.919 VENOUS ULCER OF RIGHT LEG (HCC): Status: ACTIVE | Noted: 2022-05-15

## 2022-05-24 ENCOUNTER — ANTI-COAG VISIT (OUTPATIENT)
Dept: HEMATOLOGY/ONCOLOGY | Age: 82
End: 2022-05-24
Attending: INTERNAL MEDICINE
Payer: MEDICARE

## 2022-05-24 DIAGNOSIS — I82.4Y3 ACUTE DEEP VEIN THROMBOSIS (DVT) OF PROXIMAL VEIN OF BOTH LOWER EXTREMITIES (HCC): ICD-10-CM

## 2022-05-24 LAB — INR: 1.7 (ref 0.8–1.3)

## 2022-05-24 PROCEDURE — 36416 COLLJ CAPILLARY BLOOD SPEC: CPT

## 2022-05-24 PROCEDURE — 85610 PROTHROMBIN TIME: CPT

## 2022-06-03 ENCOUNTER — ANTI-COAG VISIT (OUTPATIENT)
Dept: HEMATOLOGY/ONCOLOGY | Age: 82
End: 2022-06-03
Attending: INTERNAL MEDICINE
Payer: MEDICARE

## 2022-06-03 DIAGNOSIS — I82.4Y3 ACUTE DEEP VEIN THROMBOSIS (DVT) OF PROXIMAL VEIN OF BOTH LOWER EXTREMITIES (HCC): ICD-10-CM

## 2022-06-03 LAB — INR: 2.3 (ref 0.8–1.3)

## 2022-06-03 PROCEDURE — 36416 COLLJ CAPILLARY BLOOD SPEC: CPT

## 2022-06-03 PROCEDURE — 85610 PROTHROMBIN TIME: CPT

## 2022-06-10 ENCOUNTER — ANTI-COAG VISIT (OUTPATIENT)
Dept: HEMATOLOGY/ONCOLOGY | Age: 82
End: 2022-06-10
Attending: INTERNAL MEDICINE
Payer: MEDICARE

## 2022-06-10 DIAGNOSIS — I82.4Y3 ACUTE DEEP VEIN THROMBOSIS (DVT) OF PROXIMAL VEIN OF BOTH LOWER EXTREMITIES (HCC): ICD-10-CM

## 2022-06-10 LAB — INR: 2.4 (ref 0.8–1.3)

## 2022-06-10 PROCEDURE — 85610 PROTHROMBIN TIME: CPT

## 2022-06-10 PROCEDURE — 36416 COLLJ CAPILLARY BLOOD SPEC: CPT

## 2022-06-24 ENCOUNTER — ANTI-COAG VISIT (OUTPATIENT)
Dept: HEMATOLOGY/ONCOLOGY | Age: 82
End: 2022-06-24
Attending: INTERNAL MEDICINE
Payer: MEDICARE

## 2022-06-24 DIAGNOSIS — I82.409 RECURRENT DEEP VEIN THROMBOSIS (DVT) (HCC): ICD-10-CM

## 2022-06-24 DIAGNOSIS — B35.1 ONYCHOMYCOSIS: ICD-10-CM

## 2022-06-24 DIAGNOSIS — I82.4Y3 ACUTE DEEP VEIN THROMBOSIS (DVT) OF PROXIMAL VEIN OF BOTH LOWER EXTREMITIES (HCC): ICD-10-CM

## 2022-06-24 DIAGNOSIS — D64.9 NORMOCYTIC ANEMIA: ICD-10-CM

## 2022-06-24 LAB
ALBUMIN SERPL-MCNC: 3.5 G/DL (ref 3.4–5)
ALP LIVER SERPL-CCNC: 90 U/L
ALT SERPL-CCNC: 18 U/L
AST SERPL-CCNC: 15 U/L (ref 15–37)
BILIRUB DIRECT SERPL-MCNC: 0.1 MG/DL (ref 0–0.2)
BILIRUB SERPL-MCNC: 0.5 MG/DL (ref 0.1–2)
INR BLD: 2 (ref 0.8–1.2)
PROT SERPL-MCNC: 7.1 G/DL (ref 6.4–8.2)
PROTHROMBIN TIME: 22.5 SECONDS (ref 11.6–14.8)

## 2022-06-24 PROCEDURE — 85610 PROTHROMBIN TIME: CPT

## 2022-06-24 PROCEDURE — 80076 HEPATIC FUNCTION PANEL: CPT

## 2022-06-24 PROCEDURE — 36415 COLL VENOUS BLD VENIPUNCTURE: CPT

## 2022-07-12 ENCOUNTER — PATIENT MESSAGE (OUTPATIENT)
Dept: FAMILY MEDICINE CLINIC | Facility: CLINIC | Age: 82
End: 2022-07-12

## 2022-07-12 NOTE — TELEPHONE ENCOUNTER
From: Aldair Recio  To: Robe Silva MD  Sent: 7/12/2022 2:20 PM CDT  Subject: Renewal of Handicap Placard    Sorry to bother you but I need to renewal my DL and my Handicap Placard. Do I need a not from you stating that I do indeed need the placard? If so could you mail me a signed note? ? Thank you!

## 2022-07-13 NOTE — TELEPHONE ENCOUNTER
Called and lvm stated parking placard is ready for  and placed at the . Copy sent to scanned, additional copy placed in back office nathaly.

## 2022-07-15 ENCOUNTER — MED REC SCAN ONLY (OUTPATIENT)
Dept: FAMILY MEDICINE CLINIC | Facility: CLINIC | Age: 82
End: 2022-07-15

## 2022-07-20 ENCOUNTER — OFFICE VISIT (OUTPATIENT)
Dept: SURGERY | Facility: CLINIC | Age: 82
End: 2022-07-20
Payer: MEDICARE

## 2022-07-20 DIAGNOSIS — Z12.5 SCREENING PSA (PROSTATE SPECIFIC ANTIGEN): ICD-10-CM

## 2022-07-20 DIAGNOSIS — N39.0 RECURRENT UTI: ICD-10-CM

## 2022-07-20 DIAGNOSIS — N13.8 BPH WITH OBSTRUCTION/LOWER URINARY TRACT SYMPTOMS: ICD-10-CM

## 2022-07-20 DIAGNOSIS — R82.90 URINE FINDING: Primary | ICD-10-CM

## 2022-07-20 DIAGNOSIS — N39.41 URGE INCONTINENCE: ICD-10-CM

## 2022-07-20 DIAGNOSIS — N40.1 BPH WITH OBSTRUCTION/LOWER URINARY TRACT SYMPTOMS: ICD-10-CM

## 2022-07-20 LAB
APPEARANCE: CLEAR
BILIRUBIN: NEGATIVE
GLUCOSE (URINE DIPSTICK): NEGATIVE MG/DL
KETONES (URINE DIPSTICK): NEGATIVE MG/DL
LEUKOCYTES: NEGATIVE
MULTISTIX LOT#: ABNORMAL NUMERIC
NITRITE, URINE: NEGATIVE
PH, URINE: 6 (ref 4.5–8)
PROTEIN (URINE DIPSTICK): NEGATIVE MG/DL
SPECIFIC GRAVITY: 1.02 (ref 1–1.03)
URINE-COLOR: YELLOW
UROBILINOGEN,SEMI-QN: 0.2 MG/DL (ref 0–1.9)

## 2022-07-20 PROCEDURE — 99214 OFFICE O/P EST MOD 30 MIN: CPT | Performed by: UROLOGY

## 2022-07-20 PROCEDURE — 51798 US URINE CAPACITY MEASURE: CPT | Performed by: UROLOGY

## 2022-07-20 PROCEDURE — 81003 URINALYSIS AUTO W/O SCOPE: CPT | Performed by: UROLOGY

## 2022-07-20 RX ORDER — FINASTERIDE 5 MG/1
5 TABLET, FILM COATED ORAL DAILY
Qty: 90 TABLET | Refills: 3 | Status: SHIPPED | OUTPATIENT
Start: 2022-07-20

## 2022-07-20 RX ORDER — MIRABEGRON 50 MG/1
50 TABLET, FILM COATED, EXTENDED RELEASE ORAL DAILY
Qty: 90 TABLET | Refills: 5 | Status: SHIPPED | OUTPATIENT
Start: 2022-07-20

## 2022-07-20 RX ORDER — SILODOSIN 8 MG/1
8 CAPSULE ORAL DAILY
Qty: 90 CAPSULE | Refills: 5 | Status: SHIPPED | OUTPATIENT
Start: 2022-07-20

## 2022-07-22 ENCOUNTER — ANTI-COAG VISIT (OUTPATIENT)
Dept: HEMATOLOGY/ONCOLOGY | Age: 82
End: 2022-07-22
Attending: INTERNAL MEDICINE
Payer: MEDICARE

## 2022-07-22 DIAGNOSIS — I82.4Y3 ACUTE DEEP VEIN THROMBOSIS (DVT) OF PROXIMAL VEIN OF BOTH LOWER EXTREMITIES (HCC): ICD-10-CM

## 2022-07-22 DIAGNOSIS — Z86.711 HISTORY OF PULMONARY EMBOLISM: ICD-10-CM

## 2022-07-22 DIAGNOSIS — I82.409 RECURRENT DEEP VEIN THROMBOSIS (DVT) (HCC): Primary | ICD-10-CM

## 2022-07-22 DIAGNOSIS — Z12.5 SCREENING PSA (PROSTATE SPECIFIC ANTIGEN): ICD-10-CM

## 2022-07-22 LAB
COMPLEXED PSA SERPL-MCNC: 1.01 NG/ML (ref ?–4)
INR BLD: 1.88 (ref 0.8–1.2)
PROTHROMBIN TIME: 21.3 SECONDS (ref 11.6–14.8)

## 2022-07-22 PROCEDURE — 85610 PROTHROMBIN TIME: CPT

## 2022-07-22 PROCEDURE — 36415 COLL VENOUS BLD VENIPUNCTURE: CPT

## 2022-07-29 ENCOUNTER — ANTI-COAG VISIT (OUTPATIENT)
Dept: HEMATOLOGY/ONCOLOGY | Age: 82
End: 2022-07-29
Attending: INTERNAL MEDICINE
Payer: MEDICARE

## 2022-07-29 DIAGNOSIS — I82.4Y3 ACUTE DEEP VEIN THROMBOSIS (DVT) OF PROXIMAL VEIN OF BOTH LOWER EXTREMITIES (HCC): ICD-10-CM

## 2022-07-29 LAB — INR: 2.5 (ref 0.8–1.3)

## 2022-07-29 PROCEDURE — 36416 COLLJ CAPILLARY BLOOD SPEC: CPT

## 2022-07-29 PROCEDURE — 85610 PROTHROMBIN TIME: CPT

## 2022-08-12 ENCOUNTER — ANTI-COAG VISIT (OUTPATIENT)
Dept: HEMATOLOGY/ONCOLOGY | Age: 82
End: 2022-08-12
Attending: INTERNAL MEDICINE
Payer: MEDICARE

## 2022-08-12 DIAGNOSIS — I82.4Y3 ACUTE DEEP VEIN THROMBOSIS (DVT) OF PROXIMAL VEIN OF BOTH LOWER EXTREMITIES (HCC): ICD-10-CM

## 2022-08-12 LAB — INR: 2.5 (ref 0.8–1.3)

## 2022-08-12 PROCEDURE — 36416 COLLJ CAPILLARY BLOOD SPEC: CPT

## 2022-08-12 PROCEDURE — 85610 PROTHROMBIN TIME: CPT

## 2022-08-19 ENCOUNTER — APPOINTMENT (OUTPATIENT)
Dept: HEMATOLOGY/ONCOLOGY | Age: 82
End: 2022-08-19
Attending: INTERNAL MEDICINE
Payer: MEDICARE

## 2022-08-26 ENCOUNTER — ANTI-COAG VISIT (OUTPATIENT)
Dept: HEMATOLOGY/ONCOLOGY | Age: 82
End: 2022-08-26
Attending: INTERNAL MEDICINE
Payer: MEDICARE

## 2022-08-26 DIAGNOSIS — I82.4Y3 ACUTE DEEP VEIN THROMBOSIS (DVT) OF PROXIMAL VEIN OF BOTH LOWER EXTREMITIES (HCC): ICD-10-CM

## 2022-08-26 LAB — INR: 1.9 (ref 0.8–1.3)

## 2022-08-26 PROCEDURE — 36416 COLLJ CAPILLARY BLOOD SPEC: CPT

## 2022-08-26 PROCEDURE — 85610 PROTHROMBIN TIME: CPT

## 2022-09-01 ENCOUNTER — TELEPHONE (OUTPATIENT)
Dept: FAMILY MEDICINE CLINIC | Facility: CLINIC | Age: 82
End: 2022-09-01

## 2022-09-01 NOTE — TELEPHONE ENCOUNTER
I  spoke with wife, pt's symptoms started on Tues, fever, body aches, mild cough. Today he is feeling better. They did home COVID test & it was positive. Supportive care discussed, ER precautions discussed. Quarantine discussed. All questions answered, she expresses understanding.

## 2022-09-01 NOTE — TELEPHONE ENCOUNTER
New or ongoing issue:   new  Brief description of symptoms:   Cough/fever yesterday, today temp is normal  Approximately how long? :   Symptoms started on tuesday  Offered appointment: no appointments available until next week    Patient is vaccinated and boosted. Patient's wife would like a call back with direction. Please Advise.     Thank you

## 2022-09-09 ENCOUNTER — TELEPHONE (OUTPATIENT)
Dept: FAMILY MEDICINE CLINIC | Facility: CLINIC | Age: 82
End: 2022-09-09

## 2022-09-09 RX ORDER — CODEINE PHOSPHATE AND GUAIFENESIN 10; 100 MG/5ML; MG/5ML
5 SOLUTION ORAL EVERY 6 HOURS PRN
Qty: 180 ML | Refills: 0 | Status: SHIPPED | OUTPATIENT
Start: 2022-09-09

## 2022-09-09 RX ORDER — BENZONATATE 200 MG/1
200 CAPSULE ORAL 3 TIMES DAILY PRN
Qty: 30 CAPSULE | Refills: 0 | Status: SHIPPED | OUTPATIENT
Start: 2022-09-09

## 2022-09-09 NOTE — TELEPHONE ENCOUNTER
Pt had covid last week. All that patient is left with is a dry cough. To the point that it knocks him off his feet. And starts dry heaving. Spouse wants to know if there is a rx that can be prescribed for the cough? They have tried otc with no relief.

## 2022-09-09 NOTE — TELEPHONE ENCOUNTER
Patient symptoms started on 8/30. Tested positive for Covid on 8/31. All symptoms resolved except for dry cough and fatigue. Cough is better, but he goes through coughing spells. Patient has tried Robitussin, Mucinex, and Delsym. Patient's wife denied patient experiencing SOB, difficulty breathing, or chest pain at this time. Patient requesting rx for cough suppressant.

## 2022-09-09 NOTE — TELEPHONE ENCOUNTER
Wife notified of Rx & below orders. ER precautions discussed with wife. All questions answered, she expresses understanding.

## 2022-09-23 ENCOUNTER — ANTI-COAG VISIT (OUTPATIENT)
Dept: HEMATOLOGY/ONCOLOGY | Age: 82
End: 2022-09-23
Attending: INTERNAL MEDICINE

## 2022-09-23 DIAGNOSIS — I82.4Y3 ACUTE DEEP VEIN THROMBOSIS (DVT) OF PROXIMAL VEIN OF BOTH LOWER EXTREMITIES (HCC): ICD-10-CM

## 2022-09-23 LAB — INR: 4.2 (ref 0.8–1.3)

## 2022-09-23 PROCEDURE — 85610 PROTHROMBIN TIME: CPT

## 2022-09-23 PROCEDURE — 36416 COLLJ CAPILLARY BLOOD SPEC: CPT

## 2022-09-28 ENCOUNTER — ANTI-COAG VISIT (OUTPATIENT)
Dept: HEMATOLOGY/ONCOLOGY | Age: 82
End: 2022-09-28
Attending: INTERNAL MEDICINE

## 2022-09-28 DIAGNOSIS — I82.4Y3 ACUTE DEEP VEIN THROMBOSIS (DVT) OF PROXIMAL VEIN OF BOTH LOWER EXTREMITIES (HCC): ICD-10-CM

## 2022-09-28 LAB — INR: 2.3 (ref 0.8–1.3)

## 2022-09-28 PROCEDURE — 36416 COLLJ CAPILLARY BLOOD SPEC: CPT

## 2022-09-28 PROCEDURE — 85610 PROTHROMBIN TIME: CPT

## 2022-10-05 ENCOUNTER — ANTI-COAG VISIT (OUTPATIENT)
Dept: HEMATOLOGY/ONCOLOGY | Age: 82
End: 2022-10-05
Attending: INTERNAL MEDICINE
Payer: MEDICARE

## 2022-10-05 DIAGNOSIS — I82.4Y3 ACUTE DEEP VEIN THROMBOSIS (DVT) OF PROXIMAL VEIN OF BOTH LOWER EXTREMITIES (HCC): ICD-10-CM

## 2022-10-05 LAB — INR: 2.2 (ref 0.8–1.3)

## 2022-10-05 PROCEDURE — 36416 COLLJ CAPILLARY BLOOD SPEC: CPT

## 2022-10-05 PROCEDURE — 85610 PROTHROMBIN TIME: CPT

## 2022-10-21 ENCOUNTER — ANTI-COAG VISIT (OUTPATIENT)
Dept: HEMATOLOGY/ONCOLOGY | Age: 82
End: 2022-10-21
Attending: INTERNAL MEDICINE
Payer: MEDICARE

## 2022-10-21 DIAGNOSIS — I82.4Y3 ACUTE DEEP VEIN THROMBOSIS (DVT) OF PROXIMAL VEIN OF BOTH LOWER EXTREMITIES (HCC): ICD-10-CM

## 2022-10-21 DIAGNOSIS — I82.409 RECURRENT DEEP VEIN THROMBOSIS (DVT) (HCC): ICD-10-CM

## 2022-10-21 DIAGNOSIS — D47.2 MGUS (MONOCLONAL GAMMOPATHY OF UNKNOWN SIGNIFICANCE): ICD-10-CM

## 2022-10-21 DIAGNOSIS — D50.0 IRON DEFICIENCY ANEMIA SECONDARY TO BLOOD LOSS (CHRONIC): ICD-10-CM

## 2022-10-21 DIAGNOSIS — D64.9 NORMOCYTIC ANEMIA: ICD-10-CM

## 2022-10-21 LAB
ALBUMIN SERPL-MCNC: 3.5 G/DL (ref 3.4–5)
ALBUMIN/GLOB SERPL: 0.9 {RATIO} (ref 1–2)
ALP LIVER SERPL-CCNC: 83 U/L
ALT SERPL-CCNC: 16 U/L
ANION GAP SERPL CALC-SCNC: 3 MMOL/L (ref 0–18)
AST SERPL-CCNC: 12 U/L (ref 15–37)
BASOPHILS # BLD AUTO: 0.03 X10(3) UL (ref 0–0.2)
BASOPHILS NFR BLD AUTO: 0.5 %
BILIRUB SERPL-MCNC: 0.6 MG/DL (ref 0.1–2)
BUN BLD-MCNC: 20 MG/DL (ref 7–18)
CALCIUM BLD-MCNC: 9.7 MG/DL (ref 8.5–10.1)
CHLORIDE SERPL-SCNC: 108 MMOL/L (ref 98–112)
CO2 SERPL-SCNC: 29 MMOL/L (ref 21–32)
CREAT BLD-MCNC: 1.07 MG/DL
CRP SERPL-MCNC: 0.92 MG/DL (ref ?–0.3)
DEPRECATED HBV CORE AB SER IA-ACNC: 135.8 NG/ML
EOSINOPHIL # BLD AUTO: 0.2 X10(3) UL (ref 0–0.7)
EOSINOPHIL NFR BLD AUTO: 3.6 %
ERYTHROCYTE [DISTWIDTH] IN BLOOD BY AUTOMATED COUNT: 14 %
FASTING STATUS PATIENT QL REPORTED: YES
GFR SERPLBLD BASED ON 1.73 SQ M-ARVRAT: 70 ML/MIN/1.73M2 (ref 60–?)
GLOBULIN PLAS-MCNC: 3.9 G/DL (ref 2.8–4.4)
GLUCOSE BLD-MCNC: 110 MG/DL (ref 70–99)
HCT VFR BLD AUTO: 47.3 %
HGB BLD-MCNC: 15.7 G/DL
HGB RETIC QN AUTO: 35.1 PG (ref 28.2–36.6)
IMM GRANULOCYTES # BLD AUTO: 0.01 X10(3) UL (ref 0–1)
IMM GRANULOCYTES NFR BLD: 0.2 %
IMM RETICS NFR: 0.16 RATIO (ref 0.1–0.3)
INR BLD: 2.68 (ref 0.85–1.16)
IRON SATN MFR SERPL: 31 %
IRON SERPL-MCNC: 85 UG/DL
LYMPHOCYTES # BLD AUTO: 0.8 X10(3) UL (ref 1–4)
LYMPHOCYTES NFR BLD AUTO: 14.5 %
MCH RBC QN AUTO: 31.3 PG (ref 26–34)
MCHC RBC AUTO-ENTMCNC: 33.2 G/DL (ref 31–37)
MCV RBC AUTO: 94.4 FL
MONOCYTES # BLD AUTO: 0.43 X10(3) UL (ref 0.1–1)
MONOCYTES NFR BLD AUTO: 7.8 %
NEUTROPHILS # BLD AUTO: 4.05 X10 (3) UL (ref 1.5–7.7)
NEUTROPHILS # BLD AUTO: 4.05 X10(3) UL (ref 1.5–7.7)
NEUTROPHILS NFR BLD AUTO: 73.4 %
OSMOLALITY SERPL CALC.SUM OF ELEC: 293 MOSM/KG (ref 275–295)
PLATELET # BLD AUTO: 171 10(3)UL (ref 150–450)
POTASSIUM SERPL-SCNC: 4.1 MMOL/L (ref 3.5–5.1)
PROT SERPL-MCNC: 7.4 G/DL (ref 6.4–8.2)
PROTHROMBIN TIME: 28.2 SECONDS (ref 11.6–14.8)
RBC # BLD AUTO: 5.01 X10(6)UL
RETICS # AUTO: 68.1 X10(3) UL (ref 22.5–147.5)
RETICS/RBC NFR AUTO: 1.4 %
SODIUM SERPL-SCNC: 140 MMOL/L (ref 136–145)
TIBC SERPL-MCNC: 277 UG/DL (ref 240–450)
TRANSFERRIN SERPL-MCNC: 186 MG/DL (ref 200–360)
WBC # BLD AUTO: 5.5 X10(3) UL (ref 4–11)

## 2022-10-21 PROCEDURE — 86140 C-REACTIVE PROTEIN: CPT

## 2022-10-21 PROCEDURE — 86334 IMMUNOFIX E-PHORESIS SERUM: CPT

## 2022-10-21 PROCEDURE — 82728 ASSAY OF FERRITIN: CPT

## 2022-10-21 PROCEDURE — 83550 IRON BINDING TEST: CPT

## 2022-10-21 PROCEDURE — 84165 PROTEIN E-PHORESIS SERUM: CPT

## 2022-10-21 PROCEDURE — 85610 PROTHROMBIN TIME: CPT

## 2022-10-21 PROCEDURE — 85025 COMPLETE CBC W/AUTO DIFF WBC: CPT

## 2022-10-21 PROCEDURE — 83521 IG LIGHT CHAINS FREE EACH: CPT

## 2022-10-21 PROCEDURE — 83540 ASSAY OF IRON: CPT

## 2022-10-21 PROCEDURE — 80053 COMPREHEN METABOLIC PANEL: CPT

## 2022-10-21 PROCEDURE — 85045 AUTOMATED RETICULOCYTE COUNT: CPT

## 2022-10-21 PROCEDURE — 36415 COLL VENOUS BLD VENIPUNCTURE: CPT

## 2022-10-26 LAB
ALBUMIN SERPL ELPH-MCNC: 4.06 G/DL (ref 3.75–5.21)
ALBUMIN/GLOB SERPL: 1.25 {RATIO} (ref 1–2)
ALPHA1 GLOB SERPL ELPH-MCNC: 0.26 G/DL (ref 0.19–0.46)
ALPHA2 GLOB SERPL ELPH-MCNC: 0.63 G/DL (ref 0.48–1.05)
B-GLOBULIN SERPL ELPH-MCNC: 0.8 G/DL (ref 0.68–1.23)
GAMMA GLOB SERPL ELPH-MCNC: 1.55 G/DL (ref 0.62–1.7)
KAPPA LC FREE SER-MCNC: 4.71 MG/DL (ref 0.33–1.94)
KAPPA LC FREE/LAMBDA FREE SER NEPH: 1.46 {RATIO} (ref 0.26–1.65)
LAMBDA LC FREE SERPL-MCNC: 3.22 MG/DL (ref 0.57–2.63)
PROT SERPL-MCNC: 7.3 G/DL (ref 6.4–8.2)

## 2022-10-31 ENCOUNTER — TELEPHONE (OUTPATIENT)
Dept: FAMILY MEDICINE CLINIC | Facility: CLINIC | Age: 82
End: 2022-10-31

## 2022-10-31 NOTE — TELEPHONE ENCOUNTER
Problems   The patient or proxy has not reviewed this information. Medications   The patient or proxy has not reviewed this information, and there are updates pending:   Requested Medication Removals Start Date Reported By  Comments   benzonatate 200 MG Caps 9/9/2022 Sal Recio    guaiFENesin-codeine 100-10 MG/5ML Soln 9/9/2022 Sal Recio      Allergies   The patient or proxy has not reviewed this information. Removed from medication list per pt.  Request.

## 2022-11-01 ENCOUNTER — OFFICE VISIT (OUTPATIENT)
Dept: HEMATOLOGY/ONCOLOGY | Age: 82
End: 2022-11-01
Attending: INTERNAL MEDICINE
Payer: MEDICARE

## 2022-11-01 VITALS
RESPIRATION RATE: 20 BRPM | HEART RATE: 76 BPM | OXYGEN SATURATION: 94 % | BODY MASS INDEX: 40 KG/M2 | SYSTOLIC BLOOD PRESSURE: 133 MMHG | TEMPERATURE: 96 F | DIASTOLIC BLOOD PRESSURE: 76 MMHG | WEIGHT: 315 LBS

## 2022-11-01 DIAGNOSIS — D47.2 MGUS (MONOCLONAL GAMMOPATHY OF UNKNOWN SIGNIFICANCE): Primary | ICD-10-CM

## 2022-11-01 DIAGNOSIS — I87.2 VENOUS INSUFFICIENCY: ICD-10-CM

## 2022-11-01 DIAGNOSIS — Z86.711 HISTORY OF PULMONARY EMBOLISM: ICD-10-CM

## 2022-11-01 DIAGNOSIS — I82.409 RECURRENT DEEP VEIN THROMBOSIS (DVT) (HCC): ICD-10-CM

## 2022-11-01 DIAGNOSIS — D50.0 IRON DEFICIENCY ANEMIA SECONDARY TO BLOOD LOSS (CHRONIC): ICD-10-CM

## 2022-11-01 DIAGNOSIS — Z95.828 PRESENCE OF IVC FILTER: ICD-10-CM

## 2022-11-01 DIAGNOSIS — R59.1 LYMPHADENOPATHY: ICD-10-CM

## 2022-11-01 PROCEDURE — 99214 OFFICE O/P EST MOD 30 MIN: CPT | Performed by: INTERNAL MEDICINE

## 2022-11-01 RX ORDER — MELATONIN
1000 DAILY
COMMUNITY

## 2022-11-02 ENCOUNTER — OFFICE VISIT (OUTPATIENT)
Dept: FAMILY MEDICINE CLINIC | Facility: CLINIC | Age: 82
End: 2022-11-02
Payer: MEDICARE

## 2022-11-02 ENCOUNTER — LAB ENCOUNTER (OUTPATIENT)
Dept: LAB | Age: 82
End: 2022-11-02
Attending: FAMILY MEDICINE
Payer: MEDICARE

## 2022-11-02 VITALS
WEIGHT: 315 LBS | SYSTOLIC BLOOD PRESSURE: 120 MMHG | RESPIRATION RATE: 23 BRPM | TEMPERATURE: 98 F | OXYGEN SATURATION: 97 % | HEART RATE: 70 BPM | BODY MASS INDEX: 40.43 KG/M2 | DIASTOLIC BLOOD PRESSURE: 60 MMHG | HEIGHT: 74 IN

## 2022-11-02 DIAGNOSIS — Z98.1 S/P SPINAL FUSION: ICD-10-CM

## 2022-11-02 DIAGNOSIS — M51.9 LUMBAR DISC DISEASE: ICD-10-CM

## 2022-11-02 DIAGNOSIS — I48.0 PAROXYSMAL ATRIAL FIBRILLATION (HCC): ICD-10-CM

## 2022-11-02 DIAGNOSIS — N13.8 BPH WITH OBSTRUCTION/LOWER URINARY TRACT SYMPTOMS: ICD-10-CM

## 2022-11-02 DIAGNOSIS — I83.019 VENOUS ULCER OF RIGHT LEG (HCC): ICD-10-CM

## 2022-11-02 DIAGNOSIS — E66.01 MORBID OBESITY WITH BODY MASS INDEX OF 40.0-44.9 IN ADULT (HCC): ICD-10-CM

## 2022-11-02 DIAGNOSIS — L97.919 VENOUS ULCER OF RIGHT LEG (HCC): ICD-10-CM

## 2022-11-02 DIAGNOSIS — D69.6 THROMBOCYTOPENIA (HCC): ICD-10-CM

## 2022-11-02 DIAGNOSIS — Z98.890 STATUS POST LUMBAR SURGERY: ICD-10-CM

## 2022-11-02 DIAGNOSIS — D47.2 MGUS (MONOCLONAL GAMMOPATHY OF UNKNOWN SIGNIFICANCE): ICD-10-CM

## 2022-11-02 DIAGNOSIS — I71.43 INFRARENAL ABDOMINAL AORTIC ANEURYSM (AAA) WITHOUT RUPTURE: ICD-10-CM

## 2022-11-02 DIAGNOSIS — R73.9 HYPERGLYCEMIA: ICD-10-CM

## 2022-11-02 DIAGNOSIS — Z00.00 ENCOUNTER FOR ANNUAL HEALTH EXAMINATION: Primary | ICD-10-CM

## 2022-11-02 DIAGNOSIS — M48.062 SPINAL STENOSIS OF LUMBAR REGION WITH NEUROGENIC CLAUDICATION: ICD-10-CM

## 2022-11-02 DIAGNOSIS — I87.2 VENOUS INSUFFICIENCY: ICD-10-CM

## 2022-11-02 DIAGNOSIS — Z86.711 HISTORY OF PULMONARY EMBOLISM: ICD-10-CM

## 2022-11-02 DIAGNOSIS — E78.2 HYPERLIPEMIA, MIXED: ICD-10-CM

## 2022-11-02 DIAGNOSIS — K22.70 BARRETT'S ESOPHAGUS WITHOUT DYSPLASIA: ICD-10-CM

## 2022-11-02 DIAGNOSIS — N40.1 BPH WITH OBSTRUCTION/LOWER URINARY TRACT SYMPTOMS: ICD-10-CM

## 2022-11-02 DIAGNOSIS — D64.9 NORMOCYTIC ANEMIA: ICD-10-CM

## 2022-11-02 DIAGNOSIS — Z95.828 PRESENCE OF IVC FILTER: ICD-10-CM

## 2022-11-02 DIAGNOSIS — I82.409 RECURRENT DEEP VEIN THROMBOSIS (DVT) (HCC): ICD-10-CM

## 2022-11-02 LAB
EST. AVERAGE GLUCOSE BLD GHB EST-MCNC: 123 MG/DL (ref 68–126)
HBA1C MFR BLD: 5.9 % (ref ?–5.7)

## 2022-11-02 PROCEDURE — G0439 PPPS, SUBSEQ VISIT: HCPCS | Performed by: FAMILY MEDICINE

## 2022-11-02 PROCEDURE — 36415 COLL VENOUS BLD VENIPUNCTURE: CPT

## 2022-11-02 PROCEDURE — 83036 HEMOGLOBIN GLYCOSYLATED A1C: CPT

## 2022-11-02 PROCEDURE — 1126F AMNT PAIN NOTED NONE PRSNT: CPT | Performed by: FAMILY MEDICINE

## 2022-11-10 ENCOUNTER — LAB ENCOUNTER (OUTPATIENT)
Dept: LAB | Age: 82
End: 2022-11-10
Attending: INTERNAL MEDICINE
Payer: MEDICARE

## 2022-11-10 DIAGNOSIS — D47.2 MGUS (MONOCLONAL GAMMOPATHY OF UNKNOWN SIGNIFICANCE): ICD-10-CM

## 2022-11-10 LAB
M PROTEIN 24H UR ELPH-MRATE: 159.8 MG/24 HR (ref ?–149.1)
SPECIMEN VOL UR: 1700 ML

## 2022-11-10 PROCEDURE — 84166 PROTEIN E-PHORESIS/URINE/CSF: CPT

## 2022-11-10 PROCEDURE — 84156 ASSAY OF PROTEIN URINE: CPT

## 2022-11-10 PROCEDURE — 86335 IMMUNFIX E-PHORSIS/URINE/CSF: CPT

## 2022-11-18 ENCOUNTER — ANTI-COAG VISIT (OUTPATIENT)
Dept: HEMATOLOGY/ONCOLOGY | Age: 82
End: 2022-11-18
Attending: INTERNAL MEDICINE
Payer: MEDICARE

## 2022-11-18 DIAGNOSIS — I82.4Y3 ACUTE DEEP VEIN THROMBOSIS (DVT) OF PROXIMAL VEIN OF BOTH LOWER EXTREMITIES (HCC): ICD-10-CM

## 2022-11-18 LAB — INR: 2.7 (ref 0.8–1.3)

## 2022-11-18 PROCEDURE — 85610 PROTHROMBIN TIME: CPT

## 2022-11-18 PROCEDURE — 36416 COLLJ CAPILLARY BLOOD SPEC: CPT

## 2022-11-27 PROBLEM — R31.0 GROSS HEMATURIA: Status: RESOLVED | Noted: 2020-07-29 | Resolved: 2022-11-27

## 2022-11-27 PROBLEM — N30.01 ACUTE CYSTITIS WITH HEMATURIA: Status: RESOLVED | Noted: 2020-07-06 | Resolved: 2022-11-27

## 2022-11-27 PROBLEM — I82.441 ACUTE DEEP VEIN THROMBOSIS (DVT) OF TIBIAL VEIN OF RIGHT LOWER EXTREMITY (HCC): Status: RESOLVED | Noted: 2019-03-21 | Resolved: 2022-11-27

## 2022-11-27 PROBLEM — I82.4Y3 ACUTE DEEP VEIN THROMBOSIS (DVT) OF PROXIMAL VEIN OF BOTH LOWER EXTREMITIES (HCC): Status: RESOLVED | Noted: 2020-07-06 | Resolved: 2022-11-27

## 2022-11-27 PROBLEM — L97.919 VENOUS ULCER OF RIGHT LEG (HCC): Status: RESOLVED | Noted: 2022-05-15 | Resolved: 2022-11-27

## 2022-11-27 PROBLEM — I83.019 VENOUS ULCER OF RIGHT LEG (HCC): Status: RESOLVED | Noted: 2022-05-15 | Resolved: 2022-11-27

## 2022-11-27 PROBLEM — I82.403 LEG DVT (DEEP VENOUS THROMBOEMBOLISM), ACUTE, BILATERAL (HCC): Status: RESOLVED | Noted: 2020-07-06 | Resolved: 2022-11-27

## 2022-12-02 ENCOUNTER — PATIENT MESSAGE (OUTPATIENT)
Dept: FAMILY MEDICINE CLINIC | Facility: CLINIC | Age: 82
End: 2022-12-02

## 2022-12-05 ENCOUNTER — PATIENT MESSAGE (OUTPATIENT)
Dept: FAMILY MEDICINE CLINIC | Facility: CLINIC | Age: 82
End: 2022-12-05

## 2022-12-05 NOTE — TELEPHONE ENCOUNTER
From: Sal Recio  Sent: 12/2/2022 11:00 PM CST  To: [facility] Clinical Staff  Subject: Adore Dwyer, I was not aware that a lab order was placed for me at you last appointment on 11/2/22. What is the order for? Thanks!

## 2022-12-06 NOTE — TELEPHONE ENCOUNTER
From: Juan Recio  Sent: 12/5/2022 10:57 PM CST  To: [facility] Clinical Staff  Subject: Mayda Oakes, Dr. Tequila Kohli has never discussed thyroid of cholesterol issues with me. I think this lab order was for someone else. Might check if this was for my wife Lino Mercado. Thanks!

## 2022-12-16 ENCOUNTER — ANTI-COAG VISIT (OUTPATIENT)
Dept: HEMATOLOGY/ONCOLOGY | Age: 82
End: 2022-12-16
Attending: INTERNAL MEDICINE
Payer: MEDICARE

## 2022-12-16 DIAGNOSIS — I82.409 RECURRENT DEEP VEIN THROMBOSIS (DVT) (HCC): ICD-10-CM

## 2022-12-16 DIAGNOSIS — I48.0 PAROXYSMAL ATRIAL FIBRILLATION (HCC): ICD-10-CM

## 2022-12-16 DIAGNOSIS — E78.2 HYPERLIPEMIA, MIXED: ICD-10-CM

## 2022-12-16 LAB
CHOLEST SERPL-MCNC: 126 MG/DL (ref ?–200)
FASTING PATIENT LIPID ANSWER: YES
HDLC SERPL-MCNC: 37 MG/DL (ref 40–59)
INR BLD: 2.87 (ref 0.85–1.16)
LDLC SERPL CALC-MCNC: 57 MG/DL (ref ?–100)
NONHDLC SERPL-MCNC: 89 MG/DL (ref ?–130)
PROTHROMBIN TIME: 29.7 SECONDS (ref 11.6–14.8)
TRIGL SERPL-MCNC: 194 MG/DL (ref 30–149)
TSI SER-ACNC: 1.84 MIU/ML (ref 0.36–3.74)
VLDLC SERPL CALC-MCNC: 28 MG/DL (ref 0–30)

## 2022-12-16 PROCEDURE — 36415 COLL VENOUS BLD VENIPUNCTURE: CPT

## 2022-12-16 PROCEDURE — 85610 PROTHROMBIN TIME: CPT

## 2022-12-16 PROCEDURE — 80061 LIPID PANEL: CPT

## 2022-12-16 PROCEDURE — 84443 ASSAY THYROID STIM HORMONE: CPT

## 2023-01-13 ENCOUNTER — ANTI-COAG VISIT (OUTPATIENT)
Dept: HEMATOLOGY/ONCOLOGY | Age: 83
End: 2023-01-13
Attending: INTERNAL MEDICINE
Payer: MEDICARE

## 2023-01-13 LAB — INR: 4.1 (ref 0.8–1.3)

## 2023-01-13 PROCEDURE — 85610 PROTHROMBIN TIME: CPT

## 2023-01-13 PROCEDURE — 36416 COLLJ CAPILLARY BLOOD SPEC: CPT

## 2023-01-17 ENCOUNTER — OFFICE VISIT (OUTPATIENT)
Dept: SURGERY | Facility: CLINIC | Age: 83
End: 2023-01-17

## 2023-01-17 DIAGNOSIS — R31.0 GROSS HEMATURIA: ICD-10-CM

## 2023-01-17 DIAGNOSIS — Z12.5 SCREENING PSA (PROSTATE SPECIFIC ANTIGEN): ICD-10-CM

## 2023-01-17 DIAGNOSIS — N39.0 RECURRENT UTI: ICD-10-CM

## 2023-01-17 DIAGNOSIS — N40.1 BPH WITH OBSTRUCTION/LOWER URINARY TRACT SYMPTOMS: Primary | ICD-10-CM

## 2023-01-17 DIAGNOSIS — N13.8 BPH WITH OBSTRUCTION/LOWER URINARY TRACT SYMPTOMS: Primary | ICD-10-CM

## 2023-01-17 DIAGNOSIS — N39.41 URGE INCONTINENCE: ICD-10-CM

## 2023-01-17 DIAGNOSIS — R33.9 URINARY RETENTION: ICD-10-CM

## 2023-01-17 LAB
APPEARANCE: CLEAR
BILIRUBIN: NEGATIVE
GLUCOSE (URINE DIPSTICK): NEGATIVE MG/DL
KETONES (URINE DIPSTICK): NEGATIVE MG/DL
LEUKOCYTES: NEGATIVE
MULTISTIX LOT#: ABNORMAL NUMERIC
NITRITE, URINE: NEGATIVE
PH, URINE: 5.5 (ref 4.5–8)
PROTEIN (URINE DIPSTICK): NEGATIVE MG/DL
SPECIFIC GRAVITY: 1.02 (ref 1–1.03)
UROBILINOGEN,SEMI-QN: 0.2 MG/DL (ref 0–1.9)

## 2023-01-17 PROCEDURE — 99214 OFFICE O/P EST MOD 30 MIN: CPT | Performed by: UROLOGY

## 2023-01-17 PROCEDURE — 81003 URINALYSIS AUTO W/O SCOPE: CPT | Performed by: UROLOGY

## 2023-01-17 RX ORDER — MIRABEGRON 50 MG/1
50 TABLET, FILM COATED, EXTENDED RELEASE ORAL DAILY
Qty: 90 TABLET | Refills: 5 | Status: SHIPPED | OUTPATIENT
Start: 2023-01-17

## 2023-01-17 RX ORDER — SILODOSIN 8 MG/1
8 CAPSULE ORAL DAILY
Qty: 90 CAPSULE | Refills: 5 | Status: SHIPPED | OUTPATIENT
Start: 2023-01-17

## 2023-01-17 RX ORDER — FINASTERIDE 5 MG/1
5 TABLET, FILM COATED ORAL DAILY
Qty: 90 TABLET | Refills: 5 | Status: SHIPPED | OUTPATIENT
Start: 2023-01-17

## 2023-01-20 ENCOUNTER — ANTI-COAG VISIT (OUTPATIENT)
Dept: HEMATOLOGY/ONCOLOGY | Age: 83
End: 2023-01-20
Attending: INTERNAL MEDICINE
Payer: MEDICARE

## 2023-01-20 LAB — INR: 2.3 (ref 0.8–1.3)

## 2023-01-20 PROCEDURE — 85610 PROTHROMBIN TIME: CPT

## 2023-01-20 PROCEDURE — 36416 COLLJ CAPILLARY BLOOD SPEC: CPT

## 2023-01-27 ENCOUNTER — ANTI-COAG VISIT (OUTPATIENT)
Dept: HEMATOLOGY/ONCOLOGY | Age: 83
End: 2023-01-27
Attending: INTERNAL MEDICINE
Payer: MEDICARE

## 2023-01-27 LAB — INR: 2.6 (ref 0.8–1.3)

## 2023-01-27 PROCEDURE — 36416 COLLJ CAPILLARY BLOOD SPEC: CPT

## 2023-01-27 PROCEDURE — 85610 PROTHROMBIN TIME: CPT

## 2023-02-10 ENCOUNTER — ANTI-COAG VISIT (OUTPATIENT)
Dept: HEMATOLOGY/ONCOLOGY | Age: 83
End: 2023-02-10
Attending: INTERNAL MEDICINE
Payer: MEDICARE

## 2023-02-10 LAB
INR: 3.2 (ref 0.8–1.3)
INR: 3.3 (ref 0.8–1.3)

## 2023-02-10 PROCEDURE — 36416 COLLJ CAPILLARY BLOOD SPEC: CPT

## 2023-02-10 PROCEDURE — 85610 PROTHROMBIN TIME: CPT

## 2023-02-17 ENCOUNTER — ANTI-COAG VISIT (OUTPATIENT)
Dept: HEMATOLOGY/ONCOLOGY | Age: 83
End: 2023-02-17
Attending: INTERNAL MEDICINE
Payer: MEDICARE

## 2023-02-17 LAB — INR: 2.9 (ref 0.8–1.3)

## 2023-02-17 PROCEDURE — 85610 PROTHROMBIN TIME: CPT

## 2023-02-17 PROCEDURE — 36416 COLLJ CAPILLARY BLOOD SPEC: CPT

## 2023-02-24 ENCOUNTER — ANTI-COAG VISIT (OUTPATIENT)
Dept: HEMATOLOGY/ONCOLOGY | Age: 83
End: 2023-02-24
Attending: INTERNAL MEDICINE
Payer: MEDICARE

## 2023-02-24 LAB — INR: 2.5 (ref 0.8–1.3)

## 2023-02-24 PROCEDURE — 85610 PROTHROMBIN TIME: CPT

## 2023-02-24 PROCEDURE — 36416 COLLJ CAPILLARY BLOOD SPEC: CPT

## 2023-03-10 ENCOUNTER — ANTI-COAG VISIT (OUTPATIENT)
Dept: HEMATOLOGY/ONCOLOGY | Age: 83
End: 2023-03-10
Attending: INTERNAL MEDICINE
Payer: MEDICARE

## 2023-03-10 LAB — INR: 2.3 (ref 0.8–1.3)

## 2023-03-10 PROCEDURE — 36416 COLLJ CAPILLARY BLOOD SPEC: CPT

## 2023-03-10 PROCEDURE — 85610 PROTHROMBIN TIME: CPT

## 2023-04-07 ENCOUNTER — ANTI-COAG VISIT (OUTPATIENT)
Dept: HEMATOLOGY/ONCOLOGY | Age: 83
End: 2023-04-07
Attending: INTERNAL MEDICINE
Payer: MEDICARE

## 2023-04-07 LAB — INR: 2.6 (ref 0.8–1.3)

## 2023-04-07 PROCEDURE — 85610 PROTHROMBIN TIME: CPT

## 2023-04-07 PROCEDURE — 36416 COLLJ CAPILLARY BLOOD SPEC: CPT

## 2023-04-10 RX ORDER — ROSUVASTATIN CALCIUM 10 MG/1
TABLET, COATED ORAL
Qty: 90 TABLET | Refills: 0 | Status: SHIPPED | OUTPATIENT
Start: 2023-04-10

## 2023-04-26 ENCOUNTER — LAB ENCOUNTER (OUTPATIENT)
Dept: LAB | Age: 83
End: 2023-04-26
Attending: INTERNAL MEDICINE
Payer: MEDICARE

## 2023-04-26 DIAGNOSIS — D50.0 IRON DEFICIENCY ANEMIA SECONDARY TO BLOOD LOSS (CHRONIC): ICD-10-CM

## 2023-04-26 DIAGNOSIS — I82.409 RECURRENT DEEP VEIN THROMBOSIS (DVT) (HCC): ICD-10-CM

## 2023-04-26 DIAGNOSIS — R59.1 LYMPHADENOPATHY: ICD-10-CM

## 2023-04-26 DIAGNOSIS — D47.2 MGUS (MONOCLONAL GAMMOPATHY OF UNKNOWN SIGNIFICANCE): ICD-10-CM

## 2023-04-26 LAB
ALBUMIN SERPL-MCNC: 3.4 G/DL (ref 3.4–5)
ALBUMIN/GLOB SERPL: 0.9 {RATIO} (ref 1–2)
ALP LIVER SERPL-CCNC: 77 U/L
ALT SERPL-CCNC: 16 U/L
ANION GAP SERPL CALC-SCNC: 2 MMOL/L (ref 0–18)
AST SERPL-CCNC: 14 U/L (ref 15–37)
BASOPHILS # BLD AUTO: 0.03 X10(3) UL (ref 0–0.2)
BASOPHILS NFR BLD AUTO: 0.5 %
BILIRUB SERPL-MCNC: 0.8 MG/DL (ref 0.1–2)
BUN BLD-MCNC: 17 MG/DL (ref 7–18)
CALCIUM BLD-MCNC: 9.3 MG/DL (ref 8.5–10.1)
CHLORIDE SERPL-SCNC: 112 MMOL/L (ref 98–112)
CO2 SERPL-SCNC: 27 MMOL/L (ref 21–32)
CREAT BLD-MCNC: 1.12 MG/DL
CRP SERPL-MCNC: 0.95 MG/DL (ref ?–0.3)
DEPRECATED HBV CORE AB SER IA-ACNC: 73.3 NG/ML
EOSINOPHIL # BLD AUTO: 0.12 X10(3) UL (ref 0–0.7)
EOSINOPHIL NFR BLD AUTO: 1.9 %
ERYTHROCYTE [DISTWIDTH] IN BLOOD BY AUTOMATED COUNT: 13.8 %
FASTING STATUS PATIENT QL REPORTED: YES
GFR SERPLBLD BASED ON 1.73 SQ M-ARVRAT: 66 ML/MIN/1.73M2 (ref 60–?)
GLOBULIN PLAS-MCNC: 3.6 G/DL (ref 2.8–4.4)
GLUCOSE BLD-MCNC: 122 MG/DL (ref 70–99)
HCT VFR BLD AUTO: 50.4 %
HGB BLD-MCNC: 16 G/DL
HGB RETIC QN AUTO: 36.9 PG (ref 28.2–36.6)
IMM GRANULOCYTES # BLD AUTO: 0.02 X10(3) UL (ref 0–1)
IMM GRANULOCYTES NFR BLD: 0.3 %
IMM RETICS NFR: 0.13 RATIO (ref 0.1–0.3)
IRON SATN MFR SERPL: 26 %
IRON SERPL-MCNC: 77 UG/DL
LYMPHOCYTES # BLD AUTO: 0.87 X10(3) UL (ref 1–4)
LYMPHOCYTES NFR BLD AUTO: 14 %
MCH RBC QN AUTO: 29.9 PG (ref 26–34)
MCHC RBC AUTO-ENTMCNC: 31.7 G/DL (ref 31–37)
MCV RBC AUTO: 94.2 FL
MONOCYTES # BLD AUTO: 0.52 X10(3) UL (ref 0.1–1)
MONOCYTES NFR BLD AUTO: 8.4 %
NEUTROPHILS # BLD AUTO: 4.64 X10 (3) UL (ref 1.5–7.7)
NEUTROPHILS # BLD AUTO: 4.64 X10(3) UL (ref 1.5–7.7)
NEUTROPHILS NFR BLD AUTO: 74.9 %
OSMOLALITY SERPL CALC.SUM OF ELEC: 295 MOSM/KG (ref 275–295)
PLATELET # BLD AUTO: 164 10(3)UL (ref 150–450)
POTASSIUM SERPL-SCNC: 4.3 MMOL/L (ref 3.5–5.1)
PROT SERPL-MCNC: 7 G/DL (ref 6.4–8.2)
RBC # BLD AUTO: 5.35 X10(6)UL
RETICS # AUTO: 66.9 X10(3) UL (ref 22.5–147.5)
RETICS/RBC NFR AUTO: 1.3 %
SODIUM SERPL-SCNC: 141 MMOL/L (ref 136–145)
TIBC SERPL-MCNC: 295 UG/DL (ref 240–450)
TRANSFERRIN SERPL-MCNC: 198 MG/DL (ref 200–360)
WBC # BLD AUTO: 6.2 X10(3) UL (ref 4–11)

## 2023-04-26 PROCEDURE — 83550 IRON BINDING TEST: CPT

## 2023-04-26 PROCEDURE — 85025 COMPLETE CBC W/AUTO DIFF WBC: CPT

## 2023-04-26 PROCEDURE — 80053 COMPREHEN METABOLIC PANEL: CPT

## 2023-04-26 PROCEDURE — 85045 AUTOMATED RETICULOCYTE COUNT: CPT

## 2023-04-26 PROCEDURE — 83540 ASSAY OF IRON: CPT

## 2023-04-26 PROCEDURE — 36415 COLL VENOUS BLD VENIPUNCTURE: CPT

## 2023-04-26 PROCEDURE — 86140 C-REACTIVE PROTEIN: CPT

## 2023-04-26 PROCEDURE — 82728 ASSAY OF FERRITIN: CPT

## 2023-05-02 ENCOUNTER — ANTI-COAG VISIT (OUTPATIENT)
Dept: HEMATOLOGY/ONCOLOGY | Facility: HOSPITAL | Age: 83
End: 2023-05-02

## 2023-05-02 ENCOUNTER — OFFICE VISIT (OUTPATIENT)
Dept: HEMATOLOGY/ONCOLOGY | Age: 83
End: 2023-05-02
Attending: INTERNAL MEDICINE
Payer: MEDICARE

## 2023-05-02 VITALS
HEIGHT: 72.01 IN | DIASTOLIC BLOOD PRESSURE: 81 MMHG | TEMPERATURE: 97 F | HEART RATE: 70 BPM | BODY MASS INDEX: 42.66 KG/M2 | RESPIRATION RATE: 20 BRPM | OXYGEN SATURATION: 96 % | WEIGHT: 315 LBS | SYSTOLIC BLOOD PRESSURE: 127 MMHG

## 2023-05-02 DIAGNOSIS — D47.2 MGUS (MONOCLONAL GAMMOPATHY OF UNKNOWN SIGNIFICANCE): ICD-10-CM

## 2023-05-02 DIAGNOSIS — K55.20 AVM (ARTERIOVENOUS MALFORMATION) OF SMALL BOWEL, ACQUIRED: ICD-10-CM

## 2023-05-02 DIAGNOSIS — Z86.711 HISTORY OF PULMONARY EMBOLISM: ICD-10-CM

## 2023-05-02 DIAGNOSIS — I87.2 VENOUS INSUFFICIENCY: ICD-10-CM

## 2023-05-02 DIAGNOSIS — I82.409 RECURRENT DEEP VEIN THROMBOSIS (DVT) (HCC): Primary | ICD-10-CM

## 2023-05-02 DIAGNOSIS — D50.0 IRON DEFICIENCY ANEMIA SECONDARY TO BLOOD LOSS (CHRONIC): ICD-10-CM

## 2023-05-02 DIAGNOSIS — R60.0 BILATERAL LEG EDEMA: ICD-10-CM

## 2023-05-02 DIAGNOSIS — Z95.828 PRESENCE OF IVC FILTER: ICD-10-CM

## 2023-05-02 LAB — INR: 3 (ref 0.8–1.3)

## 2023-05-02 PROCEDURE — 99214 OFFICE O/P EST MOD 30 MIN: CPT | Performed by: INTERNAL MEDICINE

## 2023-05-03 ENCOUNTER — OFFICE VISIT (OUTPATIENT)
Dept: FAMILY MEDICINE CLINIC | Facility: CLINIC | Age: 83
End: 2023-05-03
Payer: MEDICARE

## 2023-05-03 VITALS
OXYGEN SATURATION: 98 % | RESPIRATION RATE: 18 BRPM | SYSTOLIC BLOOD PRESSURE: 110 MMHG | BODY MASS INDEX: 42.66 KG/M2 | HEIGHT: 72 IN | TEMPERATURE: 98 F | HEART RATE: 79 BPM | DIASTOLIC BLOOD PRESSURE: 64 MMHG | WEIGHT: 315 LBS

## 2023-05-03 DIAGNOSIS — Z98.1 S/P SPINAL FUSION: Primary | ICD-10-CM

## 2023-05-03 DIAGNOSIS — M86.68 OTHER CHRONIC OSTEOMYELITIS, OTHER SITE (HCC): ICD-10-CM

## 2023-05-03 DIAGNOSIS — E66.01 MORBID OBESITY WITH BODY MASS INDEX OF 40.0-44.9 IN ADULT (HCC): ICD-10-CM

## 2023-05-03 DIAGNOSIS — I48.0 PAROXYSMAL ATRIAL FIBRILLATION (HCC): ICD-10-CM

## 2023-05-03 DIAGNOSIS — R73.03 PREDIABETES: ICD-10-CM

## 2023-05-03 LAB
CARTRIDGE LOT#: 56 NUMERIC
HEMOGLOBIN A1C: 6.1 % (ref 4.3–5.6)

## 2023-05-05 ENCOUNTER — APPOINTMENT (OUTPATIENT)
Dept: HEMATOLOGY/ONCOLOGY | Age: 83
End: 2023-05-05
Attending: INTERNAL MEDICINE
Payer: MEDICARE

## 2023-05-08 ENCOUNTER — APPOINTMENT (OUTPATIENT)
Dept: URBAN - METROPOLITAN AREA CLINIC 247 | Age: 83
Setting detail: DERMATOLOGY
End: 2023-05-10

## 2023-05-08 DIAGNOSIS — L57.0 ACTINIC KERATOSIS: ICD-10-CM

## 2023-05-08 DIAGNOSIS — L82.1 OTHER SEBORRHEIC KERATOSIS: ICD-10-CM

## 2023-05-08 DIAGNOSIS — L91.8 OTHER HYPERTROPHIC DISORDERS OF THE SKIN: ICD-10-CM

## 2023-05-08 DIAGNOSIS — L57.8 OTHER SKIN CHANGES DUE TO CHRONIC EXPOSURE TO NONIONIZING RADIATION: ICD-10-CM

## 2023-05-08 DIAGNOSIS — L20.89 OTHER ATOPIC DERMATITIS: ICD-10-CM

## 2023-05-08 DIAGNOSIS — D18.0 HEMANGIOMA: ICD-10-CM

## 2023-05-08 DIAGNOSIS — Z85.828 PERSONAL HISTORY OF OTHER MALIGNANT NEOPLASM OF SKIN: ICD-10-CM

## 2023-05-08 DIAGNOSIS — D485 NEOPLASM OF UNCERTAIN BEHAVIOR OF SKIN: ICD-10-CM

## 2023-05-08 PROBLEM — L20.84 INTRINSIC (ALLERGIC) ECZEMA: Status: ACTIVE | Noted: 2023-05-08

## 2023-05-08 PROBLEM — D48.5 NEOPLASM OF UNCERTAIN BEHAVIOR OF SKIN: Status: ACTIVE | Noted: 2023-05-08

## 2023-05-08 PROBLEM — D18.01 HEMANGIOMA OF SKIN AND SUBCUTANEOUS TISSUE: Status: ACTIVE | Noted: 2023-05-08

## 2023-05-08 PROCEDURE — 17000 DESTRUCT PREMALG LESION: CPT | Mod: 59

## 2023-05-08 PROCEDURE — OTHER ADDITIONAL NOTES: OTHER

## 2023-05-08 PROCEDURE — OTHER BIOPSY BY SHAVE METHOD: OTHER

## 2023-05-08 PROCEDURE — OTHER PRESCRIPTION MEDICATION MANAGEMENT: OTHER

## 2023-05-08 PROCEDURE — OTHER COUNSELING: TOPICAL STEROIDS: OTHER

## 2023-05-08 PROCEDURE — 11102 TANGNTL BX SKIN SINGLE LES: CPT | Mod: 59

## 2023-05-08 PROCEDURE — A4550 SURGICAL TRAYS: HCPCS

## 2023-05-08 PROCEDURE — OTHER COUNSELING: OTHER

## 2023-05-08 PROCEDURE — OTHER MIPS QUALITY: OTHER

## 2023-05-08 PROCEDURE — 17003 DESTRUCT PREMALG LES 2-14: CPT

## 2023-05-08 PROCEDURE — 99213 OFFICE O/P EST LOW 20 MIN: CPT | Mod: 25

## 2023-05-08 PROCEDURE — 69100 BIOPSY OF EXTERNAL EAR: CPT | Mod: 59

## 2023-05-08 PROCEDURE — OTHER PRESCRIPTION: OTHER

## 2023-05-08 PROCEDURE — 11103 TANGNTL BX SKIN EA SEP/ADDL: CPT | Mod: 59

## 2023-05-08 PROCEDURE — OTHER LIQUID NITROGEN: OTHER

## 2023-05-08 RX ORDER — MUPIROCIN 20 MG/G
OINTMENT TOPICAL
Qty: 22 | Refills: 1 | Status: ERX | COMMUNITY
Start: 2023-05-08

## 2023-05-08 RX ORDER — CLOBETASOL PROPIONATE 0.5 MG/G
OINTMENT TOPICAL
Qty: 60 | Refills: 5 | Status: ERX | COMMUNITY
Start: 2023-05-08

## 2023-05-08 ASSESSMENT — LOCATION DETAILED DESCRIPTION DERM
LOCATION DETAILED: UPPER STERNUM
LOCATION DETAILED: RIGHT CENTRAL ZYGOMA
LOCATION DETAILED: LEFT SUPERIOR HELIX
LOCATION DETAILED: RIGHT SUPERIOR HELIX
LOCATION DETAILED: LEFT INFERIOR ANTERIOR NECK
LOCATION DETAILED: RIGHT SUPERIOR LATERAL MALAR CHEEK
LOCATION DETAILED: LEFT MEDIAL UPPER BACK
LOCATION DETAILED: RIGHT SUPERIOR CENTRAL MALAR CHEEK
LOCATION DETAILED: RIGHT SUPERIOR CENTRAL BUCCAL CHEEK
LOCATION DETAILED: LEFT SUPERIOR MEDIAL UPPER BACK
LOCATION DETAILED: LEFT INFERIOR MEDIAL MALAR CHEEK
LOCATION DETAILED: RIGHT DISTAL PRETIBIAL REGION
LOCATION DETAILED: RIGHT MEDIAL SUPERIOR CHEST
LOCATION DETAILED: RIGHT VENTRAL PROXIMAL FOREARM

## 2023-05-08 ASSESSMENT — LOCATION ZONE DERM
LOCATION ZONE: EAR
LOCATION ZONE: FACE
LOCATION ZONE: ARM
LOCATION ZONE: TRUNK
LOCATION ZONE: NECK
LOCATION ZONE: LEG

## 2023-05-08 ASSESSMENT — LOCATION SIMPLE DESCRIPTION DERM
LOCATION SIMPLE: LEFT CHEEK
LOCATION SIMPLE: LEFT UPPER BACK
LOCATION SIMPLE: CHEST
LOCATION SIMPLE: RIGHT PRETIBIAL REGION
LOCATION SIMPLE: RIGHT EAR
LOCATION SIMPLE: RIGHT ZYGOMA
LOCATION SIMPLE: LEFT ANTERIOR NECK
LOCATION SIMPLE: RIGHT FOREARM
LOCATION SIMPLE: RIGHT CHEEK
LOCATION SIMPLE: LEFT EAR

## 2023-05-08 NOTE — PROCEDURE: PRESCRIPTION MEDICATION MANAGEMENT
Render In Strict Bullet Format?: No
Detail Level: Zone
Initiate Treatment: Clobetasol ointment to AA BID PRN flare

## 2023-05-08 NOTE — PROCEDURE: LIQUID NITROGEN
Show Applicator Variable?: Yes
Application Tool (Optional): Cry-AC
Detail Level: Detailed
Duration Of Freeze Thaw-Cycle (Seconds): 5
Consent: The patient's consent was obtained including but not limited to risks of crusting, scabbing, blistering, scarring, darker or lighter pigmentary change, recurrence, incomplete removal and infection.
Post-Care Instructions: I reviewed with the patient in detail post-care instructions. Patient is to wear sunprotection, and avoid picking at any of the treated lesions. Pt may apply Vaseline to crusted or scabbing areas.
Render Note In Bullet Format When Appropriate: No
Number Of Freeze-Thaw Cycles: 2 freeze-thaw cycles

## 2023-05-08 NOTE — PROCEDURE: BIOPSY BY SHAVE METHOD
Bill For Surgical Tray: yes
Consent: Written consent was obtained and risks were reviewed including but not limited to scarring, infection, bleeding, scabbing, incomplete removal, nerve damage and allergy to anesthesia.
Silver Nitrate Text: The wound bed was treated with silver nitrate after the biopsy was performed.
Biopsy Method: Personna blade
Hide Anesthesia Volume?: No
Type Of Destruction Used: Curettage
Post-Care Instructions: I reviewed with the patient in detail post-care instructions. Patient is to keep the biopsy site dry overnight, and then apply bacitracin twice daily until healed. Patient may apply hydrogen peroxide soaks to remove any crusting.
Electrodesiccation And Curettage Text: The wound bed was treated with electrodesiccation and curettage after the biopsy was performed.
Cryotherapy Text: The wound bed was treated with cryotherapy after the biopsy was performed.
Size Of Lesion In Cm: 0
Hemostasis: Electrocautery
Information: Selecting Yes will display possible errors in your note based on the variables you have selected. This validation is only offered as a suggestion for you. PLEASE NOTE THAT THE VALIDATION TEXT WILL BE REMOVED WHEN YOU FINALIZE YOUR NOTE. IF YOU WANT TO FAX A PRELIMINARY NOTE YOU WILL NEED TO TOGGLE THIS TO 'NO' IF YOU DO NOT WANT IT IN YOUR FAXED NOTE.
Billing Type: Third-Party Bill
Depth Of Biopsy: dermis
Body Location Override (Optional - Billing Will Still Be Based On Selected Body Map Location If Applicable): right mid lateral leg
Dressing: bandage
Detail Level: Detailed
Biopsy Type: H and E
Electrodesiccation Text: The wound bed was treated with electrodesiccation after the biopsy was performed.
Wound Care: Petrolatum
Curettage Text: The wound bed was treated with curettage after the biopsy was performed.
Body Location Override (Optional - Billing Will Still Be Based On Selected Body Map Location If Applicable): right lower cheek
Anesthesia Type: 1% lidocaine with epinephrine
Body Location Override (Optional - Billing Will Still Be Based On Selected Body Map Location If Applicable): left mid helix
Anesthesia Volume In Cc (Will Not Render If 0): 0.5
Notification Instructions: Patient will be notified of biopsy results. However, patient instructed to call the office if not contacted within 2 weeks.
Path Notes Override (Will Replace All Of The Above Text): Start Mupirocin ointment to AA BID

## 2023-05-08 NOTE — PROCEDURE: ADDITIONAL NOTES
Additional Notes: Start Mupirocin ointment to the right lower leg (bx site) 2-3 times a day
Render Risk Assessment In Note?: yes
Detail Level: Simple

## 2023-05-12 ENCOUNTER — TELEPHONE (OUTPATIENT)
Dept: HEMATOLOGY/ONCOLOGY | Facility: HOSPITAL | Age: 83
End: 2023-05-12

## 2023-05-12 NOTE — TELEPHONE ENCOUNTER
Rm Becerra calling calling asking cardiac clearance for colonoscopy Dr Jaqueline Martinez is asking for this. Hope Ramsey

## 2023-05-12 NOTE — TELEPHONE ENCOUNTER
Spoke with pt. Dr. Aurea Buenrostro will only give coumadin clearance. Need to have Dr. Trav Sweet office send us clearance letter to be signed by Dr. Aurea Buenrostro. If needs cardiac clearance would need to see his cardiologist. Pt v/u and will call GI.

## 2023-05-16 DIAGNOSIS — I82.4Y3 ACUTE DEEP VEIN THROMBOSIS (DVT) OF PROXIMAL VEIN OF BOTH LOWER EXTREMITIES (HCC): ICD-10-CM

## 2023-05-16 RX ORDER — WARFARIN SODIUM 5 MG/1
5 TABLET ORAL AS DIRECTED
Qty: 90 TABLET | Refills: 3 | Status: SHIPPED | OUTPATIENT
Start: 2023-05-16

## 2023-05-31 ENCOUNTER — ANTI-COAG VISIT (OUTPATIENT)
Dept: HEMATOLOGY/ONCOLOGY | Age: 83
End: 2023-05-31
Attending: INTERNAL MEDICINE
Payer: MEDICARE

## 2023-05-31 LAB — INR: 2.6 (ref 0.8–1.3)

## 2023-05-31 PROCEDURE — 36416 COLLJ CAPILLARY BLOOD SPEC: CPT

## 2023-05-31 PROCEDURE — 85610 PROTHROMBIN TIME: CPT

## 2023-06-28 ENCOUNTER — ANTI-COAG VISIT (OUTPATIENT)
Dept: HEMATOLOGY/ONCOLOGY | Age: 83
End: 2023-06-28
Attending: INTERNAL MEDICINE
Payer: MEDICARE

## 2023-06-28 LAB — INR: 2.7 (ref 0.8–1.3)

## 2023-06-28 PROCEDURE — 36416 COLLJ CAPILLARY BLOOD SPEC: CPT

## 2023-06-28 PROCEDURE — 85610 PROTHROMBIN TIME: CPT

## 2023-07-10 RX ORDER — ROSUVASTATIN CALCIUM 10 MG/1
10 TABLET, COATED ORAL NIGHTLY
Qty: 90 TABLET | Refills: 3 | Status: SHIPPED | OUTPATIENT
Start: 2023-07-10

## 2023-07-10 NOTE — TELEPHONE ENCOUNTER
Medication(s) to Refill:   Requested Prescriptions     Pending Prescriptions Disp Refills    ROSUVASTATIN 10 MG Oral Tab [Pharmacy Med Name: ROSUVASTATIN TABS 10MG] 90 tablet 3     Sig: TAKE 1 TABLET NIGHTLY         Reason for Medication Refill being sent to Provider / Reason Protocol Failed:  [] 90 day refill has already been granted  [] Blood Pressure out of range  [] Labs Abnormal/over due  [] Medication not previously prescribed by Provider  [] Non-Protocol Medication  [] Controlled Substance   [] Due for appointment- no future appointment scheduled  [] No Follow up specified      Last Time Medication was Filled:  4/10/23      Last Office Visit with PCP: 5/3/23    When Patient was Due Back to the Office:  6 months  (from when PCP last addressed condition)    Future Appointments:  Future Appointments   Date Time Provider Amada Cedeno   7/26/2023  9:50 AM PF OOT PF CHEMO I [place]   9/21/2023  8:45 AM METTU, PROCEDURE SGIEDW None   10/20/2023 10:30 AM PF OOT PF CHEMO I [place]   10/31/2023 10:30 AM Ara Kwong MD PF HEM ONC [place]   11/1/2023  9:00 AM Dorothy Barnes MD [facility]   1/17/2024  9:30 AM Jairo Chaves MD [facility] [place] [address]         Last Blood Pressures:  BP Readings from Last 2 Encounters:  05/03/23 : 110/64  05/02/23 : 127/81        Action taken:  [] Refill approved per protocol  [] Routing to provider for approval

## 2023-07-17 ENCOUNTER — PATIENT MESSAGE (OUTPATIENT)
Dept: HEMATOLOGY/ONCOLOGY | Age: 83
End: 2023-07-17

## 2023-07-26 ENCOUNTER — ANTI-COAG VISIT (OUTPATIENT)
Dept: HEMATOLOGY/ONCOLOGY | Age: 83
End: 2023-07-26
Attending: INTERNAL MEDICINE
Payer: MEDICARE

## 2023-07-26 LAB — INR: 3.3 (ref 0.8–1.3)

## 2023-07-26 PROCEDURE — 36415 COLL VENOUS BLD VENIPUNCTURE: CPT

## 2023-07-26 PROCEDURE — 85610 PROTHROMBIN TIME: CPT

## 2023-08-02 ENCOUNTER — APPOINTMENT (OUTPATIENT)
Dept: HEMATOLOGY/ONCOLOGY | Age: 83
End: 2023-08-02
Attending: INTERNAL MEDICINE
Payer: MEDICARE

## 2023-08-03 ENCOUNTER — ANTI-COAG VISIT (OUTPATIENT)
Dept: HEMATOLOGY/ONCOLOGY | Age: 83
End: 2023-08-03
Attending: INTERNAL MEDICINE
Payer: MEDICARE

## 2023-08-03 LAB — INR: 2.6 (ref 0.8–1.3)

## 2023-08-03 PROCEDURE — 36416 COLLJ CAPILLARY BLOOD SPEC: CPT

## 2023-08-03 PROCEDURE — 85610 PROTHROMBIN TIME: CPT

## 2023-08-10 ENCOUNTER — ANTI-COAG VISIT (OUTPATIENT)
Dept: HEMATOLOGY/ONCOLOGY | Age: 83
End: 2023-08-10
Attending: INTERNAL MEDICINE
Payer: MEDICARE

## 2023-08-10 LAB — INR: 2.4 (ref 0.8–1.3)

## 2023-08-10 PROCEDURE — 36416 COLLJ CAPILLARY BLOOD SPEC: CPT

## 2023-08-10 PROCEDURE — 85610 PROTHROMBIN TIME: CPT

## 2023-08-17 ENCOUNTER — ANTI-COAG VISIT (OUTPATIENT)
Dept: HEMATOLOGY/ONCOLOGY | Age: 83
End: 2023-08-17
Attending: INTERNAL MEDICINE
Payer: MEDICARE

## 2023-08-17 ENCOUNTER — ANTI-COAG VISIT (OUTPATIENT)
Dept: HEMATOLOGY/ONCOLOGY | Facility: HOSPITAL | Age: 83
End: 2023-08-17

## 2023-08-17 LAB — INR: 2.3 (ref 0.8–1.3)

## 2023-08-17 PROCEDURE — 85610 PROTHROMBIN TIME: CPT

## 2023-08-17 PROCEDURE — 36416 COLLJ CAPILLARY BLOOD SPEC: CPT

## 2023-08-23 ENCOUNTER — APPOINTMENT (OUTPATIENT)
Dept: HEMATOLOGY/ONCOLOGY | Age: 83
End: 2023-08-23
Attending: INTERNAL MEDICINE
Payer: MEDICARE

## 2023-08-30 ENCOUNTER — OFFICE VISIT (OUTPATIENT)
Dept: FAMILY MEDICINE CLINIC | Facility: CLINIC | Age: 83
End: 2023-08-30
Payer: MEDICARE

## 2023-08-30 ENCOUNTER — HOSPITAL ENCOUNTER (OUTPATIENT)
Dept: GENERAL RADIOLOGY | Age: 83
Discharge: HOME | End: 2023-08-30
Attending: NURSE PRACTITIONER
Payer: MEDICARE

## 2023-08-30 VITALS — BODY MASS INDEX: 36.98 KG/M2 | WEIGHT: 273 LBS | HEIGHT: 72 IN

## 2023-08-30 DIAGNOSIS — M79.671 RIGHT FOOT PAIN: ICD-10-CM

## 2023-08-30 DIAGNOSIS — M79.671 RIGHT FOOT PAIN: Primary | ICD-10-CM

## 2023-08-30 PROCEDURE — 99214 OFFICE O/P EST MOD 30 MIN: CPT | Performed by: NURSE PRACTITIONER

## 2023-08-30 PROCEDURE — 73630 X-RAY EXAM OF FOOT: CPT | Performed by: NURSE PRACTITIONER

## 2023-08-30 RX ORDER — DICLOXACILLIN SODIUM 250 MG/1
CAPSULE ORAL
COMMUNITY
Start: 2023-08-18 | End: 2023-09-01

## 2023-08-30 RX ORDER — CLOBETASOL PROPIONATE 0.5 MG/G
OINTMENT TOPICAL
COMMUNITY
Start: 2023-05-08

## 2023-09-05 ENCOUNTER — OFFICE VISIT (OUTPATIENT)
Facility: LOCATION | Age: 83
End: 2023-09-05

## 2023-09-05 DIAGNOSIS — R60.0 BILATERAL LOWER EXTREMITY EDEMA: ICD-10-CM

## 2023-09-05 DIAGNOSIS — M79.676 PAIN AROUND TOENAIL: ICD-10-CM

## 2023-09-05 DIAGNOSIS — M25.571 RIGHT ANKLE PAIN, UNSPECIFIED CHRONICITY: Primary | ICD-10-CM

## 2023-09-05 DIAGNOSIS — M76.71 PERONEAL TENDINITIS OF RIGHT LOWER EXTREMITY: ICD-10-CM

## 2023-09-05 DIAGNOSIS — B35.1 ONYCHOMYCOSIS: ICD-10-CM

## 2023-09-05 DIAGNOSIS — R26.9 GAIT DIFFICULTY: ICD-10-CM

## 2023-09-05 DIAGNOSIS — G62.9 PERIPHERAL POLYNEUROPATHY: ICD-10-CM

## 2023-09-05 PROCEDURE — 99203 OFFICE O/P NEW LOW 30 MIN: CPT | Performed by: PODIATRIST

## 2023-09-05 PROCEDURE — L1902 AFO ANKLE GAUNTLET PRE OTS: HCPCS | Performed by: PODIATRIST

## 2023-09-05 PROCEDURE — 1125F AMNT PAIN NOTED PAIN PRSNT: CPT | Performed by: PODIATRIST

## 2023-09-05 PROCEDURE — 11721 DEBRIDE NAIL 6 OR MORE: CPT | Performed by: PODIATRIST

## 2023-09-05 RX ORDER — PREDNISONE 10 MG/1
10 TABLET ORAL 2 TIMES DAILY
Qty: 28 TABLET | Refills: 0 | Status: SHIPPED | OUTPATIENT
Start: 2023-09-05 | End: 2023-09-19

## 2023-09-05 NOTE — PROGRESS NOTES
[id number] [facility]  Progress Note    Sophie Duverney is a 81year old male. Patient presents with: Foot Pain: New pt- R foot- onset- couple mos ago- no injury- rates pain -8-10/10 on and off worse w/ WB- pt is using cane to walk- stated numbness and tingling- per pt he would wake up at night due to pain in the heel- has xray in the system        HPI:     Patient is a very pleasant 81-year-old male who is coming in the clinic today with complaints of right ankle pain and thickened, elongated, discolored, painful toenails to both feet. Patient is accompanied by his wife today. He states that he has been experiencing pain on the outside of his right ankle for few months now. He states that it usually only causes him issues when trying to ambulate. He is currently utilizing a cane today. Denies any injuries causing the pain. Rates the pain 8-10/10 and describes it as an on-and-off pain. Patient also states that he has neuropathy in his feet and does have night pain. Patient also states that he has a history of blood clots in has severe swelling of both legs. He does have compression stockings but has not been wearing them. He is also complaining of thickened, elongated, discolored toenails to both feet. His wife normally trims them but states that she has difficulty trimming them. He has been on oral Lamisil in the past, but had minimal benefits. Patient is here today for further evaluation care. Denies recent nausea, vomiting, fever, chills. Allergies: Patient has no known allergies. Current Outpatient Medications   Medication Sig Dispense Refill    predniSONE 10 MG Oral Tab Take 1 tablet (10 mg total) by mouth 2 (two) times daily for 14 days. Take one in the morning and one at lunch and with food.  28 tablet 0    clobetasol 0.05 % External Ointment APPLY OINTMENT TOPICALLY TO AFFECTED AREA TWICE DAILY ON MOIST SKIN AS NEEDED FOR FLARES      mupirocin 2 % External Ointment APPLY OINTMENT TOPICALLY TO AFFECTED AREA 2 TO 3 TIMES DAILY FOR 2 TO 4 WEEKS      rosuvastatin 10 MG Oral Tab Take 1 tablet (10 mg total) by mouth nightly. 90 tablet 3    warfarin 5 MG Oral Tab Take 1 tablet (5 mg total) by mouth As Directed. Take as directed based on INR readings. 90 tablet 3    PANTOPRAZOLE 40 MG Oral Tab EC TAKE 1 TABLET TWICE A  tablet 3    finasteride 5 MG Oral Tab Take 1 tablet (5 mg total) by mouth daily. 90 tablet 5    Mirabegron ER (MYRBETRIQ) 50 MG Oral Tablet 24 Hr Take 50 mg by mouth daily. 90 tablet 5    silodosin 8 MG Oral Cap Take 1 capsule (8 mg total) by mouth daily. 90 capsule 5    furosemide 20 MG Oral Tab Take 1 tablet (20 mg total) by mouth in the morning. 90 tablet 3    Vitamin D, Cholecalciferol, 25 MCG (1000 UT) Oral Cap Take by mouth daily. acetaminophen 500 MG Oral Tab Take 1 tablet (500 mg total) by mouth every 6 (six) hours as needed for Pain.  0    latanoprost 0.005 % Ophthalmic Solution Place 1 drop into both eyes nightly. Dicloxacillin Sodium 500 MG Oral Cap Take 1 capsule (500 mg total) by mouth 2 (two) times daily.         Past Medical History:   Diagnosis Date    Arrhythmia     Back problem     Bull esophagus     BPH (benign prostatic hyperplasia)     Cataract     Cataract of both eyes 11/20/2017    COVID     8/30/2022    Disorder of prostate     [facility] 06/15/2012    Overview:  Overview:  3/11 after back surgery pulm emb-IVC filter Overview:  3/11 after back surgery pulm emb-IVC filter    Esophageal reflux     Frequent urination     Hearing impairment     bilateral hearing aids    Hearing loss     Wear hearing aids    Heartburn 1998    High cholesterol     Hx of endoscopy 09/12/2016    Hyperlipidemia     Indigestion 1998    Leaking of urine     Leg DVT (deep venous thromboembolism), acute, bilateral (HCC) 07/06/2020    Leg swelling     Obesity     Pain in joints     Pleurisy with effusion     Postoperative anemia due to acute blood loss 2014    Prediabetes     Presence of IVC filter 2015    cook celect filter.   Had 3 different IVC filters placed previous to current filter    Pulmonary embolism ([address]     Stented coronary artery     aorta / iliac    Venous ulcer of right leg ([facility] 05/15/2022    Visual impairment     glasses    Wears glasses     Weight gain       Past Surgical History:   Procedure Laterality Date    BACK SURGERY  ,,, [id number]    CATARACT  2012    COLONOSCOPY  2016    COLONOSCOPY      COLONOSCOPY N/A 2022    Procedure: COLONOSCOPY;  Surgeon: Candelario Tejeda MD;  Location: [address] [place] [address] ENDOSCOPY    ENDOVAS REPAIR, INFRARENL ABDOM AORTIC ANEURYSM/DISSECT  2017    Stent    KNEE REPLACEMENT SURGERY Right 2016    OTHER  2012    aortic and iliac artery stent    OTHER SURGICAL HISTORY  1964    Pleuectomy    OTHER SURGICAL HISTORY      IVC filter    SPINAL FUSION  10/12/2018    SPINE SURGERY PROCEDURE UNLISTED      Back Surgery    TONSILLECTOMY      VASECTOMY  1984      Family History   Problem Relation Age of Onset    Cancer Father         Prostate    Colon Cancer Father         [de-identified]    Cancer Mother         Ghada Alfredo bladder    Alcohol and Other Disorders Associated Sister         Alcoholism    Cancer Brother         Pancreas    DVT/VTE Neg       Social History    Socioeconomic History      Marital status:     Tobacco Use      Smoking status: Former        Packs/day: 1.00        Years: 25.00        Pack years: 25        Types: Cigarettes        Quit date: 2/3/1987        Years since quittin.6      Smokeless tobacco: Never    Vaping Use      Vaping Use: Never used    Substance and Sexual Activity      Alcohol use: Not Currently        Comment: Social drinker      Drug use: No    Other Topics      Concerns:        Caffeine Concern: No        Exercise: No        Seat Belt: No        Special Diet: No        Stress Concern: No        Weight Concern: No          REVIEW OF SYSTEMS:     10 point ROS completed and was negative, except for pertinent positive and negatives stated in subjective. EXAM:     GENERAL: well developed, well nourished, in no apparent distress  EXTREMITIES:   1. Integument: Nails x10 are thickened, elongated, discolored, dystrophic with subungual debris noted to right hallux toenail. Normal skin temperature and decreased turgor. Skin color changes consistent with hemosiderin deposition noted to bilateral lower extremities. 2. Vascular: Dorsalis pedis 2/4 bilateral and posterior tibial pulses 2/4 bilateral, capillary refill normal.  2+ pitting edema noted to bilateral lower extremities   3. Musculoskeletal: All muscle groups are graded 4+/5 in the foot and ankle, right with no pain elicited in any direction. Pain elicited on palpation along the course of the peroneal tendons near the lateral malleolus. Adequate eversion strength is noted with no signs of tendon rupture, right, right. 4. Neurological: Sensation diminished via light touch to bilateral lower extremities    ASSESSMENT AND PLAN:   Diagnoses and all orders for this visit:    Right ankle pain, unspecified chronicity    Peroneal tendinitis of right lower extremity    Onychomycosis    Peripheral polyneuropathy    Gait difficulty    Bilateral lower extremity edema    Pain around toenail    Other orders  -     predniSONE 10 MG Oral Tab; Take 1 tablet (10 mg total) by mouth 2 (two) times daily for 14 days. Take one in the morning and one at lunch and with food. Plan:   Patient seen and evaluated today in clinic. All relevant clinic notes and imaging were reviewed prior to visit. Discussed findings, which are consistent with peroneal tendinitis of the right lower extremity. Discussed conservative management and potential for formal PT in the future.   Patient states that he is minimally ambulatory and would like to do it at home stretching and strengthening program  Discussed possible oral steroids if patient is not diabetic, otherwise use of NSAIDS if no history of GERD or stomach upset. Patient elects for oral steroids at this time. Rx: Prednisone 10 mg twice daily for 14 days  Recommend cryotherapy/icing. Discussed the importance of rest and the need for immobilization. Recommend use of OTC and/or custom orthotics in the treatment and future prevention of tendinitis. Recommend patient continue with supportive new balance shoes daily. Recommend use of compression stockings and frequent lower extremity elevation to control edema/swelling. Discussed potential need to order MRI if conservative management fails to resolve symptoms out of concern for underlying tear. Discussed potential need for surgical intervention if conservative management fails to resolve symptoms. Provided patient with at home stretching and strengthening program.    Recommend continue use of cane as needed for gait assistance    Patient provided with a lace up ankle brace today. Recommend using only when on feet for long periods of time. In regards to patient's toenails, discussed with patient proper care and hygiene for their feet. Recommend daily foot soaks for 10 minutes with warm water and apple cider vinegar    Patient relates pain relief with intermittent toenail debridements. Patient elects for nail debridement today. They tolerated procedures well, without incident. Instrumentation used includes nail nippers and electric  where appropriate. Procedure: (46220 Debridement of toenails 6-10)  Surgically debrided and mechanically reduced 6 or more toenails. Hemorrhage occurred: none. Nails that were debrided appeared dystrophic and caused the patient pain in shoe gear. Nails 1-5 left & 1-5 right. The patient indicates understanding of these issues and agrees to the plan.     Time spent reviewing pertinent information from patient's chart, reviewing any pertinent imaging, obtaining history and physical exam, and discussing and mutually agreeing on a treatment plan: 42 minutes    RTC 6 weeks    JAKOB Deleon [facility]    9/5/2023    Dragon speech recognition software was used to prepare this note. Errors in word recognition may occur. Please contact me with any questions/concerns with this note.

## 2023-09-06 ENCOUNTER — TELEPHONE (OUTPATIENT)
Dept: HEMATOLOGY/ONCOLOGY | Facility: HOSPITAL | Age: 83
End: 2023-09-06

## 2023-09-06 NOTE — TELEPHONE ENCOUNTER
Alicja calling asking if Nica Gamez should be taking predniSONE 10 MG Oral Tab with his warfarin.  Thank you amor

## 2023-09-08 NOTE — TELEPHONE ENCOUNTER
Spoke with wife yesterday. States pt will be on 14 days of prednisone for foot pain. Pt will come in next week to check INR. Wife states pt will also be holding his coumadin at the end of next week for upcoming colonoscopy.

## 2023-09-11 ENCOUNTER — ANTI-COAG VISIT (OUTPATIENT)
Dept: HEMATOLOGY/ONCOLOGY | Age: 83
End: 2023-09-11
Attending: INTERNAL MEDICINE
Payer: MEDICARE

## 2023-09-11 LAB — INR: 2.6 (ref 0.8–1.3)

## 2023-09-11 PROCEDURE — 85610 PROTHROMBIN TIME: CPT

## 2023-09-11 PROCEDURE — 36416 COLLJ CAPILLARY BLOOD SPEC: CPT

## 2023-09-14 ENCOUNTER — APPOINTMENT (OUTPATIENT)
Dept: HEMATOLOGY/ONCOLOGY | Age: 83
End: 2023-09-14
Attending: INTERNAL MEDICINE
Payer: MEDICARE

## 2023-09-15 ENCOUNTER — APPOINTMENT (OUTPATIENT)
Dept: HEMATOLOGY/ONCOLOGY | Age: 83
End: 2023-09-15
Attending: INTERNAL MEDICINE
Payer: MEDICARE

## 2023-09-21 ENCOUNTER — ANESTHESIA EVENT (OUTPATIENT)
Dept: ENDOSCOPY | Facility: HOSPITAL | Age: 83
End: 2023-09-21
Payer: MEDICARE

## 2023-09-21 ENCOUNTER — ANESTHESIA (OUTPATIENT)
Dept: ENDOSCOPY | Facility: HOSPITAL | Age: 83
End: 2023-09-21
Payer: MEDICARE

## 2023-09-21 ENCOUNTER — HOSPITAL ENCOUNTER (OUTPATIENT)
Facility: HOSPITAL | Age: 83
Setting detail: HOSPITAL OUTPATIENT SURGERY
Discharge: HOME | End: 2023-09-21
Attending: STUDENT IN AN ORGANIZED HEALTH CARE EDUCATION/TRAINING PROGRAM | Admitting: STUDENT IN AN ORGANIZED HEALTH CARE EDUCATION/TRAINING PROGRAM
Payer: MEDICARE

## 2023-09-21 VITALS
OXYGEN SATURATION: 91 % | HEIGHT: 72 IN | BODY MASS INDEX: 42.66 KG/M2 | SYSTOLIC BLOOD PRESSURE: 126 MMHG | WEIGHT: 315 LBS | HEART RATE: 92 BPM | DIASTOLIC BLOOD PRESSURE: 69 MMHG | RESPIRATION RATE: 12 BRPM | TEMPERATURE: 97 F

## 2023-09-21 DIAGNOSIS — D50.9 IRON DEFICIENCY ANEMIA, UNSPECIFIED IRON DEFICIENCY ANEMIA TYPE: ICD-10-CM

## 2023-09-21 DIAGNOSIS — Z86.010 PERSONAL HISTORY OF COLONIC POLYPS: ICD-10-CM

## 2023-09-21 DIAGNOSIS — Z12.11 COLON CANCER SCREENING: ICD-10-CM

## 2023-09-21 LAB
ATRIAL RATE: 326 BPM
INR: 1.2 (ref 0.8–1.3)
Q-T INTERVAL: 354 MS
QRS DURATION: 82 MS
QTC CALCULATION (BEZET): 440 MS
R AXIS: -19 DEGREES
T AXIS: -7 DEGREES
VENTRICULAR RATE: 93 BPM

## 2023-09-21 PROCEDURE — 88305 TISSUE EXAM BY PATHOLOGIST: CPT | Performed by: STUDENT IN AN ORGANIZED HEALTH CARE EDUCATION/TRAINING PROGRAM

## 2023-09-21 PROCEDURE — 93005 ELECTROCARDIOGRAM TRACING: CPT

## 2023-09-21 PROCEDURE — 0DBN8ZX EXCISION OF SIGMOID COLON, VIA NATURAL OR ARTIFICIAL OPENING ENDOSCOPIC, DIAGNOSTIC: ICD-10-PCS | Performed by: STUDENT IN AN ORGANIZED HEALTH CARE EDUCATION/TRAINING PROGRAM

## 2023-09-21 PROCEDURE — 0DBM8ZX EXCISION OF DESCENDING COLON, VIA NATURAL OR ARTIFICIAL OPENING ENDOSCOPIC, DIAGNOSTIC: ICD-10-PCS | Performed by: STUDENT IN AN ORGANIZED HEALTH CARE EDUCATION/TRAINING PROGRAM

## 2023-09-21 PROCEDURE — 93010 ELECTROCARDIOGRAM REPORT: CPT | Performed by: INTERNAL MEDICINE

## 2023-09-21 RX ORDER — LIDOCAINE HYDROCHLORIDE 10 MG/ML
INJECTION, SOLUTION EPIDURAL; INFILTRATION; INTRACAUDAL; PERINEURAL AS NEEDED
Status: DISCONTINUED | OUTPATIENT
Start: 2023-09-21 | End: 2023-09-21 | Stop reason: SURG

## 2023-09-21 RX ORDER — SODIUM CHLORIDE, SODIUM LACTATE, POTASSIUM CHLORIDE, CALCIUM CHLORIDE 600; 310; 30; 20 MG/100ML; MG/100ML; MG/100ML; MG/100ML
INJECTION, SOLUTION INTRAVENOUS CONTINUOUS
Status: DISCONTINUED | OUTPATIENT
Start: 2023-09-21 | End: 2023-09-21

## 2023-09-21 RX ADMIN — LIDOCAINE HYDROCHLORIDE 100 MG: 10 INJECTION, SOLUTION EPIDURAL; INFILTRATION; INTRACAUDAL; PERINEURAL at 09:19:00

## 2023-09-21 NOTE — DISCHARGE INSTRUCTIONS
Per Dr Peggy Sommer  There were 3 polyps in your colon. These were removed today. OK to start warfarin tonight. Repeat colonoscopy can be considered in 3 years, based on your health at that time. Specifically, if are doing well but having issues such as rectal bleeding, anemia, etc, then a repeat colonoscopy should be considered. High fiber diet recommended for patients with diverticulosis. Home Care Instructions for Colonoscopy with Sedation    Diet:  - Resume your regular diet as tolerated unless otherwise instructed. - Start with light meals to minimize bloating.  - Do not drink alcohol today. Medication:  - If you have questions about resuming your normal medications, please contact your Primary Care Physician. Activities:  - Take it easy today. Do not return to work today. - Do not drive today. - Do not operate any machinery today (including kitchen equipment). Colonoscopy:  - You may notice some rectal \"spotting\" (a little blood on the toilet tissue) for a day or two after the exam. This is normal.  - If you experience any rectal bleeding (not spotting), persistent tenderness or sharp severe abdominal pains, oral temperature over 100 degrees Fahrenheit, light-headedness or dizziness, or any other problems, contact your doctor. **If unable to reach your doctor, please go to the [facility] Emergency Room**    - Your referring physician will receive a full report of your examination.  - If you do not hear from your doctor's office within two weeks of your biopsy, please call them for your results. You may be able to see your laboratory results in [facility] between 4 and 7 business days. In some cases, your physician may not have viewed the results before they are released to [address] If you have questions regarding your results contact the physician who ordered the test/exam by phone or via [address] by choosing \"Ask a Medical Question. \"

## 2023-09-21 NOTE — OPERATIVE REPORT
Colon operative report  Patient Name: Blade Recio  Procedure: Colonoscopy with snare polypectomy  Indication: history of colon polyps  Attending: Tre Owens M.D. Consent: The risks, benefits, and alternatives were discussed with the patient / POA. Risks included, but were not limited to, bleeding, perforation, medication effects, cardiac arrhythmias, missed polyps, and aspiration. After all questions were answered to their satisfaction, a signed, informed, and witnessed consent was obtained. Sedation: Monitored Anesthesia Care  Monitoring: Pulsoximetry, pulse, respirations, and blood pressure were monitored throughout the entire procedure    Preparation Quality: adequate. [place] Bowel Prep Score: Right 2 / Transverse 3 / Left 3   Procedure: After achieving adequate sedation, and placing the patient in the left lateral decubitus position, a digital rectal examination was performed. The lubricated tip of the pediatric colonoscope was then introduced into the rectum and advanced to the terminal ileum. The appendiceal orifice and ileocecal valve were clearly and distinctly visualized, thus verifying the cecum. The terminal ileum was intubated and found to be normal to the extent examined. The endoscope was then carefully withdrawn from the patient with careful visualization of the colonic mucosa revealing no additional pathologic findings. Air was suctioned to the best of my ability, during withdrawal of the endoscope. When the endoscope reached the rectum, it was placed in a retroflexed position, and the rectal bulb was thus visualized. The endoscope was righted, and then removed from the patient. The patient tolerated the procedure without apparent procedural complications. The patient left the procedure room in stable condition for recovery. Findings:    Normal terminal ileum.   Two 2-4 mm sessile polyps in the descending colon, removed with a cold snare polypectomy  3 mm sessile polyp in the sigmoid colon, removed with a cold snare polypectomy  Diverticulosis in the sigmoid colon  Large internal hemorrhoids  Impression: Findings as above.     Recommendations:   Await pathology  Resume warfarin tonight  Consider repeat colonoscopy in 3 years based on health  High fiber diet    Saeid Conde MD

## 2023-09-21 NOTE — ANESTHESIA POSTPROCEDURE EVALUATION
[address] Patient Status:  Hospital Outpatient Surgery   Age/Gender 81year old male MRN [id number]   Location [address] [facility] 28 Attending Hedy Conde MD   Hosp Day # 0 PCP Tavo Patterson MD       Anesthesia Post-op Note    COLONOSCOPY WITH COLD SNARE POLYPECTOMY    Procedure Summary       Date: 09/21/23 Room / Location: [facility] ENDOSCOPY 02 / [facility] ENDOSCOPY    Anesthesia Start: 1604 Anesthesia Stop: 2064    Procedure: COLONOSCOPY WITH COLD SNARE POLYPECTOMY Diagnosis:       Colon cancer screening      Personal history of colonic polyps      Iron deficiency anemia, unspecified iron deficiency anemia type      (COLON POLYPS, HEMRRHOIDS)    Surgeons: Brianda Gomez MD Anesthesiologist: Fadi Olmos MD    Anesthesia Type: general, MAC ASA Status: 2            Anesthesia Type: general, MAC    Vitals Value Taken Time   /77 09/21/23 0957   Temp  09/21/23 0957   Pulse 106 09/21/23 0957   Resp 24 09/21/23 0957   SpO2 93 % 09/21/23 0957   Vitals shown include unfiled device data. Patient Location: Endoscopy    Anesthesia Type: MAC    Airway Patency: patent    Postop Pain Control: adequate    Mental Status: mildly sedated but able to meaningfully participate in the post-anesthesia evaluation    Nausea/Vomiting: none    Cardiopulmonary/Hydration status: stable euvolemic    Complications:  Other: (AFIB in recover)    Postop vital signs: stable    Dental Exam: Unchanged from Preop

## 2023-09-24 NOTE — PROGRESS NOTES
Deon Rain,    It appears that you flipped back into atrial fib during or immediately after your colonoscopy. As we discussed, resume your anticoagulation, monitor your HR on your iWatch, and follow-up with the cardiologist regarding your atrial fibrillation. If you develop chest pain, shortness of breath, or your HRs consistently stay elevated, then please to go the ER.   Please call with any questions,    Anibal Mcgregor MD

## 2023-09-24 NOTE — PROGRESS NOTES
Terrell Joseph,    No worrisome findings on the colonoscopy polyp biopsies. Typically, no repeat would be indicated for 3 years. Given that you will be 86 at that time, this may not be necessary. Please call if you develop any symptoms or issues.   Please call with any questions,    Camilla Arvizu MD

## 2023-10-06 ENCOUNTER — TELEPHONE (OUTPATIENT)
Dept: FAMILY MEDICINE CLINIC | Facility: CLINIC | Age: 83
End: 2023-10-06

## 2023-10-06 DIAGNOSIS — R06.00 DYSPNEA, UNSPECIFIED TYPE: ICD-10-CM

## 2023-10-06 DIAGNOSIS — I48.0 PAROXYSMAL ATRIAL FIBRILLATION (HCC): ICD-10-CM

## 2023-10-06 DIAGNOSIS — D47.2 MGUS (MONOCLONAL GAMMOPATHY OF UNKNOWN SIGNIFICANCE): ICD-10-CM

## 2023-10-06 DIAGNOSIS — M79.89 LEG SWELLING: Primary | ICD-10-CM

## 2023-10-06 NOTE — TELEPHONE ENCOUNTER
Kristopher Meehan called stating Franky Mckay has been having extreme swelling on his legs, and wondering if she can increase his furosemide. Please advice.      Kristopher Meehan stated we can give her a call on [phone]

## 2023-10-09 ENCOUNTER — ANTI-COAG VISIT (OUTPATIENT)
Dept: HEMATOLOGY/ONCOLOGY | Age: 83
End: 2023-10-09
Attending: INTERNAL MEDICINE
Payer: MEDICARE

## 2023-10-09 LAB — INR: 2.9 (ref 0.8–1.3)

## 2023-10-09 PROCEDURE — 85610 PROTHROMBIN TIME: CPT

## 2023-10-09 PROCEDURE — 36416 COLLJ CAPILLARY BLOOD SPEC: CPT

## 2023-10-16 ENCOUNTER — TELEPHONE (OUTPATIENT)
Dept: HEMATOLOGY/ONCOLOGY | Facility: HOSPITAL | Age: 83
End: 2023-10-16

## 2023-10-17 ENCOUNTER — OFFICE VISIT (OUTPATIENT)
Facility: LOCATION | Age: 83
End: 2023-10-17
Payer: MEDICARE

## 2023-10-17 DIAGNOSIS — M62.461 GASTROCNEMIUS EQUINUS OF RIGHT LOWER EXTREMITY: ICD-10-CM

## 2023-10-17 DIAGNOSIS — R26.9 GAIT DIFFICULTY: ICD-10-CM

## 2023-10-17 DIAGNOSIS — M72.2 PLANTAR FASCIITIS OF RIGHT FOOT: ICD-10-CM

## 2023-10-17 DIAGNOSIS — M76.71 PERONEAL TENDINITIS OF RIGHT LOWER EXTREMITY: Primary | ICD-10-CM

## 2023-10-17 DIAGNOSIS — M25.571 RIGHT ANKLE PAIN, UNSPECIFIED CHRONICITY: ICD-10-CM

## 2023-10-17 DIAGNOSIS — G62.9 PERIPHERAL POLYNEUROPATHY: ICD-10-CM

## 2023-10-17 DIAGNOSIS — R60.0 BILATERAL LOWER EXTREMITY EDEMA: ICD-10-CM

## 2023-10-17 PROCEDURE — 99213 OFFICE O/P EST LOW 20 MIN: CPT | Performed by: PODIATRIST

## 2023-10-17 PROCEDURE — 1125F AMNT PAIN NOTED PAIN PRSNT: CPT | Performed by: PODIATRIST

## 2023-10-18 NOTE — PROGRESS NOTES
[facility] Podiatry  Progress Note    Britt Dyson is a 81year old male. Patient presents with: Ankle Pain: Right ankle pain, patient completed round of oral Prednisone, rates pain 5/10. There was  little improvement, he is wearing the ankle brace with tennis shoes. HPI:     Patient is a very pleasant 80-year-old male who is coming into clinic today accompanied by his wife for recheck of right ankle. Patient states that he is doing well overall and that he has noticed some improvements after utilizing prednisone and starting the ankle brace. Patient's pain is down to 5/10, but does notice it worsening the longer he is on his feet. Patient does relate he has tried the at-home exercises that were provided to him last visit. Patient is currently wearing Compression socks and a lace up ankle brace today. Patient does relate some newer heel pain, particularly first thing in the morning. Denying any recent injuries and denying any other concerns today. He is here today for further evaluation and care. Denies recent nausea,, fever, chills. Allergies: Patient has no known allergies. Current Outpatient Medications   Medication Sig Dispense Refill    clobetasol 0.05 % External Ointment APPLY OINTMENT TOPICALLY TO AFFECTED AREA TWICE DAILY ON MOIST SKIN AS NEEDED FOR FLARES      mupirocin 2 % External Ointment APPLY OINTMENT TOPICALLY TO AFFECTED AREA 2 TO 3 TIMES DAILY FOR 2 TO 4 WEEKS      rosuvastatin 10 MG Oral Tab Take 1 tablet (10 mg total) by mouth nightly. 90 tablet 3    warfarin 5 MG Oral Tab Take 1 tablet (5 mg total) by mouth As Directed. Take as directed based on INR readings. 90 tablet 3    PANTOPRAZOLE 40 MG Oral Tab EC TAKE 1 TABLET TWICE A  tablet 3    finasteride 5 MG Oral Tab Take 1 tablet (5 mg total) by mouth daily. 90 tablet 5    Mirabegron ER (MYRBETRIQ) 50 MG Oral Tablet 24 Hr Take 50 mg by mouth daily.  90 tablet 5    silodosin 8 MG Oral Cap Take 1 capsule (8 mg total) by mouth daily. 90 capsule 5    furosemide 20 MG Oral Tab Take 1 tablet (20 mg total) by mouth in the morning. 90 tablet 3    Vitamin D, Cholecalciferol, 25 MCG (1000 UT) Oral Cap Take by mouth daily. acetaminophen 500 MG Oral Tab Take 1 tablet (500 mg total) by mouth every 6 (six) hours as needed for Pain.  0    latanoprost 0.005 % Ophthalmic Solution Place 1 drop into both eyes nightly. Dicloxacillin Sodium 500 MG Oral Cap Take 1 capsule (500 mg total) by mouth 2 (two) times daily. Past Medical History:   Diagnosis Date    Arrhythmia     Back problem     Bull esophagus     BPH (benign prostatic hyperplasia)     Cataract     Cataract of both eyes 11/20/2017    COVID     8/30/2022    Disorder of prostate     DVT (deep venous thrombosis) ([address] 06/15/2012    Overview:  Overview:  3/11 after back surgery pulm emb-IVC filter Overview:  3/11 after back surgery pulm emb-IVC filter    Esophageal reflux     Frequent urination     Hearing impairment     bilateral hearing aids    Hearing loss     Wear hearing aids    Heartburn 1998    High cholesterol     Hx of endoscopy 09/12/2016    Hyperlipidemia     Indigestion 1998    Leaking of urine     Leg DVT (deep venous thromboembolism), acute, bilateral (HCC) 07/06/2020    Leg swelling     Obesity     Pain in joints     Pleurisy with effusion     Postoperative anemia due to acute blood loss 04/07/2014    Prediabetes     Presence of IVC filter 02/09/2015    cook celect filter.   Had 3 different IVC filters placed previous to current filter    Pulmonary embolism ([address] 2011    Stented coronary artery 2017    aorta / iliac    Venous ulcer of right leg ([address] 05/15/2022    Visual impairment     glasses    Wears glasses     Weight gain       Past Surgical History:   Procedure Laterality Date    BACK SURGERY  2011,2012,2014, 2016,2019    CATARACT  2012    COLONOSCOPY  09/12/2016    COLONOSCOPY  2019    COLONOSCOPY N/A 01/06/2022    Procedure: COLONOSCOPY; Surgeon: Fadumo Lee MD;  Location: [address] [place] [address] ENDOSCOPY    COLONOSCOPY N/A 2023    Procedure: COLONOSCOPY WITH COLD SNARE POLYPECTOMY;  Surgeon: Fadumo Lee MD;  Location: [address] [place] [address] ENDOSCOPY    ENDOVAS REPAIR, INFRARENL ABDOM AORTIC ANEURYSM/DISSECT  2017    Stent    KNEE REPLACEMENT SURGERY Right 2016    OTHER  2012    aortic and iliac artery stent    OTHER SURGICAL HISTORY  1964    Pleuectomy    OTHER SURGICAL HISTORY      IVC filter    SPINAL FUSION  10/12/2018    SPINE SURGERY PROCEDURE UNLISTED      Back Surgery    TONSILLECTOMY      VASECTOMY  1984      Family History   Problem Relation Age of Onset    Cancer Father         Prostate    Colon Cancer Father         [de-identified]    Cancer Mother         Gall bladder    Alcohol and Other Disorders Associated Sister         Alcoholism    Cancer Brother         Pancreas    DVT/VTE Neg       Social History    Socioeconomic History      Marital status:     Tobacco Use      Smoking status: Former        Packs/day: 1.00        Years: 25.00        Additional pack years: 0.00        Total pack years: 25.00        Types: Cigarettes        Quit date: 2/3/1987        Years since quittin.7      Smokeless tobacco: Never    Vaping Use      Vaping Use: Never used    Substance and Sexual Activity      Alcohol use: Not Currently        Comment: Social drinker      Drug use: No    Other Topics      Concerns:        Caffeine Concern: No        Exercise: No        Seat Belt: No        Special Diet: No        Stress Concern: No        Weight Concern: No          REVIEW OF SYSTEMS:     10 point ROS completed and was negative, except for pertinent positive and negatives stated in subjective. EXAM:   GENERAL: well developed, well nourished, in no apparent distress  EXTREMITIES:              1. Integument: Nails x10 are thickened, elongated, discolored, dystrophic with subungual debris noted to right hallux toenail.   Normal skin temperature and decreased turgor. Skin color changes consistent with hemosiderin deposition noted to bilateral lower extremities. 2. Vascular: Dorsalis pedis 2/4 bilateral and posterior tibial pulses 2/4 bilateral, capillary refill normal.  2+ pitting edema noted to bilateral lower extremities              3. Musculoskeletal: All muscle groups are graded 4+/5 in the foot and ankle, right with no pain elicited in any direction. Pain elicited on palpation along the course of the peroneal tendons near the lateral malleolus. Adequate eversion strength is noted with no signs of tendon rupture, right, right. Pain with palpation noted to plantar medial aspect of right heel. No pain with side-to-side heel squeeze. Decreased ankle joint dorsiflexion noted with the knee extended, which does improve with the knee flexed consistent with equinus deformity, right. 4. Neurological: Sensation diminished via light touch to bilateral lower extremities         ASSESSMENT AND PLAN:   Diagnoses and all orders for this visit:    Peroneal tendinitis of right lower extremity  -     OP REFERRAL TO [facility]    Plantar fasciitis of right foot  -     OP REFERRAL TO [facility]    Gait difficulty  -     OP REFERRAL TO [facility]    Gastrocnemius equinus of right lower extremity  -     OP REFERRAL TO [facility]    Right ankle pain, unspecified chronicity  -     OP REFERRAL TO [facility]    Bilateral lower extremity edema  -     OP REFERRAL TO [facility]    Peripheral polyneuropathy        Plan:   -Patient seen and evaluated today in clinic. All relevant clinic notes and imaging were reviewed prior to visit. Discussed findings, which are consistent with continued peroneal tendinitis of the right lower extremity. Patient also elicits signs and symptoms of planter fasciitis to his right heel today.   Discussed conservative management and potential for formal PT. Discussed possible oral steroids if patient is not diabetic, otherwise use of NSAIDS if no history of GERD or stomach upset. Recommend cryotherapy/icing. Recommend use of OTC and/or custom orthotics in the treatment and future prevention of tendinitis. Supportive shoe gear recommendation was also given to patient today. Custom orthotics are medically necessary to support and off-load the forces leading to the pathology and future prevention of further tendon tear and deformity. Recommend continued use of compression stockings to control edema/swelling. Discussed potential need to order MRI if conservative management fails to resolve symptoms out of concern for underlying tear. Discussed potential need for surgical intervention if conservative management fails to resolve symptoms. Patient elects to start formal physical therapy at this time. Referral placed today.    -Continue to weight-bear as tolerated with lace up ankle brace to right ankle. Do recommend slowly transitioning out of ankle brace as tolerated. Recommend continued use of supportive shoe gear.    -The patient indicates understanding of these issues and agrees to the plan. Time spent reviewing pertinent information from patient's chart, reviewing any pertinent imaging, obtaining history and physical exam, and discussing and mutually agreeing on a treatment plan: 25 minutes    RTC 1 month      Thurman Opitz, ZACHARY [facility]        10/17/2023    Dragon speech recognition software was used to prepare this note. Errors in word recognition may occur. Please contact me with any questions/concerns with this note.

## 2023-10-20 ENCOUNTER — ANTI-COAG VISIT (OUTPATIENT)
Dept: HEMATOLOGY/ONCOLOGY | Facility: HOSPITAL | Age: 83
End: 2023-10-20

## 2023-10-20 ENCOUNTER — NURSE ONLY (OUTPATIENT)
Dept: HEMATOLOGY/ONCOLOGY | Age: 83
End: 2023-10-20
Attending: INTERNAL MEDICINE
Payer: MEDICARE

## 2023-10-20 DIAGNOSIS — D47.2 MGUS (MONOCLONAL GAMMOPATHY OF UNKNOWN SIGNIFICANCE): ICD-10-CM

## 2023-10-20 DIAGNOSIS — R06.00 DYSPNEA, UNSPECIFIED TYPE: ICD-10-CM

## 2023-10-20 DIAGNOSIS — M79.89 LEG SWELLING: ICD-10-CM

## 2023-10-20 DIAGNOSIS — I82.409 RECURRENT DEEP VEIN THROMBOSIS (DVT) (HCC): ICD-10-CM

## 2023-10-20 DIAGNOSIS — D50.0 IRON DEFICIENCY ANEMIA SECONDARY TO BLOOD LOSS (CHRONIC): ICD-10-CM

## 2023-10-20 LAB
ALBUMIN SERPL-MCNC: 3.3 G/DL (ref 3.4–5)
ALBUMIN/GLOB SERPL: 0.9 {RATIO} (ref 1–2)
ALP LIVER SERPL-CCNC: 96 U/L
ALT SERPL-CCNC: 17 U/L
ANION GAP SERPL CALC-SCNC: 2 MMOL/L (ref 0–18)
AST SERPL-CCNC: 16 U/L (ref 15–37)
BASOPHILS # BLD AUTO: 0.05 X10(3) UL (ref 0–0.2)
BASOPHILS NFR BLD AUTO: 0.8 %
BILIRUB SERPL-MCNC: 0.9 MG/DL (ref 0.1–2)
BUN BLD-MCNC: 17 MG/DL (ref 7–18)
CALCIUM BLD-MCNC: 9.3 MG/DL (ref 8.5–10.1)
CHLORIDE SERPL-SCNC: 109 MMOL/L (ref 98–112)
CO2 SERPL-SCNC: 29 MMOL/L (ref 21–32)
CREAT BLD-MCNC: 1.23 MG/DL
CRP SERPL-MCNC: 0.71 MG/DL (ref ?–0.3)
DEPRECATED HBV CORE AB SER IA-ACNC: 94 NG/ML
EGFRCR SERPLBLD CKD-EPI 2021: 59 ML/MIN/1.73M2 (ref 60–?)
EOSINOPHIL # BLD AUTO: 0.32 X10(3) UL (ref 0–0.7)
EOSINOPHIL NFR BLD AUTO: 5 %
ERYTHROCYTE [DISTWIDTH] IN BLOOD BY AUTOMATED COUNT: 13.5 %
FASTING STATUS PATIENT QL REPORTED: NO
GLOBULIN PLAS-MCNC: 3.7 G/DL (ref 2.8–4.4)
GLUCOSE BLD-MCNC: 121 MG/DL (ref 70–99)
HCT VFR BLD AUTO: 48.2 %
HGB BLD-MCNC: 15.9 G/DL
HGB RETIC QN AUTO: 34.8 PG (ref 28.2–36.6)
IGA SERPL-MCNC: 137 MG/DL (ref 70–312)
IGM SERPL-MCNC: 166 MG/DL (ref 43–279)
IMM GRANULOCYTES # BLD AUTO: 0.02 X10(3) UL (ref 0–1)
IMM GRANULOCYTES NFR BLD: 0.3 %
IMM RETICS NFR: 0.13 RATIO (ref 0.1–0.3)
IMMUNOGLOBULIN PNL SER-MCNC: 1380 MG/DL (ref 791–1643)
INR BLD: 2.35 (ref 0.85–1.16)
IRON SATN MFR SERPL: 30 %
IRON SERPL-MCNC: 89 UG/DL
LYMPHOCYTES # BLD AUTO: 0.74 X10(3) UL (ref 1–4)
LYMPHOCYTES NFR BLD AUTO: 11.5 %
MCH RBC QN AUTO: 31.1 PG (ref 26–34)
MCHC RBC AUTO-ENTMCNC: 33 G/DL (ref 31–37)
MCV RBC AUTO: 94.1 FL
MONOCYTES # BLD AUTO: 0.56 X10(3) UL (ref 0.1–1)
MONOCYTES NFR BLD AUTO: 8.7 %
NEUTROPHILS # BLD AUTO: 4.74 X10 (3) UL (ref 1.5–7.7)
NEUTROPHILS # BLD AUTO: 4.74 X10(3) UL (ref 1.5–7.7)
NEUTROPHILS NFR BLD AUTO: 73.7 %
NT-PROBNP SERPL-MCNC: 1209 PG/ML (ref ?–450)
OSMOLALITY SERPL CALC.SUM OF ELEC: 293 MOSM/KG (ref 275–295)
PLATELET # BLD AUTO: 175 10(3)UL (ref 150–450)
POTASSIUM SERPL-SCNC: 4.2 MMOL/L (ref 3.5–5.1)
PROT SERPL-MCNC: 7 G/DL (ref 6.4–8.2)
PROTHROMBIN TIME: 26 SECONDS (ref 11.6–14.8)
RBC # BLD AUTO: 5.12 X10(6)UL
RETICS # AUTO: 74.2 X10(3) UL (ref 22.5–147.5)
RETICS/RBC NFR AUTO: 1.5 %
SODIUM SERPL-SCNC: 140 MMOL/L (ref 136–145)
TIBC SERPL-MCNC: 292 UG/DL (ref 240–450)
TRANSFERRIN SERPL-MCNC: 196 MG/DL (ref 200–360)
WBC # BLD AUTO: 6.4 X10(3) UL (ref 4–11)

## 2023-10-20 PROCEDURE — 84165 PROTEIN E-PHORESIS SERUM: CPT

## 2023-10-20 PROCEDURE — 80053 COMPREHEN METABOLIC PANEL: CPT

## 2023-10-20 PROCEDURE — 83521 IG LIGHT CHAINS FREE EACH: CPT

## 2023-10-20 PROCEDURE — 86334 IMMUNOFIX E-PHORESIS SERUM: CPT

## 2023-10-20 PROCEDURE — 82784 ASSAY IGA/IGD/IGG/IGM EACH: CPT

## 2023-10-20 PROCEDURE — 82728 ASSAY OF FERRITIN: CPT

## 2023-10-20 PROCEDURE — 86140 C-REACTIVE PROTEIN: CPT

## 2023-10-20 PROCEDURE — 83540 ASSAY OF IRON: CPT

## 2023-10-20 PROCEDURE — 83550 IRON BINDING TEST: CPT

## 2023-10-20 PROCEDURE — 83880 ASSAY OF NATRIURETIC PEPTIDE: CPT

## 2023-10-20 PROCEDURE — 85610 PROTHROMBIN TIME: CPT

## 2023-10-20 PROCEDURE — 85025 COMPLETE CBC W/AUTO DIFF WBC: CPT

## 2023-10-20 PROCEDURE — 85045 AUTOMATED RETICULOCYTE COUNT: CPT

## 2023-10-20 PROCEDURE — 36415 COLL VENOUS BLD VENIPUNCTURE: CPT

## 2023-10-24 LAB
ALBUMIN SERPL ELPH-MCNC: 3.77 G/DL (ref 3.75–5.21)
ALBUMIN/GLOB SERPL: 1.28 {RATIO} (ref 1–2)
ALPHA1 GLOB SERPL ELPH-MCNC: 0.27 G/DL (ref 0.19–0.46)
ALPHA2 GLOB SERPL ELPH-MCNC: 0.6 G/DL (ref 0.48–1.05)
B-GLOBULIN SERPL ELPH-MCNC: 0.74 G/DL (ref 0.68–1.23)
GAMMA GLOB SERPL ELPH-MCNC: 1.32 G/DL (ref 0.62–1.7)
KAPPA LC FREE SER-MCNC: 4.06 MG/DL (ref 0.33–1.94)
KAPPA LC FREE/LAMBDA FREE SER NEPH: 1.45 {RATIO} (ref 0.26–1.65)
LAMBDA LC FREE SERPL-MCNC: 2.8 MG/DL (ref 0.57–2.63)
PROT SERPL-MCNC: 6.7 G/DL (ref 6.4–8.2)

## 2023-10-30 ENCOUNTER — OFFICE VISIT (OUTPATIENT)
Dept: PHYSICAL THERAPY | Age: 83
End: 2023-10-30
Attending: PODIATRIST

## 2023-10-30 DIAGNOSIS — M72.2 PLANTAR FASCIITIS OF RIGHT FOOT: ICD-10-CM

## 2023-10-30 DIAGNOSIS — M62.461 GASTROCNEMIUS EQUINUS OF RIGHT LOWER EXTREMITY: ICD-10-CM

## 2023-10-30 DIAGNOSIS — R26.9 GAIT DIFFICULTY: ICD-10-CM

## 2023-10-30 DIAGNOSIS — R60.0 BILATERAL LOWER EXTREMITY EDEMA: ICD-10-CM

## 2023-10-30 DIAGNOSIS — M76.71 PERONEAL TENDINITIS OF RIGHT LOWER EXTREMITY: ICD-10-CM

## 2023-10-30 DIAGNOSIS — M25.571 RIGHT ANKLE PAIN, UNSPECIFIED CHRONICITY: Primary | ICD-10-CM

## 2023-10-30 PROCEDURE — 97530 THERAPEUTIC ACTIVITIES: CPT | Performed by: PHYSICAL THERAPIST

## 2023-10-30 PROCEDURE — 97140 MANUAL THERAPY 1/> REGIONS: CPT | Performed by: PHYSICAL THERAPIST

## 2023-10-30 PROCEDURE — 97162 PT EVAL MOD COMPLEX 30 MIN: CPT | Performed by: PHYSICAL THERAPIST

## 2023-10-31 ENCOUNTER — OFFICE VISIT (OUTPATIENT)
Dept: HEMATOLOGY/ONCOLOGY | Age: 83
End: 2023-10-31
Attending: INTERNAL MEDICINE
Payer: MEDICARE

## 2023-10-31 VITALS
OXYGEN SATURATION: 94 % | TEMPERATURE: 96 F | HEIGHT: 72.01 IN | BODY MASS INDEX: 42.66 KG/M2 | DIASTOLIC BLOOD PRESSURE: 73 MMHG | SYSTOLIC BLOOD PRESSURE: 113 MMHG | HEART RATE: 83 BPM | WEIGHT: 315 LBS | RESPIRATION RATE: 20 BRPM

## 2023-10-31 DIAGNOSIS — D64.9 NORMOCYTIC ANEMIA: ICD-10-CM

## 2023-10-31 DIAGNOSIS — Z86.711 HISTORY OF PULMONARY EMBOLISM: ICD-10-CM

## 2023-10-31 DIAGNOSIS — Z95.828 PRESENCE OF IVC FILTER: ICD-10-CM

## 2023-10-31 DIAGNOSIS — D47.2 MGUS (MONOCLONAL GAMMOPATHY OF UNKNOWN SIGNIFICANCE): ICD-10-CM

## 2023-10-31 DIAGNOSIS — D50.0 IRON DEFICIENCY ANEMIA SECONDARY TO BLOOD LOSS (CHRONIC): ICD-10-CM

## 2023-10-31 DIAGNOSIS — I82.409 RECURRENT DEEP VEIN THROMBOSIS (DVT) (HCC): Primary | ICD-10-CM

## 2023-10-31 PROCEDURE — 99214 OFFICE O/P EST MOD 30 MIN: CPT | Performed by: INTERNAL MEDICINE

## 2023-10-31 NOTE — PROGRESS NOTES
Education Record     Learner:  Patient and Spouse     Disease / Diagnosis:DVT/PE     Barriers / Limitations:  None                Comments:     Method:  Brief focused                Comments:     General Topics:  Plan of care reviewed                Comments:     Outcome:  Shows understanding                Comments: here for 6 month MD f/up. Pt states he is taking coumadin as directed and has been tolerating without complications. pt states his energy is low. States he had colonoscopy 9/21. Will be getting PT for his right ankle and foot. Pt states after colonoscopy pt developed afib and wore heart monitor for 1 week. Wife states no changes were made to any medications.

## 2023-11-01 ENCOUNTER — LAB ENCOUNTER (OUTPATIENT)
Dept: LAB | Age: 83
End: 2023-11-01
Attending: FAMILY MEDICINE
Payer: MEDICARE

## 2023-11-01 ENCOUNTER — OFFICE VISIT (OUTPATIENT)
Dept: FAMILY MEDICINE CLINIC | Facility: CLINIC | Age: 83
End: 2023-11-01
Payer: MEDICARE

## 2023-11-01 VITALS
OXYGEN SATURATION: 95 % | SYSTOLIC BLOOD PRESSURE: 112 MMHG | RESPIRATION RATE: 18 BRPM | HEART RATE: 81 BPM | DIASTOLIC BLOOD PRESSURE: 72 MMHG

## 2023-11-01 DIAGNOSIS — N40.1 BPH WITH OBSTRUCTION/LOWER URINARY TRACT SYMPTOMS: ICD-10-CM

## 2023-11-01 DIAGNOSIS — I48.0 PAROXYSMAL ATRIAL FIBRILLATION (HCC): ICD-10-CM

## 2023-11-01 DIAGNOSIS — M79.671 RIGHT FOOT PAIN: ICD-10-CM

## 2023-11-01 DIAGNOSIS — Z00.00 ENCOUNTER FOR ANNUAL HEALTH EXAMINATION: Primary | ICD-10-CM

## 2023-11-01 DIAGNOSIS — R73.9 HYPERGLYCEMIA: ICD-10-CM

## 2023-11-01 DIAGNOSIS — E78.2 HYPERLIPEMIA, MIXED: ICD-10-CM

## 2023-11-01 DIAGNOSIS — R60.0 BILATERAL LEG EDEMA: ICD-10-CM

## 2023-11-01 DIAGNOSIS — N13.8 BPH WITH OBSTRUCTION/LOWER URINARY TRACT SYMPTOMS: ICD-10-CM

## 2023-11-01 DIAGNOSIS — D47.2 MGUS (MONOCLONAL GAMMOPATHY OF UNKNOWN SIGNIFICANCE): ICD-10-CM

## 2023-11-01 DIAGNOSIS — I87.2 VENOUS INSUFFICIENCY: ICD-10-CM

## 2023-11-01 DIAGNOSIS — R73.03 PREDIABETES: ICD-10-CM

## 2023-11-01 DIAGNOSIS — E66.01 MORBID OBESITY WITH BODY MASS INDEX OF 40.0-44.9 IN ADULT (HCC): ICD-10-CM

## 2023-11-01 DIAGNOSIS — M48.062 SPINAL STENOSIS OF LUMBAR REGION WITH NEUROGENIC CLAUDICATION: ICD-10-CM

## 2023-11-01 DIAGNOSIS — I71.43 INFRARENAL ABDOMINAL AORTIC ANEURYSM (AAA) WITHOUT RUPTURE (HCC): ICD-10-CM

## 2023-11-01 DIAGNOSIS — Z98.1 S/P SPINAL FUSION: ICD-10-CM

## 2023-11-01 LAB
CHOLEST SERPL-MCNC: 104 MG/DL (ref ?–200)
EST. AVERAGE GLUCOSE BLD GHB EST-MCNC: 146 MG/DL (ref 68–126)
FASTING PATIENT LIPID ANSWER: YES
HBA1C MFR BLD: 6.7 % (ref ?–5.7)
HDLC SERPL-MCNC: 40 MG/DL (ref 40–59)
LDLC SERPL CALC-MCNC: 42 MG/DL (ref ?–100)
NONHDLC SERPL-MCNC: 64 MG/DL (ref ?–130)
TRIGL SERPL-MCNC: 125 MG/DL (ref 30–149)
TSI SER-ACNC: 1.89 MIU/ML (ref 0.36–3.74)
VLDLC SERPL CALC-MCNC: 17 MG/DL (ref 0–30)

## 2023-11-01 PROCEDURE — 36415 COLL VENOUS BLD VENIPUNCTURE: CPT

## 2023-11-01 PROCEDURE — 83036 HEMOGLOBIN GLYCOSYLATED A1C: CPT

## 2023-11-01 PROCEDURE — 80061 LIPID PANEL: CPT

## 2023-11-01 PROCEDURE — 84443 ASSAY THYROID STIM HORMONE: CPT

## 2023-11-02 ENCOUNTER — OFFICE VISIT (OUTPATIENT)
Dept: PHYSICAL THERAPY | Age: 83
End: 2023-11-02
Attending: PODIATRIST
Payer: MEDICARE

## 2023-11-02 PROCEDURE — 97110 THERAPEUTIC EXERCISES: CPT

## 2023-11-02 PROCEDURE — 97140 MANUAL THERAPY 1/> REGIONS: CPT

## 2023-11-02 NOTE — PROGRESS NOTES
Diagnosis:   -Right ankle pain, unspecified chronicity (M25.571)  -Peroneal tendinitis of right lower extremity (M76.71)  -Gait difficulty (R26.9)  -Bilateral lower extremity edema (R60.0)  -Gastrocnemius equinus of right lower extremity (M62.461)  -Plantar fasciitis of right foot (M72.2)      Referring Provider: Rory Ritter  Date of Evaluation:    10/03/2023    Precautions:  Fall Risk Next MD visit:   To be determined. Date of Surgery: n/a   Insurance Primary/Secondary: MEDICARE / [employer]     # Auth Visits: No prior authorization is required per appointment notes; plan on initial evaluation is written for 8 visits per chart review. Subjective: Right foot pain; lateral aspect along the 5th metatarsal region and within the lateral posterior aspect. Has had these symptoms since August 2023; just seemed to came about. Gotten worse. Yesterday was [employer]" day for him. Denies pay at night; present with standing and walking. If he has just sitting he does not bother him, but comes about with short distance walking such as walking to chair. He was seen by Rory Ritter and was provided with lace up ankle brace. He has been wearing this brace 2 weeks; he states he has not noticed any improvement with this brace. Reports decrease complaint following last session as mobility activities reported helpful. Reports has difficulty with balance use of single point cane and rolling walker for longer distance ambulation since 2019 following multi-level thoracolumbar fusion. Reports fear falling.   Pain:   11/2/2023  -Pain rating is variable ranging from 0-7/10 pain.  -Worse in AM with standing and first steps  -Worse following sitting based upon prior activity    Objective:   11/2/2023  -Minimal tenderness 5th metatarsal  -Moderate tenderness @ Peroneus longus and brevis  -Active plantar flexion and eversion is with comparable complaint  -Passive eversion and plantar flexion with pain complaint  -Resisted plantar flexion and eversion is without complaint complaint.  -Peroneus longus/brevis 3+5    Assessment:  Siddhartha Perla presenting to session with complaint of lateral foot/ankle pain of the right LE suggestive of tendinopathy with mobility deficit limitation standing/walking activity secondary to complaint with associated comorbidity impacting balance impairment with use of assistive device since 2019 following multi-level fusion. Benefit from mobility activity of the ankle with progressive resistance exercise, we did trial and medial wedge as insert to current shoe wear for symptom modulation. Also benefit from progression of to address limitations with bed, mobility, ADL and balance enhancement of which could be a progression. Goals:   Able to walk within home and community without right/ankle pain during or following at least 80% of the time. (Goal set: 11/2/2023)  Able to transition from sitting to standing without out right ankle/foot pain at least 80% of the time. (Goal set: 11/2/2023)  Able to transition from siting to and from supine independently with need for assistance from spouse 80% of the time. (Goal set: 11/2/2023)  Able to perform sit/stand transition from standard chair height within age/gender norms during 30 second sit/sand stand with minimal fear of posterior balance loss. (Goal set: 11/2/2023)    Plan: Remain at present frequency/duration of twice per week. Initial emphasis on address current complaint limiting standing and walking, sit <-> supine transition, sit <-> stand transition. Can progress to further assessment relative to limitation in function and balance. Date: 11/2/2023  Tx#:  2/8     -Manual Therapy x 25 minutes  -Joint mobilization to talocrural and subtalar joints right LE grade III -IV based on end-feel and response without sitting  -Soft tissue mobilization of peroneal muscles.    -Therapeutic excercise x 15 minutes  -Resisted eversion and plantar flexion progressing from isometric to concentric and eccentric contractions with yellow resistance band with reps/load based on performance and symptom threshold (3/10). -Seated knee extension with ankle DF x 5 reps x 3 sets each LE (based on patient tolerance)      HEP-Activity as tolerated, re-enforced active range of motion exercise and sitting knee extension; trial of wedge in shoe. Would be based ankle lace wrap base on benefit verse need for further immobilization; can see the intent for short term use for trial for symptom modulation, however, this is likely mobility/muscle performance issue.      Charges:   Manual Therapy x 2  Therapeutic excercise x 1  Total Timed Treatment: 40 min    Total Treatment Time: 40 min

## 2023-11-07 ENCOUNTER — APPOINTMENT (OUTPATIENT)
Dept: HEMATOLOGY/ONCOLOGY | Age: 83
End: 2023-11-07
Attending: INTERNAL MEDICINE
Payer: MEDICARE

## 2023-11-07 ENCOUNTER — OFFICE VISIT (OUTPATIENT)
Dept: PHYSICAL THERAPY | Age: 83
End: 2023-11-07
Attending: PODIATRIST
Payer: MEDICARE

## 2023-11-07 PROCEDURE — 97140 MANUAL THERAPY 1/> REGIONS: CPT

## 2023-11-07 PROCEDURE — 97110 THERAPEUTIC EXERCISES: CPT

## 2023-11-07 PROCEDURE — 97530 THERAPEUTIC ACTIVITIES: CPT

## 2023-11-07 NOTE — PROGRESS NOTES
Diagnosis:   -Right ankle pain, unspecified chronicity (M25.571)  -Peroneal tendinitis of right lower extremity (M76.71)  -Gait difficulty (R26.9)  -Bilateral lower extremity edema (R60.0)  -Gastrocnemius equinus of right lower extremity (M62.461)  -Plantar fasciitis of right foot (M72.2)      Referring Provider: Van Hammonds  Date of Evaluation:    10/03/2023    Precautions:  Fall Risk Next MD visit:   To be determined. Date of Surgery: n/a   Insurance Primary/Secondary: MEDICARE / [employer]     # Auth Visits: No prior authorization is required per appointment notes; plan on initial evaluation is written for 8 visits per chart review. Subjective: Dion Recio reports the pain in his right foot has been feeling better; he would estimate 95% better. Bits of pain now and then, but doing better. Denies current pain. Pain:   2023  -Pain ratin/10 (at present assessed with walking)  2023  -Pain rating is variable ranging from 0-7/10 pain.  -Worse in AM with standing and first steps  -Worse following sitting based upon prior activity  Objective:   2023  -Minimal tenderness @ 5th metatarsal  -Decreased tenderness @ peroneus longus brevis  -Decrease complaint with resisted plantar flexion and eversion. 2023  -Minimal tenderness 5th metatarsal  -Moderate tenderness @ Peroneus longus and brevis  -Active plantar flexion and eversion is with comparable complaint  -Passive eversion and plantar flexion with pain complaint  -Resisted plantar flexion and eversion is without complaint complaint.  -Peroneus longus/brevis 3+5    Assessment:  Dion Recio presenting to session with decrease complaint within lateral foot ankle. Favorable report of decrease activity with walking. Demonstrated improved sit/stand transition within greater symmetrical weight bearing with instruction to increase hip flexion (hip hinge) with forward momentum phase.     Goals:   Able to walk within home and community without right/ankle pain during or following at least 80% of the time. (Goal set: 11/2/2023)  Able to transition from sitting to standing without out right ankle/foot pain at least 80% of the time. (Goal set: 11/2/2023)  Able to transition from siting to and from supine independently with need for assistance from spouse 80% of the time. (Goal set: 11/2/2023)  Able to perform sit/stand transition from standard chair height within age/gender norms during 30 second sit/sand stand with minimal fear of posterior balance loss. (Goal set: 11/2/2023)    Plan: Remain at present frequency/duration of twice per week. Initial emphasis on address current complaint limiting standing and walking, sit <-> supine transition, sit <-> stand transition. Can progress to further assessment relative to limitation in function and balance. Date: 11/2/2023  Tx#:  2/8   Date: 11/7/2023  Tx#:  3/8     -Manual Therapy x 25 minutes  -Joint mobilization to talocrural and subtalar joints right LE grade III -IV based on end-feel and response without sitting  -Soft tissue mobilization of peroneal muscles. Manual Therapy x 15 minutes  -Joint mobilization to talocrural and subtalar joints right LE grade III -IV based on end-feel and response without sitting  -Soft tissue mobilization of peroneal muscles. -Therapeutic excercise x 15 minutes  -Resisted eversion and plantar flexion progressing from isometric to concentric and eccentric contractions with yellow resistance band with reps/load based on performance and symptom threshold (3/10).     -Seated knee extension with ankle DF x 5 reps x 3 sets each LE (based on patient tolerance) -Therapeutic excercise x 15 minutes  -Resisted eversion and plantar flexion progressing from isometric to concentric and eccentric contractions with yellow resistance band with reps/load based on performance and symptom threshold   -Seated knee extension with ankle DF x 10 reps x 3 sets each LE.  -Seated heel raise x 20 reps x 2 sets    Therapeutic activity x 15 minutes  -Sit/stand - stand/sit with instruction in increase trunk (hip) flexion (hip hinge) during forward momentum phase. Instruction via verbal instruction and demonstration with return demonstration. Performed 5 reps x 3 sets (HEP)     HEP-Activity as tolerated, re-enforced active range of motion exercise and sitting knee extension; trial of wedge in shoe. Would be based ankle lace wrap base on benefit verse need for further immobilization; can see the intent for short term use for trial for symptom modulation, however, this is likely mobility/muscle performance issue. HEP:  Seated heel raise: 15-20 reps x 3 sets; continued use of heal wedge; sit/stand with increased hip flexion (hip hinge).      Charges:   Manual Therapy x 1  Therapeutic excercise x 1  Therapeutic activity x 1  Total Timed Treatment: 45 min    Total Treatment Time: 45 min

## 2023-11-09 ENCOUNTER — OFFICE VISIT (OUTPATIENT)
Dept: PHYSICAL THERAPY | Age: 83
End: 2023-11-09
Attending: PODIATRIST
Payer: MEDICARE

## 2023-11-09 PROCEDURE — 97110 THERAPEUTIC EXERCISES: CPT

## 2023-11-09 PROCEDURE — 97112 NEUROMUSCULAR REEDUCATION: CPT

## 2023-11-09 PROCEDURE — 97116 GAIT TRAINING THERAPY: CPT

## 2023-11-09 NOTE — PROGRESS NOTES
Diagnosis:   -Right ankle pain, unspecified chronicity (M25.571)  -Peroneal tendinitis of right lower extremity (M76.71)  -Gait difficulty (R26.9)  -Bilateral lower extremity edema (R60.0)  -Gastrocnemius equinus of right lower extremity (M62.461)  -Plantar fasciitis of right foot (M72.2)      Referring Provider: Emelia Maher  Date of Evaluation:    10/03/2023    Precautions:  Fall Risk Next MD visit:   To be determined. Date of Surgery: n/a   Insurance Primary/Secondary: MEDICARE / [employer]     # Auth Visits: No prior authorization is required per appointment notes; plan on initial evaluation is written for 8 visits per chart review. Subjective: Angely Recio reports that his back has been sore since last visits. Contributes the soreness from sitting and leaning back with performing leg exercises. Indicates right ankle/foot with mild complaint. Indicated back is uncofortable with sitting without back support or with standing. Pain:   2023  -Pain ratin/10 (at present assessed with walking)  2023  -Pain rating is variable ranging from 0-7/10 pain.  -Worse in AM with standing and first steps  -Worse following sitting based upon prior activity  Objective:   2023  -Minimal tenderness @ 5th metatarsal  -Decreased tenderness @ peroneus longus brevis  -Decrease complaint with resisted plantar flexion and eversion. 2023  -Minimal tenderness 5th metatarsal  -Moderate tenderness @ Peroneus longus and brevis  -Active plantar flexion and eversion is with comparable complaint  -Passive eversion and plantar flexion with pain complaint  -Resisted plantar flexion and eversion is without complaint complaint.  -Peroneus longus/brevis 3+5    Assessment: Altered sessions this visit to standing activities to promote hip extension to maintain upright position within close chain postural stability exercise. During session patient reported minimal low back pain. Without ankle/foot pain during session.  Goals in progress at this time. Goals:   Able to walk within home and community without right/ankle pain during or following at least 80% of the time. (Goal set: 11/2/2023)  Able to transition from sitting to standing without out right ankle/foot pain at least 80% of the time. (Goal set: 11/2/2023)  Able to transition from siting to and from supine independently with need for assistance from spouse 80% of the time. (Goal set: 11/2/2023)  Able to perform sit/stand transition from standard chair height within age/gender norms during 30 second sit/sand stand with minimal fear of posterior balance loss. (Goal set: 11/2/2023)    Plan: Remain at present frequency/duration of twice per week. Initial emphasis on address current complaint limiting standing and walking and to improve sit <-> supine transition, sit <-> stand transition. Can progress to further assessment relative to limitation in function and balance. Date: 11/2/2023  Tx#:  2/8   Date: 11/7/2023  Tx#:  3/8   Date: 11/9/2023  Tx#:  4/8     -Manual Therapy x 25 minutes  -Joint mobilization to talocrural and subtalar joints right LE grade III -IV based on end-feel and response without sitting  -Soft tissue mobilization of peroneal muscles. Manual Therapy x 15 minutes  -Joint mobilization to talocrural and subtalar joints right LE grade III -IV based on end-feel and response without sitting  -Soft tissue mobilization of peroneal muscles.  Neuromuscular re-education x 25 minutes  -Bilateral shoulder extension extension from shoulder height (48#) with emphasis on anterior shift with hip/trunk extension verse posterior weight shift with hip/trunk flexion to enhance postural control  -Split stance  hip rotation in standing within II bars within unilateral to promote hip range of proprioception ~ 20 reps each direction bilaterally    -Therapeutic excercise x 15 minutes  -Resisted eversion and plantar flexion progressing from isometric to concentric and eccentric contractions with yellow resistance band with reps/load based on performance and symptom threshold (3/10). -Seated knee extension with ankle DF x 5 reps x 3 sets each LE (based on patient tolerance) -Therapeutic excercise x 15 minutes  -Resisted eversion and plantar flexion progressing from isometric to concentric and eccentric contractions with yellow resistance band with reps/load based on performance and symptom threshold   -Seated knee extension with ankle DF x 10 reps x 3 sets each LE.  -Seated heel raise x 20 reps x 2 sets Therapeutic excercise x 10 minutes  -Split stance with emphasis on stance limb stability with unilateral upper row @ cable weights 38# 15 reps x 2 sets with alternating lead leg        Therapeutic activity x 15 minutes  -Sit/stand - stand/sit with instruction in increase trunk (hip) flexion (hip hinge) during forward momentum phase. Instruction via verbal instruction and demonstration with return demonstration. Performed 5 reps x 3 sets (HEP)   Gait training x 10 minutes  -Walking with bilateral canes with increase step length length of gym area ~ 25 feet x 10    HEP-Activity as tolerated, re-enforced active range of motion exercise and sitting knee extension; trial of wedge in shoe. Would be based ankle lace wrap base on benefit verse need for further immobilization; can see the intent for short term use for trial for symptom modulation, however, this is likely mobility/muscle performance issue. HEP:  Seated heel raise: 15-20 reps x 3 sets; continued use of heal wedge; sit/stand with increased hip flexion (hip hinge).  To be progressed at next visit     Charges:   Neuromuscular re-education x 1  Therapeutic excercise x 1  Gait Training x 1  Total Timed Treatment: 45 min    Total Treatment Time: 45 min

## 2023-11-14 ENCOUNTER — OFFICE VISIT (OUTPATIENT)
Dept: PHYSICAL THERAPY | Age: 83
End: 2023-11-14
Attending: PODIATRIST
Payer: MEDICARE

## 2023-11-14 PROCEDURE — 97140 MANUAL THERAPY 1/> REGIONS: CPT

## 2023-11-14 PROCEDURE — 97110 THERAPEUTIC EXERCISES: CPT

## 2023-11-14 NOTE — PROGRESS NOTES
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 Date of Surgery: n/a   Insurance Primary/Secondary: MEDICARE / [employer]     # Auth Visits: No prior authorization is required per appointment notes; plan on initial evaluation is written for 8 visits per chart review. Subjective: Dorothea Recio reports her right ankle/foot is doing better. He reports pain in the right ankle foot is no longer as frequent. Indicates right/ankle foot is present only ~ 15% of the time. No longer constant. Pain:   2023  -Pain ratin/10 (presenting to session) - right lateral foot  -Pain ratin/10 (during session) - right lateral foot. 2023  -Pain ratin/10 (at present assessed with walking)  2023  -Pain rating is variable ranging from 0-7/10 pain.  -Worse in AM with standing and first steps  -Worse following sitting based upon prior activity  Objective:   2023  -Minimal tenderness @ 5th metatarsal  -Decreased tenderness @ peroneus longus brevis  -Decrease complaint with resisted plantar flexion and eversion. 2023  -Minimal tenderness 5th metatarsal  -Moderate tenderness @ Peroneus longus and brevis  -Active plantar flexion and eversion is with comparable complaint  -Passive eversion and plantar flexion with pain complaint  -Resisted plantar flexion and eversion is without complaint complaint.  -Peroneus longus/brevis 3+5    Assessment: Dorothea Recio presenting to session with report of decrease in symptom severity relative to pain in the lateral aspect of the right foot.  During session symptom provocation was present with weight shifting activity with dorsiflexion and eversion or plantar flexion eversion. Decrease complaint following joint mobilization. Goals:   Able to walk within home and community without right/ankle pain during or following at least 80% of the time. (Goal set: 11/2/2023)  Able to transition from sitting to standing without out right ankle/foot pain at least 80% of the time. (Goal set: 11/2/2023)  Able to transition from siting to and from supine independently with need for assistance from spouse 80% of the time. (Goal set: 11/2/2023)  Able to perform sit/stand transition from standard chair height within age/gender norms during 30 second sit/sand stand with minimal fear of posterior balance loss. (Goal set: 11/2/2023)    Plan: Weight bearing strenghtening; weight shifting, joint mobilization  Date: 11/2/2023  Tx#:  2/8   Date: 11/7/2023  Tx#:  3/8   Date: 11/9/2023  Tx#:  4/8   Date: 11/14/2023  Tx#:  5/8     -Manual Therapy x 25 minutes  -Joint mobilization to talocrural and subtalar joints right LE grade III -IV based on end-feel and response without sitting  -Soft tissue mobilization of peroneal muscles. Manual Therapy x 15 minutes  -Joint mobilization to talocrural and subtalar joints right LE grade III -IV based on end-feel and response without sitting  -Soft tissue mobilization of peroneal muscles. Neuromuscular re-education x 25 minutes  -Bilateral shoulder extension extension from shoulder height (48#) with emphasis on anterior shift with hip/trunk extension verse posterior weight shift with hip/trunk flexion to enhance postural control  -Split stance  hip rotation in standing within II bars within unilateral to promote hip range of proprioception ~ 20 reps each direction bilaterally  Manual Therapy x 25 minutes  -Joint mobilization to talocrural and subtalar joints right LE grade III -IV based on end-feel and response without sitting  -Soft tissue mobilization of peroneal muscles.    -Therapeutic excercise x 15 minutes  -Resisted eversion and plantar flexion progressing from isometric to concentric and eccentric contractions with yellow resistance band with reps/load based on performance and symptom threshold (3/10). -Seated knee extension with ankle DF x 5 reps x 3 sets each LE (based on patient tolerance) -Therapeutic excercise x 15 minutes  -Resisted eversion and plantar flexion progressing from isometric to concentric and eccentric contractions with yellow resistance band with reps/load based on performance and symptom threshold   -Seated knee extension with ankle DF x 10 reps x 3 sets each LE.  -Seated heel raise x 20 reps x 2 sets Therapeutic excercise x 10 minutes  -Split stance with emphasis on stance limb stability with unilateral upper row @ cable weights 38# 15 reps x 2 sets with alternating lead leg     Therapeutic excercise x 15 minutes  -anterior, posterior lateral, medial, step with red resistance band, right lower extremity stationary: 15 reps x 2 sets each direction.  -Tandem stance anterior/posterior weight shift (rocking) to enhance muscle performance/mobility for gait. 2 min x 2 each position (right foot forward, right foot posterior)    Therapeutic activity x 15 minutes  -Sit/stand - stand/sit with instruction in increase trunk (hip) flexion (hip hinge) during forward momentum phase. Instruction via verbal instruction and demonstration with return demonstration. Performed 5 reps x 3 sets (HEP)   Gait training x 10 minutes  -Walking with bilateral canes with increase step length length of gym area ~ 25 feet x 10  ___________________   HEP-Activity as tolerated, re-enforced active range of motion exercise and sitting knee extension; trial of wedge in shoe. Would be based ankle lace wrap base on benefit verse need for further immobilization; can see the intent for short term use for trial for symptom modulation, however, this is likely mobility/muscle performance issue.  HEP:  Seated heel raise: 15-20 reps x 3 sets; continued use of heal wedge; sit/stand with increased hip flexion (hip hinge).  To be progressed at next visit      Charges:   Manual Therapy x 2  Therapeutic excercise x 1  Total Timed Treatment: 45 min    Total Treatment Time: 45 min

## 2023-11-16 ENCOUNTER — OFFICE VISIT (OUTPATIENT)
Dept: PHYSICAL THERAPY | Age: 83
End: 2023-11-16
Attending: PODIATRIST
Payer: MEDICARE

## 2023-11-16 PROCEDURE — 97530 THERAPEUTIC ACTIVITIES: CPT

## 2023-11-16 PROCEDURE — 97110 THERAPEUTIC EXERCISES: CPT

## 2023-11-16 NOTE — PROGRESS NOTES
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 Date of Surgery: n/a   Insurance Primary/Secondary: MEDICARE / [employer]     # Auth Visits: No prior authorization is required per appointment notes; plan on initial evaluation is written for 8 visits per chart review. Subjective: Dorothea Recio reports right ankle foot has been doing better. He reports a reduction in pain about the lateral aspect of the right foot with walking or with initial steps after sitting. He indicates that when is standing to use the bathroom, the way in which he has lean when attending to his pain does bring about pain with lateral foot of which equates to the pressure that he has to place in this region. Pain:   2023  -Pain ratin/10 (presenting to session) - right lateral foot  -Pain ratin/10 (during session) - right lateral foot. 2023  -Pain ratin/10 (at present assessed with walking)  2023  -Pain rating is variable ranging from 0-7/10 pain.  -Worse in AM with standing and first steps  -Worse following sitting based upon prior activity  Objective:   2023  -Minimal tenderness @ 5th metatarsal  -Decreased tenderness @ peroneus longus brevis  -Decrease complaint with resisted plantar flexion and eversion.   2023  -Minimal tenderness 5th metatarsal  -Moderate tenderness @ Peroneus longus and brevis  -Active plantar flexion and eversion is with comparable complaint  -Passive eversion and plantar flexion with pain complaint  -Resisted plantar flexion and eversion is without complaint complaint.  -Peroneus longus/brevis 3+5    Assessment: Maria Dolores Recio presenting to session with report of decrease in symptom severity relative to pain in the lateral aspect of the right foot. During session symptom provocation with weight shifting activity with dorsiflexion and eversion or plantar flexion eversion was not present as he existed at previous sessions. Goals:   Able to walk within home and community without right/ankle pain during or following at least 80% of the time. (Goal set: 11/2/2023)  Able to transition from sitting to standing without out right ankle/foot pain at least 80% of the time. (Goal set: 11/2/2023)  Able to transition from siting to and from supine independently with need for assistance from spouse 80% of the time. (Goal set: 11/2/2023)  Able to perform sit/stand transition from standard chair height within age/gender norms during 30 second sit/sand stand with minimal fear of posterior balance loss. (Goal set: 11/2/2023)    Plan: Weight bearing strenghtening; weight shifting, joint mobilization. Discuss with patient progress to functional goals. Date: 11/2/2023  Tx#:  2/8   Date: 11/7/2023  Tx#:  3/8   Date: 11/9/2023  Tx#:  4/8   Date: 11/14/2023  Tx#:  5/8   Date: 11/16/2023  Tx#:  6     -Manual Therapy x 25 minutes  -Joint mobilization to talocrural and subtalar joints right LE grade III -IV based on end-feel and response without sitting  -Soft tissue mobilization of peroneal muscles. Manual Therapy x 15 minutes  -Joint mobilization to talocrural and subtalar joints right LE grade III -IV based on end-feel and response without sitting  -Soft tissue mobilization of peroneal muscles.  Neuromuscular re-education x 25 minutes  -Bilateral shoulder extension extension from shoulder height (48#) with emphasis on anterior shift with hip/trunk extension verse posterior weight shift with hip/trunk flexion to enhance postural control  -Split stance  hip rotation in standing within II bars within unilateral to promote hip range of proprioception ~ 20 reps each direction bilaterally  Manual Therapy x 25 minutes  -Joint mobilization to talocrural and subtalar joints right LE grade III -IV based on end-feel and response without sitting  -Soft tissue mobilization of peroneal muscles. ___________________   -Therapeutic excercise x 15 minutes  -Resisted eversion and plantar flexion progressing from isometric to concentric and eccentric contractions with yellow resistance band with reps/load based on performance and symptom threshold (3/10). -Seated knee extension with ankle DF x 5 reps x 3 sets each LE (based on patient tolerance) -Therapeutic excercise x 15 minutes  -Resisted eversion and plantar flexion progressing from isometric to concentric and eccentric contractions with yellow resistance band with reps/load based on performance and symptom threshold   -Seated knee extension with ankle DF x 10 reps x 3 sets each LE.  -Seated heel raise x 20 reps x 2 sets Therapeutic excercise x 10 minutes  -Split stance with emphasis on stance limb stability with unilateral upper row @ cable weights 38# 15 reps x 2 sets with alternating lead leg     Therapeutic excercise x 15 minutes  -anterior, posterior lateral, medial, step with red resistance band, right lower extremity stationary: 15 reps x 2 sets each direction.  -Tandem stance anterior/posterior weight shift (rocking) to enhance muscle performance/mobility for gait. 2 min x 2 each position (right foot forward, right foot posterior) Therapeutic excercise x 35 minutes  -anterior, posterior lateral, medial, step with red resistance band, right lower extremity stationary: 15 reps x 2 sets each direction.  -Tandem stance anterior/posterior weight shift (rocking) to enhance muscle performance/mobility for gait.  2 min x 2 each position (right foot forward, right foot posterior)  -Split stance with emphasis on stance limb stability with unilateral upper row @ cable weights 38# 15 reps x 2 sets with alternating lead leg  -Sit/stand lower extremity squat: 15 reps x 3 sets to enhance lower quarter strength    Therapeutic activity x 15 minutes  -Sit/stand - stand/sit with instruction in increase trunk (hip) flexion (hip hinge) during forward momentum phase. Instruction via verbal instruction and demonstration with return demonstration. Performed 5 reps x 3 sets (HEP)   Gait training x 10 minutes  -Walking with bilateral canes with increase step length length of gym area ~ 25 feet x 10  ___________________ Therapeutic activity x 10 minutes  -Discussion relative to base of support in standing with wide base of support with static standing verse shift to right with sustained standing tasks. HEP-Activity as tolerated, re-enforced active range of motion exercise and sitting knee extension; trial of wedge in shoe. Would be based ankle lace wrap base on benefit verse need for further immobilization; can see the intent for short term use for trial for symptom modulation, however, this is likely mobility/muscle performance issue. HEP:  Seated heel raise: 15-20 reps x 3 sets; continued use of heal wedge; sit/stand with increased hip flexion (hip hinge). To be progressed at next visit  -Review home program at next visit. Charges:    Therapeutic excercise x 2  Therapeutic activity x 1  Total Timed Treatment: 45 min    Total Treatment Time: 45 min

## 2023-11-17 ENCOUNTER — ANTI-COAG VISIT (OUTPATIENT)
Dept: HEMATOLOGY/ONCOLOGY | Age: 83
End: 2023-11-17
Attending: INTERNAL MEDICINE
Payer: MEDICARE

## 2023-11-17 LAB — INR: 3.1 (ref 0.8–1.3)

## 2023-11-17 PROCEDURE — 36416 COLLJ CAPILLARY BLOOD SPEC: CPT

## 2023-11-17 PROCEDURE — 85610 PROTHROMBIN TIME: CPT

## 2023-11-20 ENCOUNTER — APPOINTMENT (OUTPATIENT)
Dept: URBAN - METROPOLITAN AREA CLINIC 247 | Age: 83
Setting detail: DERMATOLOGY
End: 2023-11-21

## 2023-11-20 DIAGNOSIS — Z85.828 PERSONAL HISTORY OF OTHER MALIGNANT NEOPLASM OF SKIN: ICD-10-CM

## 2023-11-20 DIAGNOSIS — L82.1 OTHER SEBORRHEIC KERATOSIS: ICD-10-CM

## 2023-11-20 DIAGNOSIS — L91.8 OTHER HYPERTROPHIC DISORDERS OF THE SKIN: ICD-10-CM

## 2023-11-20 DIAGNOSIS — L57.0 ACTINIC KERATOSIS: ICD-10-CM

## 2023-11-20 DIAGNOSIS — L57.8 OTHER SKIN CHANGES DUE TO CHRONIC EXPOSURE TO NONIONIZING RADIATION: ICD-10-CM

## 2023-11-20 DIAGNOSIS — D18.0 HEMANGIOMA: ICD-10-CM

## 2023-11-20 DIAGNOSIS — L20.89 OTHER ATOPIC DERMATITIS: ICD-10-CM

## 2023-11-20 DIAGNOSIS — D49.2 NEOPLASM OF UNSPECIFIED BEHAVIOR OF BONE, SOFT TISSUE, AND SKIN: ICD-10-CM

## 2023-11-20 PROBLEM — D18.01 HEMANGIOMA OF SKIN AND SUBCUTANEOUS TISSUE: Status: ACTIVE | Noted: 2023-11-20

## 2023-11-20 PROCEDURE — 17000 DESTRUCT PREMALG LESION: CPT | Mod: 59

## 2023-11-20 PROCEDURE — 17003 DESTRUCT PREMALG LES 2-14: CPT

## 2023-11-20 PROCEDURE — 11302 SHAVE SKIN LESION 1.1-2.0 CM: CPT

## 2023-11-20 PROCEDURE — OTHER LIQUID NITROGEN: OTHER

## 2023-11-20 PROCEDURE — OTHER SHAVE REMOVAL: OTHER

## 2023-11-20 PROCEDURE — OTHER COUNSELING: OTHER

## 2023-11-20 PROCEDURE — OTHER PRESCRIPTION MEDICATION MANAGEMENT: OTHER

## 2023-11-20 PROCEDURE — 99213 OFFICE O/P EST LOW 20 MIN: CPT | Mod: 25

## 2023-11-20 PROCEDURE — OTHER MIPS QUALITY: OTHER

## 2023-11-20 ASSESSMENT — LOCATION DETAILED DESCRIPTION DERM
LOCATION DETAILED: RIGHT MEDIAL SUPERIOR CHEST
LOCATION DETAILED: RIGHT VENTRAL PROXIMAL FOREARM
LOCATION DETAILED: UPPER STERNUM
LOCATION DETAILED: RIGHT CENTRAL TEMPLE
LOCATION DETAILED: LEFT INFERIOR ANTERIOR NECK
LOCATION DETAILED: RIGHT LATERAL TEMPLE
LOCATION DETAILED: LEFT INFERIOR MEDIAL MALAR CHEEK
LOCATION DETAILED: RIGHT MEDIAL TEMPLE
LOCATION DETAILED: LEFT MEDIAL UPPER BACK
LOCATION DETAILED: LEFT SUPERIOR MEDIAL UPPER BACK
LOCATION DETAILED: LEFT LATERAL PROXIMAL PRETIBIAL REGION

## 2023-11-20 ASSESSMENT — LOCATION ZONE DERM
LOCATION ZONE: NECK
LOCATION ZONE: TRUNK
LOCATION ZONE: LEG
LOCATION ZONE: FACE
LOCATION ZONE: ARM

## 2023-11-20 ASSESSMENT — LOCATION SIMPLE DESCRIPTION DERM
LOCATION SIMPLE: RIGHT TEMPLE
LOCATION SIMPLE: LEFT UPPER BACK
LOCATION SIMPLE: CHEST
LOCATION SIMPLE: RIGHT FOREARM
LOCATION SIMPLE: LEFT CHEEK
LOCATION SIMPLE: LEFT ANTERIOR NECK
LOCATION SIMPLE: LEFT PRETIBIAL REGION

## 2023-11-20 NOTE — PROCEDURE: PRESCRIPTION MEDICATION MANAGEMENT
Render In Strict Bullet Format?: No
Detail Level: Zone
Continue Regimen: Clobetasol 0.05% ointment to AA BID, PRN flare

## 2023-11-20 NOTE — PROCEDURE: SHAVE REMOVAL
Medical Necessity Information: It is in your best interest to select a reason for this procedure from the list below. All of these items fulfill various CMS LCD requirements except the new and changing color options.
Medical Necessity Clause: This procedure was medically necessary because the lesion that was treated was:
Lab: -[id number]
Lab Facility: 0
Body Location Override (Optional - Billing Will Still Be Based On Selected Body Map Location If Applicable): left mid anterior leg
Detail Level: Detailed
Was A Bandage Applied: Yes
Size Of Lesion In Cm (Required): 1.2
Depth Of Shave: dermis
Biopsy Method: Dermablade
Anesthesia Type: 1% lidocaine with epinephrine
Hemostasis: Drysol
Wound Care: Petrolatum
Path Notes (To The Dermatopathologist): Please check margins
Render Path Notes In Note?: No
Consent was obtained from the patient. The risks and benefits to therapy were discussed in detail. Specifically, the risks of infection, scarring, bleeding, prolonged wound healing, incomplete removal, allergy to anesthesia, nerve injury and recurrence were addressed. Prior to the procedure, the treatment site was clearly identified and confirmed by the patient. All components of Universal Protocol/PAUSE Rule completed.
Post-Care Instructions: I reviewed with the patient in detail post-care instructions. Patient is to keep the biopsy site dry overnight, and then apply bacitracin twice daily until healed. Patient may apply hydrogen peroxide soaks to remove any crusting.
Notification Instructions: Patient will be notified of pathology results. However, patient instructed to call the office if not contacted within 2 weeks.
Billing Type: Third-Party Bill

## 2023-11-21 ENCOUNTER — OFFICE VISIT (OUTPATIENT)
Facility: LOCATION | Age: 83
End: 2023-11-21
Payer: MEDICARE

## 2023-11-21 ENCOUNTER — OFFICE VISIT (OUTPATIENT)
Dept: PHYSICAL THERAPY | Age: 83
End: 2023-11-21
Attending: PODIATRIST
Payer: MEDICARE

## 2023-11-21 DIAGNOSIS — M62.461 GASTROCNEMIUS EQUINUS OF RIGHT LOWER EXTREMITY: ICD-10-CM

## 2023-11-21 DIAGNOSIS — R60.0 BILATERAL LOWER EXTREMITY EDEMA: ICD-10-CM

## 2023-11-21 DIAGNOSIS — R26.9 GAIT DIFFICULTY: ICD-10-CM

## 2023-11-21 DIAGNOSIS — B35.1 ONYCHOMYCOSIS: ICD-10-CM

## 2023-11-21 DIAGNOSIS — G62.9 PERIPHERAL POLYNEUROPATHY: ICD-10-CM

## 2023-11-21 DIAGNOSIS — M76.71 PERONEAL TENDINITIS OF RIGHT LOWER EXTREMITY: Primary | ICD-10-CM

## 2023-11-21 PROCEDURE — 1125F AMNT PAIN NOTED PAIN PRSNT: CPT | Performed by: PODIATRIST

## 2023-11-21 PROCEDURE — 99213 OFFICE O/P EST LOW 20 MIN: CPT | Performed by: PODIATRIST

## 2023-11-21 PROCEDURE — 97530 THERAPEUTIC ACTIVITIES: CPT

## 2023-11-21 PROCEDURE — 97110 THERAPEUTIC EXERCISES: CPT

## 2023-11-22 NOTE — PROGRESS NOTES
[facility]  Progress Note    Mayur Pollock is a 81year old male. Chief Complaint   Patient presents with    Follow - Up     Patient is here to follow up on the right foot pain, heel pain. PT has helped, he still gets pain that wakes him up, heel and lateral side. HPI:     Patient is a pleasant 81-year-old male who is returning to clinic today for recheck of right foot. Patient states that he is doing better. He has been in formal physical therapy, which she states has been beneficial.  Patient does relate random pains around his heel that wake him up in the middle of the night. He does relate an extensive history of back surgeries and has rods up all regions of his back. Patient does utilize compression socks for edema control. He is also utilizing his lace up ankle brace today. He has a cane for gait assistance. Denies recent injury or other pedal complaints at this time and is here for further evaluation care. Denies recent nausea, vomiting, fever, chills. Allergies: Patient has no known allergies. Current Outpatient Medications   Medication Sig Dispense Refill    metFORMIN  MG Oral Tablet 24 Hr Take 1 tablet (500 mg total) by mouth daily with breakfast. 90 tablet 1    clobetasol 0.05 % External Ointment APPLY OINTMENT TOPICALLY TO AFFECTED AREA TWICE DAILY ON MOIST SKIN AS NEEDED FOR FLARES      mupirocin 2 % External Ointment APPLY OINTMENT TOPICALLY TO AFFECTED AREA 2 TO 3 TIMES DAILY FOR 2 TO 4 WEEKS      rosuvastatin 10 MG Oral Tab Take 1 tablet (10 mg total) by mouth nightly. 90 tablet 3    warfarin 5 MG Oral Tab Take 1 tablet (5 mg total) by mouth As Directed. Take as directed based on INR readings. (Patient taking differently: Take 1 tablet (5 mg total) by mouth As Directed.  5mg M-W-F and 2.5mg all other days) 90 tablet 3    PANTOPRAZOLE 40 MG Oral Tab EC TAKE 1 TABLET TWICE A  tablet 3    finasteride 5 MG Oral Tab Take 1 tablet (5 mg total) by mouth daily. 90 tablet 5    Mirabegron ER (MYRBETRIQ) 50 MG Oral Tablet 24 Hr Take 50 mg by mouth daily. 90 tablet 5    silodosin 8 MG Oral Cap Take 1 capsule (8 mg total) by mouth daily. 90 capsule 5    furosemide 20 MG Oral Tab Take 1 tablet (20 mg total) by mouth in the morning. (Patient taking differently: Take 2 tablets (40 mg total) by mouth daily.) 90 tablet 3    Vitamin D, Cholecalciferol, 25 MCG (1000 UT) Oral Cap Take by mouth daily. acetaminophen 500 MG Oral Tab Take 1 tablet (500 mg total) by mouth every 6 (six) hours as needed for Pain.  0    latanoprost 0.005 % Ophthalmic Solution Place 1 drop into both eyes nightly. Dicloxacillin Sodium 500 MG Oral Cap Take 1 capsule (500 mg total) by mouth 2 (two) times daily. Past Medical History:   Diagnosis Date    Arrhythmia     Back problem     Bull esophagus     BPH (benign prostatic hyperplasia)     Cataract     Cataract of both eyes 11/20/2017    COVID     8/30/2022    Disorder of prostate     DVT (deep venous thrombosis) ([facility] 06/15/2012    Overview:  Overview:  3/11 after back surgery pulm emb-IVC filter Overview:  3/11 after back surgery pulm emb-IVC filter    Esophageal reflux     Frequent urination     Hearing impairment     bilateral hearing aids    Hearing loss     Wear hearing aids    Heartburn 1998    High cholesterol     Hx of endoscopy 09/12/2016    Hyperlipidemia     Indigestion 1998    Leaking of urine     Leg DVT (deep venous thromboembolism), acute, bilateral (HCC) 07/06/2020    Leg swelling     Obesity     Pain in joints     Pleurisy with effusion     Postoperative anemia due to acute blood loss 04/07/2014    Prediabetes     Presence of IVC filter 02/09/2015    cook celect filter.   Had 3 different IVC filters placed previous to current filter    Pulmonary embolism ([address] 2011    Stented coronary artery 2017    aorta / iliac    Venous ulcer of right leg ([address] 05/15/2022    Visual impairment     glasses    Wears glasses     Weight gain       Past Surgical History:   Procedure Laterality Date    BACK SURGERY  [id number], [id number]    CATARACT  2012    COLONOSCOPY  2016    COLONOSCOPY  2019    COLONOSCOPY N/A 2022    Procedure: COLONOSCOPY;  Surgeon: Zaira Davis MD;  Location: [facility] ENDOSCOPY    COLONOSCOPY N/A 2023    Procedure: COLONOSCOPY WITH COLD SNARE POLYPECTOMY;  Surgeon: Zaira Davis MD;  Location: [address] [place] [address] ENDOSCOPY    ENDOVAS REPAIR, INFRARENL ABDOM AORTIC ANEURYSM/DISSECT  2017    Stent    KNEE REPLACEMENT SURGERY Right 2016    OTHER  2012    aortic and iliac artery stent    OTHER SURGICAL HISTORY  1964    Pleuectomy    OTHER SURGICAL HISTORY      IVC filter    SPINAL FUSION  10/12/2018    SPINE SURGERY PROCEDURE UNLISTED      Back Surgery    TONSILLECTOMY      VASECTOMY  1984      Family History   Problem Relation Age of Onset    Cancer Father         Prostate    Colon Cancer Father         [de-identified]    Cancer Mother         Andressa Krista bladder    Alcohol and Other Disorders Associated Sister         Alcoholism    Cancer Brother         Pancreas    DVT/VTE Neg       Social History     Socioeconomic History    Marital status:    Tobacco Use    Smoking status: Former     Packs/day: 1.00     Years: 25.00     Additional pack years: 0.00     Total pack years: 25.00     Types: Cigarettes     Quit date: 2/3/1987     Years since quittin.8    Smokeless tobacco: Never   Vaping Use    Vaping Use: Never used   Substance and Sexual Activity    Alcohol use: Not Currently     Comment: Social drinker    Drug use: No   Other Topics Concern    Caffeine Concern No    Exercise No    Seat Belt No    Special Diet No    Stress Concern No    Weight Concern No           REVIEW OF SYSTEMS:     10 point ROS completed and was negative, except for pertinent positive and negatives stated in subjective.        EXAM:   GENERAL: well developed, well nourished, in no apparent distress  EXTREMITIES:              1. Integument: Nails x10 are thickened, elongated, discolored, dystrophic with subungual debris noted to right hallux toenail. Normal skin temperature and decreased turgor. Skin color changes consistent with hemosiderin deposition noted to bilateral lower extremities. 2. Vascular: Dorsalis pedis 2/4 bilateral and posterior tibial pulses 2/4 bilateral, capillary refill normal.  2+ pitting edema noted to bilateral lower extremities              3. Musculoskeletal: All muscle groups are graded 4+/5 in the foot and ankle, right with no pain elicited in any direction. Improved pain elicited on palpation along the course of the peroneal tendons near the lateral malleolus. Adequate eversion strength is noted with no signs of tendon rupture, right. Continued but improved pain with palpation noted to plantar medial aspect of right heel. No pain with side-to-side heel squeeze. Decreased ankle joint dorsiflexion noted with the knee extended, which does improve with the knee flexed consistent with equinus deformity, right. 4. Neurological: Sensation diminished via light touch to bilateral lower extremities. Negative Tinel's sign to nerve courses of the right lower extremity today. ASSESSMENT AND PLAN:   Diagnoses and all orders for this visit:    Peroneal tendinitis of right lower extremity    Gait difficulty    Gastrocnemius equinus of right lower extremity    Peripheral polyneuropathy    Bilateral lower extremity edema    Onychomycosis        Plan:   -Patient was seen and evaluated today in clinic. Discussed findings, which are still consistent with peroneal tendinitis of the right lower extremity and plantar fasciitis to his right heel today. Symptoms are overall improved. Discussed conservative management and potential for formal PT. Discussed possible oral steroids if patient is not diabetic, otherwise use of NSAIDS if no history of GERD or stomach upset. Recommend cryotherapy/icing.   Again recommended use of OTC and/or custom orthotics in the treatment and future prevention of tendinitis. Recommend continued use of compression stockings to control edema/swelling. Discussed potential need to order MRI if conservative management fails to resolve symptoms out of concern for underlying tear. Discussed potential need for surgical intervention if conservative management fails to resolve symptoms. I do recommend to patient today to look into over-the-counter inserts    Recommend continued at home exercises that he learned at physical therapy    To patient's occasional pains at night, symptoms sound more like neuropathy symptoms. Recommend topical Voltaren gel. Patient has been using Salonpas. Recommend continued use of symptoms are controlled with this.    -The patient indicates understanding of these issues and agrees to the plan. Time spent reviewing pertinent information from patient's chart, reviewing any pertinent imaging, obtaining history and physical exam, and discussing and mutually agreeing on a treatment plan: 25 minutes    RTC 4 to 6 weeks      [facility]        11/21/2023    Dragon speech recognition software was used to prepare this note. Errors in word recognition may occur. Please contact me with any questions/concerns with this note.

## 2023-11-26 NOTE — PROGRESS NOTES
Diagnosis:   -Right ankle pain, unspecified chronicity (M25.571)  -Peroneal tendinitis of right lower extremity (M76.71)  -Gait difficulty (R26.9)  -Bilateral lower extremity edema (R60.0)  -Gastrocnemius equinus of right lower extremity (M62.461)  -Plantar fasciitis of right foot (M72.2)      Referring Provider: Crystal Amador  Date of Evaluation:    10/03/2023    Precautions:  Fall Risk Next MD visit:   To be determined. Date of Surgery: n/a   Insurance Primary/Secondary: MEDICARE / [employer]     # Auth Visits: No prior authorization is required per appointment notes; plan on initial evaluation is written for 8 visits per chart review. Subjective: Everton Recio reports that his right ankle/foot has been feeling better. He reports he has been able walk with decrease discomfort about the lateral aspect of right foot/ankle. Notes decrease complaint with periods of sitting and with first few steps. States that if stands for periods of team, the region of lateral aspect of the right foot and ankle is sensitive to pressure. He reports he leans to the right when standing as he holds the cane on that side. Pain:   2023  -Pain ratin/10 (presenting to session) - right lateral foot  -Pain ratin/10 (during session) - right lateral foot. 2023  -Pain ratin/10 (at present assessed with walking)  2023  -Pain rating is variable ranging from 0-7/10 pain.  -Worse in AM with standing and first steps  -Worse following sitting based upon prior activity  Objective:   2023  -Minimal tenderness @ 5th metatarsal  -Decreased tenderness @ peroneus longus brevis  -Decrease complaint with resisted plantar flexion and eversion.   2023  -Minimal tenderness 5th metatarsal  -Moderate tenderness @ Peroneus longus and brevis  -Active plantar flexion and eversion is with comparable complaint  -Passive eversion and plantar flexion with pain complaint  -Resisted plantar flexion and eversion is without complaint complaint.  -Peroneus longus/brevis 3+5    Assessment: Fabian Bradshaw has been seen for 7 visits secondary to pain with right foot/ankle about the lateral aspect. He is reported improvement with decrease symptom severity with lateral aspect of the right foot ankle with walking. Goals 1-3 met at this time time. We discussed potential of continuing with therapy to address limitations in transfer secondary to impairment in muscle performance of the lower quarter and for improving balance, patient reported he wanted to await continuation at this time pending discussion with his wife. Goals:   Able to walk within home and community without right/ankle pain during or following at least 80% of the time. (Goal set: 11/2/2023) - Goal Met  Able to transition from sitting to standing without out right ankle/foot pain at least 80% of the time. (Goal set: 11/2/2023) - Goal Met  Able to transition from siting to and from supine independently with need for assistance from spouse 80% of the time. (Goal set: 11/2/2023) - Goal Met  Able to perform sit/stand transition from standard chair height within age/gender norms during 30 second sit/sand stand with minimal fear of posterior balance loss. (Goal set: 11/2/2023) - Goal unmet    Plan: Discharge at this time; can re-evaluate at future time should symptoms return or additional needs occur. Date: 11/2/2023  Tx#:  2/8   Date: 11/7/2023  Tx#:  3/8   Date: 11/9/2023  Tx#:  4/8   Date: 11/14/2023  Tx#:  5/8   Date: 11/16/2023  Tx#:  6   Date: 11/212023  Tx#:  7     -Manual Therapy x 25 minutes  -Joint mobilization to talocrural and subtalar joints right LE grade III -IV based on end-feel and response without sitting  -Soft tissue mobilization of peroneal muscles. Manual Therapy x 15 minutes  -Joint mobilization to talocrural and subtalar joints right LE grade III -IV based on end-feel and response without sitting  -Soft tissue mobilization of peroneal muscles.  Neuromuscular re-education x 25 minutes  -Bilateral shoulder extension extension from shoulder height (48#) with emphasis on anterior shift with hip/trunk extension verse posterior weight shift with hip/trunk flexion to enhance postural control  -Split stance  hip rotation in standing within II bars within unilateral to promote hip range of proprioception ~ 20 reps each direction bilaterally  Manual Therapy x 25 minutes  -Joint mobilization to talocrural and subtalar joints right LE grade III -IV based on end-feel and response without sitting  -Soft tissue mobilization of peroneal muscles. ___________________    -Therapeutic excercise x 15 minutes  -Resisted eversion and plantar flexion progressing from isometric to concentric and eccentric contractions with yellow resistance band with reps/load based on performance and symptom threshold (3/10). -Seated knee extension with ankle DF x 5 reps x 3 sets each LE (based on patient tolerance) -Therapeutic excercise x 15 minutes  -Resisted eversion and plantar flexion progressing from isometric to concentric and eccentric contractions with yellow resistance band with reps/load based on performance and symptom threshold   -Seated knee extension with ankle DF x 10 reps x 3 sets each LE.  -Seated heel raise x 20 reps x 2 sets Therapeutic excercise x 10 minutes  -Split stance with emphasis on stance limb stability with unilateral upper row @ cable weights 38# 15 reps x 2 sets with alternating lead leg     Therapeutic excercise x 15 minutes  -anterior, posterior lateral, medial, step with red resistance band, right lower extremity stationary: 15 reps x 2 sets each direction.  -Tandem stance anterior/posterior weight shift (rocking) to enhance muscle performance/mobility for gait.  2 min x 2 each position (right foot forward, right foot posterior) Therapeutic excercise x 35 minutes  -anterior, posterior lateral, medial, step with red resistance band, right lower extremity stationary: 15 reps x 2 sets each direction.  -Tandem stance anterior/posterior weight shift (rocking) to enhance muscle performance/mobility for gait. 2 min x 2 each position (right foot forward, right foot posterior)  -Split stance with emphasis on stance limb stability with unilateral upper row @ cable weights 38# 15 reps x 2 sets with alternating lead leg  -Sit/stand lower extremity squat: 15 reps x 3 sets to enhance lower quarter strength Therapeutic excercise x 35 minutes  -anterior, posterior lateral, medial, step with red resistance band, right lower extremity stationary: 15 reps x 2 sets each direction.  -Tandem stance anterior/posterior weight shift (rocking) to enhance muscle performance/mobility for gait. 2 min x 2 each position (right foot forward, right foot posterior)  -Split stance with emphasis on stance limb stability with unilateral upper row @ cable weights 38# 15 reps x 2 sets with alternating lead leg  -Sit/stand lower extremity squat: 15 reps x 3 sets to enhance lower quarter strength    Therapeutic activity x 15 minutes  -Sit/stand - stand/sit with instruction in increase trunk (hip) flexion (hip hinge) during forward momentum phase. Instruction via verbal instruction and demonstration with return demonstration. Performed 5 reps x 3 sets (HEP)   Gait training x 10 minutes  -Walking with bilateral canes with increase step length length of gym area ~ 25 feet x 10  ___________________ Therapeutic activity x 10 minutes  -Discussion relative to base of support in standing with wide base of support with static standing verse shift to right with sustained standing tasks. Therapeutic activity x 10 minutes  -Discussion relative to base of support in standing with wide base of support with static standing verse shift to right with sustained standing tasks. HEP-Activity as tolerated, re-enforced active range of motion exercise and sitting knee extension; trial of wedge in shoe.  Would be based ankle lace wrap base on benefit verse need for further immobilization; can see the intent for short term use for trial for symptom modulation, however, this is likely mobility/muscle performance issue. HEP:  Seated heel raise: 15-20 reps x 3 sets; continued use of heal wedge; sit/stand with increased hip flexion (hip hinge). To be progressed at next visit  -Review home program at next visit. Encouraged progressive walking program.   Tandem stance anterior and posterior weight shift with ankle DF/PF  Sit/stand squat from elevated surface progressing to standard chair  Increase base of support with static standing with symmetrical weight bearing verse leaning to right. Charges:    Therapeutic excercise x 2  Therapeutic activity x 1  Total Timed Treatment: 45 min    Total Treatment Time: 45 min

## 2023-12-15 ENCOUNTER — ANTI-COAG VISIT (OUTPATIENT)
Dept: HEMATOLOGY/ONCOLOGY | Age: 83
End: 2023-12-15
Attending: INTERNAL MEDICINE
Payer: MEDICARE

## 2023-12-15 ENCOUNTER — TELEPHONE (OUTPATIENT)
Dept: HEMATOLOGY/ONCOLOGY | Facility: HOSPITAL | Age: 83
End: 2023-12-15

## 2023-12-15 ENCOUNTER — ANTI-COAG VISIT (OUTPATIENT)
Dept: HEMATOLOGY/ONCOLOGY | Facility: HOSPITAL | Age: 83
End: 2023-12-15

## 2023-12-15 LAB
INR: 2.7 (ref 0.8–1.2)
INR: 2.7 (ref 0.8–1.3)

## 2023-12-15 PROCEDURE — 85610 PROTHROMBIN TIME: CPT

## 2023-12-15 PROCEDURE — 36416 COLLJ CAPILLARY BLOOD SPEC: CPT

## 2023-12-15 NOTE — TELEPHONE ENCOUNTER
Called patient and reviewed plan of care regarding INR, patient to continue on same dose and recheck in 4 weeks. Patient conveyed understanding.

## 2023-12-26 ENCOUNTER — APPOINTMENT (OUTPATIENT)
Dept: URBAN - METROPOLITAN AREA CLINIC 247 | Age: 83
Setting detail: DERMATOLOGY
End: 2023-12-28

## 2023-12-26 PROBLEM — C44.729 SQUAMOUS CELL CARCINOMA OF SKIN OF LEFT LOWER LIMB, INCLUDING HIP: Status: ACTIVE | Noted: 2023-12-26

## 2023-12-26 PROCEDURE — OTHER UNNA BOOT APPLICATION: OTHER

## 2023-12-26 PROCEDURE — 17313 MOHS 1 STAGE T/A/L: CPT

## 2023-12-26 PROCEDURE — OTHER MOHS SURGERY: OTHER

## 2023-12-26 PROCEDURE — 29580 STRAPPING UNNA BOOT: CPT | Mod: LT

## 2023-12-26 NOTE — PROCEDURE: UNNA BOOT APPLICATION
Coban Units: 1
Bill For Unna Boot Supplies?: No
Detail Level: Detailed
Removal Of Previous Unna Boot (No) Text: The site was cleaned in preparation for an Unna Boot application.
After Unna Boot Application Text: Coban was used to wrap the outside of Unna Boot and we ensured the Unna Boot wasn't too tight prior to the patient leaving the office.
Additional Instructions: follow up in 1-2 weeks to remove
Location Applied: the left leg
Medication Occluded Under Unnaboot: Bactroban
Indication: chronic unresponsive dermatitis
Removal Of Previous Unna Boot (Yes) Text: The previous Unna Boot was removed and then the site was cleaned in preparation for another Unna Boot application.

## 2023-12-26 NOTE — PROCEDURE: MOHS SURGERY
Retention Suture Text: Retention sutures were placed to support the closure and prevent dehiscence.
Cheek Interpolation Flap Text: A decision was made to reconstruct the defect utilizing an interpolation axial flap and a staged reconstruction.  A telfa template was made of the defect.  This telfa template was then used to outline the Cheek Interpolation flap.  The donor area for the pedicle flap was then injected with anesthesia.  The flap was excised through the skin and subcutaneous tissue down to the layer of the underlying musculature.  The interpolation flap was carefully excised within this deep plane to maintain its blood supply.  The edges of the donor site were undermined.   The donor site was closed in a primary fashion.  The pedicle was then rotated into position and sutured.  Once the tube was sutured into place, adequate blood supply was confirmed with blanching and refill.  The pedicle was then wrapped with xeroform gauze and dressed appropriately with a telfa and gauze bandage to ensure continued blood supply and protect the attached pedicle.
Brow Lift Text: A midfrontal incision was made medially to the defect to allow access to the tissues just superior to the left eyebrow. Following careful dissection inferiorly in a supraperiosteal plane to the level of the left eyebrow, several 3-0 monocryl sutures were used to resuspend the eyebrow orbicularis oculi muscular unit to the superior frontal bone periosteum. This resulted in an appropriate reapproximation of static eyebrow symmetry and correction of the left brow ptosis.
Anesthesia Volume In Cc: 6
Advancement Flap (Single) Text: The defect edges were debeveled with a #15 scalpel blade. Given the location of the defect and the proximity to free margins a single advancement flap was deemed most appropriate. Using a sterile surgical marker, an appropriate advancement flap was drawn incorporating the defect and placing the expected incisions within the relaxed skin tension lines where possible. The area thus outlined was incised deep to adipose tissue with a #15 scalpel blade. The skin margins were undermined to an appropriate distance in all directions utilizing iris scissors. Following this, the designed flap was advanced and carried over into the primary defect and sutured into place.
Special Stains Stage 13 - Results: Base On Clearance Noted Above
Quadrant Reporting?: no
Pinch Graft Text: The defect edges were debeveled with a #15 scalpel blade. Given the location of the defect, shape of the defect and the proximity to free margins a pinch graft was deemed most appropriate. Using a sterile surgical marker, the primary defect shape was transferred to the donor site. The area thus outlined was incised deep to adipose tissue with a #15 scalpel blade.  The harvested graft was then trimmed of adipose tissue until only dermis and epidermis was left. The skin margins of the secondary defect were undermined to an appropriate distance in all directions utilizing iris scissors.  The secondary defect was closed with interrupted buried subcutaneous sutures.  The skin edges were then re-apposed with running  sutures.  The skin graft was then placed in the primary defect and oriented appropriately.
Rotation Flap Text: The defect edges were debeveled with a #15 scalpel blade. Given the location of the defect, shape of the defect and the proximity to free margins a rotation flap was deemed most appropriate. Using a sterile surgical marker, an appropriate rotation flap was drawn incorporating the defect and placing the expected incisions within the relaxed skin tension lines where possible. The area thus outlined was incised deep to adipose tissue with a #15 scalpel blade. The skin margins were undermined to an appropriate distance in all directions utilizing iris scissors. Following this, the designed flap was carried over into the primary defect and sutured into place.
Tumor Depth: Less than 6mm from granular layer and no invasion beyond the subcutaneous fat
Epidermal Closure Graft Donor Site (Optional): simple interrupted
Mohs Rapid Report Verbiage: The area of clinically evident tumor was marked with skin marking ink and appropriately hatched.  The initial incision was made following the Mohs approach through the skin.  The specimen was taken to the lab, divided into the necessary number of pieces, chromacoded and processed according to the Mohs protocol.  This was repeated in successive stages until a tumor free defect was achieved.
Biopsy Photograph Reviewed: Yes
Is There Documentation In The Chart Showing Discussion Of Changes With Another Physician?: Please Select the Appropriate Response
Partial Purse String (Intermediate) Text: Given the location of the defect and the characteristics of the surrounding skin an intermediate purse string closure was deemed most appropriate.  Undermining was performed circumferentially around the surgical defect.  A purse string suture was then placed and tightened. Wound tension only allowed a partial closure of the circular defect.
Mid-Level Procedure Text (C): After obtaining clear surgical margins the patient was sent to a mid-level provider for surgical repair.  The patient understands they will receive post-surgical care and follow-up from the mid-level provider.
Quadrants Reporting?: 0
Ear Star Wedge Flap Text: The defect edges were debeveled with a #15 blade scalpel.  Given the location of the defect and the proximity to free margins (helical rim) an ear star wedge flap was deemed most appropriate. Using a sterile surgical marker, the appropriate flap was drawn incorporating the defect and placing the expected incisions between the helical rim and antihelix where possible.  The area thus outlined was incised through and through with a #15 scalpel blade. Following this, the designed flap was carried over into the primary defect and sutured into place.
Referred To Plastics For Closure Text (Leave Blank If You Do Not Want): After obtaining clear surgical margins the patient was sent to plastics for surgical repair.  The patient understands they will receive post-surgical care and follow-up from the referring physician's office.
Detail Level: Detailed
Post-Care Instructions: I reviewed with the patient in detail post-care instructions. Patient is not to engage in any heavy lifting, exercise, or swimming for the next 14 days. Should the patient develop any fevers, chills, bleeding, severe pain patient will contact the office immediately.
Provider Procedure Text (F): After obtaining clear surgical margins the defect was repaired by another provider.
Vermilion Border Text: The closure involved the vermilion border.
W Plasty Text: The lesion was extirpated to the level of the fat with a #15 scalpel blade. Given the location of the defect, shape of the defect and the proximity to free margins a W-plasty was deemed most appropriate for repair. Using a sterile surgical marker, the appropriate transposition arms of the W-plasty were drawn incorporating the defect and placing the expected incisions within the relaxed skin tension lines where possible. The area thus outlined was incised deep to adipose tissue with a #15 scalpel blade. The skin margins were undermined to an appropriate distance in all directions utilizing iris scissors. The opposing transposition arms were then transposed and carried over into place in opposite direction and anchored with interrupted buried subcutaneous sutures.
Ear Wedge Repair Text: A wedge excision was completed by carrying down an excision through the full thickness of the ear and cartilage with an inward facing Burow's triangle. The wound was then closed in a layered fashion.
Staging Info: By selecting yes to the question above you will include information on AJCC 8 tumor staging in your Mohs note. Information on tumor staging will be automatically added for SCCs on the head and neck. AJCC 8 includes tumor size, tumor depth, perineural involvement and bone invasion.
Stage 1: Number Of Blocks?: 1
Epidermal Closure: running
Modified Advancement Flap Text: The defect edges were debeveled with a #15 scalpel blade. Given the location of the defect, shape of the defect and the proximity to free margins a modified advancement flap was deemed most appropriate. Using a sterile surgical marker, an appropriate advancement flap was drawn incorporating the defect and placing the expected incisions within the relaxed skin tension lines where possible. The area thus outlined was incised deep to adipose tissue with a #15 scalpel blade. The skin margins were undermined to an appropriate distance in all directions utilizing iris scissors. Following this, the designed flap was advanced and carried over into the primary defect and sutured into place.
Mohs Histo Method Verbiage: Each section was then chromacoded and processed in the Mohs lab using the Mohs protocol and submitted for frozen section, utilizing hematoxylin and eosin histochemical stains.
Closure 3 Information: This tab is for additional flaps and grafts above and beyond our usual structured repairs.  Please note if you enter information here it will not currently bill and you will need to add the billing information manually.
Consent (Ear)/Introductory Paragraph: The rationale for Mohs was explained to the patient and consent was obtained. The risks, benefits and alternatives to therapy were discussed in detail. Specifically, the risks of ear deformity, infection, scarring, bleeding, prolonged wound healing, incomplete removal, allergy to anesthesia, nerve injury and recurrence were addressed. Prior to the procedure, the treatment site was clearly identified and confirmed by the patient. All components of Universal Protocol/PAUSE Rule completed.
Rhomboid Transposition Flap Text: The defect edges were debeveled with a #15 scalpel blade. Given the location of the defect and the proximity to free margins a rhomboid transposition flap was deemed most appropriate. Using a sterile surgical marker, an appropriate rhomboid flap was drawn incorporating the defect. The area thus outlined was incised deep to adipose tissue with a #15 scalpel blade. The skin margins were undermined to an appropriate distance in all directions utilizing iris scissors. Following this, the designed flap was carried over into the primary defect and sutured into place.
Secondary Intention Text (Leave Blank If You Do Not Want): The defect will heal with secondary intention.
Debridement Text: The wound edges were debrided prior to proceeding with the closure to facilitate wound healing.
Oculoplastic Surgeon Procedure Text (C): After obtaining clear surgical margins the patient was sent to oculoplastics for surgical repair.  The patient understands they will receive post-surgical care and follow-up from the referring physician's office.
Inflammation Suggestive Of Cancer Camouflage Histology Text: There was a dense lymphocytic infiltrate which prevented adequate histologic evaluation of adjacent structures.
Mart-1 - Positive Histology Text: MART-1 staining demonstrates areas of higher density and clustering of melanocytes with Pagetoid spread upwards within the epidermis. The surgical margins are positive for tumor cells.
Unna Boot Text: An Unna boot was placed to help immobilize the limb and facilitate more rapid healing.
O-Z Plasty Text: The defect edges were debeveled with a #15 scalpel blade. Given the location of the defect, shape of the defect and the proximity to free margins an O-Z plasty (double transposition flap) was deemed most appropriate. Using a sterile surgical marker, the appropriate transposition flaps were drawn incorporating the defect and placing the expected incisions within the relaxed skin tension lines where possible. The area thus outlined was incised deep to adipose tissue with a #15 scalpel blade. The skin margins were undermined to an appropriate distance in all directions utilizing iris scissors. Hemostasis was achieved with electrocautery. The flaps were then transposed and carried over into place, one clockwise and the other counterclockwise, and anchored with interrupted buried subcutaneous sutures.
O-T Plasty Text: The defect edges were debeveled with a #15 scalpel blade. Given the location of the defect, shape of the defect and the proximity to free margins an O-T plasty was deemed most appropriate. Using a sterile surgical marker, an appropriate O-T plasty was drawn incorporating the defect and placing the expected incisions within the relaxed skin tension lines where possible. The area thus outlined was incised deep to adipose tissue with a #15 scalpel blade. The skin margins were undermined to an appropriate distance in all directions utilizing iris scissors. Following this, the designed flap was carried over into the primary defect and sutured into place.
Asc Procedure Text (D): After obtaining clear surgical margins the patient was sent to an ASC for surgical repair.  The patient understands they will receive post-surgical care and follow-up from the ASC physician.
Mart-1 - Negative Histology Text: MART-1 staining demonstrates a normal density and pattern of melanocytes along the dermal-epidermal junction. The surgical margins are negative for tumor cells.
Otolaryngologist Procedure Text (E): After obtaining clear surgical margins the patient was sent to otolaryngology for surgical repair.  The patient understands they will receive post-surgical care and follow-up from the referring physician's office.
Abbe Flap (Lower To Upper Lip) Text: The defect of the upper lip was assessed and measured.  Given the location and size of the defect, an Abbe flap was deemed most appropriate. Using a sterile surgical marker, an appropriate Abbe flap was measured and drawn on the lower lip. Local anesthesia was then infiltrated. A scalpel was then used to incise the upper lip through and through the skin, vermilion, muscle and mucosa, leaving the flap pedicled on the opposite side.  The flap was then rotated and transferred to the lower lip defect.  The flap was then sutured into place with a three layer technique, closing the orbicularis oris muscle layer with subcutaneous buried sutures, followed by a mucosal layer and an epidermal layer.
Anesthesia Volume In Cc: 3
O-Z Flap Text: The defect edges were debeveled with a #15 scalpel blade. Given the location of the defect, shape of the defect and the proximity to free margins an O-Z flap was deemed most appropriate. Using a sterile surgical marker, an appropriate transposition flap was drawn incorporating the defect and placing the expected incisions within the relaxed skin tension lines where possible. The area thus outlined was incised deep to adipose tissue with a #15 scalpel blade. The skin margins were undermined to an appropriate distance in all directions utilizing iris scissors. Following this, the designed flap was carried over into the primary defect and sutured into place.
Xenograft Text: The defect edges were debeveled with a #15 scalpel blade. Given the location of the defect, shape of the defect and the proximity to free margins a xenograft was deemed most appropriate.  The graft was then trimmed to fit the size of the defect.  The graft was then placed in the primary defect and oriented appropriately.
Nasal Turnover Hinge Flap Text: The defect edges were debeveled with a #15 scalpel blade.  Given the size, depth, location of the defect and the defect being full thickness a nasal turnover hinge flap was deemed most appropriate. Using a sterile surgical marker, an appropriate hinge flap was drawn incorporating the defect. The area thus outlined was incised with a #15 scalpel blade. The flap was designed to recreate the nasal mucosal lining and the alar rim. The skin margins were undermined to an appropriate distance in all directions utilizing iris scissors. Following this, the designed flap was carried over into the primary defect and sutured into place
Stage 14: Additional Anesthesia Type: 1% lidocaine with epinephrine
Lazy S Intermediate Repair Preamble Text (Leave Blank If You Do Not Want): Undermining was performed with blunt dissection.
Consent 3/Introductory Paragraph: I gave the patient a chance to ask questions they had about the procedure.  Following this I explained the Mohs procedure and consent was obtained. The risks, benefits and alternatives to therapy were discussed in detail. Specifically, the risks of infection, scarring, bleeding, prolonged wound healing, incomplete removal, allergy to anesthesia, nerve injury and recurrence were addressed. Prior to the procedure, the treatment site was clearly identified and confirmed by the patient. All components of Universal Protocol/PAUSE Rule completed.
Alar Island Pedicle Flap Text: The defect edges were debeveled with a #15 scalpel blade. Given the location of the defect, shape of the defect and the proximity to the alar rim an island pedicle advancement flap was deemed most appropriate. Using a sterile surgical marker, an appropriate advancement flap was drawn incorporating the defect, outlining the appropriate donor tissue and placing the expected incisions within the nasal ala running parallel to the alar rim. The area thus outlined was incised with a #15 scalpel blade. The skin margins were undermined minimally to an appropriate distance in all directions around the primary defect and laterally outward around the island pedicle utilizing iris scissors.  There was minimal undermining beneath the pedicle flap. Following this, the designed flap was carried over into the primary defect and sutured into place.
Bcc Histology Text: There were numerous aggregates of basaloid cells.
Mastoid Interpolation Flap Text: A decision was made to reconstruct the defect utilizing an interpolation axial flap and a staged reconstruction.  A telfa template was made of the defect.  This telfa template was then used to outline the mastoid interpolation flap.  The donor area for the pedicle flap was then injected with anesthesia.  The flap was excised through the skin and subcutaneous tissue down to the layer of the underlying musculature.  The pedicle flap was carefully excised within this deep plane to maintain its blood supply.  The edges of the donor site were undermined.   The donor site was closed in a primary fashion.  The pedicle was then rotated into position and sutured.  Once the tube was sutured into place, adequate blood supply was confirmed with blanching and refill.  The pedicle was then wrapped with xeroform gauze and dressed appropriately with a telfa and gauze bandage to ensure continued blood supply and protect the attached pedicle.
Crescentic Advancement Flap Text: The defect edges were debeveled with a #15 scalpel blade. Given the location of the defect and the proximity to free margins a crescentic advancement flap was deemed most appropriate. Using a sterile surgical marker, the appropriate advancement flap was drawn incorporating the defect and placing the expected incisions within the relaxed skin tension lines where possible. The area thus outlined was incised deep to adipose tissue with a #15 scalpel blade. The skin margins were undermined to an appropriate distance in all directions utilizing iris scissors. Following this, the designed flap was advanced and carried over into the primary defect and sutured into place.
Hemigard Intro: Due to skin fragility and wound tension, it was decided to use HEMIGARD adhesive retention suture devices to permit a linear closure. The skin was cleaned and dried for a 6cm distance away from the wound. Excessive hair, if present, was removed to allow for adhesion.
Epidermal Autograft Text: The defect edges were debeveled with a #15 scalpel blade. Given the location of the defect, shape of the defect and the proximity to free margins an epidermal autograft was deemed most appropriate. Using a sterile surgical marker, the primary defect shape was transferred to the donor site. The epidermal graft was then harvested.  The skin graft was then placed in the primary defect and oriented appropriately.
Star Wedge Flap Text: The defect edges were debeveled with a #15 scalpel blade. Given the location of the defect, shape of the defect and the proximity to free margins a star wedge flap was deemed most appropriate. Using a sterile surgical marker, an appropriate rotation flap was drawn incorporating the defect and placing the expected incisions within the relaxed skin tension lines where possible. The area thus outlined was incised deep to adipose tissue with a #15 scalpel blade. The skin margins were undermined to an appropriate distance in all directions utilizing iris scissors. Following this, the designed flap was carried over into the primary defect and sutured into place.
Consent Type: Consent 1 (Standard)
Lazy S Complex Repair Preamble Text (Leave Blank If You Do Not Want): Extensive wide undermining was performed.
Medical Necessity Statement: Based on my medical judgement, Mohs surgery is the most appropriate treatment for this cancer compared to other treatments.
Trilobed Flap Text: The defect edges were debeveled with a #15 scalpel blade. Given the location of the defect and the proximity to free margins a trilobed flap was deemed most appropriate. Using a sterile surgical marker, an appropriate trilobed flap was drawn around the defect. The area thus outlined was incised deep to adipose tissue with a #15 scalpel blade. The skin margins were undermined to an appropriate distance in all directions utilizing iris scissors. Following this, the designed flap was carried into the primary defect and sutured into place.
Intermediate Repair And Graft Additional Text (Will Appearing After The Standard Complex Repair Text): The intermediate repair was not sufficient to completely close the primary defect. The remaining additional defect was repaired with the graft mentioned below.
Length To Time In Minutes Device Was In Place: 10
Burow's Graft Text: The defect edges were debeveled with a #15 scalpel blade. Given the location of the defect, shape of the defect, the proximity to free margins and the presence of a standing cone deformity a Burow's skin graft was deemed most appropriate. The standing cone was removed and this tissue was then trimmed to the shape of the primary defect. The adipose tissue was also removed until only dermis and epidermis were left.  The skin margins of the secondary defect were undermined to an appropriate distance in all directions utilizing iris scissors.  The secondary defect was closed with interrupted buried subcutaneous sutures.  The skin edges were then re-apposed with running  sutures.  The skin graft was then placed in the primary defect and oriented appropriately.
Bilateral Rotation Flap Text: The defect edges were debeveled with a #15 scalpel blade. Given the location of the defect, shape of the defect and the proximity to free margins a bilateral rotation flap was deemed most appropriate. Using a sterile surgical marker, an appropriate rotation flap was drawn incorporating the defect and placing the expected incisions within the relaxed skin tension lines where possible. The area thus outlined was incised deep to adipose tissue with a #15 scalpel blade. The skin margins were undermined to an appropriate distance in all directions utilizing iris scissors. Following this, the designed flap was carried over into the primary defect and sutured into place.
Localized Dermabrasion With Wire Brush Text: The patient was draped in routine manner.  Localized dermabrasion using 3 x 17 mm wire brush was performed in routine manner to papillary dermis. This spot dermabrasion is being performed to complete skin cancer reconstruction. It also will eliminate the other sun damaged precancerous cells that are known to be part of the regional effect of a lifetime's worth of sun exposure. This localized dermabrasion is therapeutic and should not be considered cosmetic in any regard.
Repair Type: None (only Mohs)
Banner Transposition Flap Text: The defect edges were debeveled with a #15 scalpel blade. Given the location of the defect and the proximity to free margins a Banner transposition flap was deemed most appropriate. Using a sterile surgical marker, an appropriate flap was drawn around the defect. The area thus outlined was incised deep to adipose tissue with a #15 scalpel blade. The skin margins were undermined to an appropriate distance in all directions utilizing iris scissors. Following this, the designed flap was carried into the primary defect and sutured into place.
Nostril Rim Text: The closure involved the nostril rim.
Z Plasty Text: The lesion was extirpated to the level of the fat with a #15 scalpel blade. Given the location of the defect, shape of the defect and the proximity to free margins a Z-plasty was deemed most appropriate for repair. Using a sterile surgical marker, the appropriate transposition arms of the Z-plasty were drawn incorporating the defect and placing the expected incisions within the relaxed skin tension lines where possible. The area thus outlined was incised deep to adipose tissue with a #15 scalpel blade. The skin margins were undermined to an appropriate distance in all directions utilizing iris scissors. The opposing transposition arms were then transposed and carried over into place in opposite direction and anchored with interrupted buried subcutaneous sutures.
Suturegard Retention Suture: 2-0 Nylon
Full Thickness Lip Wedge Repair (Flap) Text: Given the location of the defect and the proximity to free margins a full thickness wedge repair was deemed most appropriate. Using a sterile surgical marker, the appropriate repair was drawn incorporating the defect and placing the expected incisions perpendicular to the vermilion border.  The vermilion border was also meticulously outlined to ensure appropriate reapproximation during the repair.  The area thus outlined was incised through and through with a #15 scalpel blade.  The muscularis and dermis were reaproximated with deep sutures following hemostasis. Care was taken to realign the vermilion border before proceeding with the superficial closure.  Once the vermilion was realigned the superfical and mucosal closure was finished.
Mucosal Advancement Flap Text: Given the location of the defect, shape of the defect and the proximity to free margins a mucosal advancement flap was deemed most appropriate. Incisions were made with a 15 blade scalpel in the appropriate fashion along the cutaneous vermilion border and the mucosal lip. The remaining actinically damaged mucosal tissue was excised.  The mucosal advancement flap was then elevated to the gingival sulcus with care taken to preserve the neurovascular structures and advanced into the primary defect. Care was taken to ensure that precise realignment of the vermilion border was achieved.
Area H Indication Text: Tumors in this location are included in Area H (eyelids, eyebrows, nose, lips, chin, ear, pre-auricular, post-auricular, temple, genitalia, hands, feet, ankles and areola).  Tissue conservation is critical in these anatomic locations.
Wound Care: Petrolatum
Primary Defect Length In Cm (Final Defect Size - Required For Flaps/Grafts): 2.1
Bi-Rhombic Flap Text: The defect edges were debeveled with a #15 scalpel blade. Given the location of the defect and the proximity to free margins a bi-rhombic flap was deemed most appropriate. Using a sterile surgical marker, an appropriate rhombic flap was drawn incorporating the defect. The area thus outlined was incised deep to adipose tissue with a #15 scalpel blade. The skin margins were undermined to an appropriate distance in all directions utilizing iris scissors. Following this, the designed flap was carried over into the primary defect and sutured into place.
Consent (Nose)/Introductory Paragraph: The rationale for Mohs was explained to the patient and consent was obtained. The risks, benefits and alternatives to therapy were discussed in detail. Specifically, the risks of nasal deformity, changes in the flow of air through the nose, infection, scarring, bleeding, prolonged wound healing, incomplete removal, allergy to anesthesia, nerve injury and recurrence were addressed. Prior to the procedure, the treatment site was clearly identified and confirmed by the patient. All components of Universal Protocol/PAUSE Rule completed.
No Repair - Repaired With Adjacent Surgical Defect Text (Leave Blank If You Do Not Want): After obtaining clear surgical margins the defect was repaired concurrently with another surgical defect which was in close approximation.
Home Suture Removal Text: Patient was provided instructions on removing sutures and will remove their sutures at home.  If they have any questions or difficulties they will call the office.
Double O-Z Plasty Text: The defect edges were debeveled with a #15 scalpel blade. Given the location of the defect, shape of the defect and the proximity to free margins a Double O-Z plasty (double transposition flap) was deemed most appropriate. Using a sterile surgical marker, the appropriate transposition flaps were drawn incorporating the defect and placing the expected incisions within the relaxed skin tension lines where possible. The area thus outlined was incised deep to adipose tissue with a #15 scalpel blade. The skin margins were undermined to an appropriate distance in all directions utilizing iris scissors. Hemostasis was achieved with electrocautery. The flaps were then transposed and carried over into place, one clockwise and the other counterclockwise, and anchored with interrupted buried subcutaneous sutures.
Estlander Flap (Lower To Upper Lip) Text: The defect of the lower lip was assessed and measured.  Given the location and size of the defect, an Estlander flap was deemed most appropriate. Using a sterile surgical marker, an appropriate Estlander flap was measured and drawn on the upper lip. Local anesthesia was then infiltrated. A scalpel was then used to incise the lateral aspect of the flap, through skin, muscle and mucosa, leaving the flap pedicled medially.  The flap was then rotated and positioned to fill the lower lip defect.  The flap was then sutured into place with a three layer technique, closing the orbicularis oris muscle layer with subcutaneous buried sutures, followed by a mucosal layer and an epidermal layer.
V-Y Flap Text: The defect edges were debeveled with a #15 scalpel blade. Given the location of the defect, shape of the defect and the proximity to free margins a V-Y flap was deemed most appropriate. Using a sterile surgical marker, an appropriate advancement flap was drawn incorporating the defect and placing the expected incisions within the relaxed skin tension lines where possible. The area thus outlined was incised deep to adipose tissue with a #15 scalpel blade. The skin margins were undermined to an appropriate distance in all directions utilizing iris scissors. Following this, the designed flap was advanced and carried over into the primary defect and sutured into place.
Double O-Z Flap Text: The defect edges were debeveled with a #15 scalpel blade. Given the location of the defect, shape of the defect and the proximity to free margins a Double O-Z flap was deemed most appropriate. Using a sterile surgical marker, an appropriate transposition flap was drawn incorporating the defect and placing the expected incisions within the relaxed skin tension lines where possible. The area thus outlined was incised deep to adipose tissue with a #15 scalpel blade. The skin margins were undermined to an appropriate distance in all directions utilizing iris scissors. Following this, the designed flap was carried over into the primary defect and sutured into place.
Purse String (Simple) Text: Given the location of the defect and the characteristics of the surrounding skin a purse string closure was deemed most appropriate.  Undermining was performed circumferentially around the surgical defect.  A purse string suture was then placed and tightened.
Perineural Invasion (For Histology - Be Specific If Possible): absent
Nasalis-Muscle-Based Myocutaneous Island Pedicle Flap Text: Using a #15 blade, an incision was made around the donor flap to the level of the nasalis muscle. Wide lateral undermining was then performed in both the subcutaneous plane above the nasalis muscle, and in a submuscular plane just above periosteum. This allowed the formation of a free nasalis muscle axial pedicle (based on the angular artery) which was still attached to the actual cutaneous flap, increasing its mobility and vascular viability. Hemostasis was obtained with pinpoint electrocoagulation. The flap was mobilized into position and the pivotal anchor points positioned and stabilized with buried interrupted sutures. Subcutaneous and dermal tissues were closed in a multilayered fashion with sutures. Tissue redundancies were excised, and the epidermal edges were apposed without significant tension and sutured with sutures.
Consent (Temporal Branch)/Introductory Paragraph: The rationale for Mohs was explained to the patient and consent was obtained. The risks, benefits and alternatives to therapy were discussed in detail. Specifically, the risks of damage to the temporal branch of the facial nerve, infection, scarring, bleeding, prolonged wound healing, incomplete removal, allergy to anesthesia, and recurrence were addressed. Prior to the procedure, the treatment site was clearly identified and confirmed by the patient. All components of Universal Protocol/PAUSE Rule completed.
Double Island Pedicle Flap Text: The defect edges were debeveled with a #15 scalpel blade. Given the location of the defect, shape of the defect and the proximity to free margins a double island pedicle advancement flap was deemed most appropriate. Using a sterile surgical marker, an appropriate advancement flap was drawn incorporating the defect, outlining the appropriate donor tissue and placing the expected incisions within the relaxed skin tension lines where possible. The area thus outlined was incised deep to adipose tissue with a #15 scalpel blade. The skin margins were undermined to an appropriate distance in all directions around the primary defect and laterally outward around the island pedicle utilizing iris scissors.  There was minimal undermining beneath the pedicle flap. Following this, the flap was carried over into the primary defect and sutured into place.
Posterior Auricular Interpolation Flap Text: A decision was made to reconstruct the defect utilizing an interpolation axial flap and a staged reconstruction.  A telfa template was made of the defect.  This telfa template was then used to outline the posterior auricular interpolation flap.  The donor area for the pedicle flap was then injected with anesthesia.  The flap was excised through the skin and subcutaneous tissue down to the layer of the underlying musculature.  The pedicle flap was carefully excised within this deep plane to maintain its blood supply.  The edges of the donor site were undermined.   The donor site was closed in a primary fashion.  The pedicle was then rotated into position and sutured.  Once the tube was sutured into place, adequate blood supply was confirmed with blanching and refill.  The pedicle was then wrapped with xeroform gauze and dressed appropriately with a telfa and gauze bandage to ensure continued blood supply and protect the attached pedicle.
Hemostasis: Electrocautery
A-T Advancement Flap Text: The defect edges were debeveled with a #15 scalpel blade. Given the location of the defect, shape of the defect and the proximity to free margins an A-T advancement flap was deemed most appropriate. Using a sterile surgical marker, an appropriate advancement flap was drawn incorporating the defect and placing the expected incisions within the relaxed skin tension lines where possible. The area thus outlined was incised deep to adipose tissue with a #15 scalpel blade. The skin margins were undermined to an appropriate distance in all directions utilizing iris scissors. Following this, the designed flap was advanced and carried over into the primary defect and sutured into place.
Hemigard Postcare Instructions: The HEMIGARD strips are to remain completely dry for at least 5-7 days.
Dermal Autograft Text: The defect edges were debeveled with a #15 scalpel blade. Given the location of the defect, shape of the defect and the proximity to free margins a dermal autograft was deemed most appropriate. Using a sterile surgical marker, the primary defect shape was transferred to the donor site. The area thus outlined was incised deep to adipose tissue with a #15 scalpel blade.  The harvested graft was then trimmed of adipose and epidermal tissue until only dermis was left.  The skin graft was then placed in the primary defect and oriented appropriately.
Closure 2 Information: This tab is for additional flaps and grafts, including complex repair and grafts and complex repair and flaps. You can also specify a different location for the additional defect, if the location is the same you do not need to select a new one. We will insert the automated text for the repair you select below just as we do for solitary flaps and grafts. Please note that at this time if you select a location with a different insurance zone you will need to override the ICD10 and CPT if appropriate.
Transposition Flap Text: The defect edges were debeveled with a #15 scalpel blade. Given the location of the defect and the proximity to free margins a transposition flap was deemed most appropriate. Using a sterile surgical marker, an appropriate transposition flap was drawn incorporating the defect. The area thus outlined was incised deep to adipose tissue with a #15 scalpel blade. The skin margins were undermined to an appropriate distance in all directions utilizing iris scissors. Following this, the designed flap was carried over into the primary defect and sutured into place.
Alternatives Discussed Intro (Do Not Add Period): I discussed alternative treatments to Mohs surgery and specifically discussed the risks and benefits of
Dressing (No Sutures): dry sterile dressing
Complex Repair And Flap Additional Text (Will Appearing After The Standard Complex Repair Text): The complex repair was not sufficient to completely close the primary defect. The remaining additional defect was repaired with the flap mentioned below.
Dorsal Nasal Flap Text: The defect edges were debeveled with a #15 scalpel blade. Given the location of the defect and the proximity to free margins a dorsal nasal flap was deemed most appropriate. Using a sterile surgical marker, an appropriate dorsal nasal flap was drawn around the defect. The area thus outlined was incised deep to adipose tissue with a #15 scalpel blade. The skin margins were undermined to an appropriate distance in all directions utilizing iris scissors. Following this, the designed flap was carried into the primary defect and sutured into place.
Cheek-To-Nose Interpolation Flap Text: A decision was made to reconstruct the defect utilizing an interpolation axial flap and a staged reconstruction.  A telfa template was made of the defect.  This telfa template was then used to outline the Cheek-To-Nose Interpolation flap.  The donor area for the pedicle flap was then injected with anesthesia.  The flap was excised through the skin and subcutaneous tissue down to the layer of the underlying musculature.  The interpolation flap was carefully excised within this deep plane to maintain its blood supply.  The edges of the donor site were undermined.   The donor site was closed in a primary fashion.  The pedicle was then rotated into position and sutured.  Once the tube was sutured into place, adequate blood supply was confirmed with blanching and refill.  The pedicle was then wrapped with xeroform gauze and dressed appropriately with a telfa and gauze bandage to ensure continued blood supply and protect the attached pedicle.
Advancement Flap (Double) Text: The defect edges were debeveled with a #15 scalpel blade. Given the location of the defect and the proximity to free margins a double advancement flap was deemed most appropriate. Using a sterile surgical marker, the appropriate advancement flaps were drawn incorporating the defect and placing the expected incisions within the relaxed skin tension lines where possible. The area thus outlined was incised deep to adipose tissue with a #15 scalpel blade. The skin margins were undermined to an appropriate distance in all directions utilizing iris scissors. Following this, the designed flaps were advanced and carried over into the primary defect and sutured into place.
Deep Sutures: 4-0 Monocryl
Spiral Flap Text: The defect edges were debeveled with a #15 scalpel blade. Given the location of the defect, shape of the defect and the proximity to free margins a spiral flap was deemed most appropriate. Using a sterile surgical marker, an appropriate rotation flap was drawn incorporating the defect and placing the expected incisions within the relaxed skin tension lines where possible. The area thus outlined was incised deep to adipose tissue with a #15 scalpel blade. The skin margins were undermined to an appropriate distance in all directions utilizing iris scissors. Following this, the designed flap was carried over into the primary defect and sutured into place.
Tarsorrhaphy Text: A tarsorrhaphy was performed using Frost sutures.
Bilobed Flap Text: The defect edges were debeveled with a #15 scalpel blade. Given the location of the defect and the proximity to free margins a bilobe flap was deemed most appropriate. Using a sterile surgical marker, an appropriate bilobe flap drawn around the defect. The area thus outlined was incised deep to adipose tissue with a #15 scalpel blade. The skin margins were undermined to an appropriate distance in all directions utilizing iris scissors. Following this, the designed flap was carried over into the primary defect and sutured into place.
Double Z Plasty Text: The lesion was extirpated to the level of the fat with a #15 scalpel blade. Given the location of the defect, shape of the defect and the proximity to free margins a double Z-plasty was deemed most appropriate for repair. Using a sterile surgical marker, the appropriate transposition arms of the double Z-plasty were drawn incorporating the defect and placing the expected incisions within the relaxed skin tension lines where possible. The area thus outlined was incised deep to adipose tissue with a #15 scalpel blade. The skin margins were undermined to an appropriate distance in all directions utilizing iris scissors. The opposing transposition arms were then transposed and carried over into place in opposite direction and anchored with interrupted buried subcutaneous sutures.
Ftsg Text: The defect edges were debeveled with a #15 scalpel blade. Given the location of the defect, shape of the defect and the proximity to free margins a full thickness skin graft was deemed most appropriate. Using a sterile surgical marker, the primary defect shape was transferred to the donor site. The area thus outlined was incised deep to adipose tissue with a #15 scalpel blade.  The harvested graft was then trimmed of adipose tissue until only dermis and epidermis was left.  The skin margins of the secondary defect were undermined to an appropriate distance in all directions utilizing iris scissors.  The secondary defect was closed with interrupted buried subcutaneous sutures.  The skin edges were then re-apposed with running  sutures.  The skin graft was then placed in the primary defect and oriented appropriately.
Peng Advancement Flap Text: The defect edges were debeveled with a #15 scalpel blade. Given the location of the defect, shape of the defect and the proximity to free margins a Peng advancement flap was deemed most appropriate. Using a sterile surgical marker, an appropriate advancement flap was drawn incorporating the defect and placing the expected incisions within the relaxed skin tension lines where possible. The area thus outlined was incised deep to adipose tissue with a #15 scalpel blade. The skin margins were undermined to an appropriate distance in all directions utilizing iris scissors. Following this, the designed flap was advanced and carried over into the primary defect and sutured into place.
Area M Indication Text: Tumors in this location are included in Area M (cheek, forehead, scalp, neck, jawline and pretibial skin).  Mohs surgery is indicated for tumors in these anatomic locations.
Purse String (Intermediate) Text: Given the location of the defect and the characteristics of the surrounding skin a purse string intermediate closure was deemed most appropriate.  Undermining was performed circumferentially around the surgical defect.  A purse string suture was then placed and tightened.
Primary Defect Width In Cm (Final Defect Size - Required For Flaps/Grafts): 2.3
Helical Rim Advancement Flap Text: The defect edges were debeveled with a #15 blade scalpel.  Given the location of the defect and the proximity to free margins (helical rim) a double helical rim advancement flap was deemed most appropriate. Using a sterile surgical marker, the appropriate advancement flaps were drawn incorporating the defect and placing the expected incisions between the helical rim and antihelix where possible.  The area thus outlined was incised through and through with a #15 scalpel blade.  With a skin hook and iris scissors, the flaps were gently and sharply undermined and freed up. Folllowing this, the designed flaps were carried over into the primary defect and sutured into place.
Consent (Lip)/Introductory Paragraph: The rationale for Mohs was explained to the patient and consent was obtained. The risks, benefits and alternatives to therapy were discussed in detail. Specifically, the risks of lip deformity, changes in the oral aperture, infection, scarring, bleeding, prolonged wound healing, incomplete removal, allergy to anesthesia, nerve injury and recurrence were addressed. Prior to the procedure, the treatment site was clearly identified and confirmed by the patient. All components of Universal Protocol/PAUSE Rule completed.
V-Y Plasty Text: The defect edges were debeveled with a #15 scalpel blade. Given the location of the defect, shape of the defect and the proximity to free margins an V-Y advancement flap was deemed most appropriate. Using a sterile surgical marker, an appropriate advancement flap was drawn incorporating the defect and placing the expected incisions within the relaxed skin tension lines where possible. The area thus outlined was incised deep to adipose tissue with a #15 scalpel blade. The skin margins were undermined to an appropriate distance in all directions utilizing iris scissors. Following this, the designed flap was advanced and carried over into the primary defect and sutured into place.
Cheiloplasty (Less Than 50%) Text: A decision was made to reconstruct the defect with a  cheiloplasty.  The defect was undermined extensively.  Additional orbicularis oris muscle was excised with a 15 blade scalpel.  The defect was converted into a full thickness wedge, of less than 50% of the vertical height of the lip, to facilite a better cosmetic result.  Small vessels were then tied off with 5-0 monocyrl. The orbicularis oris, superficial fascia, adipose and dermis were then reapproximated.  After the deeper layers were approximated the epidermis was reapproximated with particular care given to realign the vermilion border.
Presence Of Scar Tissue (For Histology): present
Advancement-Rotation Flap Text: The defect edges were debeveled with a #15 scalpel blade. Given the location of the defect, shape of the defect and the proximity to free margins an advancement-rotation flap was deemed most appropriate. Using a sterile surgical marker, an appropriate flap was drawn incorporating the defect and placing the expected incisions within the relaxed skin tension lines where possible. The area thus outlined was incised deep to adipose tissue with a #15 scalpel blade. The skin margins were undermined to an appropriate distance in all directions utilizing iris scissors. Following this, the designed flap was carried over into the primary defect and sutured into place.
Mohs Method Verbiage: An incision at a 45 degree angle following the standard Mohs approach was done and the specimen was harvested as a microscopic controlled layer.
Eye Protection Verbiage: Before proceeding with the stage, a plastic scleral shield was inserted. The globe was anesthetized with a few drops of 1% lidocaine with 1:100,000 epinephrine. Then, an appropriate sized scleral shield was chosen and coated with lacrilube ointment. The shield was gently inserted and left in place for the duration of each stage. After the stage was completed, the shield was gently removed.
Orbicularis Oris Muscle Flap Text: The defect edges were debeveled with a #15 scalpel blade.  Given that the defect affected the competency of the oral sphincter an orbicularis oris muscle flap was deemed most appropriate to restore this competency and normal muscle function.  Using a sterile surgical marker, an appropriate flap was drawn incorporating the defect. The area thus outlined was incised with a #15 scalpel blade. Following this, the designed flap was carried over into the primary defect and sutured into place.
Information: Selecting Yes will display possible errors in your note based on the variables you have selected. This validation is only offered as a suggestion for you. PLEASE NOTE THAT THE VALIDATION TEXT WILL BE REMOVED WHEN YOU FINALIZE YOUR NOTE. IF YOU WANT TO FAX A PRELIMINARY NOTE YOU WILL NEED TO TOGGLE THIS TO 'NO' IF YOU DO NOT WANT IT IN YOUR FAXED NOTE.
Consent (Marginal Mandibular)/Introductory Paragraph: The rationale for Mohs was explained to the patient and consent was obtained. The risks, benefits and alternatives to therapy were discussed in detail. Specifically, the risks of damage to the marginal mandibular branch of the facial nerve, infection, scarring, bleeding, prolonged wound healing, incomplete removal, allergy to anesthesia, and recurrence were addressed. Prior to the procedure, the treatment site was clearly identified and confirmed by the patient. All components of Universal Protocol/PAUSE Rule completed.
Island Pedicle Flap-Requiring Vessel Identification Text: The defect edges were debeveled with a #15 scalpel blade. Given the location of the defect, shape of the defect and the proximity to free margins an island pedicle advancement flap was deemed most appropriate. Using a sterile surgical marker, an appropriate advancement flap was drawn, based on the axial vessel mentioned above, incorporating the defect, outlining the appropriate donor tissue and placing the expected incisions within the relaxed skin tension lines where possible. The area thus outlined was incised deep to adipose tissue with a #15 scalpel blade. The skin margins were undermined to an appropriate distance in all directions around the primary defect and laterally outward around the island pedicle utilizing iris scissors.  There was minimal undermining beneath the pedicle flap. Following this, the designed flap was carried over into the primary defect and sutured into place.
Bcc Infiltrative Histology Text: There were numerous aggregates of basaloid cells demonstrating an infiltrative pattern.
Pain Refusal Text: I offered to prescribe pain medication but the patient refused to take this medication.
Paramedian Forehead Flap Text: A decision was made to reconstruct the defect utilizing an interpolation axial flap and a staged reconstruction.  A telfa template was made of the defect.  This telfa template was then used to outline the paramedian forehead pedicle flap.  The donor area for the pedicle flap was then injected with anesthesia.  The flap was excised through the skin and subcutaneous tissue down to the layer of the underlying musculature.  The pedicle flap was carefully excised within this deep plane to maintain its blood supply.  The edges of the donor site were undermined.   The donor site was closed in a primary fashion.  The pedicle was then rotated into position and sutured.  Once the tube was sutured into place, adequate blood supply was confirmed with blanching and refill.  The pedicle was then wrapped with xeroform gauze and dressed appropriately with a telfa and gauze bandage to ensure continued blood supply and protect the attached pedicle.
O-T Advancement Flap Text: The defect edges were debeveled with a #15 scalpel blade. Given the location of the defect, shape of the defect and the proximity to free margins an O-T advancement flap was deemed most appropriate. Using a sterile surgical marker, an appropriate advancement flap was drawn incorporating the defect and placing the expected incisions within the relaxed skin tension lines where possible. The area thus outlined was incised deep to adipose tissue with a #15 scalpel blade. The skin margins were undermined to an appropriate distance in all directions utilizing iris scissors. Following this, the designed flap was advanced and carried over into the primary defect and sutured into place.
Skin Substitute Text: The defect edges were debeveled with a #15 scalpel blade. Given the location of the defect, shape of the defect and the proximity to free margins a skin substitute graft was deemed most appropriate.  The graft material was trimmed to fit the size of the defect. The graft was then placed in the primary defect and oriented appropriately.
Donor Site Anesthesia Type: same as repair anesthesia
Consent 1/Introductory Paragraph: The rationale for Mohs was explained to the patient and consent was obtained. The risks, benefits and alternatives to therapy were discussed in detail. Specifically, the risks of infection, scarring, bleeding, prolonged wound healing, incomplete removal, allergy to anesthesia, nerve injury and recurrence were addressed. Prior to the procedure, the treatment site was clearly identified and confirmed by the patient. All components of Universal Protocol/PAUSE Rule completed.
Muscle Hinge Flap Text: The defect edges were debeveled with a #15 scalpel blade.  Given the size, depth and location of the defect and the proximity to free margins a muscle hinge flap was deemed most appropriate. Using a sterile surgical marker, an appropriate hinge flap was drawn incorporating the defect. The area thus outlined was incised with a #15 scalpel blade. The skin margins were undermined to an appropriate distance in all directions utilizing iris scissors. Following this, the designed flap was carried into the primary defect and sutured into place.
Complex Repair And Graft Additional Text (Will Appearing After The Standard Complex Repair Text): The complex repair was not sufficient to completely close the primary defect. The remaining additional defect was repaired with the graft mentioned below.
Island Pedicle Flap Text: The defect edges were debeveled with a #15 scalpel blade. Given the location of the defect, shape of the defect and the proximity to free margins an island pedicle advancement flap was deemed most appropriate. Using a sterile surgical marker, an appropriate advancement flap was drawn incorporating the defect, outlining the appropriate donor tissue and placing the expected incisions within the relaxed skin tension lines where possible. The area thus outlined was incised deep to adipose tissue with a #15 scalpel blade. The skin margins were undermined to an appropriate distance in all directions around the primary defect and laterally outward around the island pedicle utilizing iris scissors.  There was minimal undermining beneath the pedicle flap. Following this, the flap was carried over into the primary defect and sutured into place.
Interpolation Flap Text: A decision was made to reconstruct the defect utilizing an interpolation axial flap and a staged reconstruction.  A telfa template was made of the defect.  This telfa template was then used to outline the interpolation flap.  The donor area for the pedicle flap was then injected with anesthesia.  The flap was excised through the skin and subcutaneous tissue down to the layer of the underlying musculature.  The interpolation flap was carefully excised within this deep plane to maintain its blood supply.  The edges of the donor site were undermined.   The donor site was closed in a primary fashion.  The pedicle was then rotated into position and sutured.  Once the tube was sutured into place, adequate blood supply was confirmed with blanching and refill.  The pedicle was then wrapped with xeroform gauze and dressed appropriately with a telfa and gauze bandage to ensure continued blood supply and protect the attached pedicle.
Burow's Advancement Flap Text: The defect edges were debeveled with a #15 scalpel blade. Given the location of the defect and the proximity to free margins a Burow's advancement flap was deemed most appropriate. Using a sterile surgical marker, the appropriate advancement flap was drawn incorporating the defect and placing the expected incisions within the relaxed skin tension lines where possible. The area thus outlined was incised deep to adipose tissue with a #15 scalpel blade. The skin margins were undermined to an appropriate distance in all directions utilizing iris scissors. Following this, the designed flap was advanced and carried over into the primary defect and sutured into place.
Suturegard Intro: Intraoperative tissue expansion was performed, utilizing the SUTUREGARD device, in order to reduce wound tension.
Graft Donor Site Bandage (Optional-Leave Blank If You Don't Want In Note): Steri-strips and a pressure bandage were applied to the donor site.
Cartilage Graft Text: The defect edges were debeveled with a #15 scalpel blade. Given the location of the defect, shape of the defect, the fact the defect involved a full thickness cartilage defect a cartilage graft was deemed most appropriate.  An appropriate donor site was identified, cleansed, and anesthetized. The cartilage graft was then harvested and transferred to the recipient site, oriented appropriately and then sutured into place.  The secondary defect was then repaired using a primary closure.
Intermediate Repair And Flap Additional Text (Will Appearing After The Standard Complex Repair Text): The intermediate repair was not sufficient to completely close the primary defect. The remaining additional defect was repaired with the flap mentioned below.
Undermining Type: Entire Wound
Bilobed Transposition Flap Text: The defect edges were debeveled with a #15 scalpel blade. Given the location of the defect and the proximity to free margins a bilobed transposition flap was deemed most appropriate. Using a sterile surgical marker, an appropriate bilobe flap drawn around the defect. The area thus outlined was incised deep to adipose tissue with a #15 scalpel blade. The skin margins were undermined to an appropriate distance in all directions utilizing iris scissors. Following this, the designed flap was carried over into the primary defect and sutured into place.
Zygomaticofacial Flap Text: Given the location of the defect, shape of the defect and the proximity to free margins a zygomaticofacial flap was deemed most appropriate for repair. Using a sterile surgical marker, the appropriate flap was drawn incorporating the defect and placing the expected incisions within the relaxed skin tension lines where possible. The area thus outlined was incised deep to adipose tissue with a #15 scalpel blade with preservation of a vascular pedicle.  The skin margins were undermined to an appropriate distance in all directions utilizing iris scissors. The flap was then carried over into the defect and anchored with interrupted buried subcutaneous sutures.
Adjacent Tissue Transfer Text: The defect edges were debeveled with a #15 scalpel blade. Given the location of the defect and the proximity to free margins an adjacent tissue transfer was deemed most appropriate. Using a sterile surgical marker, an appropriate flap was drawn incorporating the defect and placing the expected incisions within the relaxed skin tension lines where possible. The area thus outlined was incised deep to adipose tissue with a #15 scalpel blade. The skin margins were undermined to an appropriate distance in all directions utilizing iris scissors and carried over to close the primary defect.
Retention Suture Bite Size: 3 mm
Split-Thickness Skin Graft Text: The defect edges were debeveled with a #15 scalpel blade. Given the location of the defect, shape of the defect and the proximity to free margins a split thickness skin graft was deemed most appropriate. Using a sterile surgical marker, the primary defect shape was transferred to the donor site. The split thickness graft was then harvested.  The skin graft was then placed in the primary defect and oriented appropriately.
Hatchet Flap Text: The defect edges were debeveled with a #15 scalpel blade. Given the location of the defect, shape of the defect and the proximity to free margins a hatchet flap was deemed most appropriate. Using a sterile surgical marker, an appropriate hatchet flap was drawn incorporating the defect and placing the expected incisions within the relaxed skin tension lines where possible. The area thus outlined was incised deep to adipose tissue with a #15 scalpel blade. The skin margins were undermined to an appropriate distance in all directions utilizing iris scissors. Following this, the designed flap was carried over into the primary defect and sutured into place.
Where Do You Want The Question To Include Opioid Counseling Located?: Case Summary Tab
Subsequent Stages Histo Method Verbiage: Using a similar technique to that described above, a thin layer of tissue was removed from all areas where tumor was visible on the previous stage.  The tissue was again oriented, mapped, dyed, and processed as above.
Area L Indication Text: Tumors in this location are included in Area L (trunk and extremities).  Mohs surgery is indicated for larger tumors, or tumors with aggressive histologic features, in these anatomic locations.
Partial Purse String (Simple) Text: Given the location of the defect and the characteristics of the surrounding skin a simple purse string closure was deemed most appropriate.  Undermining was performed circumferentially around the surgical defect.  A purse string suture was then placed and tightened. Wound tension only allowed a partial closure of the circular defect.
Bilateral Helical Rim Advancement Flap Text: The defect edges were debeveled with a #15 blade scalpel.  Given the location of the defect and the proximity to free margins (helical rim) a bilateral helical rim advancement flap was deemed most appropriate. Using a sterile surgical marker, the appropriate advancement flaps were drawn incorporating the defect and placing the expected incisions between the helical rim and antihelix where possible.  The area thus outlined was incised through and through with a #15 scalpel blade.  With a skin hook and iris scissors, the flaps were gently and sharply undermined and freed up. Following this, the designed flaps were placed into the primary defect and sutured into place.
Consent (Scalp)/Introductory Paragraph: The rationale for Mohs was explained to the patient and consent was obtained. The risks, benefits and alternatives to therapy were discussed in detail. Specifically, the risks of changes in hair growth pattern secondary to repair, infection, scarring, bleeding, prolonged wound healing, incomplete removal, allergy to anesthesia, nerve injury and recurrence were addressed. Prior to the procedure, the treatment site was clearly identified and confirmed by the patient. All components of Universal Protocol/PAUSE Rule completed.
Helical Rim Text: The closure involved the helical rim.
H Plasty Text: Given the location of the defect, shape of the defect and the proximity to free margins a H-plasty was deemed most appropriate for repair. Using a sterile surgical marker, the appropriate advancement arms of the H-plasty were drawn incorporating the defect and placing the expected incisions within the relaxed skin tension lines where possible. The area thus outlined was incised deep to adipose tissue with a #15 scalpel blade. The skin margins were undermined to an appropriate distance in all directions utilizing iris scissors.  The opposing advancement arms were then advanced and carried over into place in opposite direction and anchored with interrupted buried subcutaneous sutures.
Cheiloplasty (Complex) Text: A decision was made to reconstruct the defect with a  cheiloplasty.  The defect was undermined extensively.  Additional orbicularis oris muscle was excised with a 15 blade scalpel.  The defect was converted into a full thickness wedge to facilite a better cosmetic result.  Small vessels were then tied off with 5-0 monocyrl. The orbicularis oris, superficial fascia, adipose and dermis were then reapproximated.  After the deeper layers were approximated the epidermis was reapproximated with particular care given to realign the vermilion border.
Mercedes Flap Text: The defect edges were debeveled with a #15 scalpel blade. Given the location of the defect, shape of the defect and the proximity to free margins a Mercedes flap was deemed most appropriate. Using a sterile surgical marker, an appropriate advancement flap was drawn incorporating the defect and placing the expected incisions within the relaxed skin tension lines where possible. The area thus outlined was incised deep to adipose tissue with a #15 scalpel blade. The skin margins were undermined to an appropriate distance in all directions utilizing iris scissors. Following this, the designed flap was advanced and carried over into the primary defect and sutured into place.
Body Location Override (Optional - Billing Will Still Be Based On Selected Body Map Location If Applicable): left mid anterior leg
Surgeon/Pathologist Verbiage (Will Incorporate Name Of Surgeon From Intro If Not Blank): operated in two distinct and integrated capacities as the surgeon and pathologist.
Rhombic Flap Text: The defect edges were debeveled with a #15 scalpel blade. Given the location of the defect and the proximity to free margins a rhombic flap was deemed most appropriate. Using a sterile surgical marker, an appropriate rhombic flap was drawn incorporating the defect. The area thus outlined was incised deep to adipose tissue with a #15 scalpel blade. The skin margins were undermined to an appropriate distance in all directions utilizing iris scissors. Following this, the designed flap was carried over into the primary defect and sutured into place.
X Size Of Lesion In Cm (Optional): 2
Graft Cartilage Fenestration Text: The cartilage was fenestrated with a 2mm punch biopsy to help facilitate graft survival and healing.
No Residual Tumor Seen Histology Text: There were no malignant cells seen in the sections examined.
Consent (Near Eyelid Margin)/Introductory Paragraph: The rationale for Mohs was explained to the patient and consent was obtained. The risks, benefits and alternatives to therapy were discussed in detail. Specifically, the risks of ectropion or eyelid deformity, infection, scarring, bleeding, prolonged wound healing, incomplete removal, allergy to anesthesia, nerve injury and recurrence were addressed. Prior to the procedure, the treatment site was clearly identified and confirmed by the patient. All components of Universal Protocol/PAUSE Rule completed.
Melolabial Transposition Flap Text: The defect edges were debeveled with a #15 scalpel blade. Given the location of the defect and the proximity to free margins a melolabial flap was deemed most appropriate. Using a sterile surgical marker, an appropriate melolabial transposition flap was drawn incorporating the defect. The area thus outlined was incised deep to adipose tissue with a #15 scalpel blade. The skin margins were undermined to an appropriate distance in all directions utilizing iris scissors. Following this, the designed flap was carried over into the primary defect and sutured into place.
Non-Graft Cartilage Fenestration Text: The cartilage was fenestrated with a 2mm punch biopsy to help facilitate healing.
Consent (Spinal Accessory)/Introductory Paragraph: The rationale for Mohs was explained to the patient and consent was obtained. The risks, benefits and alternatives to therapy were discussed in detail. Specifically, the risks of damage to the spinal accessory nerve, infection, scarring, bleeding, prolonged wound healing, incomplete removal, allergy to anesthesia, and recurrence were addressed. Prior to the procedure, the treatment site was clearly identified and confirmed by the patient. All components of Universal Protocol/PAUSE Rule completed.
Same Histology In Subsequent Stages Text: The pattern and morphology of the tumor is as described in the first stage.
Keystone Flap Text: The defect edges were debeveled with a #15 scalpel blade. Given the location of the defect, shape of the defect a keystone flap was deemed most appropriate. Using a sterile surgical marker, an appropriate keystone flap was drawn incorporating the defect, outlining the appropriate donor tissue and placing the expected incisions within the relaxed skin tension lines where possible. The area thus outlined was incised deep to adipose tissue with a #15 scalpel blade. The skin margins were undermined to an appropriate distance in all directions around the primary defect and laterally outward around the flap utilizing iris scissors. Following this, the designed flap was carried into the primary defect and sutured into place.
Mauc Instructions: By selecting yes to the question below the MAUC number will be added into the note.  This will be calculated automatically based on the diagnosis chosen, the size entered, the body zone selected (H,M,L) and the specific indications you chose. You will also have the option to override the Mohs AUC if you disagree with the automatically calculated number and this option is found in the Case Summary tab.
Estimated Blood Loss (Cc): minimal
Abbe Flap (Upper To Lower Lip) Text: The defect of the lower lip was assessed and measured.  Given the location and size of the defect, an Abbe flap was deemed most appropriate. Using a sterile surgical marker, an appropriate Abbe flap was measured and drawn on the upper lip. Local anesthesia was then infiltrated.  A scalpel was then used to incise the upper lip through and through the skin, vermilion, muscle and mucosa, leaving the flap pedicled on the opposite side.  The flap was then rotated and transferred to the lower lip defect.  The flap was then sutured into place with a three layer technique, closing the orbicularis oris muscle layer with subcutaneous buried sutures, followed by a mucosal layer and an epidermal layer.
Epidermal Sutures: 5-0 Fast Absorbing Gut
O-L Flap Text: The defect edges were debeveled with a #15 scalpel blade. Given the location of the defect, shape of the defect and the proximity to free margins an O-L flap was deemed most appropriate. Using a sterile surgical marker, an appropriate advancement flap was drawn incorporating the defect and placing the expected incisions within the relaxed skin tension lines where possible. The area thus outlined was incised deep to adipose tissue with a #15 scalpel blade. The skin margins were undermined to an appropriate distance in all directions utilizing iris scissors. Following this, the designed flap was advanced and carried over into the primary defect and sutured into place.
Tissue Cultured Epidermal Autograft Text: The defect edges were debeveled with a #15 scalpel blade. Given the location of the defect, shape of the defect and the proximity to free margins a tissue cultured epidermal autograft was deemed most appropriate.  The graft was then trimmed to fit the size of the defect.  The graft was then placed in the primary defect and oriented appropriately.
Depth Of Tumor Invasion (For Histology): tumor not visualized
Mustarde Flap Text: The defect edges were debeveled with a #15 scalpel blade.  Given the size, depth and location of the defect and the proximity to free margins a Mustarde flap was deemed most appropriate. Using a sterile surgical marker, an appropriate flap was drawn incorporating the defect. The area thus outlined was incised with a #15 scalpel blade. The skin margins were undermined to an appropriate distance in all directions utilizing iris scissors. Following this, the designed flap was carried into the primary defect and sutured into place.
Consent 2/Introductory Paragraph: Mohs surgery was explained to the patient and consent was obtained. The risks, benefits and alternatives to therapy were discussed in detail. Specifically, the risks of infection, scarring, bleeding, prolonged wound healing, incomplete removal, allergy to anesthesia, nerve injury and recurrence were addressed. Prior to the procedure, the treatment site was clearly identified and confirmed by the patient. All components of Universal Protocol/PAUSE Rule completed.
Manual Repair Warning Statement: We plan on removing the manually selected variable below in favor of our much easier automatic structured text blocks found in the previous tab. We decided to do this to help make the flow better and give you the full power of structured data. Manual selection is never going to be ideal in our platform and I would encourage you to avoid using manual selection from this point on, especially since I will be sunsetting this feature. It is important that you do one of two things with the customized text below. First, you can save all of the text in a word file so you can have it for future reference. Second, transfer the text to the appropriate area in the Library tab. Lastly, if there is a flap or graft type which we do not have you need to let us know right away so I can add it in before the variable is hidden. No need to panic, we plan to give you roughly 6 months to make the change.
Island Pedicle Flap With Canthal Suspension Text: The defect edges were debeveled with a #15 scalpel blade. Given the location of the defect, shape of the defect and the proximity to free margins an island pedicle advancement flap was deemed most appropriate. Using a sterile surgical marker, an appropriate advancement flap was drawn incorporating the defect, outlining the appropriate donor tissue and placing the expected incisions within the relaxed skin tension lines where possible. The area thus outlined was incised deep to adipose tissue with a #15 scalpel blade. The skin margins were undermined to an appropriate distance in all directions around the primary defect and laterally outward around the island pedicle utilizing iris scissors.  There was minimal undermining beneath the pedicle flap. A suspension suture was placed in the canthal tendon to prevent tension and prevent ectropion. Following this, the designed flap was placed into the primary defect and sutured into place.
Postop Diagnosis: same
Melolabial Interpolation Flap Text: A decision was made to reconstruct the defect utilizing an interpolation axial flap and a staged reconstruction.  A telfa template was made of the defect.  This telfa template was then used to outline the melolabial interpolation flap.  The donor area for the pedicle flap was then injected with anesthesia.  The flap was excised through the skin and subcutaneous tissue down to the layer of the underlying musculature.  The pedicle flap was carefully excised within this deep plane to maintain its blood supply.  The edges of the donor site were undermined.   The donor site was closed in a primary fashion.  The pedicle was then rotated into position and sutured.  Once the tube was sutured into place, adequate blood supply was confirmed with blanching and refill.  The pedicle was then wrapped with xeroform gauze and dressed appropriately with a telfa and gauze bandage to ensure continued blood supply and protect the attached pedicle.
Chonodrocutaneous Helical Advancement Flap Text: The defect edges were debeveled with a #15 scalpel blade. Given the location of the defect and the proximity to free margins a chondrocutaneous helical advancement flap was deemed most appropriate. Using a sterile surgical marker, the appropriate advancement flap was drawn incorporating the defect and placing the expected incisions within the relaxed skin tension lines where possible. The area thus outlined was incised deep to adipose tissue with a #15 scalpel blade. The skin margins were undermined to an appropriate distance in all directions utilizing iris scissors. Following this, the designed flap was advanced and carried over into the primary defect and sutured into place.
Composite Graft Text: The defect edges were debeveled with a #15 scalpel blade. Given the location of the defect, shape of the defect, the proximity to free margins and the fact the defect was full thickness a composite graft was deemed most appropriate.  The defect was outline and then transferred to the donor site.  A full thickness graft was then excised from the donor site. The graft was then placed in the primary defect, oriented appropriately and then sutured into place.  The secondary defect was then repaired using a primary closure.
Suturegard Body: The suture ends were repeatedly re-tightened and re-clamped to achieve the desired tissue expansion.
Staged Advancement Flap Text: The defect edges were debeveled with a #15 scalpel blade. Given the location of the defect, shape of the defect and the proximity to free margins a staged advancement flap was deemed most appropriate. Using a sterile surgical marker, an appropriate advancement flap was drawn incorporating the defect and placing the expected incisions within the relaxed skin tension lines where possible. The area thus outlined was incised deep to adipose tissue with a #15 scalpel blade. The skin margins were undermined to an appropriate distance in all directions utilizing iris scissors. Following this, the designed flap was carried over into the primary defect and sutured into place.
Eyelid Full Thickness Repair - 49352: The eyelid defect was full thickness which required a wedge repair of the eyelid. Special care was taken to ensure that the eyelid margin was realligned when placing sutures.
Which Eyelid Repair Cpt Are You Using?: 03857

## 2023-12-27 ENCOUNTER — APPOINTMENT (OUTPATIENT)
Dept: URBAN - METROPOLITAN AREA CLINIC 247 | Age: 83
Setting detail: DERMATOLOGY
End: 2023-12-27

## 2023-12-27 DIAGNOSIS — Z48.817 ENCOUNTER FOR SURGICAL AFTERCARE FOLLOWING SURGERY ON THE SKIN AND SUBCUTANEOUS TISSUE: ICD-10-CM

## 2023-12-27 PROCEDURE — OTHER UNNA BOOT APPLICATION: OTHER

## 2023-12-27 PROCEDURE — 29580 STRAPPING UNNA BOOT: CPT | Mod: LT

## 2023-12-27 ASSESSMENT — LOCATION DETAILED DESCRIPTION DERM: LOCATION DETAILED: LEFT PROXIMAL PRETIBIAL REGION

## 2023-12-27 ASSESSMENT — LOCATION SIMPLE DESCRIPTION DERM: LOCATION SIMPLE: LEFT PRETIBIAL REGION

## 2023-12-27 ASSESSMENT — LOCATION ZONE DERM: LOCATION ZONE: LEG

## 2023-12-27 NOTE — PROCEDURE: UNNA BOOT APPLICATION
Detail Level: Detailed
Location Applied: the left leg
Additional Instructions: Site was no longer bleeding. New unna boot applied.
Zinc Paste Impregnated Bandage Units: 1
Removal Of Previous Unna Boot (No) Text: The site was cleaned in preparation for an Unna Boot application.
Was A Previous Unna Boot Removed?: Yes
Bill For Unna Boot Supplies?: No
After Unna Boot Application Text: Coban was used to wrap the outside of Unna Boot and we ensured the Unna Boot wasn't too tight prior to the patient leaving the office.
Indication: stasis ulcer
Removal Of Previous Unna Boot (Yes) Text: The previous Unna Boot was removed and then the site was cleaned in preparation for another Unna Boot application.

## 2023-12-29 ENCOUNTER — HOSPITAL ENCOUNTER (OUTPATIENT)
Age: 83
Discharge: HOME | End: 2023-12-29
Payer: MEDICARE

## 2023-12-29 VITALS
SYSTOLIC BLOOD PRESSURE: 93 MMHG | BODY MASS INDEX: 40.43 KG/M2 | HEIGHT: 74 IN | DIASTOLIC BLOOD PRESSURE: 67 MMHG | WEIGHT: 315 LBS | RESPIRATION RATE: 18 BRPM | OXYGEN SATURATION: 95 % | TEMPERATURE: 97 F | HEART RATE: 97 BPM

## 2023-12-29 DIAGNOSIS — Z51.89 VISIT FOR WOUND CHECK: Primary | ICD-10-CM

## 2023-12-29 PROCEDURE — 99213 OFFICE O/P EST LOW 20 MIN: CPT | Performed by: NURSE PRACTITIONER

## 2023-12-29 RX ORDER — HYDROCODONE BITARTRATE AND ACETAMINOPHEN 5; 325 MG/1; MG/1
1 TABLET ORAL EVERY 6 HOURS PRN
Qty: 10 TABLET | Refills: 0 | Status: SHIPPED | OUTPATIENT
Start: 2023-12-29

## 2023-12-29 RX ORDER — CEFADROXIL 500 MG/1
500 CAPSULE ORAL 2 TIMES DAILY
Qty: 20 CAPSULE | Refills: 0 | Status: SHIPPED | OUTPATIENT
Start: 2023-12-29 | End: 2024-01-08

## 2023-12-29 NOTE — DISCHARGE INSTRUCTIONS
Take Duricef as prescribed for possible secondary infection. Clean area with a nonallergenic soap and warm water once daily. Apply cream that was provided by dermatology to the area. Use a Vaseline impregnated dressing for the first layer then may apply a nonadherent dressing on top of that with bulky gauze. Change dressing at least once a day or if soiled. Follow-up with dermatology as scheduled. Go directly to the ER with any uncontrolled bleeding or worsening of symptoms.

## 2023-12-29 NOTE — ED INITIAL ASSESSMENT (HPI)
Patient states he had a growth removed from his left lower leg on 12/26/23 by a dermatologist in [place]. Followed up the next day due to bleeding. He is on coumadin. Here today for wound check.

## 2024-01-03 ENCOUNTER — APPOINTMENT (OUTPATIENT)
Dept: URBAN - METROPOLITAN AREA CLINIC 247 | Age: 84
Setting detail: DERMATOLOGY
End: 2024-01-03

## 2024-01-03 DIAGNOSIS — L92.8 OTHER GRANULOMATOUS DISORDERS OF THE SKIN AND SUBCUTANEOUS TISSUE: ICD-10-CM

## 2024-01-03 PROCEDURE — OTHER COUNSELING: OTHER

## 2024-01-03 PROCEDURE — 99212 OFFICE O/P EST SF 10 MIN: CPT

## 2024-01-03 PROCEDURE — OTHER ADDITIONAL NOTES: OTHER

## 2024-01-03 PROCEDURE — OTHER DIAGNOSIS COMMENT: OTHER

## 2024-01-03 ASSESSMENT — LOCATION ZONE DERM: LOCATION ZONE: LEG

## 2024-01-03 ASSESSMENT — LOCATION SIMPLE DESCRIPTION DERM: LOCATION SIMPLE: LEFT PRETIBIAL REGION

## 2024-01-03 ASSESSMENT — LOCATION DETAILED DESCRIPTION DERM: LOCATION DETAILED: LEFT DISTAL PRETIBIAL REGION

## 2024-01-03 NOTE — PROCEDURE: ADDITIONAL NOTES
Additional Notes: Tried/failed 2 raulito boot applications. The last one fell off 1 day after application. Pt reports that his leg swelled up and when the swelling decreased the next morning, the boot fell off.\\nNo signs of infection. \\nAdvised to elevate leg when possible and wear compression stockings or ACE bandage wraps on top of the bandage\\nAdvised to cleanse wound daily and apply Vaseline and a bandage.\\nCoban wrap applied over bandage today. Pt is aware he can reuse the coban.
Render Risk Assessment In Note?: no
Detail Level: Simple

## 2024-01-03 NOTE — PROCEDURE: DIAGNOSIS COMMENT
Comment: S/P mohs 12/27/23 - left mid anterior leg
Render Risk Assessment In Note?: no
Detail Level: Simple

## 2024-01-09 ENCOUNTER — APPOINTMENT (OUTPATIENT)
Dept: URBAN - METROPOLITAN AREA CLINIC 247 | Age: 84
Setting detail: DERMATOLOGY
End: 2024-01-09

## 2024-01-09 DIAGNOSIS — I87.2 VENOUS INSUFFICIENCY (CHRONIC) (PERIPHERAL): ICD-10-CM

## 2024-01-09 DIAGNOSIS — L92.8 OTHER GRANULOMATOUS DISORDERS OF THE SKIN AND SUBCUTANEOUS TISSUE: ICD-10-CM

## 2024-01-09 PROCEDURE — 99212 OFFICE O/P EST SF 10 MIN: CPT

## 2024-01-09 PROCEDURE — OTHER DIAGNOSIS COMMENT: OTHER

## 2024-01-09 PROCEDURE — OTHER ADDITIONAL NOTES: OTHER

## 2024-01-09 PROCEDURE — OTHER COUNSELING: OTHER

## 2024-01-09 ASSESSMENT — LOCATION DETAILED DESCRIPTION DERM
LOCATION DETAILED: RIGHT DISTAL PRETIBIAL REGION
LOCATION DETAILED: LEFT DISTAL PRETIBIAL REGION

## 2024-01-09 ASSESSMENT — LOCATION SIMPLE DESCRIPTION DERM
LOCATION SIMPLE: RIGHT PRETIBIAL REGION
LOCATION SIMPLE: LEFT PRETIBIAL REGION

## 2024-01-09 ASSESSMENT — LOCATION ZONE DERM: LOCATION ZONE: LEG

## 2024-01-09 NOTE — PROCEDURE: ADDITIONAL NOTES
Additional Notes: Tried/failed 2 raulito boot applications. The last one fell off 1 day after application. Pt reports that his leg swelled up and when the swelling decreased the next morning, the boot fell off.\\nNo signs of infection. \\nAdvised to elevate leg when possible and wear compression stockings or ACE bandage wraps on top of the bandage\\nAdvised to cleanse wound daily and apply Vaseline and a bandage.\\nCoban wrap applied over bandage today
Render Risk Assessment In Note?: no
Detail Level: Simple

## 2024-01-12 ENCOUNTER — APPOINTMENT (OUTPATIENT)
Dept: HEMATOLOGY/ONCOLOGY | Age: 84
End: 2024-01-12
Attending: INTERNAL MEDICINE
Payer: MEDICARE

## 2024-01-18 ENCOUNTER — ANTI-COAG VISIT (OUTPATIENT)
Dept: HEMATOLOGY/ONCOLOGY | Age: 84
End: 2024-01-18
Attending: INTERNAL MEDICINE
Payer: MEDICARE

## 2024-01-18 ENCOUNTER — ANTI-COAG VISIT (OUTPATIENT)
Dept: HEMATOLOGY/ONCOLOGY | Facility: HOSPITAL | Age: 84
End: 2024-01-18

## 2024-01-18 ENCOUNTER — TELEPHONE (OUTPATIENT)
Dept: HEMATOLOGY/ONCOLOGY | Facility: HOSPITAL | Age: 84
End: 2024-01-18

## 2024-01-18 DIAGNOSIS — I82.4Y3 ACUTE DEEP VEIN THROMBOSIS (DVT) OF PROXIMAL VEIN OF BOTH LOWER EXTREMITIES (HCC): ICD-10-CM

## 2024-01-18 DIAGNOSIS — I82.409 RECURRENT DEEP VEIN THROMBOSIS (DVT) (HCC): Primary | ICD-10-CM

## 2024-01-18 LAB
INR BLD: 2.18 (ref 0.8–1.2)
PROTHROMBIN TIME: 24.5 SECONDS (ref 11.6–14.8)

## 2024-01-18 PROCEDURE — 85610 PROTHROMBIN TIME: CPT

## 2024-01-18 PROCEDURE — 36415 COLL VENOUS BLD VENIPUNCTURE: CPT

## 2024-01-18 NOTE — TELEPHONE ENCOUNTER
Called patient and notified him of INR of 2.18. Advised patient no change in dosage and repeat INR in 4 weeks. Scheduled lab appointment for patient. Patient conveyed understanding.

## 2024-01-23 NOTE — PROGRESS NOTES
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    Following for:  1. BPH/LUTS with MARIA T  - on rapaflo and now proscar 8/4/20  2. 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 OAB/UI  - Kegels reviewed, resumed myrbetriq 3/19/21  4. Several (~10) 2-3 mm erythematous bladder lesions on posterior bladder on cysto Aug 2020 as well as moderate BPH and bladder trabeculation  - cysto 6/30/21 showed resolution, suggesting inflammatory process and cytology was negative  5. Recurrent UTIs  - 3 UTIs over the past couple years  5. Pelvic/inguinal LAD noted on CT   - decreasing in size. Plan to observe clinically per Dr Carpenter    PCP - Kari Patricio - [place]  Renal - Fang    LOV 1/17/23    He asked about my son and family today.    Presents for check-up. He is taking rapaflo, proscar, and myrbetriq. Just doubled lasix and incontinence has gotten worse.  No UTIs since last visit.    AUA SS is 6/35 with 3 u; 2 f; 1 MARIA T. Was 25/35 prior to proscar with 4 f, i, u, w; 3 n, s, MARIA T. Mixed feelings towards LUTS.  Incontinence: UI and soaks underwear once a day. Was reporting UI about once a day and soaking underwear 3 times per week. Used depends in the past but not currently. Was reporting even more significant incontinence LOV (a few times per day and significant amounts). He is doing Kegels regularly.    Drinks no water, 20 tea. Is not on fluid restriction. I again encouraged him to drink > 40-60 oz water per day for UTI prevention.    UA is neg and PVRs have consistently been low recently - zero, zero prior checks. Was 305 mL prior visit but had him void again and was 178 mL (was 38, 130 mL prior visits).    PSA 1.01  7/22/22.    CT A/P with IVC 10/20/21: decreased size of LN.  CT A/P with IVC 4/1/21: stable to slightly decreased iliac and inguinal LN size.  He has another CT planned with Dr Carpenter in Oct and if stable/improving was planning to stop monitoring.  CT C/A/P with IVC 7/6/20: 2.4 cm LUP renal cyst and enlarged pelvic and inguinal lymph nodes measuring up to 3 cm which may be reactive.    Will continue rapaflo, proscar, water intake for UTI prevention, continue myrbetriq. Continue Kegels and declines PFT. He prefers f/u in 1 y with me with PVR    Prior note:    Obstructive symptoms are better with rapaflo, proscar but he is now having worsening OAB/UI.  AUA SS is 21/35 with 5/5 f, I; 4/5 u; 3/6 w, MARIA T; 1/5 n. Was 25/35 LOV with 4/5 f, i, u, w; 3/5 n, s, MARIA T. He is unhappy with LUTS.  Incontinence: yes - now a few times per day and significant amounts. LOV reported UI about once a day and soaks his underwear almost every day. He has used depends in the past.    Drinks ~ 30 ounces water, 8 oz soda and 8 oz juice per day. Urine is typically medium to light yellow.    UA was neg and PVR is down to 38 mL, was 130 mL LOV.    CT C/A/P with IV contrast 7/6/20: 2.4 cm LUP renal cyst and enlarged pelvic and inguinal lymph nodes measuring up to 3 cm which may be reactive.    PSA 3.23 8/6/20    - For OAB he would like to get back on myrbetriq. If insufficient we discussed anticholinergics as an option and reviewed SEs. He will call back if he'd like to try.  - Also discussed kegels again and PFT. He does not seem interested in PFT right now but requests referral in case he changes his mind.  - For bladder lesions on prior cysto he would like to check again in a couple months with another office cysto. If these persist he would be open to bladder biopsy.  - For LAD he would like to repeat CT A/P  - For BPH symptoms he is happy with rapaflo/proscar  - He will continue to drink plenty of fluids for UTI prevention.      Prior note:  I  saw him while admitted for urinary retention and gross hematuria. Passed voiding trial prior to discharge. Did perform a clot evacuation at bedside during admission with just a few tiny clots. UCx was negative.    Dr Hernandez is current PCP.    He presents for f/u for:  - BPH/LUTS  - recent urinary retention  - gross hematuria  - lymphadenopathy noted on CT    He is taking both rapaflo and proscar and reports urinary stream is still pretty weak.  He has had three UTIs over the past year.    AUA SS is 25/35 with 4/5 frequency, intermittency, urgency, weak stream; 3/5 nocturia, straining, MARIA T. He is unhappy with LUTS.  Incontinence: yes, UI about once a day and soaks his underwear almost every day. He has used depends in the past.    UA is negative and PVR is elevated at 130 mL.  Office cysto today shows moderate BPH with bladder trabeculation noted. He also has several (~ 10) small (~2-3 mm) erythematous lesions on the posterior bladder wall which may be inflammatory lesions versus malignancy.    Will check PSA given the LAD noted on CT but remain conservative given his age and comorbidities.    HISTORY:  Past Medical History:   Diagnosis Date    Arrhythmia     Back problem     Bull esophagus     BPH (benign prostatic hyperplasia)     Cataract     Cataract of both eyes 11/20/2017    COVID     8/30/2022    Disorder of prostate     DVT (deep venous thrombosis) ([facility]) 06/15/2012    Overview:  Overview:  3/11 after back surgery pulm emb-IVC filter Overview:  3/11 after back surgery pulm emb-IVC filter    Esophageal reflux     Frequent urination     Hearing impairment     bilateral hearing aids    Hearing loss     Wear hearing aids    Heartburn 1998    High cholesterol     Hx of endoscopy 09/12/2016    Hyperlipidemia     Indigestion 1998    Leaking of urine     Leg DVT (deep venous thromboembolism), acute, bilateral ([facility]) 07/06/2020    Leg swelling     Obesity     Pain in joints     Pleurisy with effusion     Postoperative  anemia due to acute blood loss 2014    Prediabetes     Presence of IVC filter 2015    cook celect filter.  Had 3 different IVC filters placed previous to current filter    Pulmonary embolism (HCC)     Stented coronary artery     aorta / iliac    Venous ulcer of right leg (HCC) 05/15/2022    Visual impairment     glasses    Wears glasses     Weight gain       Past Surgical History:   Procedure Laterality Date    BACK SURGERY  ,,, ,    CATARACT  2012    COLONOSCOPY  2016    COLONOSCOPY  2019    COLONOSCOPY N/A 2022    Procedure: COLONOSCOPY;  Surgeon: Saeid Conde MD;  Location:  ENDOSCOPY    COLONOSCOPY N/A 2023    Procedure: COLONOSCOPY WITH COLD SNARE POLYPECTOMY;  Surgeon: Saeid Conde MD;  Location:  ENDOSCOPY    ENDOVAS REPAIR, INFRARENL ABDOM AORTIC ANEURYSM/DISSECT  2017    Stent    KNEE REPLACEMENT SURGERY Right 2016    OTHER  2012    aortic and iliac artery stent    OTHER SURGICAL HISTORY  1964    Pleuectomy    OTHER SURGICAL HISTORY      IVC filter    SPINAL FUSION  10/12/2018    SPINE SURGERY PROCEDURE UNLISTED      Back Surgery    TONSILLECTOMY      VASECTOMY  1984      Family History   Problem Relation Age of Onset    Cancer Father         Prostate    Colon Cancer Father         80+    Cancer Mother         Gall bladder    Alcohol and Other Disorders Associated Sister         Alcoholism    Cancer Brother         Pancreas    DVT/VTE Neg       Social History:   Social History     Socioeconomic History    Marital status:    Tobacco Use    Smoking status: Former     Packs/day: 1.00     Years: 25.00     Additional pack years: 0.00     Total pack years: 25.00     Types: Cigarettes     Quit date: 2/3/1987     Years since quittin.0    Smokeless tobacco: Never   Vaping Use    Vaping Use: Never used   Substance and Sexual Activity    Alcohol use: Not Currently     Comment: Social drinker    Drug use: No   Other  Topics Concern    Caffeine Concern No    Exercise No    Seat Belt No    Special Diet No    Stress Concern No    Weight Concern No   Social History Narrative    , lives with wife.  Has 4 children, 1 of which is with his current wife.  Has grandchildren and 2 great-grandchildren as well.  Retired, previously worked in voice and data operations.        Medications (Active prior to today's visit):  Current Outpatient Medications   Medication Sig Dispense Refill    silodosin 8 MG Oral Cap Take 1 capsule (8 mg total) by mouth daily. 90 capsule 5    Mirabegron ER (MYRBETRIQ) 50 MG Oral Tablet 24 Hr Take 1 tablet (50 mg total) by mouth daily. 90 tablet 5    finasteride 5 MG Oral Tab Take 1 tablet (5 mg total) by mouth daily. 90 tablet 5    Potassium Chloride ER 10 MEQ Oral Tab CR Take 1 tablet (10 mEq total) by mouth daily. 90 tablet 0    furosemide 40 MG Oral Tab Take 1 tablet (40 mg total) by mouth daily. 90 tablet 3    HYDROcodone-acetaminophen 5-325 MG Oral Tab Take 1 tablet by mouth every 6 (six) hours as needed for Pain. 10 tablet 0    metFORMIN  MG Oral Tablet 24 Hr Take 1 tablet (500 mg total) by mouth daily with breakfast. 90 tablet 1    clobetasol 0.05 % External Ointment APPLY OINTMENT TOPICALLY TO AFFECTED AREA TWICE DAILY ON MOIST SKIN AS NEEDED FOR FLARES      mupirocin 2 % External Ointment APPLY OINTMENT TOPICALLY TO AFFECTED AREA 2 TO 3 TIMES DAILY FOR 2 TO 4 WEEKS      rosuvastatin 10 MG Oral Tab Take 1 tablet (10 mg total) by mouth nightly. 90 tablet 3    warfarin 5 MG Oral Tab Take 1 tablet (5 mg total) by mouth As Directed. Take as directed based on INR readings. (Patient taking differently: Take 1 tablet (5 mg total) by mouth As Directed. 5mg M-W-F and 2.5mg all other days) 90 tablet 3    PANTOPRAZOLE 40 MG Oral Tab EC TAKE 1 TABLET TWICE A  tablet 3    Vitamin D, Cholecalciferol, 25 MCG (1000 UT) Oral Cap Take by mouth daily.      acetaminophen 500 MG Oral Tab Take 1 tablet (500 mg  total) by mouth every 6 (six) hours as needed for Pain.  0    latanoprost 0.005 % Ophthalmic Solution Place 1 drop into both eyes nightly.      Dicloxacillin Sodium 500 MG Oral Cap Take 1 capsule (500 mg total) by mouth 2 (two) times daily.         Allergies:  No Known Allergies      ROS:     A comprehensive 10 point review of systems was completed.  Pertinent positives and negatives noted in the the HPI.    PHYSICAL EXAM:     GENERAL APPEARANCE: well, developed, well nourished, in no acute distress  NEUROLOGIC: nonfocal, alert and oriented  HEAD: normocephalic, atraumatic  EYES: sclera non-icteric  EARS: hearing intact  ORAL CAVITY: mucosa moist  NECK/THYROID: no obvious goiter or masses  LUNGS: nonlabored breathing  ABDOMEN: soft, no obvious masses or tenderness  SKIN: no obvious rashes     ASSESSMENT/PLAN:   Diagnoses and all orders for this visit:    BPH with obstruction/lower urinary tract symptoms  -     URINALYSIS, AUTO, W/O SCOPE  -     silodosin 8 MG Oral Cap; Take 1 capsule (8 mg total) by mouth daily.  -     finasteride 5 MG Oral Tab; Take 1 tablet (5 mg total) by mouth daily.    Urge incontinence  -     URINALYSIS, AUTO, W/O SCOPE  -     Mirabegron ER (MYRBETRIQ) 50 MG Oral Tablet 24 Hr; Take 1 tablet (50 mg total) by mouth daily.    Gross hematuria  -     URINALYSIS, AUTO, W/O SCOPE    Recurrent UTI  -     URINALYSIS, AUTO, W/O SCOPE    Elevated PSA  -     PSA Total, Diagnostic; Future    - as noted above.    Thanks again for this consult.    Ian Villarreal MD, FACS  Urologist  [facility]  Office: [phone]

## 2024-01-24 ENCOUNTER — OFFICE VISIT (OUTPATIENT)
Facility: LOCATION | Age: 84
End: 2024-01-24
Payer: MEDICARE

## 2024-01-24 DIAGNOSIS — B35.1 ONYCHOMYCOSIS: Primary | ICD-10-CM

## 2024-01-24 DIAGNOSIS — G62.9 PERIPHERAL POLYNEUROPATHY: ICD-10-CM

## 2024-01-24 DIAGNOSIS — R26.9 GAIT DIFFICULTY: ICD-10-CM

## 2024-01-24 DIAGNOSIS — M76.71 PERONEAL TENDINITIS OF RIGHT LOWER EXTREMITY: ICD-10-CM

## 2024-01-24 DIAGNOSIS — R60.0 BILATERAL LOWER EXTREMITY EDEMA: ICD-10-CM

## 2024-01-24 PROCEDURE — 1125F AMNT PAIN NOTED PAIN PRSNT: CPT | Performed by: PODIATRIST

## 2024-01-24 PROCEDURE — 99213 OFFICE O/P EST LOW 20 MIN: CPT | Performed by: PODIATRIST

## 2024-01-29 NOTE — PROGRESS NOTES
[facility]  Progress Note    Boyd Recio is a 83 year old male.   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  He is unable to trim his toenails himself.  He is hoping to discuss options for this today.  Patient also relates continued pain when standing for long times, rating it 7/10.  He states it is along the bottom of his feet.  He also states that he has occasional sharp heel pain, rating the pain as bad as 10/10.  He states that it did wake him up this morning and when he elevated his feet the pain is so away.  Patient does continue to experience swelling to both of his legs.  He is also continue to utilize a cane for gait assistance.  Denying any other concerns today and here for further evaluation and care.  Denies recent nausea, vomiting, fever, chills.      Allergies: Patient has no known allergies.   Current Outpatient Medications   Medication Sig Dispense Refill    HYDROcodone-acetaminophen 5-325 MG Oral Tab Take 1 tablet by mouth every 6 (six) hours as needed for Pain. 10 tablet 0    metFORMIN  MG Oral Tablet 24 Hr Take 1 tablet (500 mg total) by mouth daily with breakfast. 90 tablet 1    clobetasol 0.05 % External Ointment APPLY OINTMENT TOPICALLY TO AFFECTED AREA TWICE DAILY ON MOIST SKIN AS NEEDED FOR FLARES      mupirocin 2 % External Ointment APPLY OINTMENT TOPICALLY TO AFFECTED AREA 2 TO 3 TIMES DAILY FOR 2 TO 4 WEEKS      rosuvastatin 10 MG Oral Tab Take 1 tablet (10 mg total) by mouth nightly. 90  tablet 3    warfarin 5 MG Oral Tab Take 1 tablet (5 mg total) by mouth As Directed. Take as directed based on INR readings. (Patient taking differently: Take 1 tablet (5 mg total) by mouth As Directed. 5mg M-W-F and 2.5mg all other days) 90 tablet 3    PANTOPRAZOLE 40 MG Oral Tab EC TAKE 1 TABLET TWICE A  tablet 3    finasteride 5 MG Oral Tab Take 1 tablet (5 mg total) by mouth daily. 90 tablet 5    Mirabegron ER (MYRBETRIQ) 50 MG Oral Tablet 24 Hr Take 50 mg by mouth daily. 90 tablet 5    silodosin 8 MG Oral Cap Take 1 capsule (8 mg total) by mouth daily. 90 capsule 5    Vitamin D, Cholecalciferol, 25 MCG (1000 UT) Oral Cap Take by mouth daily.      acetaminophen 500 MG Oral Tab Take 1 tablet (500 mg total) by mouth every 6 (six) hours as needed for Pain.  0    latanoprost 0.005 % Ophthalmic Solution Place 1 drop into both eyes nightly.      Dicloxacillin Sodium 500 MG Oral Cap Take 1 capsule (500 mg total) by mouth 2 (two) times daily.      Potassium Chloride ER 10 MEQ Oral Tab CR Take 1 tablet (10 mEq total) by mouth daily. 90 tablet 0    furosemide 40 MG Oral Tab Take 1 tablet (40 mg total) by mouth daily. 90 tablet 3      Past Medical History:   Diagnosis Date    Arrhythmia     Back problem     Bull esophagus     BPH (benign prostatic hyperplasia)     Cataract     Cataract of both eyes 11/20/2017    COVID     8/30/2022    Disorder of prostate     DVT (deep venous thrombosis) ([facility]) 06/15/2012    Overview:  Overview:  3/11 after back surgery pulm emb-IVC filter Overview:  3/11 after back surgery pulm emb-IVC filter    Esophageal reflux     Frequent urination     Hearing impairment     bilateral hearing aids    Hearing loss     Wear hearing aids    Heartburn 1998    High cholesterol     Hx of endoscopy 09/12/2016    Hyperlipidemia     Indigestion 1998    Leaking of urine     Leg DVT (deep venous thromboembolism), acute, bilateral ([facility]) 07/06/2020    Leg swelling     Obesity     Pain in joints      Pleurisy with effusion     Postoperative anemia due to acute blood loss 2014    Prediabetes     Presence of IVC filter 2015    cook celect filter.  Had 3 different IVC filters placed previous to current filter    Pulmonary embolism (HCC)     Stented coronary artery 2017    aorta / iliac    Venous ulcer of right leg (HCC) 05/15/2022    Visual impairment     glasses    Wears glasses     Weight gain       Past Surgical History:   Procedure Laterality Date    BACK SURGERY  ,,, ,    CATARACT      COLONOSCOPY  2016    COLONOSCOPY  2019    COLONOSCOPY N/A 2022    Procedure: COLONOSCOPY;  Surgeon: Saeid Conde MD;  Location:  ENDOSCOPY    COLONOSCOPY N/A 2023    Procedure: COLONOSCOPY WITH COLD SNARE POLYPECTOMY;  Surgeon: Saeid Conde MD;  Location:  ENDOSCOPY    ENDOVAS REPAIR, INFRARENL ABDOM AORTIC ANEURYSM/DISSECT  2017    Stent    KNEE REPLACEMENT SURGERY Right 2016    OTHER  2012    aortic and iliac artery stent    OTHER SURGICAL HISTORY  1964    Pleuectomy    OTHER SURGICAL HISTORY      IVC filter    SPINAL FUSION  10/12/2018    SPINE SURGERY PROCEDURE UNLISTED      Back Surgery    TONSILLECTOMY      VASECTOMY  1984      Family History   Problem Relation Age of Onset    Cancer Father         Prostate    Colon Cancer Father         80+    Cancer Mother         Gall bladder    Alcohol and Other Disorders Associated Sister         Alcoholism    Cancer Brother         Pancreas    DVT/VTE Neg       Social History     Socioeconomic History    Marital status:    Tobacco Use    Smoking status: Former     Packs/day: 1.00     Years: 25.00     Additional pack years: 0.00     Total pack years: 25.00     Types: Cigarettes     Quit date: 2/3/1987     Years since quittin.0    Smokeless tobacco: Never   Vaping Use    Vaping Use: Never used   Substance and Sexual Activity    Alcohol use: Not Currently     Comment: Social drinker     Drug use: No   Other Topics Concern    Caffeine Concern No    Exercise No    Seat Belt No    Special Diet No    Stress Concern No    Weight Concern No           REVIEW OF SYSTEMS:     10 point ROS completed and was negative, except for pertinent positive and negatives stated in subjective.       EXAM:     GENERAL: well developed, well nourished, in no apparent distress  EXTREMITIES:              1. Integument: Nails x10 are thickened, elongated, discolored, dystrophic with subungual debris noted to right hallux toenail.  Normal skin temperature and decreased turgor.  Skin color changes consistent with hemosiderin deposition noted to bilateral lower extremities.  2. Vascular: Dorsalis pedis 2/4 bilateral and posterior tibial pulses 2/4 bilateral, capillary refill normal.  2+ pitting edema noted to bilateral lower extremities              3. Musculoskeletal: All muscle groups are graded 4+/5 in the foot and ankle, right with no pain elicited in any direction.  No current pain elicited on palpation along the course of the peroneal tendons near the lateral malleolus.  Adequate eversion strength is noted with no signs of tendon rupture, right.  No pain with palpation noted to plantar medial aspect of right heel.  No pain with side-to-side heel squeeze.  Decreased ankle joint dorsiflexion noted with the knee extended, which does improve with the knee flexed consistent with equinus deformity, right.  Some discomfort with palpation of toenails of bilateral feet              4. Neurological: Sensation diminished via light touch to bilateral lower extremities.  Negative Tinel's sign to nerve courses of the right lower extremity today.      ASSESSMENT AND PLAN:   Diagnoses and all orders for this visit:    Onychomycosis    Peroneal tendinitis of right lower extremity    Peripheral polyneuropathy    Bilateral lower extremity edema    Gait difficulty        Plan:   -Patient was seen and evaluated today in clinic.    -Evaluated  the patient. Discussed treatment options with the patient.  -Discussed with patient proper care and hygiene for their feet.  Recommend daily foot soaks for 10 minutes with warm water and apple cider vinegar    -If thickness of the toenails are causing discomfort, advised patient to try utilizing Vicks VapoRub on her toenails every night.  The goal would be for the toenails to soften over time.  -Patient relates pain relief with intermittent toenail debridements.  -Patient elects for nail debridement today. They tolerated procedures well, without incident.    -Instrumentation used includes nail nippers.  The etiology of edema was discussed with the patient.                  Discussion was made on various ways to control the lower extremity swelling which include leg elevation, compression stockings, as well as diet and diuresis management by the patient's primary care physician.    Discussed possibility of advanced therapies and programs to reduce edema including vascular surgical consultation, formal lymphedema reduction program, compression stockings/wraps, venous compression pumps, and elevation.    Provided patient with recommendation for compression     Recommend continued at home exercises that he learned at physical therapy     To patient's occasional pains at night, symptoms sound more like neuropathy symptoms.  Again recommend topical Voltaren gel.  Patient has been using Salonpas.  Recommend continued use of symptoms are controlled with this.      -The patient indicates understanding of these issues and agrees to the plan.    Time spent reviewing pertinent information from patient's chart, reviewing any pertinent imaging, obtaining history and physical exam, discussing and mutually agreeing on a treatment plan, and documenting encounter: 25 minutes    RTC 2 to 3 months      Cameron Menard DPM        1/28/2024    Dragon speech recognition software was used to prepare this note.  Errors in word recognition may  occur.  Please contact me with any questions/concerns with this note.

## 2024-01-30 ENCOUNTER — OFFICE VISIT (OUTPATIENT)
Dept: SURGERY | Facility: CLINIC | Age: 84
End: 2024-01-30
Payer: MEDICARE

## 2024-01-30 DIAGNOSIS — N40.1 BPH WITH OBSTRUCTION/LOWER URINARY TRACT SYMPTOMS: Primary | ICD-10-CM

## 2024-01-30 DIAGNOSIS — R31.0 GROSS HEMATURIA: ICD-10-CM

## 2024-01-30 DIAGNOSIS — N13.8 BPH WITH OBSTRUCTION/LOWER URINARY TRACT SYMPTOMS: Primary | ICD-10-CM

## 2024-01-30 DIAGNOSIS — R97.20 ELEVATED PSA: ICD-10-CM

## 2024-01-30 DIAGNOSIS — N39.41 URGE INCONTINENCE: ICD-10-CM

## 2024-01-30 DIAGNOSIS — N39.0 RECURRENT UTI: ICD-10-CM

## 2024-01-30 PROCEDURE — 99214 OFFICE O/P EST MOD 30 MIN: CPT | Performed by: UROLOGY

## 2024-01-30 PROCEDURE — 81003 URINALYSIS AUTO W/O SCOPE: CPT | Performed by: UROLOGY

## 2024-01-30 RX ORDER — FINASTERIDE 5 MG/1
5 TABLET, FILM COATED ORAL DAILY
Qty: 90 TABLET | Refills: 5 | Status: SHIPPED | OUTPATIENT
Start: 2024-01-30

## 2024-01-30 RX ORDER — SILODOSIN 8 MG/1
8 CAPSULE ORAL DAILY
Qty: 90 CAPSULE | Refills: 5 | Status: SHIPPED | OUTPATIENT
Start: 2024-01-30

## 2024-01-30 RX ORDER — MIRABEGRON 50 MG/1
50 TABLET, FILM COATED, EXTENDED RELEASE ORAL DAILY
Qty: 90 TABLET | Refills: 5 | Status: SHIPPED | OUTPATIENT
Start: 2024-01-30

## 2024-02-15 ENCOUNTER — ANTI-COAG VISIT (OUTPATIENT)
Dept: HEMATOLOGY/ONCOLOGY | Age: 84
End: 2024-02-15
Attending: INTERNAL MEDICINE
Payer: MEDICARE

## 2024-02-15 ENCOUNTER — APPOINTMENT (OUTPATIENT)
Dept: URBAN - METROPOLITAN AREA CLINIC 247 | Age: 84
Setting detail: DERMATOLOGY
End: 2024-02-15

## 2024-02-15 DIAGNOSIS — R97.20 ELEVATED PSA: ICD-10-CM

## 2024-02-15 DIAGNOSIS — E11.59 TYPE 2 DIABETES MELLITUS WITH OTHER CIRCULATORY COMPLICATION, WITHOUT LONG-TERM CURRENT USE OF INSULIN (HCC): ICD-10-CM

## 2024-02-15 DIAGNOSIS — L92.8 OTHER GRANULOMATOUS DISORDERS OF THE SKIN AND SUBCUTANEOUS TISSUE: ICD-10-CM

## 2024-02-15 DIAGNOSIS — I87.2 VENOUS INSUFFICIENCY (CHRONIC) (PERIPHERAL): ICD-10-CM

## 2024-02-15 DIAGNOSIS — I82.4Y3 ACUTE DEEP VEIN THROMBOSIS (DVT) OF PROXIMAL VEIN OF BOTH LOWER EXTREMITIES (HCC): ICD-10-CM

## 2024-02-15 DIAGNOSIS — I82.409 RECURRENT DEEP VEIN THROMBOSIS (DVT) (HCC): ICD-10-CM

## 2024-02-15 LAB
ANION GAP SERPL CALC-SCNC: 0 MMOL/L (ref 0–18)
BUN BLD-MCNC: 19 MG/DL (ref 9–23)
CALCIUM BLD-MCNC: 9.7 MG/DL (ref 8.5–10.1)
CHLORIDE SERPL-SCNC: 109 MMOL/L (ref 98–112)
CHOLEST SERPL-MCNC: 119 MG/DL (ref ?–200)
CO2 SERPL-SCNC: 33 MMOL/L (ref 21–32)
CREAT BLD-MCNC: 1.25 MG/DL
EGFRCR SERPLBLD CKD-EPI 2021: 57 ML/MIN/1.73M2 (ref 60–?)
EST. AVERAGE GLUCOSE BLD GHB EST-MCNC: 140 MG/DL (ref 68–126)
FASTING PATIENT LIPID ANSWER: YES
FASTING STATUS PATIENT QL REPORTED: YES
GLUCOSE BLD-MCNC: 123 MG/DL (ref 70–99)
HBA1C MFR BLD: 6.5 % (ref ?–5.7)
HDLC SERPL-MCNC: 37 MG/DL (ref 40–59)
INR BLD: 2.24 (ref 0.8–1.2)
LDLC SERPL CALC-MCNC: 60 MG/DL (ref ?–100)
NONHDLC SERPL-MCNC: 82 MG/DL (ref ?–130)
OSMOLALITY SERPL CALC.SUM OF ELEC: 298 MOSM/KG (ref 275–295)
POTASSIUM SERPL-SCNC: 4.2 MMOL/L (ref 3.5–5.1)
PROTHROMBIN TIME: 25.1 SECONDS (ref 11.6–14.8)
PSA SERPL-MCNC: 0.61 NG/ML (ref ?–4)
SODIUM SERPL-SCNC: 142 MMOL/L (ref 136–145)
TRIGL SERPL-MCNC: 119 MG/DL (ref 30–149)
VLDLC SERPL CALC-MCNC: 18 MG/DL (ref 0–30)

## 2024-02-15 PROCEDURE — 80048 BASIC METABOLIC PNL TOTAL CA: CPT

## 2024-02-15 PROCEDURE — OTHER COUNSELING: OTHER

## 2024-02-15 PROCEDURE — OTHER DIAGNOSIS COMMENT: OTHER

## 2024-02-15 PROCEDURE — 83036 HEMOGLOBIN GLYCOSYLATED A1C: CPT

## 2024-02-15 PROCEDURE — 85610 PROTHROMBIN TIME: CPT

## 2024-02-15 PROCEDURE — 84153 ASSAY OF PSA TOTAL: CPT

## 2024-02-15 PROCEDURE — 99212 OFFICE O/P EST SF 10 MIN: CPT

## 2024-02-15 PROCEDURE — OTHER ADDITIONAL NOTES: OTHER

## 2024-02-15 PROCEDURE — 36415 COLL VENOUS BLD VENIPUNCTURE: CPT

## 2024-02-15 PROCEDURE — 80061 LIPID PANEL: CPT

## 2024-02-15 ASSESSMENT — LOCATION DETAILED DESCRIPTION DERM
LOCATION DETAILED: RIGHT DISTAL PRETIBIAL REGION
LOCATION DETAILED: LEFT DISTAL PRETIBIAL REGION

## 2024-02-15 ASSESSMENT — LOCATION SIMPLE DESCRIPTION DERM
LOCATION SIMPLE: RIGHT PRETIBIAL REGION
LOCATION SIMPLE: LEFT PRETIBIAL REGION

## 2024-02-15 ASSESSMENT — LOCATION ZONE DERM: LOCATION ZONE: LEG

## 2024-02-15 NOTE — PROCEDURE: ADDITIONAL NOTES
Additional Notes: No signs of infection. \\nAdvised to elevate leg when possible and wear compression stockings on top of the bandage\\nAdvised to cleanse wound daily and apply Vaseline and a bandage.\\nVaseline, non adhesive gauze and Hypafix applied today\\nPt has sensitivity to adhesive and will swell if any banding applied over dressing but states compression stockings ok.
Render Risk Assessment In Note?: no
Detail Level: Simple

## 2024-03-14 ENCOUNTER — ANTI-COAG VISIT (OUTPATIENT)
Dept: HEMATOLOGY/ONCOLOGY | Age: 84
End: 2024-03-14
Attending: INTERNAL MEDICINE
Payer: MEDICARE

## 2024-03-14 DIAGNOSIS — I82.4Y3 ACUTE DEEP VEIN THROMBOSIS (DVT) OF PROXIMAL VEIN OF BOTH LOWER EXTREMITIES (HCC): ICD-10-CM

## 2024-03-14 DIAGNOSIS — I82.409 RECURRENT DEEP VEIN THROMBOSIS (DVT) (HCC): ICD-10-CM

## 2024-03-14 LAB
INR BLD: 2.3 (ref 0.8–1.2)
PROTHROMBIN TIME: 25.6 SECONDS (ref 11.6–14.8)

## 2024-03-14 PROCEDURE — 36415 COLL VENOUS BLD VENIPUNCTURE: CPT

## 2024-03-14 PROCEDURE — 85610 PROTHROMBIN TIME: CPT

## 2024-04-11 ENCOUNTER — ANTI-COAG VISIT (OUTPATIENT)
Dept: HEMATOLOGY/ONCOLOGY | Age: 84
End: 2024-04-11
Attending: INTERNAL MEDICINE
Payer: MEDICARE

## 2024-04-11 DIAGNOSIS — I82.409 RECURRENT DEEP VEIN THROMBOSIS (DVT) (HCC): ICD-10-CM

## 2024-04-11 DIAGNOSIS — I82.4Y3 ACUTE DEEP VEIN THROMBOSIS (DVT) OF PROXIMAL VEIN OF BOTH LOWER EXTREMITIES (HCC): ICD-10-CM

## 2024-04-11 LAB
INR BLD: 2.4 (ref 0.8–1.2)
PROTHROMBIN TIME: 26.4 SECONDS (ref 11.6–14.8)

## 2024-04-11 PROCEDURE — 36415 COLL VENOUS BLD VENIPUNCTURE: CPT

## 2024-04-11 PROCEDURE — 85610 PROTHROMBIN TIME: CPT

## 2024-04-13 DIAGNOSIS — R60.0 BILATERAL LEG EDEMA: ICD-10-CM

## 2024-04-15 NOTE — TELEPHONE ENCOUNTER
Medication(s) to Refill:   Requested Prescriptions     Pending Prescriptions Disp Refills    metFORMIN  MG Oral Tablet 24 Hr 90 tablet 1     Sig: Take 1 tablet (500 mg total) by mouth daily with breakfast.    Potassium Chloride ER 10 MEQ Oral Tab CR 90 tablet 0     Sig: Take 1 tablet (10 mEq total) by mouth daily.         Reason for Medication Refill being sent to Provider / Reason Protocol Failed:  [] 90 day refill has already been granted  [] Blood Pressure out of range  [] Labs Abnormal/over due  [] Medication not previously prescribed by Provider  [] Non-Protocol Medication  [] Controlled Substance   [] Due for appointment- no future appointment scheduled  [] No Follow up specified      Last Time Medication was Filled:  1/24/24      Last Office Visit with PCP: 8/30/23    When Patient was Due Back to the Office:    (from when PCP last addressed condition)    Future Appointments:  Future Appointments   Date Time Provider Department Center   5/1/2024 10:00 AM Kyle Menard DPM [id number]   5/3/2024 10:20 AM PF OOT PF CHEMO I [place]   5/7/2024 10:30 AM Nakul Carpenter MD PF HEM ONC [place]   5/13/2024 10:30 AM Kari Hernandez MD [facility]   2/4/2025 11:00 AM Ian Villarreal MD [id number]         Last Blood Pressures:  BP Readings from Last 2 Encounters:   12/29/23 93/67   11/01/23 112/72         Action taken:  [] Refill approved per protocol  [] Routing to provider for approval

## 2024-04-16 RX ORDER — POTASSIUM CHLORIDE 750 MG/1
10 TABLET, FILM COATED, EXTENDED RELEASE ORAL DAILY
Qty: 90 TABLET | Refills: 1 | Status: SHIPPED | OUTPATIENT
Start: 2024-04-16

## 2024-04-16 RX ORDER — METFORMIN HYDROCHLORIDE 500 MG/1
500 TABLET, EXTENDED RELEASE ORAL
Qty: 90 TABLET | Refills: 1 | Status: SHIPPED | OUTPATIENT
Start: 2024-04-16

## 2024-04-22 ENCOUNTER — TELEPHONE (OUTPATIENT)
Dept: FAMILY MEDICINE CLINIC | Facility: CLINIC | Age: 84
End: 2024-04-22

## 2024-04-22 DIAGNOSIS — E11.59 TYPE 2 DIABETES MELLITUS WITH OTHER CIRCULATORY COMPLICATION, WITHOUT LONG-TERM CURRENT USE OF INSULIN (HCC): Primary | ICD-10-CM

## 2024-04-22 DIAGNOSIS — I48.0 PAROXYSMAL ATRIAL FIBRILLATION (HCC): ICD-10-CM

## 2024-04-22 NOTE — TELEPHONE ENCOUNTER
Labs due for heme on 5/3/24      A1c too soon - will check POC at visit on 5/13/24   Lipid UTD - due in August   Ordered urine micro and tsh  CBC, CMP already ordered.

## 2024-05-03 ENCOUNTER — ANTI-COAG VISIT (OUTPATIENT)
Dept: HEMATOLOGY/ONCOLOGY | Age: 84
End: 2024-05-03
Attending: INTERNAL MEDICINE
Payer: MEDICARE

## 2024-05-03 ENCOUNTER — ANTI-COAG VISIT (OUTPATIENT)
Dept: HEMATOLOGY/ONCOLOGY | Facility: HOSPITAL | Age: 84
End: 2024-05-03

## 2024-05-03 DIAGNOSIS — I48.0 PAROXYSMAL ATRIAL FIBRILLATION (HCC): ICD-10-CM

## 2024-05-03 DIAGNOSIS — D50.0 IRON DEFICIENCY ANEMIA SECONDARY TO BLOOD LOSS (CHRONIC): ICD-10-CM

## 2024-05-03 DIAGNOSIS — Z86.711 HISTORY OF PULMONARY EMBOLISM: ICD-10-CM

## 2024-05-03 DIAGNOSIS — E11.59 TYPE 2 DIABETES MELLITUS WITH OTHER CIRCULATORY COMPLICATION, WITHOUT LONG-TERM CURRENT USE OF INSULIN (HCC): ICD-10-CM

## 2024-05-03 DIAGNOSIS — D64.9 NORMOCYTIC ANEMIA: ICD-10-CM

## 2024-05-03 DIAGNOSIS — D47.2 MGUS (MONOCLONAL GAMMOPATHY OF UNKNOWN SIGNIFICANCE): ICD-10-CM

## 2024-05-03 DIAGNOSIS — I82.409 RECURRENT DEEP VEIN THROMBOSIS (DVT) (HCC): ICD-10-CM

## 2024-05-03 LAB
ALBUMIN SERPL-MCNC: 3.3 G/DL (ref 3.4–5)
ALBUMIN/GLOB SERPL: 0.9 {RATIO} (ref 1–2)
ALP LIVER SERPL-CCNC: 99 U/L
ALT SERPL-CCNC: 15 U/L
ANION GAP SERPL CALC-SCNC: 2 MMOL/L (ref 0–18)
AST SERPL-CCNC: 15 U/L (ref 15–37)
BASOPHILS # BLD AUTO: 0.03 X10(3) UL (ref 0–0.2)
BASOPHILS NFR BLD AUTO: 0.5 %
BILIRUB SERPL-MCNC: 1 MG/DL (ref 0.1–2)
BUN BLD-MCNC: 19 MG/DL (ref 9–23)
CALCIUM BLD-MCNC: 10 MG/DL (ref 8.5–10.1)
CHLORIDE SERPL-SCNC: 107 MMOL/L (ref 98–112)
CO2 SERPL-SCNC: 30 MMOL/L (ref 21–32)
CREAT BLD-MCNC: 1.21 MG/DL
CREAT UR-SCNC: 150 MG/DL
CRP SERPL-MCNC: 0.82 MG/DL (ref ?–0.3)
DEPRECATED HBV CORE AB SER IA-ACNC: 66.2 NG/ML
EGFRCR SERPLBLD CKD-EPI 2021: 60 ML/MIN/1.73M2 (ref 60–?)
EOSINOPHIL # BLD AUTO: 0.15 X10(3) UL (ref 0–0.7)
EOSINOPHIL NFR BLD AUTO: 2.3 %
ERYTHROCYTE [DISTWIDTH] IN BLOOD BY AUTOMATED COUNT: 13.8 %
FASTING STATUS PATIENT QL REPORTED: NO
GLOBULIN PLAS-MCNC: 3.8 G/DL (ref 2.8–4.4)
GLUCOSE BLD-MCNC: 121 MG/DL (ref 70–99)
HCT VFR BLD AUTO: 50.5 %
HGB BLD-MCNC: 16.5 G/DL
IMM GRANULOCYTES # BLD AUTO: 0.01 X10(3) UL (ref 0–1)
IMM GRANULOCYTES NFR BLD: 0.2 %
INR BLD: 2.14 (ref 0.8–1.2)
LYMPHOCYTES # BLD AUTO: 0.69 X10(3) UL (ref 1–4)
LYMPHOCYTES NFR BLD AUTO: 10.7 %
MCH RBC QN AUTO: 30.8 PG (ref 26–34)
MCHC RBC AUTO-ENTMCNC: 32.7 G/DL (ref 31–37)
MCV RBC AUTO: 94.4 FL
MICROALBUMIN UR-MCNC: 1.79 MG/DL
MICROALBUMIN/CREAT 24H UR-RTO: 11.9 UG/MG (ref ?–30)
MONOCYTES # BLD AUTO: 0.52 X10(3) UL (ref 0.1–1)
MONOCYTES NFR BLD AUTO: 8.1 %
NEUTROPHILS # BLD AUTO: 5.03 X10 (3) UL (ref 1.5–7.7)
NEUTROPHILS # BLD AUTO: 5.03 X10(3) UL (ref 1.5–7.7)
NEUTROPHILS NFR BLD AUTO: 78.2 %
OSMOLALITY SERPL CALC.SUM OF ELEC: 292 MOSM/KG (ref 275–295)
PLATELET # BLD AUTO: 168 10(3)UL (ref 150–450)
POTASSIUM SERPL-SCNC: 4.6 MMOL/L (ref 3.5–5.1)
PROT SERPL-MCNC: 7.1 G/DL (ref 6.4–8.2)
PROTHROMBIN TIME: 24.1 SECONDS (ref 11.6–14.8)
RBC # BLD AUTO: 5.35 X10(6)UL
SODIUM SERPL-SCNC: 139 MMOL/L (ref 136–145)
TSI SER-ACNC: 1.69 MIU/ML (ref 0.36–3.74)
WBC # BLD AUTO: 6.4 X10(3) UL (ref 4–11)

## 2024-05-03 PROCEDURE — 86140 C-REACTIVE PROTEIN: CPT

## 2024-05-03 PROCEDURE — 82570 ASSAY OF URINE CREATININE: CPT

## 2024-05-03 PROCEDURE — 80053 COMPREHEN METABOLIC PANEL: CPT

## 2024-05-03 PROCEDURE — 36415 COLL VENOUS BLD VENIPUNCTURE: CPT

## 2024-05-03 PROCEDURE — 85610 PROTHROMBIN TIME: CPT

## 2024-05-03 PROCEDURE — 82043 UR ALBUMIN QUANTITATIVE: CPT

## 2024-05-03 PROCEDURE — 82728 ASSAY OF FERRITIN: CPT

## 2024-05-03 PROCEDURE — 85025 COMPLETE CBC W/AUTO DIFF WBC: CPT

## 2024-05-03 PROCEDURE — 84443 ASSAY THYROID STIM HORMONE: CPT

## 2024-05-07 ENCOUNTER — OFFICE VISIT (OUTPATIENT)
Dept: HEMATOLOGY/ONCOLOGY | Age: 84
End: 2024-05-07
Attending: INTERNAL MEDICINE
Payer: MEDICARE

## 2024-05-07 VITALS
TEMPERATURE: 97 F | HEIGHT: 72.01 IN | BODY MASS INDEX: 41.95 KG/M2 | RESPIRATION RATE: 20 BRPM | WEIGHT: 309.69 LBS | HEART RATE: 71 BPM | OXYGEN SATURATION: 95 % | DIASTOLIC BLOOD PRESSURE: 72 MMHG | SYSTOLIC BLOOD PRESSURE: 108 MMHG

## 2024-05-07 DIAGNOSIS — Z79.01 CHRONIC ANTICOAGULATION: ICD-10-CM

## 2024-05-07 DIAGNOSIS — Z95.828 PRESENCE OF IVC FILTER: ICD-10-CM

## 2024-05-07 DIAGNOSIS — D64.9 NORMOCYTIC ANEMIA: ICD-10-CM

## 2024-05-07 DIAGNOSIS — I82.409 RECURRENT DEEP VEIN THROMBOSIS (DVT) (HCC): Primary | ICD-10-CM

## 2024-05-07 DIAGNOSIS — I87.2 VENOUS INSUFFICIENCY: ICD-10-CM

## 2024-05-07 DIAGNOSIS — R60.0 BILATERAL LEG EDEMA: ICD-10-CM

## 2024-05-07 DIAGNOSIS — I82.4Y3 ACUTE DEEP VEIN THROMBOSIS (DVT) OF PROXIMAL VEIN OF BOTH LOWER EXTREMITIES (HCC): ICD-10-CM

## 2024-05-07 DIAGNOSIS — Z86.711 HISTORY OF PULMONARY EMBOLISM: ICD-10-CM

## 2024-05-07 DIAGNOSIS — D47.2 MGUS (MONOCLONAL GAMMOPATHY OF UNKNOWN SIGNIFICANCE): ICD-10-CM

## 2024-05-07 PROCEDURE — 99214 OFFICE O/P EST MOD 30 MIN: CPT | Performed by: INTERNAL MEDICINE

## 2024-05-07 NOTE — PROGRESS NOTES
Education Record     Learner:  Patient and Spouse     Disease / Diagnosis:DVT/PE     Barriers / Limitations:  None                Comments:     Method:  Brief focused                Comments:     General Topics:  Plan of care reviewed                Comments:     Outcome:  Shows understanding                Comments: here for 6 month MD f/up. Pt states he is taking coumadin as directed and has been tolerating without complications.

## 2024-05-07 NOTE — PROGRESS NOTES
Hematology Clinic Follow Up Visit    Patient Name: Boyd Recio  Medical Record Number: [id number]   YOB: 1940    PCP: Dr. Kari Hernandez   Other providers:  Dr. Ian Villarreal (urology), Dr. Isaiah Shin (renal)    Reason for Consultation:  Boyd Recio was seen today for the diagnosis of recurrent VTE, KVNG, MGUS    Hematologic History:  *Recurrent VTE  -3/2011- developed massive PE + DVT following surgery.  Developed cardiac arrest/code as a result.                  -on coumadin for at least several months, then stopped  -subsequently had a series of spine surgeries with IVC filter placements prior to each surgery- total of 4 filter placements- known dates as below (others unknown d/t incomplete medical records).   -3/10/14- infrarenal Narciso Tulip IVC filter placed as prophylaxis for spinal surgery  -7/10/14- removal of infrarenal Narciso Tulip IVC filter  -2/9/15- IVC filter (Cook Celect) placed- NOT REMOVED  -3/21/19- BLE venous doppler- + R posterior tibial DVT (provoked by surgery)                -started eliquis  -9/27/19- RLE venous doppler- no DVT  10/2019- stopped eliquis   -7/6/20- BLE venous doppler-  Extensive bilateral lower extremity DVT is present.  On the left there is DVT involving visualized left iliac veins extending to the calf veins.  On the right partial thrombus is seen within right iliac veins as well as right common femoral vein, right femoral vein, and right calf veins.  -7/6/20- CTA c/a/p- multiple scattered occlusive and nonocclusive pulmonary emboli within the lobar, segmental, and subsegmental branches of the pulmonary arterial vasculature bilaterally with an overall moderate embolic burden. No grace CT evidence of right heart strain. IVC filter noted.  The IVC is not well opacified on this exam due to phase of imaging.  IVC thrombus is not entirely excluded.  The common iliac veins and external iliac veins are dilated/distended and demonstrate surrounding fatty  infiltration which may be due to underlying thrombus. Oglesby catheter in the urinary bladder.  Urinary bladder wall is thickened and there is air in the urinary bladder.  There is surrounding fatty induration.  This may be due to underlying cystitis, however clinical correlation is recommended. Enlarged pelvic and left inguinal lymph nodes as above may be reactive, however clinical correlation recommended.                -started on UFH gtt  -7/8/20- underwent CDT to BLE by interventional cardiology   -started on warfarin with target INR 2-3  -10/2/20- LLE venous doppler- residual focal segment occlusive thrombus involving the proximal aspect of the left posterior tibial vein.   -4/7/21- BLE venous doppler- Negative exam, no evidence of lower extremity DVT.   -4/26/21- venous insuff doppler- Right: Negative for deep and superficial vein thrombosis. Negative for deep and superficial vein reflux (insufficiency). Left: Negative for deep and superficial vein thrombosis. Deep vein reflux involving the common femoral vein, femoral vein and profunda femoral vein.  Superficial vein reflux (insufficiency) involving the great and small saphenous vein     *MGUS-IgG lambda  -4/27/21- monoclonal protein study showed small nonquantifiable IgG lambda M spike.  -10/26/21- no detectable monoclonal protein on SPEP/MANDEEP  -10/21/22- small nonquantifiable IgG lambda M spike re-identified.   -10/20/23- small nonquantifiable IgG lambda M spike re-identified.     *Anemia- AoCD, KVNG  -4/16/19- wbc 6.2, hgb 9.8, plt 312  -10/11/19- wbc 6.5, hgb 11.6, plt 219  -7/6/20- wbc 9.2, hgb 10.9, plt 113, ferritin 104, CRP 18  -7/28/20- wbc 6.2, hgb 11.2, MCV 89, plt 229  -10/6/20- wbc 5.9, hgb 12.4, MCV 84, plt 179, CRP 0.8  -3/10/21- wbc 6.8, hgb 12.4, MCV 84, plt 221  -4/27/21- wbc 7.0, hgb 13.1, MCV 83, plt 218, ferritin 11, CRP 1.5  -5/13/21- received IV INFeD 1000mg  -10/26/21- wbc 6.5, hgb 14.5, MCV 94, plt 168, ferritin 43  -11/10/21- IV INFeD  1000mg   -1/6/22- EGD/Colonoscopy (Dr. Conde)- White plaque without underlying exudate or inflammation noted in proximal esophagus, biopsy showed rare fungal organisms consistent with Candida species.  Colonoscopy revealed 9 colonic/rectal polyps that were removed by polypectomy-pathology showed tubular and tubulovillous adenomas other than the rectal polyp was hyperplastic. +diverticulosis.  No evidence of bleeding source.  Normal duodenal biopsy.  -1/31/22- capsule endoscopy (Dr. Durán)- The small bowel mucosa has a normal villous appearance.  There are no masses, polyps, ulcers, erosions, diverticula, or strictures.  2 small non-bleeding vascular ectasias are appreciated. Both are likely out of reach for conventional push enteroscopy.   -4/7/22- wbc 5.8, hgb 15.2, MCV 98, plt 195, ferritin 115, TSAT 26%  -10/21/22- wbc 5.5, hgb 15.7, MCV 94, plt 171, ferritin 135, TSAT 31%  -4/26/23- wbc 6.2, hgb 16.0, MCV 94, plt 164, ferritin 73, TSAT 23%  -9/21/23- colonoscopy (Dr. Conde)- Two 2-4 mm sessile polyps in the descending colon, removed with a cold snare polypectomy. 3 mm sessile polyp in the sigmoid colon, removed with a cold snare polypectomy. Diverticulosis in the sigmoid colon. Large internal hemorrhoids.  Pathology of polyps showed tubular adenomata.   -10/20/23- wbc 6.4, hgb 15.9, MCV 94, plt 175, ferritin 94  -5/3/24- wbc 6.4, hgb 16.5, MCV 94, plt 168, ferritin 66, CRP 0.8    ============================  Interval events: Presents for follow-up.  Since last visit he was found to have ongoing atrial fibrillation.  His heart rates have remained in the normal range and he is already on warfarin and therefore no changes have been made.  No associated symptoms when he is in A-fib versus sinus.  He reports that he is doing about the same overall without any significant new changes otherwise.     Stable baseline dyspnea with exertion is unchanged.  No chest pain or lightheadedness.  Leg swelling remains stable at  baseline without any leg pains.  No recent fevers or infections.    Remains on warfarin.  He denies any known bleeding or dark stools.  No bowel or bladder changes.      Of note he has a history of an aortic and iliac arterial stent that was placed in 2012.  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  He remains on chronic suppressive dicloxacillin.      Past Medical History:  Past Medical History:    Arrhythmia    Back problem    Bull esophagus    BPH (benign prostatic hyperplasia)    Cataract    Cataract of both eyes    COVID    8/30/2022    Disorder of prostate    DVT (deep venous thrombosis) (HCC)    Overview:  Overview:  3/11 after back surgery pulm emb-IVC filter Overview:  3/11 after back surgery pulm emb-IVC filter    Esophageal reflux    Frequent urination    Hearing impairment    bilateral hearing aids    Hearing loss    Wear hearing aids    Heartburn    Hemorrhoids    High cholesterol    Hx of endoscopy    Hyperlipidemia    Indigestion    Leaking of urine    Leg DVT (deep venous thromboembolism), acute, bilateral (HCC)    Leg swelling    Obesity    Pain in joints    Pleurisy with effusion    Postoperative anemia due to acute blood loss    Prediabetes    Presence of IVC filter    cook celect filter.  Had 3 different IVC filters placed previous to current filter    Pulmonary embolism (HCC)    Stented coronary artery    aorta / iliac    Venous ulcer of right leg (HCC)    Visual impairment    glasses    Wears glasses    Weight gain     Past Surgical History:   Procedure Laterality Date    Back surgery  2011,2012,2014, 2016,2019    Cataract  2012    Colonoscopy  09/12/2016    Colonoscopy  2019    Colonoscopy N/A 01/06/2022    Procedure: COLONOSCOPY;  Surgeon: Saeid Conde MD;  Location:  ENDOSCOPY    Colonoscopy N/A 9/21/2023    Procedure: COLONOSCOPY WITH COLD SNARE POLYPECTOMY;  Surgeon: Saeid Conde MD;  Location:  ENDOSCOPY    Endovas repair, infrarenl  abdom aortic aneurysm/dissect  06/13/2017    Stent    Knee replacement surgery Right 04/04/2016    Other  06/2012    aortic and iliac artery stent    Other surgical history  1964    Pleuectomy    Other surgical history      IVC filter    Spinal fusion  10/12/2018    Spine surgery procedure unlisted      Back Surgery    Tonsillectomy      Vasectomy  1984     Home Medications:   pantoprazole 40 MG Oral Tab EC Take 1 tablet (40 mg total) by mouth 2 (two) times daily. 180 tablet 3    metFORMIN  MG Oral Tablet 24 Hr Take 1 tablet (500 mg total) by mouth daily with breakfast. 90 tablet 1    Potassium Chloride ER 10 MEQ Oral Tab CR Take 1 tablet (10 mEq total) by mouth daily. 90 tablet 1    silodosin 8 MG Oral Cap Take 1 capsule (8 mg total) by mouth daily. 90 capsule 5    Mirabegron ER (MYRBETRIQ) 50 MG Oral Tablet 24 Hr Take 1 tablet (50 mg total) by mouth daily. 90 tablet 5    finasteride 5 MG Oral Tab Take 1 tablet (5 mg total) by mouth daily. 90 tablet 5    furosemide 40 MG Oral Tab Take 1 tablet (40 mg total) by mouth daily. 90 tablet 3    clobetasol 0.05 % External Ointment APPLY OINTMENT TOPICALLY TO AFFECTED AREA TWICE DAILY ON MOIST SKIN AS NEEDED FOR FLARES      rosuvastatin 10 MG Oral Tab Take 1 tablet (10 mg total) by mouth nightly. 90 tablet 3    warfarin 5 MG Oral Tab Take 1 tablet (5 mg total) by mouth As Directed. Take as directed based on INR readings. (Patient taking differently: Take 1 tablet (5 mg total) by mouth As Directed. 5mg M-W-F and 2.5mg all other days) 90 tablet 3    Vitamin D, Cholecalciferol, 25 MCG (1000 UT) Oral Cap Take by mouth daily.      acetaminophen 500 MG Oral Tab Take 1 tablet (500 mg total) by mouth every 6 (six) hours as needed for Pain.  0    latanoprost 0.005 % Ophthalmic Solution Place 1 drop into both eyes nightly.      Dicloxacillin Sodium 500 MG Oral Cap Take 1 capsule (500 mg total) by mouth 2 (two) times daily.       Allergies:   No Known Allergies    Psychosocial  History:  Social History     Social History Narrative    , lives with wife.  Has 4 children, 1 of which is with his current wife.  Has grandchildren and 2 great-grandchildren as well.  Retired, previously worked in voice and data operations.     Social History     Socioeconomic History    Marital status:    Tobacco Use    Smoking status: Former     Current packs/day: 0.00     Average packs/day: 1 pack/day for 25.0 years (25.0 ttl pk-yrs)     Types: Cigarettes     Start date: 2/3/1962     Quit date: 2/3/1987     Years since quittin.2    Smokeless tobacco: Never   Vaping Use    Vaping status: Never Used   Substance and Sexual Activity    Alcohol use: Not Currently     Comment: Social drinker    Drug use: Never   Other Topics Concern    Caffeine Concern No    Exercise No    Seat Belt No    Special Diet No    Stress Concern No    Weight Concern No   Social History Narrative    , lives with wife.  Has 4 children, 1 of which is with his current wife.  Has grandchildren and 2 great-grandchildren as well.  Retired, previously worked in voice and data operations.     Social Determinants of Health     Physical Activity: Insufficiently Active (3/3/2021)    Received from [facility]    Exercise Vital Sign     Days of Exercise per Week: 4 days     Minutes of Exercise per Session: 20 min     Family Medical History:  Family History   Problem Relation Age of Onset    Cancer Father         Prostate    Colon Cancer Father         80+    Cancer Mother         Gall bladder    Alcohol and Other Disorders Associated Sister         Alcoholism    Cancer Brother         Pancreas    DVT/VTE Neg      Review of Systems:  A 10-point ROS was done with pertinent positives and negative per the HPI    Vital Signs:  Height: 182.9 cm (6' 0.01\") ( 1018)  Weight: 140.5 kg (309 lb 11.2 oz) ( 101)  BSA (Calculated - sq m): 2.57 sq meters ( 101)  Pulse: 71 ( 101)  BP:  108/72 (05/07 1018)  Temp: 96.9 °F (36.1 °C) (05/07 1018)  Do Not Use - Resp Rate: --  SpO2: 95 % (05/07 1018)    Wt Readings from Last 6 Encounters:   05/07/24 (!) 140.5 kg (309 lb 11.2 oz)   04/23/24 (!) 140.2 kg (309 lb)   12/29/23 (!) 145.2 kg (320 lb)   10/31/23 (!) 145.6 kg (321 lb)   09/21/23 (!) 145.2 kg (320 lb)   08/30/23 123.8 kg (273 lb)     Physical Examination:  General: Patient is alert and oriented, not in acute distress  Psych: Mood and affect are appropriate  Eyes: EOMI  ENT: Oropharynx is clear  CV: Regular rate and rhythm, no murmurs  Respiratory: Lungs clear to auscultation bilaterally  GI/Abd: Soft, non-tender with normoactive bowel sounds, no hepatosplenomegaly  Neurological: Grossly intact   Lymphatics: No palpable lymphadenopathy  Skin: No rash or petechiae  Ext: mild symmetric BLE edema.  +known prominent chronic venous stasis skin changes noted in past, today is wearing compression stockings not removed for re-examination.     Laboratory:  Lab Results   Component Value Date    WBC 6.4 05/03/2024    WBC 6.4 10/20/2023    WBC 6.2 04/26/2023    HGB 16.5 05/03/2024    HGB 15.9 10/20/2023    HGB 16.0 04/26/2023    HCT 50.5 05/03/2024    MCV 94.4 05/03/2024    MCH 30.8 05/03/2024    MCHC 32.7 05/03/2024    RDW 13.8 05/03/2024    .0 05/03/2024    .0 10/20/2023    .0 04/26/2023     Lab Results   Component Value Date     (H) 05/03/2024    BUN 19 05/03/2024    BUNCREA 13.7 04/27/2021    CREATSERUM 1.21 05/03/2024    CREATSERUM 1.25 02/15/2024    CREATSERUM 1.23 10/20/2023    ANIONGAP 2 05/03/2024    GFRNAA 67 04/07/2022    GFRAA 77 04/07/2022    CA 10.0 05/03/2024    OSMOCALC 292 05/03/2024    ALKPHO 99 05/03/2024    AST 15 05/03/2024    ALT 15 (L) 05/03/2024    BILT 1.0 05/03/2024    TP 7.1 05/03/2024    ALB 3.3 (L) 05/03/2024    GLOBULIN 3.8 05/03/2024     05/03/2024    K 4.6 05/03/2024     05/03/2024    CO2 30.0 05/03/2024     Lab Results   Component Value  Date    .1 (H) 07/15/2020    INR 2.14 (H) 05/03/2024     Lab Results   Component Value Date    REITCPERCENT 1.5 10/20/2023    REITCPERCENT 1.3 04/26/2023    REITCPERCENT 1.4 10/21/2022    REITCPERCENT 1.5 10/26/2021    REITCPERCENT 1.1 04/27/2021    REITCPERCENT 1.9 07/07/2020     Lab Results   Component Value Date    RETHE 34.8 10/20/2023    RETHE 36.9 (H) 04/26/2023    RETHE 35.1 10/21/2022    RETHE 34.2 10/26/2021    RETHE 29.0 04/27/2021    RETHE 28.9 07/07/2020     Lab Results   Component Value Date    KAREN 66.2 05/03/2024    KAREN 94.0 10/20/2023    KAREN 73.3 04/26/2023    KAREN 135.8 10/21/2022    KAREN 115.9 04/07/2022     Lab Results   Component Value Date    SAT 30 10/20/2023    SAT 26 04/26/2023    SAT 31 10/21/2022    SAT 26 04/07/2022     Soluble transferrin receptor  No results found for: \"STFRNR\"  Lab Results   Component Value Date    B12 366 04/27/2021     Lab Results   Component Value Date    MMA 0.34 04/27/2021     No results found for: \"FOLIC\"  No results found for: \"COPPER\"  Lab Results   Component Value Date    ESRML 15 (H) 04/27/2021    ESRML 23 (H) 07/07/2020     Lab Results   Component Value Date    CRP 0.82 (H) 05/03/2024    CRP 0.71 (H) 10/20/2023    CRP 0.95 (H) 04/26/2023    CRP 0.92 (H) 10/21/2022    CRP 1.50 (H) 04/27/2021    CRP 0.89 (H) 10/06/2020    CRP 18.70 (H) 07/07/2020     Lab Results   Component Value Date     04/27/2021     07/28/2020     (H) 07/07/2020     No results found for: \"HAPT\"     Component      Latest Ref Rng & Units 4/27/2021   SPE INTERPRETATION       Small abnormality in the gamma region. . . .   IMMUNOFIXATION       Monoclonal IgG lambda. If clinically indicated, suggest 24 hour urine monoclonal protein studies. . . .     No results found for: \"ELECTMS1\"  Lab Results   Component Value Date    KAPPALTCHN 4.064 (H) 10/20/2023    KAPPALTCHN 4.707 (H) 10/21/2022    KAPPALTCHN 2.818 (H) 10/26/2021    KAPPALTCHN 4.192 (H) 04/27/2021      Lab Results    Component Value Date    LAMBDALTCH 2.803 (H) 10/20/2023    LAMBDALTCH 3.222 (H) 10/21/2022    LAMBDALTCH 2.666 (H) 10/26/2021    LAMBDALTCH 2.927 (H) 04/27/2021     Lab Results   Component Value Date    KAPLAMRATIO 1.45 10/20/2023    KAPLAMRATIO 1.46 10/21/2022    KAPLAMRATIO 1.06 10/26/2021    KAPLAMRATIO 1.43 04/27/2021     Lab Results   Component Value Date    TSH 1.690 05/03/2024    TSH 1.890 11/01/2023    TSH 1.840 12/16/2022    TSH 2.140 04/07/2022    TSH 2.290 09/09/2020     No results found for: \"T4F\"  No results found for: \"T3TOT\"    Lab Results   Component Value Date    DDIMER 0.70 10/06/2020     Component      Latest Ref Rng 11/10/2022   PROTEIN URINE CALC      <149.1 mg/24 hr 159.8 (H)    Urine Volume 24Hr      mL 1,700    URINE BENCE HERNANDEZ PROTEIN 24HR 24-hour urine concentrated 50X is negative for Free Monoclonal Light Chains (Bence Hernandez Protein).    Reviewed By: Reviewed by Cathryn Goldberg, M.D. Pathology 11/18/22 at 5:30 PM       Imaging:    As reviewed above    CT a/p 10/20/21:   FINDINGS:     LIVER:  No enlargement, atrophy, abnormal density, or significant focal lesion.     BILIARY:  No visible dilatation or calcification.     PANCREAS:  No lesion, fluid collection, ductal dilatation, or atrophy.     SPLEEN:  No enlargement or focal lesion.     KIDNEYS:  No mass, obstruction, or calcification.  Stable upper pole cyst of left kidney.   ADRENALS:  Stable coarse right adrenal calcifications.     AORTA/VASCULAR:  Aortoiliac stent in place.  No aneurysm or endoleak.  IVC filter is unchanged in appearance.   RETROPERITONEUM:  No mass or adenopathy.     BOWEL/MESENTERY:  No visible mass, obstruction, or bowel wall thickening.  Few uncomplicated colonic diverticula.  Normal appendix.   ABDOMINAL WALL:  No mass or hernia.     URINARY BLADDER:  No visible focal wall thickening, lesion, or calculus.     PELVIC NODES:  Left external iliac lymph node with central fat measures 2.5 x 1.3 cm, previously 2.7 x  1.3 cm.  2.1 x 1.1 cm left inguinal lymph node previously measured 2.1 x 1.2 cm.  Other smaller lymph nodes are stable.   PELVIC ORGANS:  No visible mass.  Pelvic organs appropriate for patient age.     BONES:  Extensive postsurgical changes throughout the spine.   LUNG BASES:  No visible pulmonary or pleural disease.       Impression   CONCLUSION:  Slight decrease in size of left external iliac lymph node, otherwise no change from 4/1/2021.      Impression & Plan:     *Extensive BLE DVT to iliac veins + PE, recurrent VTE  -While his other VTE events were provoked by surgery, most recent event in 2020 was unprovoked.  Indefinite duration anticoagulation is recommended.  S/p CDT to BLE by interventional cardiology given extensive clot burden with good clinical improvement.  Repeat doppler shows resolution of DVT though has some LLE venous insufficiency noted on doppler.   -Given his CKD and weight greater than 120 kg a DOAC is not favored. Continue warfarin, dose adjusted to goal INR 2-3  -last IVC filter placed in 2015- will not be able to be remove since it has been in place for too long.   -Repeat CBC and CMP at least q6mths while remains on anticoagulation     *normocytic anemia- KVNG, AoCD/inflammation  -previously with a component of AoCD/inflammation with elevated CRP. Iron deficiency replaced with IV iron with improvement.  Source of KVNG likely related to intestinal AVMs noted on capsule endoscopy 1/31/22.  No recent s/s of active bleeding.  -Current labs indicate normal hemoglobin and adequate iron stores.    -Repeat CBC, iron studies, and CRP in 6 months    *Bull's esophagus, multiple prior colon polyps  -follows with GI for surveillance endoscopy    *MGUS  -Small monoclonal IgG lambda M spike 4/27/21 without any other associated s/s of multiple myeloma.  Monoclonal gammopathy resolved on repeat testing on 10/26/21, however was reidentified again on 10/21/22.  -Negative 24 urine Bence-Hernandez protein  collection 11/2022  -Repeat monoclonal protein study and quantitative immunoglobulin testing annually- due next 10/2024  -he is considered low risk, therefore no bone marrow biopsy or bone imaging assessment is indicated at this point.    *Afib  -follows with cardiology.  Rates have been controlled.  On Coumadin already for history of DVT as above.    *CKD  -baseline cre 1.3-1.9.  Better lately.      *BLE edema  -At current baseline, perhaps somewhat better lately.  Related to volume overload + component of venous insuff.  Limit salt intake and continue compression stockings prn. Follows with cardiology and vascular. On diuretics     *chronic spinal hardware infection  -no recent issues per discussion with wife. On suppressive dicloxacillin.      RTC in 6 mths    Nakul Carpenter MD  Hematology/Medical Oncology  [facility]

## 2024-05-08 ENCOUNTER — OFFICE VISIT (OUTPATIENT)
Facility: LOCATION | Age: 84
End: 2024-05-08
Payer: MEDICARE

## 2024-05-08 DIAGNOSIS — M76.71 PERONEAL TENDINITIS OF RIGHT LOWER EXTREMITY: ICD-10-CM

## 2024-05-08 DIAGNOSIS — M72.2 PLANTAR FASCIITIS OF RIGHT FOOT: ICD-10-CM

## 2024-05-08 DIAGNOSIS — G62.9 PERIPHERAL POLYNEUROPATHY: ICD-10-CM

## 2024-05-08 DIAGNOSIS — E11.9 TYPE 2 DIABETES MELLITUS WITHOUT COMPLICATION, UNSPECIFIED WHETHER LONG TERM INSULIN USE (HCC): ICD-10-CM

## 2024-05-08 DIAGNOSIS — R26.9 GAIT DIFFICULTY: ICD-10-CM

## 2024-05-08 DIAGNOSIS — M62.461 GASTROCNEMIUS EQUINUS OF RIGHT LOWER EXTREMITY: ICD-10-CM

## 2024-05-08 DIAGNOSIS — R60.0 BILATERAL LOWER EXTREMITY EDEMA: ICD-10-CM

## 2024-05-08 DIAGNOSIS — B35.1 ONYCHOMYCOSIS: Primary | ICD-10-CM

## 2024-05-08 PROCEDURE — 99214 OFFICE O/P EST MOD 30 MIN: CPT | Performed by: PODIATRIST

## 2024-05-08 RX ORDER — PREDNISONE 10 MG/1
10 TABLET ORAL 2 TIMES DAILY
Qty: 28 TABLET | Refills: 0 | Status: SHIPPED | OUTPATIENT
Start: 2024-05-08 | End: 2024-05-22

## 2024-05-08 NOTE — PROGRESS NOTES
[facility]  Progress Note    Boyd Recio is a 83 year old male.   Chief Complaint   Patient presents with    Toenail Care     3 month follow up- Patient still endorses pain in right foot. Rates pain at 1-2/10.          HPI:     Patient is a pleasant 83-year-old male who is returning to clinic today with complaints of thickened and elongated and painful toenails to both the, as well as continued pain to the outside of his right ankle and bottom of his right heel.  Patient is accompanied by his wife today.  Patient does state that he is now on metformin due to his sugars, which have gone down since starting.  He does state that his toenails have become thickened and elongated and his wife is unable to trim all of his toenails for him as the toenails are thickened.  Patient also states that he is continuing to have pain to the outside of his right ankle and heel.  He has gone to physical therapy in the past, but states that the pain continues.  Denies performing at home exercises.  He is ambulating in supportive shoe gear.  Currently rates his pain 1-2/10.  His pains to the right lower extremity do worsen with ambulation.  Per patient's wife, it is limiting him as he is not walking as much.  He is currently ambulating with a cane today.  Denies recent injury or other pedal complaints and is presenting for further evaluation and care.  Denies recent nausea, vomiting, fever, chills.      Allergies: Patient has no known allergies.   Current Outpatient Medications   Medication Sig Dispense Refill    predniSONE 10 MG Oral Tab Take 1 tablet (10 mg total) by mouth 2 (two) times daily for 14 days. Take one in the morning and one at lunch and with food. 28 tablet 0    pantoprazole 40 MG Oral Tab EC Take 1 tablet (40 mg total) by mouth 2 (two) times daily. 180 tablet 3    metFORMIN  MG Oral Tablet 24 Hr Take 1 tablet (500 mg total) by mouth daily with breakfast. 90 tablet 1    Potassium Chloride ER 10  MEQ Oral Tab CR Take 1 tablet (10 mEq total) by mouth daily. 90 tablet 1    silodosin 8 MG Oral Cap Take 1 capsule (8 mg total) by mouth daily. 90 capsule 5    Mirabegron ER (MYRBETRIQ) 50 MG Oral Tablet 24 Hr Take 1 tablet (50 mg total) by mouth daily. 90 tablet 5    finasteride 5 MG Oral Tab Take 1 tablet (5 mg total) by mouth daily. 90 tablet 5    furosemide 40 MG Oral Tab Take 1 tablet (40 mg total) by mouth daily. 90 tablet 3    clobetasol 0.05 % External Ointment APPLY OINTMENT TOPICALLY TO AFFECTED AREA TWICE DAILY ON MOIST SKIN AS NEEDED FOR FLARES      rosuvastatin 10 MG Oral Tab Take 1 tablet (10 mg total) by mouth nightly. 90 tablet 3    warfarin 5 MG Oral Tab Take 1 tablet (5 mg total) by mouth As Directed. Take as directed based on INR readings. (Patient taking differently: Take 1 tablet (5 mg total) by mouth As Directed. 5mg M-W-F and 2.5mg all other days) 90 tablet 3    Vitamin D, Cholecalciferol, 25 MCG (1000 UT) Oral Cap Take by mouth daily.      acetaminophen 500 MG Oral Tab Take 1 tablet (500 mg total) by mouth every 6 (six) hours as needed for Pain.  0    latanoprost 0.005 % Ophthalmic Solution Place 1 drop into both eyes nightly.      Dicloxacillin Sodium 500 MG Oral Cap Take 1 capsule (500 mg total) by mouth 2 (two) times daily.        Past Medical History:    Arrhythmia    Back problem    Bull esophagus    BPH (benign prostatic hyperplasia)    Cataract    Cataract of both eyes    COVID    8/30/2022    Disorder of prostate    DVT (deep venous thrombosis) (HCC)    Overview:  Overview:  3/11 after back surgery pulm emb-IVC filter Overview:  3/11 after back surgery pulm emb-IVC filter    Esophageal reflux    Frequent urination    Hearing impairment    bilateral hearing aids    Hearing loss    Wear hearing aids    Heartburn    Hemorrhoids    High cholesterol    Hx of endoscopy    Hyperlipidemia    Indigestion    Leaking of urine    Leg DVT (deep venous thromboembolism), acute, bilateral (HCC)     Leg swelling    Obesity    Pain in joints    Pleurisy with effusion    Postoperative anemia due to acute blood loss    Prediabetes    Presence of IVC filter    cook celect filter.  Had 3 different IVC filters placed previous to current filter    Pulmonary embolism (HCC)    Stented coronary artery    aorta / iliac    Venous ulcer of right leg (HCC)    Visual impairment    glasses    Wears glasses    Weight gain      Past Surgical History:   Procedure Laterality Date    Back surgery  ,,, ,2019    Cataract  2012    Colonoscopy  2016    Colonoscopy  2019    Colonoscopy N/A 2022    Procedure: COLONOSCOPY;  Surgeon: Saeid Conde MD;  Location:  ENDOSCOPY    Colonoscopy N/A 2023    Procedure: COLONOSCOPY WITH COLD SNARE POLYPECTOMY;  Surgeon: Saeid Conde MD;  Location:  ENDOSCOPY    Endovas repair, infrarenl abdom aortic aneurysm/dissect  2017    Stent    Knee replacement surgery Right 2016    Other  2012    aortic and iliac artery stent    Other surgical history  1964    Pleuectomy    Other surgical history      IVC filter    Spinal fusion  10/12/2018    Spine surgery procedure unlisted      Back Surgery    Tonsillectomy      Vasectomy  1984      Family History   Problem Relation Age of Onset    Cancer Father         Prostate    Colon Cancer Father         80+    Cancer Mother         Gall bladder    Alcohol and Other Disorders Associated Sister         Alcoholism    Cancer Brother         Pancreas    DVT/VTE Neg       Social History     Socioeconomic History    Marital status:    Tobacco Use    Smoking status: Former     Current packs/day: 0.00     Average packs/day: 1 pack/day for 25.0 years (25.0 ttl pk-yrs)     Types: Cigarettes     Start date: 2/3/1962     Quit date: 2/3/1987     Years since quittin.2    Smokeless tobacco: Never   Vaping Use    Vaping status: Never Used   Substance and Sexual Activity    Alcohol use: Not Currently      Comment: Social drinker    Drug use: Never   Other Topics Concern    Caffeine Concern No    Exercise No    Seat Belt No    Special Diet No    Stress Concern No    Weight Concern No           REVIEW OF SYSTEMS:     10 point ROS completed and was negative, except for pertinent positive and negatives stated in subjective.       EXAM:     GENERAL: well developed, well nourished, in no apparent distress  EXTREMITIES:              1. Integument: Nails x10 are thickened, elongated, discolored, dystrophic with subungual debris noted to right hallux toenail.  Normal skin temperature and decreased turgor.  Skin color changes consistent with hemosiderin deposition noted to bilateral lower extremities.  2. Vascular: Dorsalis pedis 2/4 bilateral and posterior tibial pulses 2/4 bilateral, capillary refill normal.  2+ pitting edema noted to bilateral lower extremities              3. Musculoskeletal: All muscle groups are graded 4+/5 in the foot and ankle, right with pain elicited with eversion against resistance.  Patient does have pain elicited on palpation along the course of the peroneal tendons near the lateral malleolus.  Adequate eversion strength is noted with no signs of tendon rupture, right.  Pain with palpation noted to plantar medial aspect of right heel.  Some discomfort with side-to-side heel squeeze.  Decreased ankle joint dorsiflexion noted with the knee extended, which does improve with the knee flexed consistent with equinus deformity, right.  Some discomfort with palpation of toenails of bilateral feet              4. Neurological: Sensation diminished via light touch to bilateral lower extremities.  Negative Tinel's sign to nerve courses of the right lower extremity today.       ASSESSMENT AND PLAN:   Diagnoses and all orders for this visit:    Onychomycosis    Peroneal tendinitis of right lower extremity  -     MRI ANKLE, RIGHT (CPT=73721); Future  -     predniSONE 10 MG Oral Tab; Take 1 tablet (10 mg total)  by mouth 2 (two) times daily for 14 days. Take one in the morning and one at lunch and with food.    Plantar fasciitis of right foot  -     MRI ANKLE, RIGHT (CPT=73721); Future  -     predniSONE 10 MG Oral Tab; Take 1 tablet (10 mg total) by mouth 2 (two) times daily for 14 days. Take one in the morning and one at lunch and with food.    Peripheral polyneuropathy    Bilateral lower extremity edema    Gait difficulty    Gastrocnemius equinus of right lower extremity    Type 2 diabetes mellitus without complication, unspecified whether long term insulin use (HCC)        Plan:   -Patient was seen and evaluated today in clinic.  Chart history reviewed.    -Discussed importance of proper pedal hygiene, daily foot checks, and tight glycemic control.    -Patient to avoid walking barefoot. Recommend ambulating with supportive diabetic shoes and inserts.    -Patient to monitor for acute signs of infection and seek immediate medical attention if any signs of infection or other concerns arise.    -Evaluated the patient. Discussed treatment options with the patient.  -Discussed with patient proper care and hygiene for their feet.  Recommend daily foot soaks for 10 minutes with warm water and apple cider vinegar    -If thickness of the toenails are causing discomfort, advised patient to try utilizing Vicks VapoRub on her toenails every night.  The goal would be for the toenails to soften over time.  -Patient relates pain relief with intermittent toenail debridements.  -Patient elects for nail debridement today. Toenails 1-5 of the left foot and 1-5 of the right foot were debrided today.  They tolerated procedures well, without incident.    -Instrumentation used includes nail nippers.    Patient also continues to have clinical symptoms consistent with plantars fasciitis and peroneal tendinitis of the right lower extremity.  Has performed physical therapy, as well as wearing supportive shoe gear, resting, and utilizes a cane for  gait assistance.  Unfortunately continues to have pain.  Because patient has failed conservative therapy thus far, recommending obtaining an MRI of the right ankle to further evaluate the plantar fascia and peroneal tendons.  Patient is agreeable to this and an order for right ankle MRI was placed today.  Rx: Prednisone 10 mg twice daily for 14 days.  Patient was educated on transient increase in blood sugars.  He states that they have been well-controlled lately.  Recommend continuing to weight-bear as tolerated in supportive shoe gear and utilizing a cane for gait assistance.  Patient should rest, ice, and elevate with any increase in pain.  He should also continue at home stretching routine that he learned in physical therapy.  He does admit to not doing any recent home exercises.    -The patient indicates understanding of these issues and agrees to the plan.    Time spent reviewing pertinent information from patient's chart, reviewing any pertinent imaging, obtaining history and physical exam, discussing and mutually agreeing on a treatment plan, and documenting encounter: 30 minutes    RTC after MRI to discuss findings and further treatment options      Cameron Menard DPM        5/8/2024    Dragon speech recognition software was used to prepare this note.  Errors in word recognition may occur.  Please contact me with any questions/concerns with this note.

## 2024-05-13 ENCOUNTER — OFFICE VISIT (OUTPATIENT)
Dept: FAMILY MEDICINE CLINIC | Facility: CLINIC | Age: 84
End: 2024-05-13

## 2024-05-13 VITALS
WEIGHT: 309 LBS | DIASTOLIC BLOOD PRESSURE: 70 MMHG | HEIGHT: 73 IN | HEART RATE: 85 BPM | OXYGEN SATURATION: 96 % | SYSTOLIC BLOOD PRESSURE: 110 MMHG | BODY MASS INDEX: 40.95 KG/M2 | RESPIRATION RATE: 22 BRPM

## 2024-05-13 DIAGNOSIS — I48.0 PAROXYSMAL ATRIAL FIBRILLATION (HCC): ICD-10-CM

## 2024-05-13 DIAGNOSIS — I87.2 VENOUS INSUFFICIENCY: ICD-10-CM

## 2024-05-13 DIAGNOSIS — E11.59 TYPE 2 DIABETES MELLITUS WITH OTHER CIRCULATORY COMPLICATION, WITHOUT LONG-TERM CURRENT USE OF INSULIN (HCC): Primary | ICD-10-CM

## 2024-05-13 DIAGNOSIS — R60.0 BILATERAL LEG EDEMA: ICD-10-CM

## 2024-05-13 PROCEDURE — G2211 COMPLEX E/M VISIT ADD ON: HCPCS | Performed by: FAMILY MEDICINE

## 2024-05-13 PROCEDURE — 99213 OFFICE O/P EST LOW 20 MIN: CPT | Performed by: FAMILY MEDICINE

## 2024-05-13 NOTE — PATIENT INSTRUCTIONS
After prednisone is finished - then hold metformin for 2-3 weeks to see if diarrhea resolves - if so, we need to change medication     Greasy foods + metformin can increase diarrhea

## 2024-05-20 ENCOUNTER — APPOINTMENT (OUTPATIENT)
Dept: URBAN - METROPOLITAN AREA CLINIC 247 | Age: 84
Setting detail: DERMATOLOGY
End: 2024-05-20

## 2024-05-20 DIAGNOSIS — L91.8 OTHER HYPERTROPHIC DISORDERS OF THE SKIN: ICD-10-CM

## 2024-05-20 DIAGNOSIS — L82.1 OTHER SEBORRHEIC KERATOSIS: ICD-10-CM

## 2024-05-20 DIAGNOSIS — L20.89 OTHER ATOPIC DERMATITIS: ICD-10-CM

## 2024-05-20 DIAGNOSIS — L57.8 OTHER SKIN CHANGES DUE TO CHRONIC EXPOSURE TO NONIONIZING RADIATION: ICD-10-CM

## 2024-05-20 DIAGNOSIS — D18.0 HEMANGIOMA: ICD-10-CM

## 2024-05-20 DIAGNOSIS — Z85.828 PERSONAL HISTORY OF OTHER MALIGNANT NEOPLASM OF SKIN: ICD-10-CM

## 2024-05-20 PROBLEM — D18.01 HEMANGIOMA OF SKIN AND SUBCUTANEOUS TISSUE: Status: ACTIVE | Noted: 2024-05-20

## 2024-05-20 PROCEDURE — 99213 OFFICE O/P EST LOW 20 MIN: CPT

## 2024-05-20 PROCEDURE — OTHER MIPS QUALITY: OTHER

## 2024-05-20 PROCEDURE — OTHER COUNSELING: OTHER

## 2024-05-20 PROCEDURE — OTHER PRESCRIPTION MEDICATION MANAGEMENT: OTHER

## 2024-05-20 ASSESSMENT — LOCATION DETAILED DESCRIPTION DERM
LOCATION DETAILED: LEFT ANTERIOR PROXIMAL UPPER ARM
LOCATION DETAILED: RIGHT VENTRAL PROXIMAL FOREARM
LOCATION DETAILED: RIGHT ANTERIOR PROXIMAL UPPER ARM
LOCATION DETAILED: LEFT INFERIOR ANTERIOR NECK
LOCATION DETAILED: LEFT VENTRAL PROXIMAL FOREARM
LOCATION DETAILED: LEFT INFERIOR MEDIAL MALAR CHEEK

## 2024-05-20 ASSESSMENT — LOCATION SIMPLE DESCRIPTION DERM
LOCATION SIMPLE: LEFT FOREARM
LOCATION SIMPLE: RIGHT FOREARM
LOCATION SIMPLE: LEFT UPPER ARM
LOCATION SIMPLE: LEFT CHEEK
LOCATION SIMPLE: RIGHT UPPER ARM
LOCATION SIMPLE: LEFT ANTERIOR NECK

## 2024-05-20 ASSESSMENT — LOCATION ZONE DERM
LOCATION ZONE: NECK
LOCATION ZONE: ARM
LOCATION ZONE: FACE

## 2024-05-20 NOTE — PROCEDURE: MIPS QUALITY
Quality 130: Documentation Of Current Medications In The Medical Record: Current Medications Documented
Detail Level: Detailed
Quality 431: Preventive Care And Screening: Unhealthy Alcohol Use - Screening: Patient not identified as an unhealthy alcohol user when screened for unhealthy alcohol use using a systematic screening method
Quality 226: Preventive Care And Screening: Tobacco Use: Screening And Cessation Intervention: Patient screened for tobacco use and is an ex/non-smoker
Quality 47: Advance Care Plan: Advance care planning not documented, reason not otherwise specified.

## 2024-05-31 ENCOUNTER — ANTI-COAG VISIT (OUTPATIENT)
Dept: HEMATOLOGY/ONCOLOGY | Age: 84
End: 2024-05-31
Attending: INTERNAL MEDICINE

## 2024-05-31 DIAGNOSIS — I82.4Y3 ACUTE DEEP VEIN THROMBOSIS (DVT) OF PROXIMAL VEIN OF BOTH LOWER EXTREMITIES (HCC): ICD-10-CM

## 2024-05-31 DIAGNOSIS — I82.409 RECURRENT DEEP VEIN THROMBOSIS (DVT) (HCC): ICD-10-CM

## 2024-05-31 LAB
INR BLD: 2.52 (ref 0.8–1.2)
PROTHROMBIN TIME: 27.5 SECONDS (ref 11.6–14.8)

## 2024-05-31 PROCEDURE — 85610 PROTHROMBIN TIME: CPT

## 2024-05-31 PROCEDURE — 36415 COLL VENOUS BLD VENIPUNCTURE: CPT

## 2024-06-20 ENCOUNTER — HOSPITAL ENCOUNTER (OUTPATIENT)
Dept: MRI IMAGING | Facility: HOSPITAL | Age: 84
Discharge: HOME | End: 2024-06-20
Attending: PODIATRIST

## 2024-06-20 DIAGNOSIS — M72.2 PLANTAR FASCIITIS OF RIGHT FOOT: ICD-10-CM

## 2024-06-20 DIAGNOSIS — M76.71 PERONEAL TENDINITIS OF RIGHT LOWER EXTREMITY: ICD-10-CM

## 2024-06-20 PROCEDURE — 73721 MRI JNT OF LWR EXTRE W/O DYE: CPT | Performed by: PODIATRIST

## 2024-06-26 ENCOUNTER — APPOINTMENT (OUTPATIENT)
Dept: URBAN - METROPOLITAN AREA CLINIC 247 | Age: 84
Setting detail: DERMATOLOGY
End: 2024-06-26

## 2024-06-26 DIAGNOSIS — S31000A OPEN WOUND(S) (MULTIPLE) OF UNSPECIFIED SITE(S), WITHOUT MENTION OF COMPLICATION: ICD-10-CM

## 2024-06-26 PROBLEM — S81.812A LACERATION WITHOUT FOREIGN BODY, LEFT LOWER LEG, INITIAL ENCOUNTER: Status: ACTIVE | Noted: 2024-06-26

## 2024-06-26 PROCEDURE — 99213 OFFICE O/P EST LOW 20 MIN: CPT

## 2024-06-26 PROCEDURE — OTHER PRESCRIPTION MEDICATION MANAGEMENT: OTHER

## 2024-06-26 PROCEDURE — OTHER COUNSELING: OTHER

## 2024-06-26 PROCEDURE — OTHER PRESCRIPTION: OTHER

## 2024-06-26 PROCEDURE — OTHER ADDITIONAL NOTES: OTHER

## 2024-06-26 RX ORDER — DOXYCYCLINE HYCLATE 100 MG/1
CAPSULE, GELATIN COATED ORAL
Qty: 20 | Refills: 0 | Status: ERX | COMMUNITY
Start: 2024-06-26

## 2024-06-26 RX ORDER — MUPIROCIN 20 MG/G
OINTMENT TOPICAL
Qty: 22 | Refills: 1 | Status: ERX | COMMUNITY
Start: 2024-06-26

## 2024-06-26 ASSESSMENT — LOCATION SIMPLE DESCRIPTION DERM: LOCATION SIMPLE: LEFT PRETIBIAL REGION

## 2024-06-26 ASSESSMENT — LOCATION ZONE DERM: LOCATION ZONE: LEG

## 2024-06-26 ASSESSMENT — LOCATION DETAILED DESCRIPTION DERM: LOCATION DETAILED: LEFT DISTAL PRETIBIAL REGION

## 2024-06-26 NOTE — PROCEDURE: PRESCRIPTION MEDICATION MANAGEMENT
Detail Level: Zone
Render In Strict Bullet Format?: No
Initiate Treatment: Mupirocin ointment apply to AA TID for 2 weeks\\nDoxycycline 100mg BID for 10 days\\nVaseline daily

## 2024-06-26 NOTE — PROCEDURE: ADDITIONAL NOTES
Detail Level: Simple
Render Risk Assessment In Note?: no
Additional Notes: Wound from a fall 6 days ago.\\nPt reports “pins and needles” feeling.\\nPt reports increasing pain\\nAdvised likely developing wound infection \\nCall or go to UC if worsening\\nRTC 1 week

## 2024-06-26 NOTE — HPI: WOUND
Is The Wound New Or Recurrent?: new
Is This A New Presentation, Or A Follow-Up?: Wound
Any Other Pertinent Features?: Pt was using Vaseline and Mupirocin and draining.

## 2024-06-27 ENCOUNTER — ANTI-COAG VISIT (OUTPATIENT)
Dept: HEMATOLOGY/ONCOLOGY | Age: 84
End: 2024-06-27
Attending: INTERNAL MEDICINE

## 2024-06-27 DIAGNOSIS — I82.409 RECURRENT DEEP VEIN THROMBOSIS (DVT) (HCC): ICD-10-CM

## 2024-06-27 DIAGNOSIS — I82.4Y3 ACUTE DEEP VEIN THROMBOSIS (DVT) OF PROXIMAL VEIN OF BOTH LOWER EXTREMITIES (HCC): ICD-10-CM

## 2024-06-27 LAB
INR BLD: 2.48 (ref 0.8–1.2)
PROTHROMBIN TIME: 27.1 SECONDS (ref 11.6–14.8)

## 2024-06-27 PROCEDURE — 85610 PROTHROMBIN TIME: CPT

## 2024-06-27 PROCEDURE — 36415 COLL VENOUS BLD VENIPUNCTURE: CPT

## 2024-06-28 ENCOUNTER — OFFICE VISIT (OUTPATIENT)
Facility: LOCATION | Age: 84
End: 2024-06-28
Payer: MEDICARE

## 2024-06-28 DIAGNOSIS — S86.311A TEAR OF PERONEAL TENDON OF RIGHT FOOT: Primary | ICD-10-CM

## 2024-06-28 DIAGNOSIS — R60.0 BILATERAL LOWER EXTREMITY EDEMA: ICD-10-CM

## 2024-06-28 DIAGNOSIS — B35.1 ONYCHOMYCOSIS: ICD-10-CM

## 2024-06-28 DIAGNOSIS — R26.9 GAIT DIFFICULTY: ICD-10-CM

## 2024-06-28 DIAGNOSIS — M25.571 RIGHT ANKLE PAIN, UNSPECIFIED CHRONICITY: ICD-10-CM

## 2024-06-28 DIAGNOSIS — E11.9 TYPE 2 DIABETES MELLITUS WITHOUT COMPLICATION, UNSPECIFIED WHETHER LONG TERM INSULIN USE (HCC): ICD-10-CM

## 2024-06-28 PROCEDURE — 99213 OFFICE O/P EST LOW 20 MIN: CPT | Performed by: PODIATRIST

## 2024-06-28 RX ORDER — DOXYCYCLINE HYCLATE 100 MG/1
CAPSULE ORAL
COMMUNITY
Start: 2024-06-26

## 2024-06-28 NOTE — PROGRESS NOTES
[facility]  Progress Note    Boyd Recio is a 83 year old male.   Chief Complaint   Patient presents with    Ankle Pain     R ankle f/u- here for MRI result- rates pain 5/10 on and off         HPI:     Patient is a pleasant 83-year-old diabetic male who is returning to clinic today to discuss MRI results of his right ankle, as well as complaints of thickened and elongated toenails to both feet that are starting to bother him.  Patient states that he continues to have intermittent pain to the outside of his right ankle.  Rates the pain 5/10.  Patient states that it is not debilitating and he is able to still get around.  Patient states he does continue to do some at home exercises.  He continues to utilize a cane for gait assistance.  Denying any other concerns at this time and here for further evaluation and care.  Denies recent nausea, vomiting, fevers, chills.      Allergies: Patient has no known allergies.   Current Outpatient Medications   Medication Sig Dispense Refill    doxycycline 100 MG Oral Cap       pantoprazole 40 MG Oral Tab EC Take 1 tablet (40 mg total) by mouth 2 (two) times daily. 180 tablet 3    metFORMIN  MG Oral Tablet 24 Hr Take 1 tablet (500 mg total) by mouth daily with breakfast. 90 tablet 1    Potassium Chloride ER 10 MEQ Oral Tab CR Take 1 tablet (10 mEq total) by mouth daily. 90 tablet 1    silodosin 8 MG Oral Cap Take 1 capsule (8 mg total) by mouth daily. 90 capsule 5    Mirabegron ER (MYRBETRIQ) 50 MG Oral Tablet 24 Hr Take 1 tablet (50 mg total) by mouth daily. 90 tablet 5    finasteride 5 MG Oral Tab Take 1 tablet (5 mg total) by mouth daily. 90 tablet 5    furosemide 40 MG Oral Tab Take 1 tablet (40 mg total) by mouth daily. 90 tablet 3    clobetasol 0.05 % External Ointment APPLY OINTMENT TOPICALLY TO AFFECTED AREA TWICE DAILY ON MOIST SKIN AS NEEDED FOR FLARES      rosuvastatin 10 MG Oral Tab Take 1 tablet (10 mg total) by mouth nightly. 90 tablet 3     warfarin 5 MG Oral Tab Take 1 tablet (5 mg total) by mouth As Directed. Take as directed based on INR readings. (Patient taking differently: Take 1 tablet (5 mg total) by mouth As Directed. 5mg M-W-F and 2.5mg all other days) 90 tablet 3    Vitamin D, Cholecalciferol, 25 MCG (1000 UT) Oral Cap Take by mouth daily.      acetaminophen 500 MG Oral Tab Take 1 tablet (500 mg total) by mouth every 6 (six) hours as needed for Pain.  0    latanoprost 0.005 % Ophthalmic Solution Place 1 drop into both eyes nightly.      Dicloxacillin Sodium 500 MG Oral Cap Take 1 capsule (500 mg total) by mouth 2 (two) times daily.        Past Medical History:    Arrhythmia    Back problem    Bull esophagus    BPH (benign prostatic hyperplasia)    Cataract    Cataract of both eyes    COVID    8/30/2022    Disorder of prostate    DVT (deep venous thrombosis) (HCC)    Overview:  Overview:  3/11 after back surgery pulm emb-IVC filter Overview:  3/11 after back surgery pulm emb-IVC filter    Esophageal reflux    Frequent urination    Hearing impairment    bilateral hearing aids    Hearing loss    Wear hearing aids    Heartburn    Hemorrhoids    High cholesterol    Hx of endoscopy    Hyperlipidemia    Indigestion    Leaking of urine    Leg DVT (deep venous thromboembolism), acute, bilateral (HCC)    Leg swelling    Obesity    Pain in joints    Pleurisy with effusion    Postoperative anemia due to acute blood loss    Prediabetes    Presence of IVC filter    cook celect filter.  Had 3 different IVC filters placed previous to current filter    Pulmonary embolism (HCC)    Stented coronary artery    aorta / iliac    Venous ulcer of right leg (HCC)    Visual impairment    glasses    Wears glasses    Weight gain      Past Surgical History:   Procedure Laterality Date    Back surgery  2011,2012,2014, 2016,2019    Cataract  2012    Colonoscopy  09/12/2016    Colonoscopy  2019    Colonoscopy N/A 01/06/2022    Procedure: COLONOSCOPY;  Surgeon: Amaya  Saeid Ashton MD;  Location:  ENDOSCOPY    Colonoscopy N/A 2023    Procedure: COLONOSCOPY WITH COLD SNARE POLYPECTOMY;  Surgeon: Saeid Conde MD;  Location:  ENDOSCOPY    Endovas repair, infrarenl abdom aortic aneurysm/dissect  2017    Stent    Knee replacement surgery Right 2016    Other  2012    aortic and iliac artery stent    Other surgical history  1964    Pleuectomy    Other surgical history      IVC filter    Spinal fusion  10/12/2018    Spine surgery procedure unlisted      Back Surgery    Tonsillectomy      Vasectomy  1984      Family History   Problem Relation Age of Onset    Cancer Father         Prostate    Colon Cancer Father         80+    Cancer Mother         Gall bladder    Alcohol and Other Disorders Associated Sister         Alcoholism    Cancer Brother         Pancreas    Cancer Brother         Lung / Brain    DVT/VTE Neg       Social History     Socioeconomic History    Marital status:    Tobacco Use    Smoking status: Former     Current packs/day: 0.00     Average packs/day: 1 pack/day for 25.0 years (25.0 ttl pk-yrs)     Types: Cigarettes     Start date: 2/3/1962     Quit date: 2/3/1987     Years since quittin.4    Smokeless tobacco: Never   Vaping Use    Vaping status: Never Used   Substance and Sexual Activity    Alcohol use: Not Currently     Comment: Social drinker    Drug use: Never   Other Topics Concern    Caffeine Concern No    Exercise No    Seat Belt No    Special Diet No    Stress Concern No    Weight Concern No           REVIEW OF SYSTEMS:     10 point ROS completed and was negative, except for pertinent positive and negatives stated in subjective.       EXAM:     GENERAL: well developed, well nourished, in no apparent distress  EXTREMITIES:              1. Integument: Nails x10 are thickened, elongated, discolored, dystrophic with subungual debris noted to right hallux toenail.  Normal skin temperature and decreased turgor.  Skin color  changes consistent with hemosiderin deposition noted to bilateral lower extremities.  2. Vascular: Dorsalis pedis 2/4 bilateral and posterior tibial pulses 2/4 bilateral, capillary refill normal.  2+ pitting edema noted to bilateral lower extremities              3. Musculoskeletal: All muscle groups are graded 4+/5 in the foot and ankle, right with pain elicited with eversion against resistance.  Patient does have pain elicited on palpation along the course of the peroneal tendons near the lateral malleolus.  Adequate eversion strength is noted with no signs of tendon rupture, right.  No pain with palpation noted to plantar medial aspect of right heel.  No discomfort with side-to-side heel squeeze.  Decreased ankle joint dorsiflexion noted with the knee extended, which does improve with the knee flexed consistent with equinus deformity, right.  Some discomfort with palpation of toenails of bilateral feet              4. Neurological: Sensation diminished via light touch to bilateral lower extremities.  Negative Tinel's sign to nerve courses of the right lower extremity today.     MRI ANKLE, RIGHT (PFK=34402)    Result Date: 6/20/2024  CONCLUSION:   1. Moderate grade interstitial split tear of the inframalleolar segment of the peroneus brevis tendon.  Findings are on a background of severe tendinopathy with fluid distention of the peroneal tendon sheath compatible with tenosynovitis.  The peroneus longus tendon is subluxed into the torn portion of the peroneus brevis tendon.  2. Intact ankle ligaments.  3. Edema and atrophy of the intrinsic foot musculature, likely a manifestation of chronic diabetic neuropathy.   LOCATION:  [facility]   Dictated by (CST): Jessi Lozada MD on 6/20/2024 at 1:52 PM     Finalized by (CST): Jessi Lozada MD on 6/20/2024 at 1:59 PM          ASSESSMENT AND PLAN:   Diagnoses and all orders for this visit:    Tear of peroneal tendon of right foot  -     DME - EXTERNAL     Onychomycosis    Bilateral  lower extremity edema  -     DME - EXTERNAL     Gait difficulty  -     DME - EXTERNAL     Type 2 diabetes mellitus without complication, unspecified whether long term insulin use (HCC)    Right ankle pain, unspecified chronicity  -     DME - EXTERNAL         Plan:   -Patient was seen and evaluated today in clinic.  Chart history reviewed.    -Independently reviewed patient's right ankle MRI, revealing a interstitial tear within the peroneus brevis tendon with subluxation of peroneus longus tendon into the peroneus brevis tendon.  This does correlate with patient's ongoing pain.    Discussed both conservative and surgical treatment options.  Explained to patient that due to tearing of the tendon, surgical treatment for this would normally be recommended.   Conservatively, would recommend long-term custom ankle brace use.    Long discussion was had in regards to MRI results and treatment recommendations.  Patient's wife does share patient has recently been diagnosed with A-fib and they believe that it was anesthesia induced from a recent colonoscopy.  Discussed risk and benefits of the surgical procedure to repair his peroneal tendon.  I do believe that he has a high risk patient due to ongoing comorbidities.      After further discussion, patient has decided against surgical intervention at this time.  Recommend use of custom [place] brace.  Patient is agreeable to this and an order was placed for a custom [place] ankle brace for patient's right lower extremity.  -Discussed with patient proper care and hygiene for their feet.  Recommend daily foot soaks for 10 minutes with warm water and apple cider vinegar    -If thickness of the toenails are causing discomfort, advised patient to try utilizing Vicks VapoRub on her toenails every night.  The goal would be for the toenails to soften over time.  -Patient relates pain relief with intermittent toenail debridements.  -Patient elects for nail debridement today. Toenails  1-5 of the left foot and 1-5 of the right foot were debrided today.  They tolerated procedures well, without incident.    -Instrumentation used includes nail nippers.    Time spent reviewing pertinent information from patient's chart, reviewing any pertinent imaging, obtaining history and physical exam, discussing and mutually agreeing on a treatment plan, and documenting encounter: 25 minutes    RTC 6-8 weeks      Cameron Menard DPM        6/28/2024    Dragon speech recognition software was used to prepare this note.  Errors in word recognition may occur.  Please contact me with any questions/concerns with this note.

## 2024-06-30 NOTE — PROGRESS NOTES
Subjective:   Boyd Recio is a 83 year old male who presents for Diabetes (F/u)     Current medication(s): metformin   Has urgent BM, loose stool. Some interference with daily activities   On prednisone for flare of plantar fasciitis     Statin: Y  ACEi/ARB: N    Patient denies episodes of hypoglycemia.  Polyuria: no  Polydipsia: no  Polyphagia: no  Blurred vision: no  Neuropathy: yes - unchanged   Depression: no acute issues   Frequent infections or sores: h/o chronic infection - remains on abx          HgbA1C (%)   Date Value   02/15/2024 6.5 (H)   2023 6.7 (H)   2022 5.9 (H)               History/Other:    Chief Complaint Reviewed and Verified  Nursing Notes Reviewed and   Verified  Tobacco Reviewed  Allergies Reviewed  Medications Reviewed    Problem List Reviewed  Medical History Reviewed  Surgical History   Reviewed  Family History Reviewed  Social History Reviewed         Tobacco:  He smoked tobacco in the past but quit greater than 12 months ago.  Social History     Tobacco Use   Smoking Status Former    Current packs/day: 0.00    Average packs/day: 1 pack/day for 25.0 years (25.0 ttl pk-yrs)    Types: Cigarettes    Start date: 2/3/1962    Quit date: 2/3/1987    Years since quittin.4   Smokeless Tobacco Never        Current Outpatient Medications on File Prior to Visit   Medication Sig Dispense Refill    pantoprazole 40 MG Oral Tab EC Take 1 tablet (40 mg total) by mouth 2 (two) times daily. 180 tablet 3    metFORMIN  MG Oral Tablet 24 Hr Take 1 tablet (500 mg total) by mouth daily with breakfast. 90 tablet 1    Potassium Chloride ER 10 MEQ Oral Tab CR Take 1 tablet (10 mEq total) by mouth daily. 90 tablet 1    silodosin 8 MG Oral Cap Take 1 capsule (8 mg total) by mouth daily. 90 capsule 5    Mirabegron ER (MYRBETRIQ) 50 MG Oral Tablet 24 Hr Take 1 tablet (50 mg total) by mouth daily. 90 tablet 5    finasteride 5 MG Oral Tab Take 1 tablet (5 mg total) by mouth daily. 90  tablet 5    furosemide 40 MG Oral Tab Take 1 tablet (40 mg total) by mouth daily. 90 tablet 3    clobetasol 0.05 % External Ointment APPLY OINTMENT TOPICALLY TO AFFECTED AREA TWICE DAILY ON MOIST SKIN AS NEEDED FOR FLARES      rosuvastatin 10 MG Oral Tab Take 1 tablet (10 mg total) by mouth nightly. 90 tablet 3    warfarin 5 MG Oral Tab Take 1 tablet (5 mg total) by mouth As Directed. Take as directed based on INR readings. (Patient taking differently: Take 1 tablet (5 mg total) by mouth As Directed. 5mg M-W-F and 2.5mg all other days) 90 tablet 3    Vitamin D, Cholecalciferol, 25 MCG (1000 UT) Oral Cap Take by mouth daily.      acetaminophen 500 MG Oral Tab Take 1 tablet (500 mg total) by mouth every 6 (six) hours as needed for Pain.  0    latanoprost 0.005 % Ophthalmic Solution Place 1 drop into both eyes nightly.      Dicloxacillin Sodium 500 MG Oral Cap Take 1 capsule (500 mg total) by mouth 2 (two) times daily.       No current facility-administered medications on file prior to visit.           Review of Systems:  Review of Systems   Constitutional:  Negative for chills and fever.   HENT:  Negative for rhinorrhea and sinus pressure.    Eyes:  Negative for pain and visual disturbance.   Respiratory:  Negative for cough and shortness of breath.    Cardiovascular:  Negative for chest pain and palpitations.   Gastrointestinal:  Positive for diarrhea and nausea. Negative for abdominal pain and vomiting.   Genitourinary:  Positive for frequency. Negative for dysuria and urgency.   Musculoskeletal:  Positive for arthralgias, back pain, gait problem and joint swelling. Negative for myalgias.   Skin:  Negative for pallor and rash.   Neurological:  Negative for dizziness and headaches.         Objective:   /70   Pulse 85   Resp 22   Ht 6' 1\" (1.854 m)   Wt (!) 309 lb (140.2 kg)   SpO2 96%   BMI 40.77 kg/m²  Estimated body mass index is 40.77 kg/m² as calculated from the following:    Height as of this  encounter: 6' 1\" (1.854 m).    Weight as of this encounter: 309 lb (140.2 kg).  Physical Exam  Constitutional:       General: He is not in acute distress.     Appearance: He is well-developed. He is obese.   Neck:      Vascular: No carotid bruit.   Cardiovascular:      Rate and Rhythm: Normal rate and regular rhythm.   Pulmonary:      Effort: Pulmonary effort is normal.      Breath sounds: Normal breath sounds.   Musculoskeletal:      Right lower le+ Pitting Edema present.      Left lower le+ Pitting Edema present.   Skin:     Coloration: Skin is not jaundiced or pale.   Neurological:      General: No focal deficit present.      Mental Status: He is alert and oriented to person, place, and time.   Psychiatric:         Mood and Affect: Mood normal.         Behavior: Behavior normal.           Assessment & Plan:   1. Type 2 diabetes mellitus with other circulatory complication, without long-term current use of insulin (HCC) (Primary)  -     Comp Metabolic Panel (14); Future; Expected date: 2024  -     Lipid Panel; Future; Expected date: 2024  -     Hemoglobin A1C; Future; Expected date: 2024  2. Paroxysmal atrial fibrillation (HCC)  3. Bilateral leg edema  4. Venous insufficiency    Cont metformin until finished with prednisone - then dec metformin to see if GI side effects improve.   Eye exam due       Return in about 3 months (around 2024).

## 2024-07-03 ENCOUNTER — APPOINTMENT (OUTPATIENT)
Dept: URBAN - METROPOLITAN AREA CLINIC 247 | Age: 84
Setting detail: DERMATOLOGY
End: 2024-07-03

## 2024-07-03 DIAGNOSIS — S31000A OPEN WOUND(S) (MULTIPLE) OF UNSPECIFIED SITE(S), WITHOUT MENTION OF COMPLICATION: ICD-10-CM

## 2024-07-03 PROBLEM — S81.812A LACERATION WITHOUT FOREIGN BODY, LEFT LOWER LEG, INITIAL ENCOUNTER: Status: ACTIVE | Noted: 2024-07-03

## 2024-07-03 PROCEDURE — OTHER PRESCRIPTION: OTHER

## 2024-07-03 PROCEDURE — OTHER ORDER TESTS: OTHER

## 2024-07-03 PROCEDURE — OTHER ADDITIONAL NOTES: OTHER

## 2024-07-03 PROCEDURE — 99213 OFFICE O/P EST LOW 20 MIN: CPT

## 2024-07-03 PROCEDURE — OTHER COUNSELING: OTHER

## 2024-07-03 PROCEDURE — OTHER PRESCRIPTION MEDICATION MANAGEMENT: OTHER

## 2024-07-03 RX ORDER — DOXYCYCLINE HYCLATE 100 MG/1
CAPSULE, GELATIN COATED ORAL
Qty: 14 | Refills: 0 | Status: ERX

## 2024-07-03 ASSESSMENT — LOCATION ZONE DERM: LOCATION ZONE: LEG

## 2024-07-03 ASSESSMENT — LOCATION DETAILED DESCRIPTION DERM: LOCATION DETAILED: LEFT DISTAL PRETIBIAL REGION

## 2024-07-03 ASSESSMENT — LOCATION SIMPLE DESCRIPTION DERM: LOCATION SIMPLE: LEFT PRETIBIAL REGION

## 2024-07-03 NOTE — PROCEDURE: PRESCRIPTION MEDICATION MANAGEMENT
Detail Level: Zone
Continue Regimen: Mupirocin ointment apply to AA TID\\nDoxycycline 100mg BID for an additional week (pt completed a 10 day course).\\nVaseline daily
Render In Strict Bullet Format?: No

## 2024-07-03 NOTE — PROCEDURE: ADDITIONAL NOTES
Detail Level: Simple
Render Risk Assessment In Note?: no
Additional Notes: Markedly improved, decreased pain, but still painful to the touch. Pt is tolerating the Doxycycline well. \\nContinue Mupirocin and add one additional week of Doxycycline. \\nElevate leg when able to. \\nPhoto taken today. \\nVikor sample taken today.

## 2024-07-03 NOTE — PROCEDURE: ORDER TESTS
Performing Laboratory: [id number]
Lab Facility: [id number]
Expected Date Of Service: 07/03/2024
Billing Type: Third-Party Bill
Bill For Surgical Tray: no

## 2024-07-15 RX ORDER — ROSUVASTATIN CALCIUM 10 MG/1
10 TABLET, COATED ORAL NIGHTLY
Qty: 90 TABLET | Refills: 3 | Status: SHIPPED | OUTPATIENT
Start: 2024-07-15

## 2024-07-25 ENCOUNTER — ANTI-COAG VISIT (OUTPATIENT)
Dept: HEMATOLOGY/ONCOLOGY | Age: 84
End: 2024-07-25
Attending: INTERNAL MEDICINE
Payer: MEDICARE

## 2024-07-25 ENCOUNTER — ANTI-COAG VISIT (OUTPATIENT)
Dept: HEMATOLOGY/ONCOLOGY | Facility: HOSPITAL | Age: 84
End: 2024-07-25

## 2024-07-25 DIAGNOSIS — I82.4Y3 ACUTE DEEP VEIN THROMBOSIS (DVT) OF PROXIMAL VEIN OF BOTH LOWER EXTREMITIES (HCC): ICD-10-CM

## 2024-07-25 DIAGNOSIS — I82.409 RECURRENT DEEP VEIN THROMBOSIS (DVT) (HCC): ICD-10-CM

## 2024-07-25 LAB
INR BLD: 2.66 (ref 0.8–1.2)
PROTHROMBIN TIME: 28.7 SECONDS (ref 11.6–14.8)

## 2024-07-25 PROCEDURE — 36415 COLL VENOUS BLD VENIPUNCTURE: CPT

## 2024-07-25 PROCEDURE — 85610 PROTHROMBIN TIME: CPT

## 2024-08-22 ENCOUNTER — ANTI-COAG VISIT (OUTPATIENT)
Dept: HEMATOLOGY/ONCOLOGY | Age: 84
End: 2024-08-22
Attending: INTERNAL MEDICINE
Payer: MEDICARE

## 2024-08-22 ENCOUNTER — ANTI-COAG VISIT (OUTPATIENT)
Dept: HEMATOLOGY/ONCOLOGY | Facility: HOSPITAL | Age: 84
End: 2024-08-22

## 2024-08-22 ENCOUNTER — TELEPHONE (OUTPATIENT)
Facility: LOCATION | Age: 84
End: 2024-08-22

## 2024-08-22 DIAGNOSIS — I82.409 RECURRENT DEEP VEIN THROMBOSIS (DVT) (HCC): ICD-10-CM

## 2024-08-22 DIAGNOSIS — I82.4Y3 ACUTE DEEP VEIN THROMBOSIS (DVT) OF PROXIMAL VEIN OF BOTH LOWER EXTREMITIES (HCC): ICD-10-CM

## 2024-08-22 LAB
INR BLD: 2.47 (ref 0.8–1.2)
PROTHROMBIN TIME: 27 SECONDS (ref 11.6–14.8)

## 2024-08-22 PROCEDURE — 36415 COLL VENOUS BLD VENIPUNCTURE: CPT

## 2024-08-22 PROCEDURE — 85610 PROTHROMBIN TIME: CPT

## 2024-08-22 NOTE — TELEPHONE ENCOUNTER
Maddi from [facility] (formerly [facility]) called requesting patient OVN for order sent.  Patient has appointment at [facility] on 8/23/24.  Contact info:   [phone] and fax:[phone]

## 2024-08-22 NOTE — TELEPHONE ENCOUNTER
Maddi from [facility] calling for appointment notes for that were missing from order placed for patient.  Contact number [phone], fax [phone]  Patient has appointment 8/23/24 with Leeroy.

## 2024-08-24 DIAGNOSIS — I82.4Y3 ACUTE DEEP VEIN THROMBOSIS (DVT) OF PROXIMAL VEIN OF BOTH LOWER EXTREMITIES (HCC): ICD-10-CM

## 2024-08-26 RX ORDER — WARFARIN SODIUM 5 MG/1
TABLET ORAL
Qty: 90 TABLET | Refills: 3 | Status: SHIPPED | OUTPATIENT
Start: 2024-08-26

## 2024-09-10 ENCOUNTER — OFFICE VISIT (OUTPATIENT)
Facility: LOCATION | Age: 84
End: 2024-09-10
Payer: MEDICARE

## 2024-09-10 DIAGNOSIS — R60.0 BILATERAL LOWER EXTREMITY EDEMA: ICD-10-CM

## 2024-09-10 DIAGNOSIS — M25.571 RIGHT ANKLE PAIN, UNSPECIFIED CHRONICITY: ICD-10-CM

## 2024-09-10 DIAGNOSIS — S86.311A TEAR OF PERONEAL TENDON OF RIGHT FOOT: ICD-10-CM

## 2024-09-10 DIAGNOSIS — R26.9 GAIT DIFFICULTY: ICD-10-CM

## 2024-09-10 DIAGNOSIS — B35.1 ONYCHOMYCOSIS: Primary | ICD-10-CM

## 2024-09-10 DIAGNOSIS — E11.9 TYPE 2 DIABETES MELLITUS WITHOUT COMPLICATION, UNSPECIFIED WHETHER LONG TERM INSULIN USE (HCC): ICD-10-CM

## 2024-09-10 NOTE — PROGRESS NOTES
[facility]  Progress Note    Boyd Recio is a 84 year old male.   Chief Complaint   Patient presents with    Toenail Care         HPI:     Patient is a pleasant 84-year-old diabetic male who is returning to clinic today with complaints of thickened and elongated toenails with an ability to trim them himself.  Patient states that his toenails have become thickened and elongated and is hoping to discuss trimming today as he cannot do that safely himself.  He is on warfarin.  Patient also has obtained his custom AFO for his right lower extremity because of a peroneal tendon tear.  Patient is choosing to avoid surgical management.  He does state that he feels more stable with the brace and he has an occasional pain, but is not all the time.  No other concerns and here today for further evaluation and care.  Denies recent nausea, vomiting, fevers, chills.      Allergies: Patient has no known allergies.   Current Outpatient Medications   Medication Sig Dispense Refill    WARFARIN 5 MG Oral Tab TAKE 1 TABLET AS DIRECTED BASED ON INR READINGS 90 tablet 3    ROSUVASTATIN 10 MG Oral Tab TAKE 1 TABLET NIGHTLY 90 tablet 3    doxycycline 100 MG Oral Cap       pantoprazole 40 MG Oral Tab EC Take 1 tablet (40 mg total) by mouth 2 (two) times daily. 180 tablet 3    silodosin 8 MG Oral Cap Take 1 capsule (8 mg total) by mouth daily. 90 capsule 5    Mirabegron ER (MYRBETRIQ) 50 MG Oral Tablet 24 Hr Take 1 tablet (50 mg total) by mouth daily. 90 tablet 5    finasteride 5 MG Oral Tab Take 1 tablet (5 mg total) by mouth daily. 90 tablet 5    furosemide 40 MG Oral Tab Take 1 tablet (40 mg total) by mouth daily. 90 tablet 3    clobetasol 0.05 % External Ointment APPLY OINTMENT TOPICALLY TO AFFECTED AREA TWICE DAILY ON MOIST SKIN AS NEEDED FOR FLARES      Vitamin D, Cholecalciferol, 25 MCG (1000 UT) Oral Cap Take by mouth daily.      acetaminophen 500 MG Oral Tab Take 1 tablet (500 mg total) by mouth every 6 (six) hours  as needed for Pain.  0    latanoprost 0.005 % Ophthalmic Solution Place 1 drop into both eyes nightly.      Dicloxacillin Sodium 500 MG Oral Cap Take 1 capsule (500 mg total) by mouth 2 (two) times daily.        Past Medical History:    Arrhythmia    Back problem    Bull esophagus    BPH (benign prostatic hyperplasia)    Cataract    Cataract of both eyes    COVID    8/30/2022    Disorder of prostate    DVT (deep venous thrombosis) (HCC)    Overview:  Overview:  3/11 after back surgery pulm emb-IVC filter Overview:  3/11 after back surgery pulm emb-IVC filter    Esophageal reflux    Frequent urination    Hearing impairment    bilateral hearing aids    Hearing loss    Wear hearing aids    Heartburn    Hemorrhoids    High cholesterol    Hx of endoscopy    Hyperlipidemia    Indigestion    Leaking of urine    Leg DVT (deep venous thromboembolism), acute, bilateral (HCC)    Leg swelling    Obesity    Pain in joints    Pleurisy with effusion    Postoperative anemia due to acute blood loss    Prediabetes    Presence of IVC filter    cook celect filter.  Had 3 different IVC filters placed previous to current filter    Pulmonary embolism (HCC)    Stented coronary artery    aorta / iliac    Venous ulcer of right leg (HCC)    Visual impairment    glasses    Wears glasses    Weight gain      Past Surgical History:   Procedure Laterality Date    Back surgery  2011,2012,2014, 2016,2019    Cataract  2012    Colonoscopy  09/12/2016    Colonoscopy  2019    Colonoscopy N/A 01/06/2022    Procedure: COLONOSCOPY;  Surgeon: Saeid Conde MD;  Location:  ENDOSCOPY    Colonoscopy N/A 9/21/2023    Procedure: COLONOSCOPY WITH COLD SNARE POLYPECTOMY;  Surgeon: Saeid Conde MD;  Location:  ENDOSCOPY    Endovas repair, infrarenl abdom aortic aneurysm/dissect  06/13/2017    Stent    Knee replacement surgery Right 04/04/2016    Other  06/2012    aortic and iliac artery stent    Other surgical history  1964    Pleuectomy     Other surgical history      IVC filter    Spinal fusion  10/12/2018    Spine surgery procedure unlisted      Back Surgery    Tonsillectomy      Vasectomy  1984      Family History   Problem Relation Age of Onset    Cancer Father         Prostate    Colon Cancer Father         80+    Cancer Mother         Gall bladder    Alcohol and Other Disorders Associated Sister         Alcoholism    Cancer Brother         Pancreas    Cancer Brother         Lung / Brain    DVT/VTE Neg       Social History     Socioeconomic History    Marital status:    Tobacco Use    Smoking status: Former     Current packs/day: 0.00     Average packs/day: 1 pack/day for 25.0 years (25.0 ttl pk-yrs)     Types: Cigarettes     Start date: 2/3/1962     Quit date: 2/3/1987     Years since quittin.6    Smokeless tobacco: Never   Vaping Use    Vaping status: Never Used   Substance and Sexual Activity    Alcohol use: Not Currently     Comment: Social drinker    Drug use: Never   Other Topics Concern    Caffeine Concern No    Exercise No    Seat Belt No    Special Diet No    Stress Concern No    Weight Concern No           REVIEW OF SYSTEMS:     10 point ROS completed and was negative, except for pertinent positive and negatives stated in subjective.       EXAM:     GENERAL: well developed, well nourished, in no apparent distress  EXTREMITIES:              1. Integument: Nails x10 are thickened, elongated, discolored, dystrophic with subungual debris noted to right hallux toenail.  Normal skin temperature and decreased turgor.  Skin color changes consistent with hemosiderin deposition noted to bilateral lower extremities.  2. Vascular: Dorsalis pedis 2/4 bilateral and posterior tibial pulses 2/4 bilateral, capillary refill normal.  2+ pitting edema noted to bilateral lower extremities              3. Musculoskeletal: All muscle groups are graded 4+/5 in the foot and ankle, right with pain elicited with eversion against resistance.  Patient  does have pain elicited on palpation along the course of the peroneal tendons near the lateral malleolus.  Adequate eversion strength is noted with no signs of tendon rupture, right.  No pain with palpation noted to plantar medial aspect of right heel.  Decreased ankle joint dorsiflexion noted with the knee extended, which does improve with the knee flexed consistent with equinus deformity, right.  Some discomfort with palpation of toenails of bilateral feet              4. Neurological: Sensation diminished via light touch to bilateral lower extremities.         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  Chart history reviewed.    -Evaluated the patient. Discussed treatment options with the patient.  -Discussed with patient proper care and hygiene for their feet.  Recommend daily foot soaks for 10 minutes with warm water and apple cider vinegar    -If thickness of the toenails are causing discomfort, advised patient to try utilizing Vicks VapoRub on her toenails every night.  The goal would be for the toenails to soften over time.  -Patient relates pain relief with intermittent toenail debridements.  -Patient elects for nail debridement today. Toenails 1-5 of the left foot and 1-5 of the right foot were debrided today.  They tolerated procedures well, without incident.    -Instrumentation used includes nail nippers.    -Discussed importance of proper pedal hygiene, daily foot checks, and tight glycemic control.    -Patient to avoid walking barefoot. Recommend ambulating with supportive diabetic shoes and inserts.  Do recommend patient continue utilizing custom AFO to right lower  extremity due to his ongoing peroneal tendon tear, which was confirmed on recent MRI.  Patient wants to continue with conservative management for this.    -Patient to monitor for acute signs of infection and seek immediate medical attention if any signs of infection or other concerns arise.      -The patient indicates understanding of these issues and agrees to the plan.    Time spent reviewing pertinent information from patient's chart, reviewing any pertinent imaging, obtaining history and physical exam, discussing and mutually agreeing on a treatment plan, and documenting encounter: 20 minutes    RTC 2-3 months      Cameron Menard DPM        [address] [address]     9/10/2024    Dragon speech recognition software was used to prepare this note.  Errors in word recognition may occur.  Please contact me with any questions/concerns with this note.

## 2024-09-19 ENCOUNTER — ANTI-COAG VISIT (OUTPATIENT)
Dept: HEMATOLOGY/ONCOLOGY | Age: 84
End: 2024-09-19
Attending: INTERNAL MEDICINE
Payer: MEDICARE

## 2024-09-19 DIAGNOSIS — I82.4Y3 ACUTE DEEP VEIN THROMBOSIS (DVT) OF PROXIMAL VEIN OF BOTH LOWER EXTREMITIES (HCC): ICD-10-CM

## 2024-09-19 DIAGNOSIS — I82.409 RECURRENT DEEP VEIN THROMBOSIS (DVT) (HCC): ICD-10-CM

## 2024-09-19 LAB
INR BLD: 2.24 (ref 0.8–1.2)
PROTHROMBIN TIME: 25 SECONDS (ref 11.6–14.8)

## 2024-09-19 PROCEDURE — 85610 PROTHROMBIN TIME: CPT

## 2024-09-19 PROCEDURE — 36415 COLL VENOUS BLD VENIPUNCTURE: CPT

## 2024-10-17 ENCOUNTER — ANTI-COAG VISIT (OUTPATIENT)
Dept: HEMATOLOGY/ONCOLOGY | Age: 84
End: 2024-10-17
Attending: INTERNAL MEDICINE
Payer: MEDICARE

## 2024-10-17 DIAGNOSIS — I82.4Y3 ACUTE DEEP VEIN THROMBOSIS (DVT) OF PROXIMAL VEIN OF BOTH LOWER EXTREMITIES (HCC): ICD-10-CM

## 2024-10-17 DIAGNOSIS — I82.409 RECURRENT DEEP VEIN THROMBOSIS (DVT) (HCC): ICD-10-CM

## 2024-10-17 LAB
INR BLD: 2.71 (ref 0.8–1.2)
PROTHROMBIN TIME: 28.4 SECONDS (ref 11.6–14.8)

## 2024-10-17 PROCEDURE — 85610 PROTHROMBIN TIME: CPT

## 2024-10-17 PROCEDURE — 36415 COLL VENOUS BLD VENIPUNCTURE: CPT

## 2024-11-01 ENCOUNTER — TELEPHONE (OUTPATIENT)
Dept: FAMILY MEDICINE CLINIC | Facility: CLINIC | Age: 84
End: 2024-11-01

## 2024-11-01 DIAGNOSIS — I48.0 PAROXYSMAL ATRIAL FIBRILLATION (HCC): ICD-10-CM

## 2024-11-01 DIAGNOSIS — E11.59 TYPE 2 DIABETES MELLITUS WITH OTHER CIRCULATORY COMPLICATION, WITHOUT LONG-TERM CURRENT USE OF INSULIN (HCC): Primary | ICD-10-CM

## 2024-11-01 DIAGNOSIS — E78.2 HYPERLIPEMIA, MIXED: ICD-10-CM

## 2024-11-07 ENCOUNTER — ANTI-COAG VISIT (OUTPATIENT)
Dept: HEMATOLOGY/ONCOLOGY | Age: 84
End: 2024-11-07
Attending: INTERNAL MEDICINE
Payer: MEDICARE

## 2024-11-07 DIAGNOSIS — E11.59 TYPE 2 DIABETES MELLITUS WITH OTHER CIRCULATORY COMPLICATION, WITHOUT LONG-TERM CURRENT USE OF INSULIN (HCC): ICD-10-CM

## 2024-11-07 DIAGNOSIS — Z79.01 CHRONIC ANTICOAGULATION: ICD-10-CM

## 2024-11-07 DIAGNOSIS — D64.9 NORMOCYTIC ANEMIA: ICD-10-CM

## 2024-11-07 DIAGNOSIS — D47.2 MGUS (MONOCLONAL GAMMOPATHY OF UNKNOWN SIGNIFICANCE): ICD-10-CM

## 2024-11-07 DIAGNOSIS — E78.2 HYPERLIPEMIA, MIXED: ICD-10-CM

## 2024-11-07 DIAGNOSIS — I82.409 RECURRENT DEEP VEIN THROMBOSIS (DVT) (HCC): ICD-10-CM

## 2024-11-07 DIAGNOSIS — I82.4Y3 ACUTE DEEP VEIN THROMBOSIS (DVT) OF PROXIMAL VEIN OF BOTH LOWER EXTREMITIES (HCC): ICD-10-CM

## 2024-11-07 LAB
ALBUMIN SERPL-MCNC: 3.3 G/DL (ref 3.4–5)
ALBUMIN/GLOB SERPL: 0.9 {RATIO} (ref 1–2)
ALP LIVER SERPL-CCNC: 113 U/L
ALT SERPL-CCNC: 20 U/L
ANION GAP SERPL CALC-SCNC: 0 MMOL/L (ref 0–18)
AST SERPL-CCNC: 15 U/L (ref 15–37)
BASOPHILS # BLD AUTO: 0.02 X10(3) UL (ref 0–0.2)
BASOPHILS NFR BLD AUTO: 0.3 %
BILIRUB SERPL-MCNC: 1.3 MG/DL (ref 0.1–2)
BUN BLD-MCNC: 16 MG/DL (ref 9–23)
CALCIUM BLD-MCNC: 10 MG/DL (ref 8.5–10.1)
CHLORIDE SERPL-SCNC: 106 MMOL/L (ref 98–112)
CHOLEST SERPL-MCNC: 114 MG/DL (ref ?–200)
CO2 SERPL-SCNC: 32 MMOL/L (ref 21–32)
COMPLEXED PSA SERPL-MCNC: 0.61 NG/ML (ref ?–4)
CREAT BLD-MCNC: 1.23 MG/DL
CRP SERPL-MCNC: 1.06 MG/DL (ref ?–0.3)
DEPRECATED HBV CORE AB SER IA-ACNC: 66 NG/ML
EGFRCR SERPLBLD CKD-EPI 2021: 58 ML/MIN/1.73M2 (ref 60–?)
EOSINOPHIL # BLD AUTO: 0.13 X10(3) UL (ref 0–0.7)
EOSINOPHIL NFR BLD AUTO: 1.9 %
ERYTHROCYTE [DISTWIDTH] IN BLOOD BY AUTOMATED COUNT: 13.8 %
EST. AVERAGE GLUCOSE BLD GHB EST-MCNC: 137 MG/DL (ref 68–126)
FASTING PATIENT LIPID ANSWER: YES
FASTING STATUS PATIENT QL REPORTED: YES
GLOBULIN PLAS-MCNC: 3.6 G/DL (ref 2.8–4.4)
GLUCOSE BLD-MCNC: 119 MG/DL (ref 70–99)
HBA1C MFR BLD: 6.4 % (ref ?–5.7)
HCT VFR BLD AUTO: 51.1 %
HDLC SERPL-MCNC: 32 MG/DL (ref 40–59)
HGB BLD-MCNC: 16.8 G/DL
HGB RETIC QN AUTO: 35.5 PG (ref 28.2–36.6)
IGA SERPL-MCNC: 137.7 MG/DL (ref 40–350)
IGM SERPL-MCNC: 186.8 MG/DL (ref 50–300)
IMM GRANULOCYTES # BLD AUTO: 0.02 X10(3) UL (ref 0–1)
IMM GRANULOCYTES NFR BLD: 0.3 %
IMM RETICS NFR: 0.13 RATIO (ref 0.1–0.3)
IMMUNOGLOBULIN PNL SER-MCNC: 1385 MG/DL (ref 650–1600)
INR BLD: 2.78 (ref 0.8–1.2)
LDLC SERPL CALC-MCNC: 59 MG/DL (ref ?–100)
LYMPHOCYTES # BLD AUTO: 0.78 X10(3) UL (ref 1–4)
LYMPHOCYTES NFR BLD AUTO: 11.6 %
MCH RBC QN AUTO: 31.3 PG (ref 26–34)
MCHC RBC AUTO-ENTMCNC: 32.9 G/DL (ref 31–37)
MCV RBC AUTO: 95.2 FL
MONOCYTES # BLD AUTO: 0.57 X10(3) UL (ref 0.1–1)
MONOCYTES NFR BLD AUTO: 8.5 %
NEUTROPHILS # BLD AUTO: 5.19 X10 (3) UL (ref 1.5–7.7)
NEUTROPHILS # BLD AUTO: 5.19 X10(3) UL (ref 1.5–7.7)
NEUTROPHILS NFR BLD AUTO: 77.4 %
NONHDLC SERPL-MCNC: 82 MG/DL (ref ?–130)
OSMOLALITY SERPL CALC.SUM OF ELEC: 288 MOSM/KG (ref 275–295)
PLATELET # BLD AUTO: 161 10(3)UL (ref 150–450)
POTASSIUM SERPL-SCNC: 4.2 MMOL/L (ref 3.5–5.1)
PROT SERPL-MCNC: 6.9 G/DL (ref 6.4–8.2)
PROTHROMBIN TIME: 28.9 SECONDS (ref 11.6–14.8)
RBC # BLD AUTO: 5.37 X10(6)UL
RETICS # AUTO: 67.1 X10(3) UL (ref 22.5–147.5)
RETICS/RBC NFR AUTO: 1.3 %
SODIUM SERPL-SCNC: 138 MMOL/L (ref 136–145)
TRIGL SERPL-MCNC: 125 MG/DL (ref 30–149)
TSI SER-ACNC: 1.65 UIU/ML (ref 0.55–4.78)
VIT B12 SERPL-MCNC: 359 PG/ML (ref 211–911)
VLDLC SERPL CALC-MCNC: 18 MG/DL (ref 0–30)
WBC # BLD AUTO: 6.7 X10(3) UL (ref 4–11)

## 2024-11-07 PROCEDURE — 85025 COMPLETE CBC W/AUTO DIFF WBC: CPT

## 2024-11-07 PROCEDURE — 86334 IMMUNOFIX E-PHORESIS SERUM: CPT

## 2024-11-07 PROCEDURE — 84443 ASSAY THYROID STIM HORMONE: CPT

## 2024-11-07 PROCEDURE — 82728 ASSAY OF FERRITIN: CPT

## 2024-11-07 PROCEDURE — 80053 COMPREHEN METABOLIC PANEL: CPT

## 2024-11-07 PROCEDURE — 84165 PROTEIN E-PHORESIS SERUM: CPT

## 2024-11-07 PROCEDURE — 85610 PROTHROMBIN TIME: CPT

## 2024-11-07 PROCEDURE — 86140 C-REACTIVE PROTEIN: CPT

## 2024-11-07 PROCEDURE — 83521 IG LIGHT CHAINS FREE EACH: CPT

## 2024-11-07 PROCEDURE — 83036 HEMOGLOBIN GLYCOSYLATED A1C: CPT

## 2024-11-07 PROCEDURE — 85045 AUTOMATED RETICULOCYTE COUNT: CPT

## 2024-11-07 PROCEDURE — 82607 VITAMIN B-12: CPT

## 2024-11-07 PROCEDURE — 36415 COLL VENOUS BLD VENIPUNCTURE: CPT

## 2024-11-07 PROCEDURE — 80061 LIPID PANEL: CPT

## 2024-11-07 PROCEDURE — 82784 ASSAY IGA/IGD/IGG/IGM EACH: CPT

## 2024-11-12 ENCOUNTER — OFFICE VISIT (OUTPATIENT)
Dept: HEMATOLOGY/ONCOLOGY | Age: 84
End: 2024-11-12
Attending: INTERNAL MEDICINE
Payer: MEDICARE

## 2024-11-12 VITALS
DIASTOLIC BLOOD PRESSURE: 75 MMHG | HEART RATE: 70 BPM | SYSTOLIC BLOOD PRESSURE: 119 MMHG | OXYGEN SATURATION: 97 % | HEIGHT: 72.01 IN | TEMPERATURE: 97 F | RESPIRATION RATE: 18 BRPM | WEIGHT: 314 LBS | BODY MASS INDEX: 42.53 KG/M2

## 2024-11-12 DIAGNOSIS — Z79.01 CHRONIC ANTICOAGULATION: ICD-10-CM

## 2024-11-12 DIAGNOSIS — D64.9 NORMOCYTIC ANEMIA: ICD-10-CM

## 2024-11-12 DIAGNOSIS — I82.4Y3 ACUTE DEEP VEIN THROMBOSIS (DVT) OF PROXIMAL VEIN OF BOTH LOWER EXTREMITIES (HCC): ICD-10-CM

## 2024-11-12 DIAGNOSIS — I48.0 PAROXYSMAL ATRIAL FIBRILLATION (HCC): ICD-10-CM

## 2024-11-12 DIAGNOSIS — Z95.828 PRESENCE OF IVC FILTER: ICD-10-CM

## 2024-11-12 DIAGNOSIS — D47.2 MGUS (MONOCLONAL GAMMOPATHY OF UNKNOWN SIGNIFICANCE): ICD-10-CM

## 2024-11-12 DIAGNOSIS — Z86.711 HISTORY OF PULMONARY EMBOLISM: ICD-10-CM

## 2024-11-12 DIAGNOSIS — I82.409 RECURRENT DEEP VEIN THROMBOSIS (DVT) (HCC): Primary | ICD-10-CM

## 2024-11-12 PROCEDURE — G2211 COMPLEX E/M VISIT ADD ON: HCPCS | Performed by: INTERNAL MEDICINE

## 2024-11-12 PROCEDURE — 99214 OFFICE O/P EST MOD 30 MIN: CPT | Performed by: INTERNAL MEDICINE

## 2024-11-12 NOTE — PROGRESS NOTES
Hematology Clinic Follow Up Visit    Patient Name: Boyd Recio  Medical Record Number: [id number]   YOB: 1940    PCP: Dr. Kari Hernandez   Other providers:  Dr. Ian Villarreal (urology), Dr. Isaiah Shin (renal)    Reason for Consultation:  Boyd Recio was seen today for the diagnosis of recurrent VTE, KVNG, MGUS    Hematologic History:  *Recurrent VTE  -3/2011- developed massive PE + DVT following surgery.  Developed cardiac arrest/code as a result.                  -on coumadin for at least several months, then stopped  -subsequently had a series of spine surgeries with IVC filter placements prior to each surgery- total of 4 filter placements- known dates as below (others unknown d/t incomplete medical records).   -3/10/14- infrarenal Narciso Tulip IVC filter placed as prophylaxis for spinal surgery  -7/10/14- removal of infrarenal Narciso Tulip IVC filter  -2/9/15- IVC filter (Cook Celect) placed- NOT REMOVED  -3/21/19- BLE venous doppler- + R posterior tibial DVT (provoked by surgery)                -started eliquis  -9/27/19- RLE venous doppler- no DVT  10/2019- stopped eliquis   -7/6/20- BLE venous doppler-  Extensive bilateral lower extremity DVT is present.  On the left there is DVT involving visualized left iliac veins extending to the calf veins.  On the right partial thrombus is seen within right iliac veins as well as right common femoral vein, right femoral vein, and right calf veins.  -7/6/20- CTA c/a/p- multiple scattered occlusive and nonocclusive pulmonary emboli within the lobar, segmental, and subsegmental branches of the pulmonary arterial vasculature bilaterally with an overall moderate embolic burden. No grace CT evidence of right heart strain. IVC filter noted.  The IVC is not well opacified on this exam due to phase of imaging.  IVC thrombus is not entirely excluded.  The common iliac veins and external iliac veins are dilated/distended and demonstrate surrounding fatty  infiltration which may be due to underlying thrombus. Oglesby catheter in the urinary bladder.  Urinary bladder wall is thickened and there is air in the urinary bladder.  There is surrounding fatty induration.  This may be due to underlying cystitis, however clinical correlation is recommended. Enlarged pelvic and left inguinal lymph nodes as above may be reactive, however clinical correlation recommended.                -started on UFH gtt  -7/8/20- underwent CDT to BLE by interventional cardiology   -started on warfarin with target INR 2-3  -10/2/20- LLE venous doppler- residual focal segment occlusive thrombus involving the proximal aspect of the left posterior tibial vein.   -4/7/21- BLE venous doppler- Negative exam, no evidence of lower extremity DVT.   -4/26/21- venous insuff doppler- Right: Negative for deep and superficial vein thrombosis. Negative for deep and superficial vein reflux (insufficiency). Left: Negative for deep and superficial vein thrombosis. Deep vein reflux involving the common femoral vein, femoral vein and profunda femoral vein.  Superficial vein reflux (insufficiency) involving the great and small saphenous vein     *MGUS-IgG lambda  -4/27/21- monoclonal protein study showed small nonquantifiable IgG lambda M spike.  -10/26/21- no detectable monoclonal protein on SPEP/MANDEEP  -10/21/22- small nonquantifiable IgG lambda M spike re-identified.   -10/20/23- small nonquantifiable IgG lambda M spike re-identified.   -11/7/24- PENDING    *Anemia- AoCD, KVNG  -4/16/19- wbc 6.2, hgb 9.8, plt 312  -10/11/19- wbc 6.5, hgb 11.6, plt 219  -7/6/20- wbc 9.2, hgb 10.9, plt 113, ferritin 104, CRP 18  -7/28/20- wbc 6.2, hgb 11.2, MCV 89, plt 229  -10/6/20- wbc 5.9, hgb 12.4, MCV 84, plt 179, CRP 0.8  -3/10/21- wbc 6.8, hgb 12.4, MCV 84, plt 221  -4/27/21- wbc 7.0, hgb 13.1, MCV 83, plt 218, ferritin 11, CRP 1.5  -5/13/21- received IV INFeD 1000mg  -10/26/21- wbc 6.5, hgb 14.5, MCV 94, plt 168, ferritin  43  -11/10/21- IV INFeD 1000mg   -1/6/22- EGD/Colonoscopy (Dr. Conde)- White plaque without underlying exudate or inflammation noted in proximal esophagus, biopsy showed rare fungal organisms consistent with Candida species.  Colonoscopy revealed 9 colonic/rectal polyps that were removed by polypectomy-pathology showed tubular and tubulovillous adenomas other than the rectal polyp was hyperplastic. +diverticulosis.  No evidence of bleeding source.  Normal duodenal biopsy.  -1/31/22- capsule endoscopy (Dr. Durán)- The small bowel mucosa has a normal villous appearance.  There are no masses, polyps, ulcers, erosions, diverticula, or strictures.  2 small non-bleeding vascular ectasias are appreciated. Both are likely out of reach for conventional push enteroscopy.   -4/7/22- wbc 5.8, hgb 15.2, MCV 98, plt 195, ferritin 115, TSAT 26%  -10/21/22- wbc 5.5, hgb 15.7, MCV 94, plt 171, ferritin 135, TSAT 31%  -4/26/23- wbc 6.2, hgb 16.0, MCV 94, plt 164, ferritin 73, TSAT 23%  -9/21/23- colonoscopy (Dr. Conde)- Two 2-4 mm sessile polyps in the descending colon, removed with a cold snare polypectomy. 3 mm sessile polyp in the sigmoid colon, removed with a cold snare polypectomy. Diverticulosis in the sigmoid colon. Large internal hemorrhoids.  Pathology of polyps showed tubular adenomata.   -10/20/23- wbc 6.4, hgb 15.9, MCV 94, plt 175, ferritin 94  -5/3/24- wbc 6.4, hgb 16.5, MCV 94, plt 168, ferritin 66, CRP 0.8  -11/7/24- wbc 6.7, hgb 16.8, MCV 95, plt 161, ferritin 66    ============================  Interval events: Presents for follow-up.  He is feeling about the same overall.  He reports a tendon in his right foot was found to be torn, he wears a brace for this.  He is not able to be very physically active, usually just walks to the mailbox and back each day.      He reports having some bilateral hand numbness and fine motor difficulty lately.  He has an appointment to see his PCP for this.  He denies any weakness or  known neuropathy related to this.  No neck or upper spine issues.    Stable baseline dyspnea with exertion is unchanged.  No chest pain or lightheadedness.  Leg swelling remains stable at baseline without any leg pains.  No recent fevers or infections.    Remains on warfarin.  He had a single episode of what sounds like hemorrhoidal bleeding recently but no other known bleeding or dark stools. No bowel or bladder changes.      Of note he has a history of an aortic and iliac arterial stent that was placed in 2012.  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  He remains on chronic suppressive dicloxacillin.      Past Medical History:  Past Medical History:    Arrhythmia    Back problem    Bull esophagus    BPH (benign prostatic hyperplasia)    Cataract    Cataract of both eyes    COVID    8/30/2022    Disorder of prostate    DVT (deep venous thrombosis) (HCC)    Overview:  Overview:  3/11 after back surgery pulm emb-IVC filter Overview:  3/11 after back surgery pulm emb-IVC filter    Esophageal reflux    Frequent urination    Hearing impairment    bilateral hearing aids    Hearing loss    Wear hearing aids    Heartburn    Hemorrhoids    High cholesterol    Hx of endoscopy    Hyperlipidemia    Indigestion    Leaking of urine    Leg DVT (deep venous thromboembolism), acute, bilateral (HCC)    Leg swelling    Obesity    Pain in joints    Pleurisy with effusion    Postoperative anemia due to acute blood loss    Prediabetes    Presence of IVC filter    cook celect filter.  Had 3 different IVC filters placed previous to current filter    Pulmonary embolism (HCC)    Stented coronary artery    aorta / iliac    Venous ulcer of right leg (HCC)    Visual impairment    glasses    Wears glasses    Weight gain     Past Surgical History:   Procedure Laterality Date    Back surgery  2011,2012,2014, 2016,2019    Cataract  2012    Colonoscopy  09/12/2016    Colonoscopy  2019    Colonoscopy N/A  01/06/2022    Procedure: COLONOSCOPY;  Surgeon: Saeid Conde MD;  Location:  ENDOSCOPY    Colonoscopy N/A 9/21/2023    Procedure: COLONOSCOPY WITH COLD SNARE POLYPECTOMY;  Surgeon: Saeid Conde MD;  Location:  ENDOSCOPY    Endovas repair, infrarenl abdom aortic aneurysm/dissect  06/13/2017    Stent    Knee replacement surgery Right 04/04/2016    Other  06/2012    aortic and iliac artery stent    Other surgical history  1964    Pleuectomy    Other surgical history      IVC filter    Spinal fusion  10/12/2018    Spine surgery procedure unlisted      Back Surgery    Tonsillectomy      Vasectomy  1984     Home Medications:   WARFARIN 5 MG Oral Tab TAKE 1 TABLET AS DIRECTED BASED ON INR READINGS 90 tablet 3    ROSUVASTATIN 10 MG Oral Tab TAKE 1 TABLET NIGHTLY 90 tablet 3    pantoprazole 40 MG Oral Tab EC Take 1 tablet (40 mg total) by mouth 2 (two) times daily. 180 tablet 3    silodosin 8 MG Oral Cap Take 1 capsule (8 mg total) by mouth daily. 90 capsule 5    Mirabegron ER (MYRBETRIQ) 50 MG Oral Tablet 24 Hr Take 1 tablet (50 mg total) by mouth daily. 90 tablet 5    finasteride 5 MG Oral Tab Take 1 tablet (5 mg total) by mouth daily. 90 tablet 5    furosemide 40 MG Oral Tab Take 1 tablet (40 mg total) by mouth daily. 90 tablet 3    clobetasol 0.05 % External Ointment APPLY OINTMENT TOPICALLY TO AFFECTED AREA TWICE DAILY ON MOIST SKIN AS NEEDED FOR FLARES      Vitamin D, Cholecalciferol, 25 MCG (1000 UT) Oral Cap Take by mouth daily.      acetaminophen 500 MG Oral Tab Take 1 tablet (500 mg total) by mouth every 6 (six) hours as needed for Pain.  0    latanoprost 0.005 % Ophthalmic Solution Place 1 drop into both eyes nightly.      Dicloxacillin Sodium 500 MG Oral Cap Take 1 capsule (500 mg total) by mouth 2 (two) times daily.       Allergies:   No Known Allergies    Psychosocial History:  Social History     Social History Narrative    , lives with wife.  Has 4 children, 1 of which is with his  current wife.  Has grandchildren and 2 great-grandchildren as well.  Retired, previously worked in voice and data operations.     Social History     Socioeconomic History    Marital status:    Tobacco Use    Smoking status: Former     Current packs/day: 0.00     Average packs/day: 1 pack/day for 25.0 years (25.0 ttl pk-yrs)     Types: Cigarettes     Start date: 2/3/1962     Quit date: 2/3/1987     Years since quittin.8    Smokeless tobacco: Never   Vaping Use    Vaping status: Never Used   Substance and Sexual Activity    Alcohol use: Not Currently     Comment: Social drinker    Drug use: Never   Other Topics Concern    Caffeine Concern No    Exercise No    Seat Belt No    Special Diet No    Stress Concern No    Weight Concern No   Social History Narrative    , lives with wife.  Has 4 children, 1 of which is with his current wife.  Has grandchildren and 2 great-grandchildren as well.  Retired, previously worked in voice and data operations.     Social Drivers of Health     Physical Activity: Insufficiently Active (3/3/2021)    Received [facility] [employer] [employer]    Exercise Vital Sign     Days of Exercise per Week: 4 days     Minutes of Exercise per Session: 20 min     Family Medical History:  Family History   Problem Relation Age of Onset    Cancer Father         Prostate    Colon Cancer Father         80+    Cancer Mother         Gall bladder    Alcohol and Other Disorders Associated Sister         Alcoholism    Cancer Brother         Pancreas    Cancer Brother         Lung / Brain    DVT/VTE Neg      Review of Systems:  A 10-point ROS was done with pertinent positives and negative per the HPI    Vital Signs:  Height: 182.9 cm (6' 0.01\") ( 1030)  Weight: 142.4 kg (314 lb) ( 103)  BSA (Calculated - sq m): 2.58 sq meters ( 103)  Pulse: 70 ( 1030)  BP: 119/75 ( 1030)  Temp: 96.9 °F (36.1 °C) (1030)  Do Not Use - Resp Rate: --  SpO2: 97 %  (11/12 1030)    Wt Readings from Last 6 Encounters:   11/12/24 (!) 142.4 kg (314 lb)   05/13/24 (!) 140.2 kg (309 lb)   05/07/24 (!) 140.5 kg (309 lb 11.2 oz)   04/23/24 (!) 140.2 kg (309 lb)   12/29/23 (!) 145.2 kg (320 lb)   10/31/23 (!) 145.6 kg (321 lb)     Physical Examination:  General: Patient is alert and oriented, not in acute distress  Psych: Mood and affect are appropriate  Eyes: EOMI  ENT: Oropharynx is clear  CV: Regular rate and rhythm, no murmurs  Respiratory: Lungs clear to auscultation bilaterally  GI/Abd: Soft, non-tender with normoactive bowel sounds, no hepatosplenomegaly  Neurological: Grossly intact   Lymphatics: No palpable lymphadenopathy  Skin: No rash or petechiae  Ext: mild symmetric BLE edema.  +known prominent chronic venous stasis skin changes noted in past, today is wearing compression stockings not removed for re-examination.     Laboratory:  Lab Results   Component Value Date    WBC 6.7 11/07/2024    WBC 6.4 05/03/2024    WBC 6.4 10/20/2023    HGB 16.8 11/07/2024    HGB 16.5 05/03/2024    HGB 15.9 10/20/2023    HCT 51.1 11/07/2024    MCV 95.2 11/07/2024    MCH 31.3 11/07/2024    MCHC 32.9 11/07/2024    RDW 13.8 11/07/2024    .0 11/07/2024    .0 05/03/2024    .0 10/20/2023     Lab Results   Component Value Date     (H) 11/07/2024    BUN 16 11/07/2024    BUNCREA 13.7 04/27/2021    CREATSERUM 1.23 11/07/2024    CREATSERUM 1.21 05/03/2024    CREATSERUM 1.25 02/15/2024    ANIONGAP 0 11/07/2024    GFRNAA 67 04/07/2022    GFRAA 77 04/07/2022    CA 10.0 11/07/2024    OSMOCALC 288 11/07/2024    ALKPHO 113 11/07/2024    AST 15 11/07/2024    ALT 20 11/07/2024    BILT 1.3 11/07/2024    TP 6.9 11/07/2024    ALB 3.3 (L) 11/07/2024    GLOBULIN 3.6 11/07/2024     11/07/2024    K 4.2 11/07/2024     11/07/2024    CO2 32.0 11/07/2024     Lab Results   Component Value Date    .1 (H) 07/15/2020    INR 2.78 (H) 11/07/2024     Lab Results   Component Value  Date    REITCPERCENT 1.3 11/07/2024    REITCPERCENT 1.5 10/20/2023    REITCPERCENT 1.3 04/26/2023    REITCPERCENT 1.4 10/21/2022    REITCPERCENT 1.5 10/26/2021    REITCPERCENT 1.1 04/27/2021    REITCPERCENT 1.9 07/07/2020     Lab Results   Component Value Date    RETHE 35.5 11/07/2024    RETHE 34.8 10/20/2023    RETHE 36.9 (H) 04/26/2023    RETHE 35.1 10/21/2022    RETHE 34.2 10/26/2021    RETHE 29.0 04/27/2021    RETHE 28.9 07/07/2020     Lab Results   Component Value Date    KAREN 66 11/07/2024    KAREN 66.2 05/03/2024    KAREN 94.0 10/20/2023    KAREN 73.3 04/26/2023    KAREN 135.8 10/21/2022     Lab Results   Component Value Date    SAT 30 10/20/2023    SAT 26 04/26/2023    SAT 31 10/21/2022    SAT 26 04/07/2022     Soluble transferrin receptor  No results found for: \"STFRNR\"  Lab Results   Component Value Date    B12 359 11/07/2024    B12 366 04/27/2021     Lab Results   Component Value Date    MMA 0.34 04/27/2021     No results found for: \"FOLIC\"  No results found for: \"COPPER\"  Lab Results   Component Value Date    ESRML 15 (H) 04/27/2021    ESRML 23 (H) 07/07/2020     Lab Results   Component Value Date    CRP 1.06 (H) 11/07/2024    CRP 0.82 (H) 05/03/2024    CRP 0.71 (H) 10/20/2023    CRP 0.95 (H) 04/26/2023    CRP 0.92 (H) 10/21/2022    CRP 1.50 (H) 04/27/2021    CRP 0.89 (H) 10/06/2020    CRP 18.70 (H) 07/07/2020     Lab Results   Component Value Date     04/27/2021     07/28/2020     (H) 07/07/2020     No results found for: \"HAPT\"     Component      Latest Ref Rng & Units 4/27/2021   SPE INTERPRETATION       Small abnormality in the gamma region. . . .   IMMUNOFIXATION       Monoclonal IgG lambda. If clinically indicated, suggest 24 hour urine monoclonal protein studies. . . .     No results found for: \"ELECTMS1\"  Lab Results   Component Value Date    KAPPALTCHN 4.064 (H) 10/20/2023    KAPPALTCHN 4.707 (H) 10/21/2022    KAPPALTCHN 2.818 (H) 10/26/2021    KAPPALTCHN 4.192 (H) 04/27/2021      Lab  Results   Component Value Date    LAMBDALTCH 2.803 (H) 10/20/2023    LAMBDALTCH 3.222 (H) 10/21/2022    LAMBDALTCH 2.666 (H) 10/26/2021    LAMBDALTCH 2.927 (H) 04/27/2021     Lab Results   Component Value Date    KAPLAMRATIO 1.45 10/20/2023    KAPLAMRATIO 1.46 10/21/2022    KAPLAMRATIO 1.06 10/26/2021    KAPLAMRATIO 1.43 04/27/2021     Lab Results   Component Value Date    TSH 1.652 11/07/2024    TSH 1.690 05/03/2024    TSH 1.890 11/01/2023    TSH 1.840 12/16/2022    TSH 2.140 04/07/2022    TSH 2.290 09/09/2020     No results found for: \"T4F\"  No results found for: \"T3TOT\"    Lab Results   Component Value Date    DDIMER 0.70 10/06/2020     Component      Latest Ref Rng 11/10/2022   PROTEIN URINE CALC      <149.1 mg/24 hr 159.8 (H)    Urine Volume 24Hr      mL 1,700    URINE BENCE HERNANDEZ PROTEIN 24HR 24-hour urine concentrated 50X is negative for Free Monoclonal Light Chains (Bence Hernandez Protein).    Reviewed By: Reviewed by Cathryn Goldberg, M.D. Pathology 11/18/22 at 5:30 PM       Imaging:    As reviewed above    CT a/p 10/20/21:   FINDINGS:     LIVER:  No enlargement, atrophy, abnormal density, or significant focal lesion.     BILIARY:  No visible dilatation or calcification.     PANCREAS:  No lesion, fluid collection, ductal dilatation, or atrophy.     SPLEEN:  No enlargement or focal lesion.     KIDNEYS:  No mass, obstruction, or calcification.  Stable upper pole cyst of left kidney.   ADRENALS:  Stable coarse right adrenal calcifications.     AORTA/VASCULAR:  Aortoiliac stent in place.  No aneurysm or endoleak.  IVC filter is unchanged in appearance.   RETROPERITONEUM:  No mass or adenopathy.     BOWEL/MESENTERY:  No visible mass, obstruction, or bowel wall thickening.  Few uncomplicated colonic diverticula.  Normal appendix.   ABDOMINAL WALL:  No mass or hernia.     URINARY BLADDER:  No visible focal wall thickening, lesion, or calculus.     PELVIC NODES:  Left external iliac lymph node with central fat  measures 2.5 x 1.3 cm, previously 2.7 x 1.3 cm.  2.1 x 1.1 cm left inguinal lymph node previously measured 2.1 x 1.2 cm.  Other smaller lymph nodes are stable.   PELVIC ORGANS:  No visible mass.  Pelvic organs appropriate for patient age.     BONES:  Extensive postsurgical changes throughout the spine.   LUNG BASES:  No visible pulmonary or pleural disease.       Impression   CONCLUSION:  Slight decrease in size of left external iliac lymph node, otherwise no change from 4/1/2021.      Impression & Plan:     *Extensive BLE DVT to iliac veins + PE, recurrent VTE  -While his other VTE events were provoked by surgery, most recent event in 2020 was unprovoked.  Indefinite duration anticoagulation is recommended.  S/p CDT to BLE by interventional cardiology given extensive clot burden with good clinical improvement.  Repeat doppler shows resolution of DVT though has some LLE venous insufficiency noted on doppler.   -Given his CKD and weight greater than 120 kg a DOAC is not favored. Continue warfarin, dose adjusted to goal INR 2-3  -last IVC filter placed in 2015- will not be able to be remove since it has been in place for too long.   -Repeat CBC and CMP at least q6mths while remains on anticoagulation     *normocytic anemia- KVNG, AoCD/inflammation  -previously with a component of AoCD/inflammation with elevated CRP. Iron deficiency replaced with IV iron with improvement.  Source of KVNG likely related to intestinal AVMs noted on capsule endoscopy 1/31/22.  No recent s/s of active bleeding.  -Current labs indicate normal hemoglobin and adequate iron stores.    -Repeat CBC, iron studies, and CRP in 6 months    *Bull's esophagus, multiple prior colon polyps  -follows with GI for surveillance endoscopy    *MGUS  -Small monoclonal IgG lambda M spike 4/27/21 without any other associated s/s of multiple myeloma.  Monoclonal gammopathy resolved on repeat testing on 10/26/21, however was reidentified again on  10/21/22.  -Negative 24 urine Bence-Hernandez protein collection 11/2022  -Repeat monoclonal protein study and quantitative immunoglobulin testing annually- pending today, if stable will be due next 11/2025.  -he is considered low risk, therefore no bone marrow biopsy or bone imaging assessment is indicated at this point.    *Afib  -follows with cardiology.  Rates have been controlled.  On Coumadin already for history of DVT as above.    *CKD  -baseline cre 1.3-1.9.  Better lately.      *BLE edema  -At current baseline, perhaps somewhat better lately.  Related to volume overload + component of venous insuff.  Limit salt intake and continue compression stockings prn. Follows with cardiology and vascular. On diuretics     *chronic spinal hardware infection  -no recent issues per discussion with wife. On suppressive dicloxacillin.      RTC in 6 mths    Nakul Carpenter MD  Hematology/Medical Oncology  [facility]     ongoing continuity of complex care

## 2024-11-12 NOTE — PROGRESS NOTES
Education Record     Learner:  Patient and Spouse     Disease / Diagnosis:DVT/PE     Barriers / Limitations:  None                Comments:     Method:  Brief focused                Comments:     General Topics:  Plan of care reviewed                Comments:     Outcome:  Shows understanding                Comments: here for 6 month MD f/up. Pt states he is taking coumadin as directed and has been tolerating without complications. reports energy is low and does not tolerate much activity.

## 2024-11-13 LAB
ALBUMIN SERPL ELPH-MCNC: 3.79 G/DL (ref 3.75–5.21)
ALBUMIN/GLOB SERPL: 1.3 {RATIO} (ref 1–2)
ALPHA1 GLOB SERPL ELPH-MCNC: 0.25 G/DL (ref 0.19–0.46)
ALPHA2 GLOB SERPL ELPH-MCNC: 0.54 G/DL (ref 0.48–1.05)
B-GLOBULIN SERPL ELPH-MCNC: 0.75 G/DL (ref 0.68–1.23)
GAMMA GLOB SERPL ELPH-MCNC: 1.38 G/DL (ref 0.62–1.7)
KAPPA LC FREE SER-MCNC: 4.45 MG/DL (ref 0.33–1.94)
KAPPA LC FREE/LAMBDA FREE SER NEPH: 1.61 {RATIO} (ref 0.26–1.65)
LAMBDA LC FREE SERPL-MCNC: 2.77 MG/DL (ref 0.57–2.63)
PROT SERPL-MCNC: 6.7 G/DL (ref 5.7–8.2)

## 2024-11-15 ENCOUNTER — OFFICE VISIT (OUTPATIENT)
Dept: FAMILY MEDICINE CLINIC | Facility: CLINIC | Age: 84
End: 2024-11-15
Payer: MEDICARE

## 2024-11-15 VITALS
OXYGEN SATURATION: 98 % | HEART RATE: 88 BPM | HEIGHT: 74 IN | SYSTOLIC BLOOD PRESSURE: 118 MMHG | WEIGHT: 315 LBS | DIASTOLIC BLOOD PRESSURE: 68 MMHG | BODY MASS INDEX: 40.43 KG/M2

## 2024-11-15 DIAGNOSIS — M86.60 OTHER CHRONIC OSTEOMYELITIS, UNSPECIFIED SITE (HCC): ICD-10-CM

## 2024-11-15 DIAGNOSIS — I82.409 RECURRENT DEEP VEIN THROMBOSIS (DVT) (HCC): ICD-10-CM

## 2024-11-15 DIAGNOSIS — I48.0 PAROXYSMAL ATRIAL FIBRILLATION (HCC): ICD-10-CM

## 2024-11-15 DIAGNOSIS — R73.03 PREDIABETES: ICD-10-CM

## 2024-11-15 DIAGNOSIS — Z86.711 HISTORY OF PULMONARY EMBOLISM: ICD-10-CM

## 2024-11-15 DIAGNOSIS — D64.9 NORMOCYTIC ANEMIA: ICD-10-CM

## 2024-11-15 DIAGNOSIS — R60.0 BILATERAL LEG EDEMA: ICD-10-CM

## 2024-11-15 DIAGNOSIS — Z79.01 CHRONIC ANTICOAGULATION: ICD-10-CM

## 2024-11-15 DIAGNOSIS — E66.01 MORBID (SEVERE) OBESITY DUE TO EXCESS CALORIES (HCC): ICD-10-CM

## 2024-11-15 DIAGNOSIS — Z00.00 ENCOUNTER FOR ANNUAL HEALTH EXAMINATION: Primary | ICD-10-CM

## 2024-11-15 DIAGNOSIS — N40.1 BPH WITH OBSTRUCTION/LOWER URINARY TRACT SYMPTOMS: ICD-10-CM

## 2024-11-15 DIAGNOSIS — Z98.1 S/P SPINAL FUSION: ICD-10-CM

## 2024-11-15 DIAGNOSIS — I87.2 VENOUS INSUFFICIENCY: ICD-10-CM

## 2024-11-15 DIAGNOSIS — G25.81 RESTLESS LEGS SYNDROME: ICD-10-CM

## 2024-11-15 DIAGNOSIS — E78.2 HYPERLIPEMIA, MIXED: ICD-10-CM

## 2024-11-15 DIAGNOSIS — M51.9 LUMBAR DISC DISEASE: ICD-10-CM

## 2024-11-15 DIAGNOSIS — I71.43 INFRARENAL ABDOMINAL AORTIC ANEURYSM (AAA) WITHOUT RUPTURE (HCC): ICD-10-CM

## 2024-11-15 DIAGNOSIS — M48.062 SPINAL STENOSIS OF LUMBAR REGION WITH NEUROGENIC CLAUDICATION: ICD-10-CM

## 2024-11-15 DIAGNOSIS — N13.8 BPH WITH OBSTRUCTION/LOWER URINARY TRACT SYMPTOMS: ICD-10-CM

## 2024-11-15 PROCEDURE — G0439 PPPS, SUBSEQ VISIT: HCPCS | Performed by: FAMILY MEDICINE

## 2024-11-15 NOTE — PROGRESS NOTES
Subjective:   Boyd Recio is a 84 year old male who presents for a Medicare Subsequent Annual Wellness visit (Pt already had Initial Annual Wellness) and scheduled follow up of multiple significant but stable problems.     Recurrent DVT/history of PE  MGUS   Denies any acute pain or shortness of breath     Afib  AAA   HLD   Taking medications as prescribed   Denies cp   Following with cardiology       Prediabetes   Tried metformin but caused diarrhea so he stopped - urgent BM and very loose.   Has been off for about 3-4 mo   Is taking metamucil fiber capsule and stool is formed again     Spinal stenosis s/p lumbar surgery   Chronic infection       OAB   Going frequently   Denies any dysuria       History/Other:   Fall Risk Assessment:   He has been screened for Falls and is High Risk. Fall Prevention information provided to patient in After Visit Summary.    Do you feel unsteady when standing or walking?: (Patient-Rptd) Yes  Do you worry about falling?: (Patient-Rptd) No  Have you fallen in the past year?: (Patient-Rptd) No     Cognitive Assessment:   Abnormal  What day of the week is this?: Correct  What month is it?: Correct  What year is it?: Correct  Recall \"Ball\": Correct  Recall \"Flag\": Incorrect  Recall \"Tree\": Correct      Functional Ability/Status:   Boyd Recio has some abnormal functions as listed below:  He has Driving difficulties based on screening of functional status. He has Hearing problems based on screening of functional status.He has Walking problems based on screening of functional status.       Depression Screening (PHQ):  PHQ-2 SCORE: 0  , done 11/15/2024             Advanced Directives:   He does NOT have a Living Will. [Do you have a living will?: (Patient-Rptd) Yes]    He does NOT have a Power of  for Health Care. [Do you have a healthcare power of ?: (Patient-Rptd) Yes]    Discussed Advance Care Planning with patient (and family/surrogate if present). Standard  forms made available to patient in After Visit Summary.      Patient Active Problem List   Diagnosis    AAA (abdominal aortic aneurysm) without rupture    BPH with obstruction/lower urinary tract symptoms    History of pulmonary embolism    Hyperlipemia, mixed    Lumbar disc disease    Osteoarthrosis    Spinal stenosis of lumbar region with neurogenic claudication    Venous insufficiency    Back pain    Osteomyelitis (HCC)    Pseudoarthrosis of lumbar spine    Restless legs syndrome    Urinary retention    Presence of IVC filter    S/P spinal fusion    Personal history of colonic polyps    Bull's esophagus without dysplasia    Normocytic anemia    Bilateral leg edema    Recurrent deep vein thrombosis (DVT) (HCC)    Lymphadenopathy    Lesion of bladder    Iron deficiency anemia secondary to blood loss (chronic)    MGUS (monoclonal gammopathy of unknown significance)    AVM (arteriovenous malformation) of small bowel, acquired    Morbid (severe) obesity due to excess calories (HCC)    Paroxysmal atrial fibrillation (HCC)    Prediabetes    Chronic anticoagulation     Allergies:  He has No Known Allergies.    Current Medications:  No outpatient medications have been marked as taking for the 11/15/24 encounter (Office Visit) with Kari Hernandez MD.       Medical History:  He  has a past medical history of Arrhythmia, Back problem, Bull esophagus, BPH (benign prostatic hyperplasia), Cataract, Cataract of both eyes (11/20/2017), COVID, Disorder of prostate, DVT (deep venous thrombosis) (HCC) (06/15/2012), Esophageal reflux, Frequent urination, Hearing impairment, Hearing loss, Heartburn (1998), Hemorrhoids (Years ago / none lately), High cholesterol, endoscopy (09/12/2016), Hyperlipidemia, Indigestion (1998), Leaking of urine, Leg DVT (deep venous thromboembolism), acute, bilateral (HCC) (07/06/2020), Leg swelling, Obesity, Pain in joints, Pleurisy with effusion, Postoperative anemia due to acute blood loss  (2014), Prediabetes, Presence of IVC filter (2015), Pulmonary embolism (HCC) (), Stented coronary artery (), Venous ulcer of right leg (HCC) (05/15/2022), Visual impairment, Wears glasses, and Weight gain.  Surgical History:  He  has a past surgical history that includes spinal fusion (10/12/2018); knee replacement surgery (Right, 2016); colonoscopy (2016); tonsillectomy; cataract (); other (2012); endovas repair, infrarenl abdom aortic aneurysm/dissect (2017); back surgery (,,, ,); colonoscopy (); spine surgery procedure unlisted; colonoscopy (N/A, 2022); vasectomy (); other surgical history (); other surgical history; colonoscopy (N/A, 2023); and skin surgery (2023).   Family History:  His family history includes Alcohol and Other Disorders Associated in his sister; Cancer in his brother, brother, father, mother, and sister; Colon Cancer in his father.  Social History:  He  reports that he quit smoking about 37 years ago. His smoking use included cigarettes. He started smoking about 62 years ago. He has a 25 pack-year smoking history. He has never used smokeless tobacco. He reports that he does not currently use alcohol. He reports that he does not use drugs.    Tobacco:  He smoked tobacco in the past but quit greater than 12 months ago.  Social History     Tobacco Use   Smoking Status Former    Current packs/day: 0.00    Average packs/day: 1 pack/day for 25.0 years (25.0 ttl pk-yrs)    Types: Cigarettes    Start date: 2/3/1962    Quit date: 2/3/1987    Years since quittin.8   Smokeless Tobacco Never          CAGE Alcohol Screen:   CAGE screening score of 0 on 2024, showing low risk of alcohol abuse.      Patient Care Team:  Kari Hernandez MD as PCP - General (Family Medicine)  Isaiah Shin MD (NEPHROLOGY)  Coleman Strong MD (Cardiac Electrophysiology)  Nakul Carpenter MD (ONCOLOGY)  Ian Villarreal MD  (UROLOGY)  Nick Puga (Specialist)  Josefina Clifton MD (INFECTIOUS DISEASES)  Leonardo Pulido MD (DERMATOLOGY)  Chris Menard MD (OPHTHALMOLOGY)  Kalyn Titus, PT (Physical Therapy)  Carl Briseno, PT  Violette Menard APRN (Nurse Practitioner)    Review of Systems   Constitutional:  Negative for chills, diaphoresis, fever and unexpected weight change.   HENT:  Negative for congestion, facial swelling, sinus pain and sore throat.    Eyes:  Negative for redness and visual disturbance.   Respiratory:  Negative for chest tightness, shortness of breath and wheezing.    Cardiovascular:  Negative for chest pain, palpitations and leg swelling.   Gastrointestinal:  Negative for abdominal pain, constipation, diarrhea and nausea.   Endocrine: Negative for cold intolerance, heat intolerance, polydipsia, polyphagia and polyuria.   Genitourinary:  Negative for dysuria, frequency and hematuria.   Musculoskeletal:  Negative for arthralgias, joint swelling, neck pain and neck stiffness.   Skin:  Negative for pallor and rash.   Allergic/Immunologic: Negative for immunocompromised state.   Neurological:  Negative for dizziness, tremors, syncope, facial asymmetry, speech difficulty and light-headedness.   Hematological:  Negative for adenopathy.   Psychiatric/Behavioral:  Negative for dysphoric mood. The patient is not nervous/anxious.           Objective:   Physical Exam  Constitutional:       General: He is not in acute distress.  HENT:      Right Ear: Tympanic membrane normal.      Left Ear: Tympanic membrane normal.      Nose: Nose normal.      Mouth/Throat:      Mouth: Mucous membranes are moist.      Pharynx: Oropharynx is clear.   Eyes:      General: No scleral icterus.     Extraocular Movements: Extraocular movements intact.      Conjunctiva/sclera: Conjunctivae normal.      Pupils: Pupils are equal, round, and reactive to light.   Neck:      Vascular: No carotid bruit.   Cardiovascular:      Rate and Rhythm: Normal  rate and regular rhythm.   Pulmonary:      Effort: Pulmonary effort is normal. No respiratory distress.      Breath sounds: Normal breath sounds.   Abdominal:      General: Abdomen is flat. Bowel sounds are normal.      Palpations: Abdomen is soft.   Musculoskeletal:         General: No swelling or tenderness.      Right lower leg: Edema present.      Left lower leg: Edema present.   Lymphadenopathy:      Cervical: No cervical adenopathy.   Skin:     General: Skin is warm and dry.   Neurological:      Mental Status: He is alert and oriented to person, place, and time. Mental status is at baseline.   Psychiatric:         Mood and Affect: Mood normal.         Behavior: Behavior normal.         Thought Content: Thought content normal.         Judgment: Judgment normal.          /68   Pulse 88   Ht 6' 2\" (1.88 m)   Wt (!) 317 lb (143.8 kg)   SpO2 98%   BMI 40.70 kg/m²  Estimated body mass index is 40.7 kg/m² as calculated from the following:    Height as of this encounter: 6' 2\" (1.88 m).    Weight as of this encounter: 317 lb (143.8 kg).    Medicare Hearing Assessment:   Hearing Screening    Screening Method: Finger Rub  Finger Rub Result: Pass         Visual Acuity:   Right Eye Visual Acuity: Corrected Right Eye Chart Acuity: 20/40   Left Eye Visual Acuity: Corrected Left Eye Chart Acuity: 20/50   Both Eyes Visual Acuity: Corrected Both Eyes Chart Acuity: 20/40   Able To Tolerate Visual Acuity: Yes        Assessment & Plan:   Boyd Recio is a 84 year old male who presents for a Medicare Assessment.     1. Encounter for annual health examination (Primary)    2. Infrarenal abdominal aortic aneurysm (AAA) without rupture (HCC)  Overview:  S/p aortoiliac stent placement     3. Paroxysmal atrial fibrillation (HCC)  - stable  - cont present management    4. Hyperlipemia, mixed  - stable  - cont present management    5. Recurrent deep vein thrombosis (DVT) (HCC)  Overview:  Initial 2011, postoperatively;  again 3/21/19; again (bilateral) 7/6/20  IVC filter placed/removed 2014, new IVC filter placed 2015    6. History of pulmonary embolism  Overview:  Initial: 3/2011 postoperatively  - stable  - cont present management    7. Chronic anticoagulation  - stable  - cont present management    8. Venous insufficiency  - stable  - cont present management    9. Other chronic osteomyelitis, unspecified site (HCC)  - stable  - cont present management - dicloxacillin     10. Morbid (severe) obesity due to excess calories (HCC)  - stable  - cont present management    11. Prediabetes  - stable  - cont diet control - weight loss encouraged     12. Spinal stenosis of lumbar region with neurogenic claudication  - stable  - cont present management    13. Lumbar disc disease  - stable  - cont present management    14. BPH with obstruction/lower urinary tract symptoms  Overview:  Previously tried flomax bid  On myrbetriq, finasteride, silodosin     15. S/P spinal fusion  Overview:  chronic spinal hardware infection under control. remains on chronic suppressive dicloxacillin  Persistent pain - cont pain control     16. Normocytic anemia  Overview:  Chronic disease  Improved - cont to monitor     17. Bilateral leg edema  - stable  - cont present management    18. Restless legs syndrome  - stable  - cont present management      The patient indicates understanding of these issues and agrees to the plan.  Reinforced healthy diet, lifestyle, and exercise.      Return in about 6 months (around 5/15/2025).     JOSE COLON MD, 11/15/2024     Supplementary Documentation:   General Health:  In the past six months, have you lost more than 10 pounds without trying?: (Patient-Rptd) 2 - No  Has your appetite been poor?: (Patient-Rptd) No  Type of Diet: (Patient-Rptd) Balanced  How does the patient maintain a good energy level?: (Patient-Rptd) Appropriate Exercise  How would you describe your daily physical activity?: (Patient-Rptd) Light  How would  you describe your current health state?: (Patient-Rptd) Fair  How do you maintain positive mental well-being?: (Patient-Rptd) Social Interaction;Puzzles;Games  On a scale of 0 to 10, with 0 being no pain and 10 being severe pain, what is your pain level?: (Patient-Rptd) 0 - (None)  In the past six months, have you experienced urine leakage?: (Patient-Rptd) 1-Yes  Have you had any immunizations at another office such as Influenza, Hepatitis B, Tetanus, or Pneumococcal?: (Patient-Rptd) Yes    Health Maintenance   Topic Date Due    Diabetes Care Foot Exam  Never done    Diabetes Care Dilated Eye Exam  Never done    Annual Physical  11/01/2024    Diabetes Care: Microalb/Creat Ratio  05/03/2025    Diabetes Care A1C  05/07/2025    Diabetes Care: GFR  11/07/2025    Colorectal Cancer Screening  09/21/2033    Influenza Vaccine  Completed    Annual Depression Screening  Completed    Fall Risk Screening (Annual)  Completed    Pneumococcal Vaccine: 65+ Years  Completed    Zoster Vaccines  Completed    COVID-19 Vaccine  Completed

## 2024-12-05 ENCOUNTER — ANTI-COAG VISIT (OUTPATIENT)
Dept: HEMATOLOGY/ONCOLOGY | Age: 84
End: 2024-12-05
Attending: INTERNAL MEDICINE
Payer: MEDICARE

## 2024-12-05 DIAGNOSIS — I82.4Y3 ACUTE DEEP VEIN THROMBOSIS (DVT) OF PROXIMAL VEIN OF BOTH LOWER EXTREMITIES (HCC): ICD-10-CM

## 2024-12-05 DIAGNOSIS — I82.409 RECURRENT DEEP VEIN THROMBOSIS (DVT) (HCC): ICD-10-CM

## 2024-12-05 LAB
INR BLD: 3.36 (ref 0.8–1.2)
PROTHROMBIN TIME: 33.5 SECONDS (ref 11.6–14.8)

## 2024-12-05 PROCEDURE — 85610 PROTHROMBIN TIME: CPT

## 2024-12-05 PROCEDURE — 36415 COLL VENOUS BLD VENIPUNCTURE: CPT

## 2024-12-10 ENCOUNTER — OFFICE VISIT (OUTPATIENT)
Facility: LOCATION | Age: 84
End: 2024-12-10
Payer: MEDICARE

## 2024-12-10 DIAGNOSIS — R60.0 BILATERAL LOWER EXTREMITY EDEMA: ICD-10-CM

## 2024-12-10 DIAGNOSIS — R26.9 GAIT DIFFICULTY: ICD-10-CM

## 2024-12-10 DIAGNOSIS — S86.311A TEAR OF PERONEAL TENDON OF RIGHT FOOT: ICD-10-CM

## 2024-12-10 DIAGNOSIS — B35.1 ONYCHOMYCOSIS: Primary | ICD-10-CM

## 2024-12-10 DIAGNOSIS — E11.9 TYPE 2 DIABETES MELLITUS WITHOUT COMPLICATION, UNSPECIFIED WHETHER LONG TERM INSULIN USE (HCC): ICD-10-CM

## 2024-12-10 PROCEDURE — 99213 OFFICE O/P EST LOW 20 MIN: CPT | Performed by: PODIATRIST

## 2024-12-11 NOTE — PROGRESS NOTES
[facility]  Progress Note      Boyd Recio is a 84 year old male.   Chief Complaint   Patient presents with    Toenail Care     Toenail care- Denies pain. Last visit with PCP was 11/15/24. Patient states that he has fungus in his right great toe. Patient wears ankle brace for the right ankle. States that there has been improvement         HPI:     Patient is a pleasant 84-year-old diabetic male who is returning to clinic today with complaints of thickened, elongated, painful toenails to both feet.  Patient states that he has inability to trim his own toenails and is hoping to discuss nail debridement today.  Patient continues to utilize his ankle brace to his right ankle for a peroneal tendon tear.  Patient states he is doing well and denies any current pain to his ankle.  No other concerns.      Allergies: Patient has no known allergies.   Current Outpatient Medications   Medication Sig Dispense Refill    WARFARIN 5 MG Oral Tab TAKE 1 TABLET AS DIRECTED BASED ON INR READINGS 90 tablet 3    ROSUVASTATIN 10 MG Oral Tab TAKE 1 TABLET NIGHTLY 90 tablet 3    pantoprazole 40 MG Oral Tab EC Take 1 tablet (40 mg total) by mouth 2 (two) times daily. 180 tablet 3    silodosin 8 MG Oral Cap Take 1 capsule (8 mg total) by mouth daily. 90 capsule 5    Mirabegron ER (MYRBETRIQ) 50 MG Oral Tablet 24 Hr Take 1 tablet (50 mg total) by mouth daily. 90 tablet 5    finasteride 5 MG Oral Tab Take 1 tablet (5 mg total) by mouth daily. 90 tablet 5    furosemide 40 MG Oral Tab Take 1 tablet (40 mg total) by mouth daily. 90 tablet 3    clobetasol 0.05 % External Ointment APPLY OINTMENT TOPICALLY TO AFFECTED AREA TWICE DAILY ON MOIST SKIN AS NEEDED FOR FLARES      Vitamin D, Cholecalciferol, 25 MCG (1000 UT) Oral Cap Take by mouth daily.      acetaminophen 500 MG Oral Tab Take 1 tablet (500 mg total) by mouth every 6 (six) hours as needed for Pain.  0    latanoprost 0.005 % Ophthalmic Solution Place 1 drop into both eyes  nightly.      Dicloxacillin Sodium 500 MG Oral Cap Take 1 capsule (500 mg total) by mouth 2 (two) times daily.        Past Medical History:    Arrhythmia    Back problem    Bull esophagus    BPH (benign prostatic hyperplasia)    Cataract    Cataract of both eyes    COVID    8/30/2022    Disorder of prostate    DVT (deep venous thrombosis) (HCC)    Overview:  Overview:  3/11 after back surgery pulm emb-IVC filter Overview:  3/11 after back surgery pulm emb-IVC filter    Esophageal reflux    Frequent urination    Hearing impairment    bilateral hearing aids    Hearing loss    Wear hearing aids    Heartburn    Hemorrhoids    High cholesterol    Hx of endoscopy    Hyperlipidemia    Indigestion    Leaking of urine    Leg DVT (deep venous thromboembolism), acute, bilateral (HCC)    Leg swelling    Obesity    Pain in joints    Pleurisy with effusion    Postoperative anemia due to acute blood loss    Prediabetes    Presence of IVC filter    cook celect filter.  Had 3 different IVC filters placed previous to current filter    Pulmonary embolism (HCC)    Stented coronary artery    aorta / iliac    Venous ulcer of right leg (HCC)    Visual impairment    glasses    Wears glasses    Weight gain      Past Surgical History:   Procedure Laterality Date    Back surgery  2011,2012,2014, 2016,2019    Cataract  2012    Colonoscopy  09/12/2016    Colonoscopy  2019    Colonoscopy N/A 01/06/2022    Procedure: COLONOSCOPY;  Surgeon: Saeid Conde MD;  Location:  ENDOSCOPY    Colonoscopy N/A 09/21/2023    Procedure: COLONOSCOPY WITH COLD SNARE POLYPECTOMY;  Surgeon: Saeid Conde MD;  Location:  ENDOSCOPY    Endovas repair, infrarenl abdom aortic aneurysm/dissect  06/13/2017    Stent    Knee replacement surgery Right 04/04/2016    Other  06/2012    aortic and iliac artery stent    Other surgical history  1964    Pleuectomy    Other surgical history      IVC filter    Skin surgery  11/2023    Spinal fusion  10/12/2018     Spine surgery procedure unlisted      Back Surgery    Tonsillectomy      Vasectomy  1984      Family History   Problem Relation Age of Onset    Cancer Mother         Gall bladder    Cancer Father         Prostate    Colon Cancer Father         80+    Alcohol and Other Disorders Associated Sister         Alcoholism    Cancer Sister         Cancer    Cancer Brother         Pancreas    Cancer Brother         Lung / Brain    DVT/VTE Neg       Social History     Socioeconomic History    Marital status:    Tobacco Use    Smoking status: Former     Current packs/day: 0.00     Average packs/day: 1 pack/day for 25.0 years (25.0 ttl pk-yrs)     Types: Cigarettes     Start date: 2/3/1962     Quit date: 2/3/1987     Years since quittin.8    Smokeless tobacco: Never   Vaping Use    Vaping status: Never Used   Substance and Sexual Activity    Alcohol use: Not Currently     Comment: Social drinker    Drug use: Never   Other Topics Concern    Caffeine Concern No    Exercise No    Seat Belt No    Special Diet No    Stress Concern No    Weight Concern No           REVIEW OF SYSTEMS:     10 point ROS completed and was negative unless stated in HPI.      EXAM:     GENERAL: well developed, well nourished, in no apparent distress  EXTREMITIES:              1. Integument: Nails x10 are thickened, elongated, discolored, dystrophic with subungual debris noted to right hallux toenail.  Normal skin temperature and decreased turgor.  Skin color changes consistent with hemosiderin deposition noted to bilateral lower extremities.  2. Vascular: Dorsalis pedis 2/4 bilateral and posterior tibial pulses 2/4 bilateral, capillary refill normal.  2+ pitting edema noted to bilateral lower extremities              3. Musculoskeletal: All muscle groups are graded 5/5 in the foot and ankle, right with pain elicited with eversion against resistance.  Patient does have pain elicited on palpation along the course of the peroneal tendons near the  lateral malleolus.  Adequate eversion strength is noted with no signs of tendon rupture, right.  No pain with palpation noted to plantar medial aspect of right heel.  Decreased ankle joint dorsiflexion noted with the knee extended, which does improve with the knee flexed consistent with equinus deformity, right.  Some discomfort with palpation of toenails of bilateral feet              4. Neurological: Sensation diminished via light touch to bilateral lower extremities.         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  Chart history reviewed.    -Discussed with patient proper care and hygiene for their feet.  Recommend daily foot soaks for 10 minutes with warm water and apple cider vinegar    -If thickness of the toenails are causing discomfort, advised patient to try utilizing Vicks VapoRub on her toenails every night.  The goal would be for the toenails to soften over time.  -Patient relates pain relief with intermittent toenail debridements.  -Patient elects for nail debridement today. Toenails 1-5 of the left foot and 1-5 of the right foot were debrided today.  They tolerated procedures well.  Minor bleeding noted to the right first and second digits upon debridement.  Hemostasis occurred with direct pressure and sites were covered with Betadine and Band-Aid.  Patient will keep sites clean and dry monitoring for signs of infection.  -Instrumentation used includes nail nippers.     -Discussed importance of proper pedal hygiene, daily foot checks, and tight glycemic control.     -Patient to avoid walking barefoot. Recommend ambulating with supportive diabetic shoes and inserts.  Do recommend patient continue utilizing  custom AFO to right lower extremity due to his ongoing peroneal tendon tear, which was confirmed on recent MRI.  Patient wants to continue with conservative management for this.    -All of the patient's questions and concerns were addressed.  They indicated their understanding of these issues and agrees to the plan.    Time spent reviewing pertinent information from patient's chart, reviewing any pertinent imaging, obtaining history and physical exam, discussing and mutually agreeing on a treatment plan, and documenting encounter: 20 minutes    RTC 2-3 months    Cameron eMnard DPM        12/11/2024    [address] [address]   [email or web address]            [employer] speech recognition software was used to prepare this note.  Errors in word recognition may occur.  Please contact me with any questions/concerns with this note.

## 2024-12-12 ENCOUNTER — ANTI-COAG VISIT (OUTPATIENT)
Dept: HEMATOLOGY/ONCOLOGY | Age: 84
End: 2024-12-12
Attending: INTERNAL MEDICINE
Payer: MEDICARE

## 2024-12-12 DIAGNOSIS — I82.4Y3 ACUTE DEEP VEIN THROMBOSIS (DVT) OF PROXIMAL VEIN OF BOTH LOWER EXTREMITIES (HCC): ICD-10-CM

## 2024-12-12 DIAGNOSIS — I82.409 RECURRENT DEEP VEIN THROMBOSIS (DVT) (HCC): ICD-10-CM

## 2024-12-12 LAB
INR BLD: 2.6 (ref 0.8–1.2)
PROTHROMBIN TIME: 27.4 SECONDS (ref 11.6–14.8)

## 2024-12-12 PROCEDURE — 85610 PROTHROMBIN TIME: CPT

## 2024-12-12 PROCEDURE — 36415 COLL VENOUS BLD VENIPUNCTURE: CPT

## 2024-12-19 ENCOUNTER — APPOINTMENT (OUTPATIENT)
Age: 84
End: 2024-12-19
Attending: INTERNAL MEDICINE
Payer: MEDICARE

## 2024-12-20 ENCOUNTER — ANTI-COAG VISIT (OUTPATIENT)
Age: 84
End: 2024-12-20
Attending: INTERNAL MEDICINE
Payer: MEDICARE

## 2024-12-20 DIAGNOSIS — I82.409 RECURRENT DEEP VEIN THROMBOSIS (DVT) (HCC): ICD-10-CM

## 2024-12-20 DIAGNOSIS — I82.4Y3 ACUTE DEEP VEIN THROMBOSIS (DVT) OF PROXIMAL VEIN OF BOTH LOWER EXTREMITIES (HCC): ICD-10-CM

## 2024-12-20 LAB
INR BLD: 2.48 (ref 0.8–1.2)
PROTHROMBIN TIME: 26.5 SECONDS (ref 11.6–14.8)

## 2025-01-02 ENCOUNTER — APPOINTMENT (OUTPATIENT)
Dept: HEMATOLOGY/ONCOLOGY | Age: 85
End: 2025-01-02
Attending: INTERNAL MEDICINE
Payer: MEDICARE

## 2025-01-03 ENCOUNTER — ANTI-COAG VISIT (OUTPATIENT)
Age: 85
End: 2025-01-03
Attending: INTERNAL MEDICINE
Payer: MEDICARE

## 2025-01-03 DIAGNOSIS — I82.4Y3 ACUTE DEEP VEIN THROMBOSIS (DVT) OF PROXIMAL VEIN OF BOTH LOWER EXTREMITIES (HCC): ICD-10-CM

## 2025-01-03 DIAGNOSIS — I82.409 RECURRENT DEEP VEIN THROMBOSIS (DVT) (HCC): ICD-10-CM

## 2025-01-03 LAB
INR BLD: 2.35 (ref 0.8–1.2)
PROTHROMBIN TIME: 25.4 SECONDS (ref 11.6–14.8)

## 2025-01-09 ENCOUNTER — APPOINTMENT (OUTPATIENT)
Age: 85
End: 2025-01-09
Attending: INTERNAL MEDICINE
Payer: MEDICARE

## 2025-01-27 ENCOUNTER — APPOINTMENT (OUTPATIENT)
Dept: URBAN - METROPOLITAN AREA CLINIC 247 | Age: 85
Setting detail: DERMATOLOGY
End: 2025-01-27

## 2025-01-27 DIAGNOSIS — D18.0 HEMANGIOMA: ICD-10-CM

## 2025-01-27 DIAGNOSIS — L57.0 ACTINIC KERATOSIS: ICD-10-CM

## 2025-01-27 DIAGNOSIS — L82.1 OTHER SEBORRHEIC KERATOSIS: ICD-10-CM

## 2025-01-27 DIAGNOSIS — D49.2 NEOPLASM OF UNSPECIFIED BEHAVIOR OF BONE, SOFT TISSUE, AND SKIN: ICD-10-CM

## 2025-01-27 DIAGNOSIS — L57.8 OTHER SKIN CHANGES DUE TO CHRONIC EXPOSURE TO NONIONIZING RADIATION: ICD-10-CM

## 2025-01-27 PROBLEM — D18.01 HEMANGIOMA OF SKIN AND SUBCUTANEOUS TISSUE: Status: ACTIVE | Noted: 2025-01-27

## 2025-01-27 PROCEDURE — OTHER MIPS QUALITY: OTHER

## 2025-01-27 PROCEDURE — 99213 OFFICE O/P EST LOW 20 MIN: CPT | Mod: 25

## 2025-01-27 PROCEDURE — OTHER LIQUID NITROGEN: OTHER

## 2025-01-27 PROCEDURE — OTHER BIOPSY BY SHAVE METHOD: OTHER

## 2025-01-27 PROCEDURE — 17003 DESTRUCT PREMALG LES 2-14: CPT

## 2025-01-27 PROCEDURE — 11102 TANGNTL BX SKIN SINGLE LES: CPT

## 2025-01-27 PROCEDURE — 17000 DESTRUCT PREMALG LESION: CPT | Mod: 59

## 2025-01-27 PROCEDURE — OTHER COUNSELING: OTHER

## 2025-01-27 ASSESSMENT — LOCATION SIMPLE DESCRIPTION DERM
LOCATION SIMPLE: RIGHT CHEEK
LOCATION SIMPLE: RIGHT ZYGOMA
LOCATION SIMPLE: LEFT ZYGOMA
LOCATION SIMPLE: LEFT ANTERIOR NECK
LOCATION SIMPLE: CHEST
LOCATION SIMPLE: RIGHT ANTERIOR NECK
LOCATION SIMPLE: LEFT UPPER BACK
LOCATION SIMPLE: LEFT CHEEK
LOCATION SIMPLE: RIGHT UPPER BACK

## 2025-01-27 ASSESSMENT — LOCATION ZONE DERM
LOCATION ZONE: NECK
LOCATION ZONE: FACE
LOCATION ZONE: TRUNK

## 2025-01-27 ASSESSMENT — LOCATION DETAILED DESCRIPTION DERM
LOCATION DETAILED: RIGHT SUPERIOR PREAURICULAR CHEEK
LOCATION DETAILED: STERNUM
LOCATION DETAILED: RIGHT INFERIOR LATERAL NECK
LOCATION DETAILED: RIGHT MEDIAL MALAR CHEEK
LOCATION DETAILED: RIGHT SUPERIOR CENTRAL MALAR CHEEK
LOCATION DETAILED: LEFT CENTRAL MALAR CHEEK
LOCATION DETAILED: RIGHT MEDIAL ZYGOMA
LOCATION DETAILED: LEFT INFERIOR LATERAL NECK
LOCATION DETAILED: RIGHT LATERAL MALAR CHEEK
LOCATION DETAILED: LEFT SUPERIOR LATERAL MALAR CHEEK
LOCATION DETAILED: RIGHT SUPERIOR UPPER BACK
LOCATION DETAILED: RIGHT MID-UPPER BACK
LOCATION DETAILED: RIGHT CENTRAL ZYGOMA
LOCATION DETAILED: LEFT CENTRAL ZYGOMA
LOCATION DETAILED: LEFT MID-UPPER BACK

## 2025-01-27 NOTE — PROCEDURE: LIQUID NITROGEN
Show Aperture Variable?: Yes
Detail Level: Detailed
Number Of Freeze-Thaw Cycles: 2 freeze-thaw cycles
Post-Care Instructions: I reviewed with the patient in detail post-care instructions. Patient is to wear sunprotection, and avoid picking at any of the treated lesions. Pt may apply Vaseline to crusted or scabbing areas.
Duration Of Freeze Thaw-Cycle (Seconds): 5
Consent: The patient's consent was obtained including but not limited to risks of crusting, scabbing, blistering, scarring, darker or lighter pigmentary change, recurrence, incomplete removal and infection.
Render Note In Bullet Format When Appropriate: No

## 2025-01-27 NOTE — PROCEDURE: BIOPSY BY SHAVE METHOD
Body Location Override (Optional - Billing Will Still Be Based On Selected Body Map Location If Applicable): right preauricular cheek
Detail Level: Detailed
Depth Of Biopsy: dermis
Was A Bandage Applied: Yes
Size Of Lesion In Cm: 0
Biopsy Type: H and E
Biopsy Method: Dermablade
Anesthesia Type: 1% lidocaine with epinephrine
Anesthesia Volume In Cc: 0.5
Hemostasis: Electrocautery
Wound Care: Petrolatum
Dressing: bandage
Destruction After The Procedure: No
Type Of Destruction Used: Curettage
Curettage Text: The wound bed was treated with curettage after the biopsy was performed.
Cryotherapy Text: The wound bed was treated with cryotherapy after the biopsy was performed.
Electrodesiccation Text: The wound bed was treated with electrodesiccation after the biopsy was performed.
Electrodesiccation And Curettage Text: The wound bed was treated with electrodesiccation and curettage after the biopsy was performed.
Silver Nitrate Text: The wound bed was treated with silver nitrate after the biopsy was performed.
Lab: -[id number]
Path Notes (To The Dermatopathologist): Please check margins
Medical Necessity Information: It is in your best interest to select a reason for this procedure from the list below. All of these items fulfill various CMS LCD requirements except the new and changing color options.
Consent: Written consent was obtained and risks were reviewed including but not limited to scarring, infection, bleeding, scabbing, incomplete removal, nerve damage and allergy to anesthesia.
Post-Care Instructions: I reviewed with the patient in detail post-care instructions. Patient is to keep the biopsy site dry overnight, and then apply bacitracin twice daily until healed. Patient may apply hydrogen peroxide soaks to remove any crusting.
Notification Instructions: Patient will be notified of biopsy results. However, patient instructed to call the office if not contacted within 2 weeks.
Billing Type: Third-Party Bill
Information: Selecting Yes will display possible errors in your note based on the variables you have selected. This validation is only offered as a suggestion for you. PLEASE NOTE THAT THE VALIDATION TEXT WILL BE REMOVED WHEN YOU FINALIZE YOUR NOTE. IF YOU WANT TO FAX A PRELIMINARY NOTE YOU WILL NEED TO TOGGLE THIS TO 'NO' IF YOU DO NOT WANT IT IN YOUR FAXED NOTE.

## 2025-01-31 ENCOUNTER — ANTI-COAG VISIT (OUTPATIENT)
Age: 85
End: 2025-01-31
Attending: INTERNAL MEDICINE
Payer: MEDICARE

## 2025-01-31 DIAGNOSIS — I82.4Y3 ACUTE DEEP VEIN THROMBOSIS (DVT) OF PROXIMAL VEIN OF BOTH LOWER EXTREMITIES (HCC): ICD-10-CM

## 2025-01-31 DIAGNOSIS — I82.409 RECURRENT DEEP VEIN THROMBOSIS (DVT) (HCC): Primary | ICD-10-CM

## 2025-01-31 DIAGNOSIS — Z79.01 CHRONIC ANTICOAGULATION: ICD-10-CM

## 2025-01-31 LAB
INR BLD: 2.73 (ref 0.8–1.2)
PROTHROMBIN TIME: 28.5 SECONDS (ref 11.6–14.8)

## 2025-02-16 ENCOUNTER — PATIENT MESSAGE (OUTPATIENT)
Dept: SURGERY | Facility: CLINIC | Age: 85
End: 2025-02-16

## 2025-02-16 DIAGNOSIS — N40.1 BPH WITH OBSTRUCTION/LOWER URINARY TRACT SYMPTOMS: ICD-10-CM

## 2025-02-16 DIAGNOSIS — N13.8 BPH WITH OBSTRUCTION/LOWER URINARY TRACT SYMPTOMS: ICD-10-CM

## 2025-02-16 DIAGNOSIS — N39.41 URGE INCONTINENCE: ICD-10-CM

## 2025-02-17 RX ORDER — SILODOSIN 8 MG/1
8 CAPSULE ORAL DAILY
Qty: 90 CAPSULE | Refills: 0 | Status: SHIPPED | OUTPATIENT
Start: 2025-02-17

## 2025-02-17 RX ORDER — FINASTERIDE 5 MG/1
5 TABLET, FILM COATED ORAL DAILY
Qty: 90 TABLET | Refills: 0 | Status: SHIPPED | OUTPATIENT
Start: 2025-02-17

## 2025-02-17 RX ORDER — MIRABEGRON 50 MG/1
50 TABLET, FILM COATED, EXTENDED RELEASE ORAL DAILY
Qty: 90 TABLET | Refills: 0 | Status: SHIPPED | OUTPATIENT
Start: 2025-02-17

## 2025-02-25 ENCOUNTER — OFFICE VISIT (OUTPATIENT)
Facility: LOCATION | Age: 85
End: 2025-02-25
Payer: MEDICARE

## 2025-02-25 DIAGNOSIS — G62.9 PERIPHERAL POLYNEUROPATHY: ICD-10-CM

## 2025-02-25 DIAGNOSIS — S86.311A TEAR OF PERONEAL TENDON OF RIGHT FOOT: ICD-10-CM

## 2025-02-25 DIAGNOSIS — E11.9 TYPE 2 DIABETES MELLITUS WITHOUT COMPLICATION, UNSPECIFIED WHETHER LONG TERM INSULIN USE (HCC): ICD-10-CM

## 2025-02-25 DIAGNOSIS — B35.1 ONYCHOMYCOSIS: Primary | ICD-10-CM

## 2025-02-25 DIAGNOSIS — R60.0 BILATERAL LOWER EXTREMITY EDEMA: ICD-10-CM

## 2025-02-25 DIAGNOSIS — R26.9 GAIT DIFFICULTY: ICD-10-CM

## 2025-02-25 PROCEDURE — 99213 OFFICE O/P EST LOW 20 MIN: CPT | Performed by: PODIATRIST

## 2025-02-25 NOTE — PROGRESS NOTES
[facility]  Progress Note      Boyd Recio is a 84 year old male.   Chief Complaint   Patient presents with    Toenail Care     84 year old male unable to trim his own nails. He does have heel pain in the right that wakes him up in the night, he puts a pillow under it and the pain goes away.  He denies any pain today in the office.          HPI:     Patient is a pleasant 84-year-old diabetic male who is returning to clinic today with complaints of thickened, elongated, painful toenails to both feet, as well as some intermittent pain to his right heel.  Patient is unable to trim his toenails himself and is hoping to discuss a debridement today.  He also has ongoing right heel pain, which has been going on for several years.  Seems to only bother him when he is laying down at night.  He does state that he is planning to utilize a compression sleeve while laying down tonight to see if this will benefit him.  Denies recent injuries.  No other concerns.      Allergies: Patient has no known allergies.   Current Outpatient Medications   Medication Sig Dispense Refill    silodosin 8 MG Oral Cap Take 1 capsule (8 mg total) by mouth daily. 90 capsule 0    Mirabegron ER (MYRBETRIQ) 50 MG Oral Tablet 24 Hr Take 1 tablet (50 mg total) by mouth daily. 90 tablet 0    finasteride 5 MG Oral Tab Take 1 tablet (5 mg total) by mouth daily. 90 tablet 0    WARFARIN 5 MG Oral Tab TAKE 1 TABLET AS DIRECTED BASED ON INR READINGS 90 tablet 3    ROSUVASTATIN 10 MG Oral Tab TAKE 1 TABLET NIGHTLY 90 tablet 3    pantoprazole 40 MG Oral Tab EC Take 1 tablet (40 mg total) by mouth 2 (two) times daily. 180 tablet 3    clobetasol 0.05 % External Ointment APPLY OINTMENT TOPICALLY TO AFFECTED AREA TWICE DAILY ON MOIST SKIN AS NEEDED FOR FLARES      Vitamin D, Cholecalciferol, 25 MCG (1000 UT) Oral Cap Take by mouth daily.      acetaminophen 500 MG Oral Tab Take 1 tablet (500 mg total) by mouth every 6 (six) hours as needed for Pain.   0    latanoprost 0.005 % Ophthalmic Solution Place 1 drop into both eyes nightly.      Dicloxacillin Sodium 500 MG Oral Cap Take 1 capsule (500 mg total) by mouth 2 (two) times daily.        Past Medical History:    Arrhythmia    Back problem    Bull esophagus    BPH (benign prostatic hyperplasia)    Cataract    Cataract of both eyes    COVID    8/30/2022    Disorder of prostate    DVT (deep venous thrombosis) (HCC)    Overview:  Overview:  3/11 after back surgery pulm emb-IVC filter Overview:  3/11 after back surgery pulm emb-IVC filter    Esophageal reflux    Frequent urination    Hearing impairment    bilateral hearing aids    Hearing loss    Wear hearing aids    Heartburn    Hemorrhoids    High cholesterol    Hx of endoscopy    Hyperlipidemia    Indigestion    Leaking of urine    Leg DVT (deep venous thromboembolism), acute, bilateral (HCC)    Leg swelling    Obesity    Pain in joints    Pleurisy with effusion    Postoperative anemia due to acute blood loss    Prediabetes    Presence of IVC filter    cook celect filter.  Had 3 different IVC filters placed previous to current filter    Pulmonary embolism (HCC)    Stented coronary artery    aorta / iliac    Venous ulcer of right leg (HCC)    Visual impairment    glasses    Wears glasses    Weight gain      Past Surgical History:   Procedure Laterality Date    Back surgery  2011,2012,2014, 2016,2019    Cataract  2012    Colonoscopy  09/12/2016    Colonoscopy  2019    Colonoscopy N/A 01/06/2022    Procedure: COLONOSCOPY;  Surgeon: Saeid Conde MD;  Location:  ENDOSCOPY    Colonoscopy N/A 09/21/2023    Procedure: COLONOSCOPY WITH COLD SNARE POLYPECTOMY;  Surgeon: Saeid Conde MD;  Location:  ENDOSCOPY    Endovas repair, infrarenl abdom aortic aneurysm/dissect  06/13/2017    Stent    Knee replacement surgery Right 04/04/2016    Other  06/2012    aortic and iliac artery stent    Other surgical history  1964    Pleuectomy    Other surgical history       IVC filter    Skin surgery  2023    Spinal fusion  10/12/2018    Spine surgery procedure unlisted      Back Surgery    Tonsillectomy      Vasectomy  1984      Family History   Problem Relation Age of Onset    Cancer Mother         Gall bladder    Cancer Father         Prostate    Colon Cancer Father         80+    Alcohol and Other Disorders Associated Sister         Alcoholism    Cancer Sister         Cancer    Cancer Brother         Pancreas    Cancer Brother         Lung / Brain    DVT/VTE Neg       Social History     Socioeconomic History    Marital status:    Tobacco Use    Smoking status: Former     Current packs/day: 0.00     Average packs/day: 1 pack/day for 25.0 years (25.0 ttl pk-yrs)     Types: Cigarettes     Start date: 2/3/1962     Quit date: 2/3/1987     Years since quittin.0    Smokeless tobacco: Never   Vaping Use    Vaping status: Never Used   Substance and Sexual Activity    Alcohol use: Not Currently     Comment: Social drinker    Drug use: Never   Other Topics Concern    Caffeine Concern No    Exercise No    Seat Belt No    Special Diet No    Stress Concern No    Weight Concern No           REVIEW OF SYSTEMS:     10 point ROS completed and was negative unless stated in HPI.      EXAM:     GENERAL: well developed, well nourished, in no apparent distress  EXTREMITIES:              1. Integument: Nails x10 are thickened, elongated, discolored, dystrophic with subungual debris noted to right hallux toenail.  Normal skin temperature and decreased turgor.  Skin color changes consistent with hemosiderin deposition noted to bilateral lower extremities.  2. Vascular: Dorsalis pedis 2/4 bilateral and posterior tibial pulses 2/4 bilateral, capillary refill normal.  2+ pitting edema noted to bilateral lower extremities              3. Musculoskeletal: All muscle groups are graded 5/5 in the foot and ankle, right with pain elicited with eversion against resistance.  Patient does have pain  elicited on palpation along the course of the peroneal tendons near the lateral malleolus, which appears improved.  Adequate eversion strength is noted with no signs of tendon rupture, right.  No pain with palpation noted to plantar medial aspect of right heel.  Decreased ankle joint dorsiflexion noted with the knee extended, which does improve with the knee flexed consistent with equinus deformity, right.  Some discomfort with palpation of toenails of bilateral feet              4. Neurological: Sensation diminished via light touch to bilateral lower extremities.         ASSESSMENT AND PLAN:   Diagnoses and all orders for this visit:    Onychomycosis    Bilateral lower extremity edema    Gait difficulty    Tear of peroneal tendon of right foot    Type 2 diabetes mellitus without complication, unspecified whether long term insulin use (HCC)    Peripheral polyneuropathy        Plan:   -Patient was seen and evaluated today in clinic.  Chart history reviewed.    -Discussed with patient proper care and hygiene for their feet.  Recommend daily foot soaks for 10 minutes with warm water and apple cider vinegar    -If thickness of the toenails are causing discomfort, advised patient to try utilizing Vicks VapoRub on her toenails every night.  The goal would be for the toenails to soften over time.  -Patient relates pain relief with intermittent toenail debridements.  -Patient elects for nail debridement today. Toenails 1-5 of the left foot and 1-5 of the right foot were debrided today.  They tolerated procedures well.    -Instrumentation used includes nail nippers.     -Discussed importance of proper pedal hygiene, daily foot checks, and tight glycemic control.     -Patient to avoid walking barefoot. Recommend ambulating with supportive diabetic shoes and inserts.  Do recommend patient continue utilizing custom AFO to right lower extremity due to his ongoing peroneal tendon tear, which was confirmed on recent MRI.  Patient  wants to continue with conservative management for this.    -I do believe patient's continued intermittent pain to the heel is driven by diabetic neuropathy.  No discomfort on exam today and he states that the discomfort usually occurs while laying down.  He will try compression to the site tonight to see if this is beneficial.    -Discussed importance of proper pedal hygiene, daily foot checks, and tight glycemic control.    -Patient to avoid walking barefoot. Recommend ambulating with supportive diabetic shoes and inserts with his ankle brace.    -Patient to monitor for acute signs of infection and seek immediate medical attention if any signs of infection or other concerns arise.    -All of the patient's questions and concerns were addressed.  They indicated their understanding of these issues and agrees to the plan.    RTC 2-3 months    Cameron Menard DPM, AACFAS        2/25/2025    [facility]  [address] [place] [id number]   [email or web address]            [employer] speech recognition software was used to prepare this note.  Errors in word recognition may occur.  Please contact me with any questions/concerns with this note.

## 2025-02-27 NOTE — PROGRESS NOTES
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    Following for:  1. BPH/LUTS with MARIA T  - on rapaflo and now proscar 8/4/20  2. 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 OAB/UI  - Kegels reviewed, resumed myrbetriq 3/19/21  4. Several (~10) 2-3 mm erythematous bladder lesions on posterior bladder on cysto Aug 2020 as well as moderate BPH and bladder trabeculation  - cysto 6/30/21 showed resolution, suggesting inflammatory process and cytology was negative  5. Recurrent UTIs  - 3 UTIs over the past couple years  5. Pelvic/inguinal LAD noted on CT   - decreasing in size. Plan to observe clinically per Dr Carpenter    PCP - Kari Patricio - [place]  Renal - Fang  LOV 1/30/24    He asked about my son, Constantine, and family LOV.    Presents for check-up. He is taking rapaflo, proscar, and myrbetriq.   No UTIs since last visit.    AUA SS is 2/35 with 1 w, u. Was 6/35 with 3 u; 2 f; 1 MARIA T. Was 25/35 prior to proscar with 4 f, i, u, w; 3 n, s, MARIA T. Happy with LUTS.  Incontinence: resolved - prior to myrbetriq was having UI and soaks underwear once a day.   UA is neg and PVRs have consistently been low recently - zero, zero prior checks. Was 305 mL prior visit but had him void again and was 178 mL (was 38, 130 mL prior visits).    Reported no water, 20 tea. Now ~ 30 oz water per day. Not on fluid restriction. I again encouraged him to drink > 40-60 oz water per day for UTI prevention.    PSA 0.61 11/7/24    CT A/P with IVC 10/20/21: decreased size of LN.  CT A/P with IVC 4/1/21: stable to slightly decreased iliac and inguinal LN size.  He has another CT planned with Dr Carpenter in Oct and if stable/improving was  planning to stop monitoring.  CT C/A/P with IVC 7/6/20: 2.4 cm LUP renal cyst and enlarged pelvic and inguinal lymph nodes measuring up to 3 cm which may be reactive.    Will continue rapaflo, proscar for BPH/LUTS. Increase water intake for UTI prevention, continue myrbetriq for OAB/UI. Continue Kegels and declines PFT. He prefers f/u in 1 y with me.    Prior note:    Obstructive symptoms are better with rapaflo, proscar but he is now having worsening OAB/UI.  AUA SS is 21/35 with 5/5 f, I; 4/5 u; 3/6 w, MARIA T; 1/5 n. Was 25/35 LOV with 4/5 f, i, u, w; 3/5 n, s, MARIA T. He is unhappy with LUTS.  Incontinence: yes - now a few times per day and significant amounts. LOV reported UI about once a day and soaks his underwear almost every day. He has used depends in the past.    Drinks ~ 30 ounces water, 8 oz soda and 8 oz juice per day. Urine is typically medium to light yellow.    UA was neg and PVR is down to 38 mL, was 130 mL LOV.    CT C/A/P with IV contrast 7/6/20: 2.4 cm LUP renal cyst and enlarged pelvic and inguinal lymph nodes measuring up to 3 cm which may be reactive.    PSA 3.23 8/6/20    - For OAB he would like to get back on myrbetriq. If insufficient we discussed anticholinergics as an option and reviewed SEs. He will call back if he'd like to try.  - Also discussed kegels again and PFT. He does not seem interested in PFT right now but requests referral in case he changes his mind.  - For bladder lesions on prior cysto he would like to check again in a couple months with another office cysto. If these persist he would be open to bladder biopsy.  - For LAD he would like to repeat CT A/P  - For BPH symptoms he is happy with rapaflo/proscar  - He will continue to drink plenty of fluids for UTI prevention.      Prior note:  I saw him while admitted for urinary retention and gross hematuria. Passed voiding trial prior to discharge. Did perform a clot evacuation at bedside during admission with just a few tiny clots.  UCx was negative.    Dr Hernandez is current PCP.    He presents for f/u for:  - BPH/LUTS  - recent urinary retention  - gross hematuria  - lymphadenopathy noted on CT    He is taking both rapaflo and proscar and reports urinary stream is still pretty weak.  He has had three UTIs over the past year.    AUA SS is 25/35 with 4/5 frequency, intermittency, urgency, weak stream; 3/5 nocturia, straining, MARIA T. He is unhappy with LUTS.  Incontinence: yes, UI about once a day and soaks his underwear almost every day. He has used depends in the past.    UA is negative and PVR is elevated at 130 mL.  Office cysto today shows moderate BPH with bladder trabeculation noted. He also has several (~ 10) small (~2-3 mm) erythematous lesions on the posterior bladder wall which may be inflammatory lesions versus malignancy.    Will check PSA given the LAD noted on CT but remain conservative given his age and comorbidities.    HISTORY:  Past Medical History:    Arrhythmia    Back problem    Bull esophagus    BPH (benign prostatic hyperplasia)    Cataract    Cataract of both eyes    COVID    8/30/2022    Disorder of prostate    Diverticulosis of large intestine    DVT (deep venous thrombosis) ([facility])    Overview:  Overview:  3/11 after back surgery pulm emb-IVC filter Overview:  3/11 after back surgery pulm emb-IVC filter    Esophageal reflux    Frequent urination    Hearing impairment    bilateral hearing aids    Hearing loss    Wear hearing aids    Heartburn    Hemorrhoids    High cholesterol    Hx of endoscopy    Hyperlipidemia    Indigestion    Leaking of urine    Leg DVT (deep venous thromboembolism), acute, bilateral (HCC)    Leg swelling    Obesity    Pain in joints    Peripheral vascular disease    Pleurisy with effusion    Postoperative anemia due to acute blood loss    Prediabetes    Presence of IVC filter    cook celect filter.  Had 3 different IVC filters placed previous to current filter    Pulmonary embolism (HCC)    Stented  coronary artery    aorta / iliac    UTI (urinary tract infection)    Venous ulcer of right leg (HCC)    Visual impairment    glasses    Wears glasses    Weight gain      Past Surgical History:   Procedure Laterality Date    Back surgery  ,,, ,2019    Cataract  2012    Colonoscopy  2016    Colonoscopy  2019    Colonoscopy N/A 2022    Procedure: COLONOSCOPY;  Surgeon: Saeid Conde MD;  Location:  ENDOSCOPY    Colonoscopy N/A 2023    Procedure: COLONOSCOPY WITH COLD SNARE POLYPECTOMY;  Surgeon: Saeid Conde MD;  Location:  ENDOSCOPY    Endovas repair, infrarenl abdom aortic aneurysm/dissect  2017    Stent    Knee replacement surgery Right 2016    Other  2012    aortic and iliac artery stent    Other surgical history  1964    Pleuectomy    Other surgical history      IVC filter    Skin surgery  2023    Spinal fusion  10/12/2018    Spine surgery procedure unlisted      Back Surgery    Tonsillectomy      Vasectomy  1984      Family History   Problem Relation Age of Onset    Cancer Mother         Gall bladder    Cancer Father         Prostate    Colon Cancer Father         80+    Alcohol and Other Disorders Associated Sister         Alcoholism    Cancer Sister         Brain aneurysm    Cancer Sister         Cancer    Cancer Brother         Pancreas    Cancer Brother         Lung / Brain    DVT/VTE Neg       Social History:   Social History     Socioeconomic History    Marital status:    Tobacco Use    Smoking status: Former     Current packs/day: 0.00     Average packs/day: 1 pack/day for 25.0 years (25.0 ttl pk-yrs)     Types: Cigarettes     Start date: 2/3/1962     Quit date: 2/3/1987     Years since quittin.1    Smokeless tobacco: Never   Vaping Use    Vaping status: Never Used   Substance and Sexual Activity    Alcohol use: Not Currently     Comment: Social drinker    Drug use: Never   Other Topics Concern    Caffeine Concern No     Exercise No    Seat Belt No    Special Diet No    Stress Concern No    Weight Concern No   Social History Narrative    , lives with wife.  Has 4 children, 1 of which is with his current wife.  Has grandchildren and 2 great-grandchildren as well.  Retired, previously worked in voice and data operations.        Medications (Active prior to today's visit):  Current Outpatient Medications   Medication Sig Dispense Refill    silodosin 8 MG Oral Cap Take 1 capsule (8 mg total) by mouth daily. 90 capsule 5    finasteride 5 MG Oral Tab Take 1 tablet (5 mg total) by mouth daily. 90 tablet 5    Mirabegron ER (MYRBETRIQ) 50 MG Oral Tablet 24 Hr Take 1 tablet (50 mg total) by mouth daily. 90 tablet 5    WARFARIN 5 MG Oral Tab TAKE 1 TABLET AS DIRECTED BASED ON INR READINGS 90 tablet 3    ROSUVASTATIN 10 MG Oral Tab TAKE 1 TABLET NIGHTLY 90 tablet 3    pantoprazole 40 MG Oral Tab EC Take 1 tablet (40 mg total) by mouth 2 (two) times daily. 180 tablet 3    clobetasol 0.05 % External Ointment APPLY OINTMENT TOPICALLY TO AFFECTED AREA TWICE DAILY ON MOIST SKIN AS NEEDED FOR FLARES      Vitamin D, Cholecalciferol, 25 MCG (1000 UT) Oral Cap Take by mouth daily.      acetaminophen 500 MG Oral Tab Take 1 tablet (500 mg total) by mouth every 6 (six) hours as needed for Pain.  0    latanoprost 0.005 % Ophthalmic Solution Place 1 drop into both eyes nightly.      Dicloxacillin Sodium 500 MG Oral Cap Take 1 capsule (500 mg total) by mouth 2 (two) times daily.         Allergies:  No Known Allergies      ROS:     A comprehensive 10 point review of systems was completed.  Pertinent positives and negatives noted in the the HPI.    PHYSICAL EXAM:     GENERAL APPEARANCE: well, developed, well nourished, in no acute distress  NEUROLOGIC: nonfocal, alert and oriented  HEAD: normocephalic, atraumatic  EYES: sclera non-icteric  EARS: hearing intact  ORAL CAVITY: mucosa moist  NECK/THYROID: no obvious goiter or masses  LUNGS: nonlabored  breathing  ABDOMEN: soft, no obvious masses or tenderness  SKIN: no obvious rashes     ASSESSMENT/PLAN:   Diagnoses and all orders for this visit:    Urge incontinence  -     URINALYSIS NONAUTO W/O SCOPE  -     Mirabegron ER (MYRBETRIQ) 50 MG Oral Tablet 24 Hr; Take 1 tablet (50 mg total) by mouth daily.    BPH with obstruction/lower urinary tract symptoms  -     silodosin 8 MG Oral Cap; Take 1 capsule (8 mg total) by mouth daily.  -     finasteride 5 MG Oral Tab; Take 1 tablet (5 mg total) by mouth daily.    Recurrent UTI    Elevated PSA    Gross hematuria    - as noted above.    Thanks again for this consult.    Ian Villarreal MD, FACS  Urologist  [facility]  Office: [phone]

## 2025-02-28 ENCOUNTER — ANTI-COAG VISIT (OUTPATIENT)
Age: 85
End: 2025-02-28
Attending: INTERNAL MEDICINE
Payer: MEDICARE

## 2025-02-28 DIAGNOSIS — I82.4Y3 ACUTE DEEP VEIN THROMBOSIS (DVT) OF PROXIMAL VEIN OF BOTH LOWER EXTREMITIES (HCC): ICD-10-CM

## 2025-02-28 DIAGNOSIS — I82.409 RECURRENT DEEP VEIN THROMBOSIS (DVT) (HCC): ICD-10-CM

## 2025-02-28 DIAGNOSIS — Z79.01 CHRONIC ANTICOAGULATION: ICD-10-CM

## 2025-02-28 LAB
INR BLD: 2.33 (ref 0.8–1.2)
PROTHROMBIN TIME: 25.2 SECONDS (ref 11.6–14.8)

## 2025-03-07 ENCOUNTER — OFFICE VISIT (OUTPATIENT)
Dept: SURGERY | Facility: CLINIC | Age: 85
End: 2025-03-07
Payer: MEDICARE

## 2025-03-07 DIAGNOSIS — R97.20 ELEVATED PSA: ICD-10-CM

## 2025-03-07 DIAGNOSIS — N39.41 URGE INCONTINENCE: Primary | ICD-10-CM

## 2025-03-07 DIAGNOSIS — N39.0 RECURRENT UTI: ICD-10-CM

## 2025-03-07 DIAGNOSIS — R31.0 GROSS HEMATURIA: ICD-10-CM

## 2025-03-07 DIAGNOSIS — N40.1 BPH WITH OBSTRUCTION/LOWER URINARY TRACT SYMPTOMS: ICD-10-CM

## 2025-03-07 DIAGNOSIS — N13.8 BPH WITH OBSTRUCTION/LOWER URINARY TRACT SYMPTOMS: ICD-10-CM

## 2025-03-07 LAB
APPEARANCE: CLEAR
BILIRUBIN: NEGATIVE
GLUCOSE (URINE DIPSTICK): NEGATIVE MG/DL
KETONES (URINE DIPSTICK): NEGATIVE MG/DL
LEUKOCYTES: NEGATIVE
MULTISTIX LOT#: ABNORMAL NUMERIC
NITRITE, URINE: NEGATIVE
PH, URINE: 6 (ref 4.5–8)
SPECIFIC GRAVITY: 1.02 (ref 1–1.03)
URINE-COLOR: YELLOW
UROBILINOGEN,SEMI-QN: 0.2 MG/DL (ref 0–1.9)

## 2025-03-07 PROCEDURE — G2211 COMPLEX E/M VISIT ADD ON: HCPCS | Performed by: UROLOGY

## 2025-03-07 PROCEDURE — 81002 URINALYSIS NONAUTO W/O SCOPE: CPT | Performed by: UROLOGY

## 2025-03-07 PROCEDURE — 99214 OFFICE O/P EST MOD 30 MIN: CPT | Performed by: UROLOGY

## 2025-03-07 RX ORDER — FINASTERIDE 5 MG/1
5 TABLET, FILM COATED ORAL DAILY
Qty: 90 TABLET | Refills: 5 | Status: SHIPPED | OUTPATIENT
Start: 2025-03-07

## 2025-03-07 RX ORDER — SILODOSIN 8 MG/1
8 CAPSULE ORAL DAILY
Qty: 90 CAPSULE | Refills: 5 | Status: SHIPPED | OUTPATIENT
Start: 2025-03-07

## 2025-03-07 RX ORDER — MIRABEGRON 50 MG/1
50 TABLET, FILM COATED, EXTENDED RELEASE ORAL DAILY
Qty: 90 TABLET | Refills: 5 | Status: SHIPPED | OUTPATIENT
Start: 2025-03-07

## 2025-03-07 NOTE — PATIENT INSTRUCTIONS
1. Increase water intake to 40-60 ounces (2 liters) per day or enough to keep urine clear to pale yellow.

## 2025-03-08 ENCOUNTER — PATIENT MESSAGE (OUTPATIENT)
Dept: FAMILY MEDICINE CLINIC | Facility: CLINIC | Age: 85
End: 2025-03-08

## 2025-03-08 DIAGNOSIS — R60.0 BILATERAL LEG EDEMA: ICD-10-CM

## 2025-03-10 NOTE — TELEPHONE ENCOUNTER
Requesting refill on furosemide 40mg  LF 25  furosemide 40 MG Oral Tab ()  Take 1 tablet (40 mg total) by mouth daily. Dispense: 90 tablet, Refills: 3 ordered   2024     LOV 11/15/24

## 2025-03-11 RX ORDER — FUROSEMIDE 40 MG/1
40 TABLET ORAL DAILY
Qty: 90 TABLET | Refills: 3 | Status: SHIPPED | OUTPATIENT
Start: 2025-03-11 | End: 2026-03-06

## 2025-03-17 ENCOUNTER — APPOINTMENT (OUTPATIENT)
Dept: URBAN - METROPOLITAN AREA CLINIC 247 | Age: 85
Setting detail: DERMATOLOGY
End: 2025-03-17

## 2025-03-17 PROBLEM — C44.329 SQUAMOUS CELL CARCINOMA OF SKIN OF OTHER PARTS OF FACE: Status: ACTIVE | Noted: 2025-03-17

## 2025-03-17 PROCEDURE — 11642 EXC F/E/E/N/L MAL+MRG 1.1-2: CPT

## 2025-03-17 PROCEDURE — OTHER MIPS QUALITY: OTHER

## 2025-03-17 PROCEDURE — OTHER EXCISION: OTHER

## 2025-03-17 NOTE — PROCEDURE: EXCISION
Suturegard Retention Suture: 2-0 Nylon
O-T Advancement Flap Text: The defect edges were debeveled with a #15 scalpel blade. Given the location of the defect, shape of the defect and the proximity to free margins an O-T advancement flap was deemed most appropriate. Using a sterile surgical marker, an appropriate advancement flap was drawn incorporating the defect and placing the expected incisions within the relaxed skin tension lines where possible. The area thus outlined was incised deep to adipose tissue with a #15 scalpel blade. The skin margins were undermined to an appropriate distance in all directions utilizing iris scissors. Following this, the designed flap was advanced and carried over into the primary defect and sutured into place.
Flip-Flop Flap Text: The defect edges were debeveled with a #15 blade scalpel.  Given the location of the defect and the proximity to free margins a flip-flop flap was deemed most appropriate. Using a sterile surgical marker, the appropriate flap was drawn incorporating the defect and placing the expected incisions between the helical rim and antihelix where possible.  The area thus outlined was incised through and through with a #15 scalpel blade. Following this, the designed flap was carried over into the primary defect and sutured into place.
Add Option For Suturegard When Performing Closure?: No
Show Primary And Secondary Defect Sizes Variable (Do Not Hide If You Perform Flaps Or Graft Closures): Yes
Double Z Plasty Text: The lesion was extirpated to the level of the fat with a #15 scalpel blade. Given the location of the defect, shape of the defect and the proximity to free margins a double Z-plasty was deemed most appropriate for repair. Using a sterile surgical marker, the appropriate transposition arms of the double Z-plasty were drawn incorporating the defect and placing the expected incisions within the relaxed skin tension lines where possible. The area thus outlined was incised deep to adipose tissue with a #15 scalpel blade. The skin margins were undermined to an appropriate distance in all directions utilizing iris scissors. The opposing transposition arms were then transposed and carried over into place in opposite direction and anchored with interrupted buried subcutaneous sutures.
Saucerization Depth: dermis and superficial adipose tissue
Keystone Flap Text: The defect edges were debeveled with a #15 scalpel blade. Given the location of the defect, shape of the defect a keystone flap was deemed most appropriate. Using a sterile surgical marker, an appropriate keystone flap was drawn incorporating the defect, outlining the appropriate donor tissue and placing the expected incisions within the relaxed skin tension lines where possible. The area thus outlined was incised deep to adipose tissue with a #15 scalpel blade. The skin margins were undermined to an appropriate distance in all directions around the primary defect and laterally outward around the flap utilizing iris scissors. Following this, the designed flap was carried into the primary defect and sutured into place.
Epidermal Closure: running cuticular
Complex Repair And Modified Advancement Flap Text: The defect edges were debeveled with a #15 scalpel blade.  The primary defect was closed partially with a complex linear closure.  Given the location of the remaining defect, shape of the defect and the proximity to free margins a modified advancement flap was deemed most appropriate for complete closure of the defect.  Using a sterile surgical marker, an appropriate advancement flap was drawn incorporating the defect and placing the expected incisions within the relaxed skin tension lines where possible. The area thus outlined was incised deep to adipose tissue with a #15 scalpel blade. The skin margins were undermined to an appropriate distance in all directions utilizing iris scissors and carried over to close the primary defect.
Consent was obtained from the patient. The risks and benefits to therapy were discussed in detail. Specifically, the risks of infection, scarring, bleeding, prolonged wound healing, incomplete removal, allergy to anesthesia, nerve injury and recurrence were addressed. Prior to the procedure, the treatment site was clearly identified and confirmed by the patient. All components of Universal Protocol/PAUSE Rule completed.
Epidermal Autograft Text: The defect edges were debeveled with a #15 scalpel blade. Given the location of the defect, shape of the defect and the proximity to free margins an epidermal autograft was deemed most appropriate. Using a sterile surgical marker, the primary defect shape was transferred to the donor site. The epidermal graft was then harvested.  The skin graft was then placed in the primary defect and oriented appropriately.
Body Location Override (Optional - Billing Will Still Be Based On Selected Body Map Location If Applicable): right preauricular cheek
Island Pedicle Flap-Requiring Vessel Identification Text: The defect edges were debeveled with a #15 scalpel blade. Given the location of the defect, shape of the defect and the proximity to free margins an island pedicle advancement flap was deemed most appropriate. Using a sterile surgical marker, an appropriate advancement flap was drawn, based on the axial vessel mentioned above, incorporating the defect, outlining the appropriate donor tissue and placing the expected incisions within the relaxed skin tension lines where possible. The area thus outlined was incised deep to adipose tissue with a #15 scalpel blade. The skin margins were undermined to an appropriate distance in all directions around the primary defect and laterally outward around the island pedicle utilizing iris scissors.  There was minimal undermining beneath the pedicle flap. Following this, the designed flap was carried over into the primary defect and sutured into place.
Crescentic Complex Repair Preamble Text (Leave Blank If You Do Not Want): Extensive wide undermining was performed.
Staged Advancement Flap Text: The defect edges were debeveled with a #15 scalpel blade. Given the location of the defect, shape of the defect and the proximity to free margins a staged advancement flap was deemed most appropriate. Using a sterile surgical marker, an appropriate advancement flap was drawn incorporating the defect and placing the expected incisions within the relaxed skin tension lines where possible. The area thus outlined was incised deep to adipose tissue with a #15 scalpel blade. The skin margins were undermined to an appropriate distance in all directions utilizing iris scissors. Following this, the designed flap was carried over into the primary defect and sutured into place.
Home Suture Removal Text: Patient was provided a home suture removal kit and will remove their sutures at home.  If they have any questions or difficulties they will call the office.
Include Anticoagulation In Mohs Note?: Please Select the Appropriate Response
Helical Rim Advancement Flap Text: The defect edges were debeveled with a #15 blade scalpel.  Given the location of the defect and the proximity to free margins (helical rim) a double helical rim advancement flap was deemed most appropriate. Using a sterile surgical marker, the appropriate advancement flaps were drawn incorporating the defect and placing the expected incisions between the helical rim and antihelix where possible.  The area thus outlined was incised through and through with a #15 scalpel blade.  With a skin hook and iris scissors, the flaps were gently and sharply undermined and freed up. Folllowing this, the designed flaps were carried over into the primary defect and sutured into place.
Complex Repair And O-L Flap Text: The defect edges were debeveled with a #15 scalpel blade.  The primary defect was closed partially with a complex linear closure.  Given the location of the remaining defect, shape of the defect and the proximity to free margins an O-L flap was deemed most appropriate for complete closure of the defect.  Using a sterile surgical marker, an appropriate flap was drawn incorporating the defect and placing the expected incisions within the relaxed skin tension lines where possible. The area thus outlined was incised deep to adipose tissue with a #15 scalpel blade. The skin margins were undermined to an appropriate distance in all directions utilizing iris scissors and carried over to close the primary defect.
Melolabial Interpolation Flap Text: A decision was made to reconstruct the defect utilizing an interpolation axial flap and a staged reconstruction.  A telfa template was made of the defect.  This telfa template was then used to outline the melolabial interpolation flap.  The donor area for the pedicle flap was then injected with anesthesia.  The flap was excised through the skin and subcutaneous tissue down to the layer of the underlying musculature.  The pedicle flap was carefully excised within this deep plane to maintain its blood supply.  The edges of the donor site were undermined.   The donor site was closed in a primary fashion.  The pedicle was then rotated into position and sutured.  Once the tube was sutured into place, adequate blood supply was confirmed with blanching and refill.  The pedicle was then wrapped with xeroform gauze and dressed appropriately with a telfa and gauze bandage to ensure continued blood supply and protect the attached pedicle.
Helical Rim Text: The closure involved the helical rim.
Bilobed Transposition Flap Text: The defect edges were debeveled with a #15 scalpel blade. Given the location of the defect and the proximity to free margins a bilobed transposition flap was deemed most appropriate. Using a sterile surgical marker, an appropriate bilobe flap drawn around the defect. The area thus outlined was incised deep to adipose tissue with a #15 scalpel blade. The skin margins were undermined to an appropriate distance in all directions utilizing iris scissors. Following this, the designed flap was carried over into the primary defect and sutured into place.
Nasal Turnover Hinge Flap Text: The defect edges were debeveled with a #15 scalpel blade.  Given the size, depth, location of the defect and the defect being full thickness a nasal turnover hinge flap was deemed most appropriate. Using a sterile surgical marker, an appropriate hinge flap was drawn incorporating the defect. The area thus outlined was incised with a #15 scalpel blade. The flap was designed to recreate the nasal mucosal lining and the alar rim. The skin margins were undermined to an appropriate distance in all directions utilizing iris scissors. Following this, the designed flap was carried over into the primary defect and sutured into place
Partial Purse String (Simple) Text: Given the location of the defect and the characteristics of the surrounding skin a simple purse string closure was deemed most appropriate.  Undermining was performed circumferentially around the surgical defect.  A purse string suture was then placed and tightened. Wound tension of the circular defect prevented complete closure of the wound.
Ear Star Wedge Flap Text: The defect edges were debeveled with a #15 blade scalpel.  Given the location of the defect and the proximity to free margins (helical rim) an ear star wedge flap was deemed most appropriate. Using a sterile surgical marker, the appropriate flap was drawn incorporating the defect and placing the expected incisions between the helical rim and antihelix where possible.  The area thus outlined was incised through and through with a #15 scalpel blade. Following this, the designed flap was carried over into the primary defect and sutured into place.
Anesthesia Volume In Cc: 6
Rhomboid Transposition Flap Text: The defect edges were debeveled with a #15 scalpel blade. Given the location of the defect and the proximity to free margins a rhomboid transposition flap was deemed most appropriate. Using a sterile surgical marker, an appropriate rhomboid flap was drawn incorporating the defect. The area thus outlined was incised deep to adipose tissue with a #15 scalpel blade. The skin margins were undermined to an appropriate distance in all directions utilizing iris scissors. Following this, the designed flap was carried over into the primary defect and sutured into place.
Complex Repair And Tissue Cultured Epidermal Autograft Text: The defect edges were debeveled with a #15 scalpel blade.  The primary defect was closed partially with a complex linear closure.  Given the location of the defect, shape of the defect and the proximity to free margins an tissue cultured epidermal autograft was deemed most appropriate to repair the remaining defect.  The graft was trimmed to fit the size of the remaining defect.  The graft was then placed in the primary defect, oriented appropriately, and sutured into place.
Skin Substitute Units (Will Override Primary Defect Units If Greater Than 0): 0
Nasolabial Transposition Flap Text: The defect edges were debeveled with a #15 scalpel blade.  Given the size, depth and location of the defect and the proximity to free margins a nasolabial transposition flap was deemed most appropriate. Using a sterile surgical marker, an appropriate flap was drawn incorporating the defect. The area thus outlined was incised with a #15 scalpel blade. The skin margins were undermined to an appropriate distance in all directions utilizing iris scissors. Following this, the designed flap was carried into the primary defect and sutured into place.
Epidermal Sutures: 4-0 Ethilon
Intermediate Repair Preamble Text (Leave Blank If You Do Not Want): Undermining was performed with blunt dissection.
Muscle Hinge Flap Text: The defect edges were debeveled with a #15 scalpel blade.  Given the size, depth and location of the defect and the proximity to free margins a muscle hinge flap was deemed most appropriate. Using a sterile surgical marker, an appropriate hinge flap was drawn incorporating the defect. The area thus outlined was incised with a #15 scalpel blade. The skin margins were undermined to an appropriate distance in all directions utilizing iris scissors. Following this, the designed flap was carried into the primary defect and sutured into place.
Saucerization Excision Additional Text (Leave Blank If You Do Not Want): The margin was drawn around the clinically apparent lesion.  Incisions were then made along these lines, in a tangential fashion, to the appropriate tissue plane and the lesion was extirpated.
Complex Repair And Double Advancement Flap Text: The defect edges were debeveled with a #15 scalpel blade.  The primary defect was closed partially with a complex linear closure.  Given the location of the remaining defect, shape of the defect and the proximity to free margins a double advancement flap was deemed most appropriate for complete closure of the defect.  Using a sterile surgical marker, an appropriate advancement flap was drawn incorporating the defect and placing the expected incisions within the relaxed skin tension lines where possible. The area thus outlined was incised deep to adipose tissue with a #15 scalpel blade. The skin margins were undermined to an appropriate distance in all directions utilizing iris scissors and carried over to close the primary defect.
Mastoid Interpolation Flap Text: A decision was made to reconstruct the defect utilizing an interpolation axial flap and a staged reconstruction.  A telfa template was made of the defect.  This telfa template was then used to outline the mastoid interpolation flap.  The donor area for the pedicle flap was then injected with anesthesia.  The flap was excised through the skin and subcutaneous tissue down to the layer of the underlying musculature.  The pedicle flap was carefully excised within this deep plane to maintain its blood supply.  The edges of the donor site were undermined.   The donor site was closed in a primary fashion.  The pedicle was then rotated into position and sutured.  Once the tube was sutured into place, adequate blood supply was confirmed with blanching and refill.  The pedicle was then wrapped with xeroform gauze and dressed appropriately with a telfa and gauze bandage to ensure continued blood supply and protect the attached pedicle.
Complex Repair And Rhombic Flap Text: The defect edges were debeveled with a #15 scalpel blade.  The primary defect was closed partially with a complex linear closure.  Given the location of the remaining defect, shape of the defect and the proximity to free margins a rhombic flap was deemed most appropriate for complete closure of the defect.  Using a sterile surgical marker, an appropriate advancement flap was drawn incorporating the defect and placing the expected incisions within the relaxed skin tension lines where possible. The area thus outlined was incised deep to adipose tissue with a #15 scalpel blade. The skin margins were undermined to an appropriate distance in all directions utilizing iris scissors and carried over to close the primary defect.
Fusiform Excision Additional Text (Leave Blank If You Do Not Want): The margin was drawn around the clinically apparent lesion.  A fusiform shape was then drawn on the skin incorporating the lesion and margins.  Incisions were then made along these lines to the appropriate tissue plane and the lesion was extirpated.
Complex Repair And Burow's Graft Text: The defect edges were debeveled with a #15 scalpel blade.  The primary defect was closed partially with a complex linear closure.  Given the location of the defect, shape of the defect, the proximity to free margins and the presence of a standing cone deformity a Burow's graft was deemed most appropriate to repair the remaining defect.  The graft was trimmed to fit the size of the remaining defect.  The graft was then placed in the primary defect, oriented appropriately, and sutured into place.
Lip Wedge Excision Repair Text: Given the location of the defect and the proximity to free margins a full thickness wedge repair was deemed most appropriate. Using a sterile surgical marker, the appropriate repair was drawn incorporating the defect and placing the expected incisions perpendicular to the vermilion border.  The vermilion border was also meticulously outlined to ensure appropriate reapproximation during the repair.  The area thus outlined was incised through and through with a #15 scalpel blade.  The muscularis and dermis were reaproximated with deep sutures following hemostasis. Care was taken to realign the vermilion border before proceeding with the superficial closure.  Once the vermilion was realigned the superfical and mucosal closure was finished.
Vermilion Border Text: The closure involved the vermilion border.
Size Of Lesion In Cm: 0.6
W Plasty Text: The lesion was extirpated to the level of the fat with a #15 scalpel blade. Given the location of the defect, shape of the defect and the proximity to free margins a W-plasty was deemed most appropriate for repair. Using a sterile surgical marker, the appropriate transposition arms of the W-plasty were drawn incorporating the defect and placing the expected incisions within the relaxed skin tension lines where possible. The area thus outlined was incised deep to adipose tissue with a #15 scalpel blade. The skin margins were undermined to an appropriate distance in all directions utilizing iris scissors. The opposing transposition arms were then transposed and carried over into place in opposite direction and anchored with interrupted buried subcutaneous sutures.
Transposition Flap Text: The defect edges were debeveled with a #15 scalpel blade. Given the location of the defect and the proximity to free margins a transposition flap was deemed most appropriate. Using a sterile surgical marker, an appropriate transposition flap was drawn incorporating the defect. The area thus outlined was incised deep to adipose tissue with a #15 scalpel blade. The skin margins were undermined to an appropriate distance in all directions utilizing iris scissors. Following this, the designed flap was carried over into the primary defect and sutured into place.
Advancement Flap (Double) Text: The defect edges were debeveled with a #15 scalpel blade. Given the location of the defect and the proximity to free margins a double advancement flap was deemed most appropriate. Using a sterile surgical marker, the appropriate advancement flaps were drawn incorporating the defect and placing the expected incisions within the relaxed skin tension lines where possible. The area thus outlined was incised deep to adipose tissue with a #15 scalpel blade. The skin margins were undermined to an appropriate distance in all directions utilizing iris scissors. Following this, the designed flaps were advanced and carried over into the primary defect and sutured into place.
Split-Thickness Skin Graft Text: The defect edges were debeveled with a #15 scalpel blade. Given the location of the defect, shape of the defect and the proximity to free margins a split thickness skin graft was deemed most appropriate. Using a sterile surgical marker, the primary defect shape was transferred to the donor site. The split thickness graft was then harvested.  The skin graft was then placed in the primary defect and oriented appropriately.
Crescentic Advancement Flap Text: The defect edges were debeveled with a #15 scalpel blade. Given the location of the defect and the proximity to free margins a crescentic advancement flap was deemed most appropriate. Using a sterile surgical marker, the appropriate advancement flap was drawn incorporating the defect and placing the expected incisions within the relaxed skin tension lines where possible. The area thus outlined was incised deep to adipose tissue with a #15 scalpel blade. The skin margins were undermined to an appropriate distance in all directions utilizing iris scissors. Following this, the designed flap was advanced and carried over into the primary defect and sutured into place.
Dressing: dry sterile dressing
O-Z Flap Text: The defect edges were debeveled with a #15 scalpel blade. Given the location of the defect, shape of the defect and the proximity to free margins an O-Z flap was deemed most appropriate. Using a sterile surgical marker, an appropriate transposition flap was drawn incorporating the defect and placing the expected incisions within the relaxed skin tension lines where possible. The area thus outlined was incised deep to adipose tissue with a #15 scalpel blade. The skin margins were undermined to an appropriate distance in all directions utilizing iris scissors. Following this, the designed flap was carried over into the primary defect and sutured into place.
Burow's Advancement Flap Text: The defect edges were debeveled with a #15 scalpel blade. Given the location of the defect and the proximity to free margins a Burow's advancement flap was deemed most appropriate. Using a sterile surgical marker, the appropriate advancement flap was drawn incorporating the defect and placing the expected incisions within the relaxed skin tension lines where possible. The area thus outlined was incised deep to adipose tissue with a #15 scalpel blade. The skin margins were undermined to an appropriate distance in all directions utilizing iris scissors. Following this, the designed flap was advanced and carried over into the primary defect and sutured into place.
Mercedes Flap Text: The defect edges were debeveled with a #15 scalpel blade. Given the location of the defect, shape of the defect and the proximity to free margins a Mercedes flap was deemed most appropriate. Using a sterile surgical marker, an appropriate advancement flap was drawn incorporating the defect and placing the expected incisions within the relaxed skin tension lines where possible. The area thus outlined was incised deep to adipose tissue with a #15 scalpel blade. The skin margins were undermined to an appropriate distance in all directions utilizing iris scissors. Following this, the designed flap was advanced and carried over into the primary defect and sutured into place.
Advancement Flap (Single) Text: The defect edges were debeveled with a #15 scalpel blade. Given the location of the defect and the proximity to free margins a single advancement flap was deemed most appropriate. Using a sterile surgical marker, an appropriate advancement flap was drawn incorporating the defect and placing the expected incisions within the relaxed skin tension lines where possible. The area thus outlined was incised deep to adipose tissue with a #15 scalpel blade. The skin margins were undermined to an appropriate distance in all directions utilizing iris scissors. Following this, the designed flap was advanced and carried over into the primary defect and sutured into place.
Excision Method: Round
V-Y Flap Text: The defect edges were debeveled with a #15 scalpel blade. Given the location of the defect, shape of the defect and the proximity to free margins a V-Y flap was deemed most appropriate. Using a sterile surgical marker, an appropriate advancement flap was drawn incorporating the defect and placing the expected incisions within the relaxed skin tension lines where possible. The area thus outlined was incised deep to adipose tissue with a #15 scalpel blade. The skin margins were undermined to an appropriate distance in all directions utilizing iris scissors. Following this, the designed flap was advanced and carried over into the primary defect and sutured into place.
Anesthesia Type: 1% lidocaine with epinephrine
Double O-Z Flap Text: The defect edges were debeveled with a #15 scalpel blade. Given the location of the defect, shape of the defect and the proximity to free margins a Double O-Z flap was deemed most appropriate. Using a sterile surgical marker, an appropriate transposition flap was drawn incorporating the defect and placing the expected incisions within the relaxed skin tension lines where possible. The area thus outlined was incised deep to adipose tissue with a #15 scalpel blade. The skin margins were undermined to an appropriate distance in all directions utilizing iris scissors. Following this, the designed flap was carried over into the primary defect and sutured into place.
Complex Repair And M Plasty Text: The defect edges were debeveled with a #15 scalpel blade.  The primary defect was closed partially with a complex linear closure.  Given the location of the remaining defect, shape of the defect and the proximity to free margins an M plasty was deemed most appropriate for complete closure of the defect.  Using a sterile surgical marker, an appropriate advancement flap was drawn incorporating the defect and placing the expected incisions within the relaxed skin tension lines where possible. The area thus outlined was incised deep to adipose tissue with a #15 scalpel blade. The skin margins were undermined to an appropriate distance in all directions utilizing iris scissors and carried over to close the primary defect.
Star Wedge Flap Text: The defect edges were debeveled with a #15 scalpel blade. Given the location of the defect, shape of the defect and the proximity to free margins a star wedge flap was deemed most appropriate. Using a sterile surgical marker, an appropriate rotation flap was drawn incorporating the defect and placing the expected incisions within the relaxed skin tension lines where possible. The area thus outlined was incised deep to adipose tissue with a #15 scalpel blade. The skin margins were undermined to an appropriate distance in all directions utilizing iris scissors. Following this, the designed flap was carried over into the primary defect and sutured into place.
Post-Care Instructions: I reviewed with the patient in detail post-care instructions. Patient is not to engage in any heavy lifting, exercise, or swimming for the next 14 days. Should the patient develop any fevers, chills, bleeding, severe pain patient will contact the office immediately.
Scalpel Size: 15 blade
Interpolation Flap Text: A decision was made to reconstruct the defect utilizing an interpolation axial flap and a staged reconstruction.  A telfa template was made of the defect.  This telfa template was then used to outline the interpolation flap.  The donor area for the pedicle flap was then injected with anesthesia.  The flap was excised through the skin and subcutaneous tissue down to the layer of the underlying musculature.  The interpolation flap was carefully excised within this deep plane to maintain its blood supply.  The edges of the donor site were undermined.   The donor site was closed in a primary fashion.  The pedicle was then rotated into position and sutured.  Once the tube was sutured into place, adequate blood supply was confirmed with blanching and refill.  The pedicle was then wrapped with xeroform gauze and dressed appropriately with a telfa and gauze bandage to ensure continued blood supply and protect the attached pedicle.
Excision Depth: adipose tissue
Nostril Rim Text: The closure involved the nostril rim.
Tissue Cultured Epidermal Autograft Text: The defect edges were debeveled with a #15 scalpel blade. Given the location of the defect, shape of the defect and the proximity to free margins a tissue cultured epidermal autograft was deemed most appropriate.  The graft was then trimmed to fit the size of the defect.  The graft was then placed in the primary defect and oriented appropriately.
Rectangular Flap Text: The defect edges were debeveled with a #15 scalpel blade. Given the location of the defect and the proximity to free margins a rectangular flap was deemed most appropriate. Using a sterile surgical marker, an appropriate rectangular flap was drawn incorporating the defect. The area thus outlined was incised deep to adipose tissue with a #15 scalpel blade. The skin margins were undermined to an appropriate distance in all directions utilizing iris scissors. Following this, the designed flap was carried over into the primary defect and sutured into place.
Complex Repair And Dermal Autograft Text: The defect edges were debeveled with a #15 scalpel blade.  The primary defect was closed partially with a complex linear closure.  Given the location of the defect, shape of the defect and the proximity to free margins an dermal autograft was deemed most appropriate to repair the remaining defect.  The graft was trimmed to fit the size of the remaining defect.  The graft was then placed in the primary defect, oriented appropriately, and sutured into place.
Island Pedicle Flap With Canthal Suspension Text: The defect edges were debeveled with a #15 scalpel blade. Given the location of the defect, shape of the defect and the proximity to free margins an island pedicle advancement flap was deemed most appropriate. Using a sterile surgical marker, an appropriate advancement flap was drawn incorporating the defect, outlining the appropriate donor tissue and placing the expected incisions within the relaxed skin tension lines where possible. The area thus outlined was incised deep to adipose tissue with a #15 scalpel blade. The skin margins were undermined to an appropriate distance in all directions around the primary defect and laterally outward around the island pedicle utilizing iris scissors.  There was minimal undermining beneath the pedicle flap. A suspension suture was placed in the canthal tendon to prevent tension and prevent ectropion. Following this, the designed flap was placed into the primary defect and sutured into place.
Size Of Margin In Cm: 0.5
Hatchet Flap Text: The defect edges were debeveled with a #15 scalpel blade. Given the location of the defect, shape of the defect and the proximity to free margins a hatchet flap was deemed most appropriate. Using a sterile surgical marker, an appropriate hatchet flap was drawn incorporating the defect and placing the expected incisions within the relaxed skin tension lines where possible. The area thus outlined was incised deep to adipose tissue with a #15 scalpel blade. The skin margins were undermined to an appropriate distance in all directions utilizing iris scissors. Following this, the designed flap was carried over into the primary defect and sutured into place.
Elliptical Excision Additional Text (Leave Blank If You Do Not Want): The margin was drawn around the clinically apparent lesion.  An elliptical shape was then drawn on the skin incorporating the lesion and margins.  Incisions were then made along these lines to the appropriate tissue plane and the lesion was extirpated.
Excisional Biopsy Additional Text (Leave Blank If You Do Not Want): The margin was drawn around the clinically apparent lesion. An elliptical shape was then drawn on the skin incorporating the lesion and margins.  Incisions were then made along these lines to the appropriate tissue plane and the lesion was extirpated.
Complex Repair And V-Y Plasty Text: The defect edges were debeveled with a #15 scalpel blade.  The primary defect was closed partially with a complex linear closure.  Given the location of the remaining defect, shape of the defect and the proximity to free margins a V-Y plasty was deemed most appropriate for complete closure of the defect.  Using a sterile surgical marker, an appropriate advancement flap was drawn incorporating the defect and placing the expected incisions within the relaxed skin tension lines where possible. The area thus outlined was incised deep to adipose tissue with a #15 scalpel blade. The skin margins were undermined to an appropriate distance in all directions utilizing iris scissors and carried over to close the primary defect.
Complex Repair And Dorsal Nasal Flap Text: The defect edges were debeveled with a #15 scalpel blade.  The primary defect was closed partially with a complex linear closure.  Given the location of the remaining defect, shape of the defect and the proximity to free margins a dorsal nasal flap was deemed most appropriate for complete closure of the defect.  Using a sterile surgical marker, an appropriate flap was drawn incorporating the defect and placing the expected incisions within the relaxed skin tension lines where possible. The area thus outlined was incised deep to adipose tissue with a #15 scalpel blade. The skin margins were undermined to an appropriate distance in all directions utilizing iris scissors and carried over to close the primary defect.
Suturegard Body: The suture ends were repeatedly re-tightened and re-clamped to achieve the desired tissue expansion.
Suturegard Intro: Intraoperative tissue expansion was performed, utilizing the SUTUREGARD device, in order to reduce wound tension.
Advancement-Rotation Flap Text: The defect edges were debeveled with a #15 scalpel blade. Given the location of the defect, shape of the defect and the proximity to free margins an advancement-rotation flap was deemed most appropriate. Using a sterile surgical marker, an appropriate flap was drawn incorporating the defect and placing the expected incisions within the relaxed skin tension lines where possible. The area thus outlined was incised deep to adipose tissue with a #15 scalpel blade. The skin margins were undermined to an appropriate distance in all directions utilizing iris scissors. Following this, the designed flap was carried over into the primary defect and sutured into place.
Z Plasty Text: The lesion was extirpated to the level of the fat with a #15 scalpel blade. Given the location of the defect, shape of the defect and the proximity to free margins a Z-plasty was deemed most appropriate for repair. Using a sterile surgical marker, the appropriate transposition arms of the Z-plasty were drawn incorporating the defect and placing the expected incisions within the relaxed skin tension lines where possible. The area thus outlined was incised deep to adipose tissue with a #15 scalpel blade. The skin margins were undermined to an appropriate distance in all directions utilizing iris scissors. The opposing transposition arms were then transposed and carried over into place in opposite direction and anchored with interrupted buried subcutaneous sutures.
Deep Sutures: 3-0 PGCL
Ftsg Text: The defect edges were debeveled with a #15 scalpel blade. Given the location of the defect, shape of the defect and the proximity to free margins a full thickness skin graft was deemed most appropriate. Using a sterile surgical marker, the primary defect shape was transferred to the donor site. The area thus outlined was incised deep to adipose tissue with a #15 scalpel blade.  The harvested graft was then trimmed of adipose tissue until only dermis and epidermis was left.  The skin margins of the secondary defect were undermined to an appropriate distance in all directions utilizing iris scissors.  The secondary defect was closed with interrupted buried subcutaneous sutures.  The skin edges were then re-apposed with running  sutures.  The skin graft was then placed in the primary defect and oriented appropriately.
Spiral Flap Text: The defect edges were debeveled with a #15 scalpel blade. Given the location of the defect, shape of the defect and the proximity to free margins a spiral flap was deemed most appropriate. Using a sterile surgical marker, an appropriate rotation flap was drawn incorporating the defect and placing the expected incisions within the relaxed skin tension lines where possible. The area thus outlined was incised deep to adipose tissue with a #15 scalpel blade. The skin margins were undermined to an appropriate distance in all directions utilizing iris scissors. Following this, the designed flap was carried over into the primary defect and sutured into place.
Positioning (Leave Blank If You Do Not Want): The patient was placed in a comfortable position exposing the surgical site.
Purse String (Simple) Text: Given the location of the defect and the characteristics of the surrounding skin a purse string simple closure was deemed most appropriate.  Undermining was performed circumferentially around the surgical defect.  A purse string suture was then placed and tightened.
Estlander Flap (Lower To Upper Lip) Text: The defect of the lower lip was assessed and measured.  Given the location and size of the defect, an Estlander flap was deemed most appropriate. Using a sterile surgical marker, an appropriate Estlander flap was measured and drawn on the upper lip. Local anesthesia was then infiltrated. A scalpel was then used to incise the lateral aspect of the flap, through skin, muscle and mucosa, leaving the flap pedicled medially.  The flap was then rotated and positioned to fill the lower lip defect.  The flap was then sutured into place with a three layer technique, closing the orbicularis oris muscle layer with subcutaneous buried sutures, followed by a mucosal layer and an epidermal layer.
Retention Suture Bite Size: 3 mm
Paramedian Forehead Flap Text: A decision was made to reconstruct the defect utilizing an interpolation axial flap and a staged reconstruction.  A telfa template was made of the defect.  This telfa template was then used to outline the paramedian forehead pedicle flap.  The donor area for the pedicle flap was then injected with anesthesia.  The flap was excised through the skin and subcutaneous tissue down to the layer of the underlying musculature.  The pedicle flap was carefully excised within this deep plane to maintain its blood supply.  The edges of the donor site were undermined.   The donor site was closed in a primary fashion.  The pedicle was then rotated into position and sutured.  Once the tube was sutured into place, adequate blood supply was confirmed with blanching and refill.  The pedicle was then wrapped with xeroform gauze and dressed appropriately with a telfa and gauze bandage to ensure continued blood supply and protect the attached pedicle.
Dorsal Nasal Flap Text: The defect edges were debeveled with a #15 scalpel blade. Given the location of the defect and the proximity to free margins a dorsal nasal flap was deemed most appropriate. Using a sterile surgical marker, an appropriate dorsal nasal flap was drawn around the defect. The area thus outlined was incised deep to adipose tissue with a #15 scalpel blade. The skin margins were undermined to an appropriate distance in all directions utilizing iris scissors. Following this, the designed flap was carried into the primary defect and sutured into place.
Complex Repair And Xenograft Text: The defect edges were debeveled with a #15 scalpel blade.  The primary defect was closed partially with a complex linear closure.  Given the location of the defect, shape of the defect and the proximity to free margins a xenograft was deemed most appropriate to repair the remaining defect.  The graft was trimmed to fit the size of the remaining defect.  The graft was then placed in the primary defect, oriented appropriately, and sutured into place.
Posterior Auricular Interpolation Flap Text: A decision was made to reconstruct the defect utilizing an interpolation axial flap and a staged reconstruction.  A telfa template was made of the defect.  This telfa template was then used to outline the posterior auricular interpolation flap.  The donor area for the pedicle flap was then injected with anesthesia.  The flap was excised through the skin and subcutaneous tissue down to the layer of the underlying musculature.  The pedicle flap was carefully excised within this deep plane to maintain its blood supply.  The edges of the donor site were undermined.   The donor site was closed in a primary fashion.  The pedicle was then rotated into position and sutured.  Once the tube was sutured into place, adequate blood supply was confirmed with blanching and refill.  The pedicle was then wrapped with xeroform gauze and dressed appropriately with a telfa and gauze bandage to ensure continued blood supply and protect the attached pedicle.
O-Z Plasty Text: The defect edges were debeveled with a #15 scalpel blade. Given the location of the defect, shape of the defect and the proximity to free margins an O-Z plasty (double transposition flap) was deemed most appropriate. Using a sterile surgical marker, the appropriate transposition flaps were drawn incorporating the defect and placing the expected incisions within the relaxed skin tension lines where possible. The area thus outlined was incised deep to adipose tissue with a #15 scalpel blade. The skin margins were undermined to an appropriate distance in all directions utilizing iris scissors. Hemostasis was achieved with electrocautery. The flaps were then transposed and carried over into place, one clockwise and the other counterclockwise, and anchored with interrupted buried subcutaneous sutures.
Retention Suture Text: Retention sutures were placed to support the closure and prevent dehiscence.
Graft Donor Site Bandage (Optional-Leave Blank If You Don't Want In Note): Steri-strips and a pressure bandage were applied to the donor site.
Adjacent Tissue Transfer Text: The defect edges were debeveled with a #15 scalpel blade. Given the location of the defect and the proximity to free margins an adjacent tissue transfer was deemed most appropriate. Using a sterile surgical marker, an appropriate flap was drawn incorporating the defect and placing the expected incisions within the relaxed skin tension lines where possible. The area thus outlined was incised deep to adipose tissue with a #15 scalpel blade. The skin margins were undermined to an appropriate distance in all directions utilizing iris scissors and carried over to close the primary defect.
Bilateral Rotation Flap Text: The defect edges were debeveled with a #15 scalpel blade. Given the location of the defect, shape of the defect and the proximity to free margins a bilateral rotation flap was deemed most appropriate. Using a sterile surgical marker, an appropriate rotation flap was drawn incorporating the defect and placing the expected incisions within the relaxed skin tension lines where possible. The area thus outlined was incised deep to adipose tissue with a #15 scalpel blade. The skin margins were undermined to an appropriate distance in all directions utilizing iris scissors. Following this, the designed flap was carried over into the primary defect and sutured into place.
Nasalis Myocutaneous Flap Text: Using a #15 blade, an incision was made around the donor flap to the level of the nasalis muscle. Wide lateral undermining was then performed in both the subcutaneous plane above the nasalis muscle, and in a submuscular plane just above periosteum. This allowed the formation of a free nasalis muscle axial pedicle which was still attached to the actual cutaneous flap, increasing its mobility and vascular viability. Hemostasis was obtained with pinpoint electrocoagulation. The flap was mobilized into position and the pivotal anchor points positioned and stabilized with buried interrupted sutures. Subcutaneous and dermal tissues were closed in a multilayered fashion with sutures. Tissue redundancies were excised, and the epidermal edges were apposed without significant tension and sutured with sutures.
A-T Advancement Flap Text: The defect edges were debeveled with a #15 scalpel blade. Given the location of the defect, shape of the defect and the proximity to free margins an A-T advancement flap was deemed most appropriate. Using a sterile surgical marker, an appropriate advancement flap was drawn incorporating the defect and placing the expected incisions within the relaxed skin tension lines where possible. The area thus outlined was incised deep to adipose tissue with a #15 scalpel blade. The skin margins were undermined to an appropriate distance in all directions utilizing iris scissors. Following this, the designed flap was advanced and carried over into the primary defect and sutured into place.
Complex Repair And Z Plasty Text: The defect edges were debeveled with a #15 scalpel blade.  The primary defect was closed partially with a complex linear closure.  Given the location of the remaining defect, shape of the defect and the proximity to free margins a Z plasty was deemed most appropriate for complete closure of the defect.  Using a sterile surgical marker, an appropriate advancement flap was drawn incorporating the defect and placing the expected incisions within the relaxed skin tension lines where possible. The area thus outlined was incised deep to adipose tissue with a #15 scalpel blade. The skin margins were undermined to an appropriate distance in all directions utilizing iris scissors and carried over to close the primary defect.
Debridement Text: The wound edges were debrided prior to proceeding with the closure to facilitate wound healing.
Undermining Type: Entire Wound
Wound Care: Petrolatum
Abbe Flap (Lower To Upper Lip) Text: The defect of the upper lip was assessed and measured.  Given the location and size of the defect, an Abbe flap was deemed most appropriate. Using a sterile surgical marker, an appropriate Abbe flap was measured and drawn on the lower lip. Local anesthesia was then infiltrated. A scalpel was then used to incise the upper lip through and through the skin, vermilion, muscle and mucosa, leaving the flap pedicled on the opposite side.  The flap was then rotated and transferred to the lower lip defect.  The flap was then sutured into place with a three layer technique, closing the orbicularis oris muscle layer with subcutaneous buried sutures, followed by a mucosal layer and an epidermal layer.
Partial Purse String (Intermediate) Text: Given the location of the defect and the characteristics of the surrounding skin an intermediate purse string closure was deemed most appropriate.  Undermining was performed circumferentially around the surgical defect.  A purse string suture was then placed and tightened. Wound tension of the circular defect prevented complete closure of the wound.
Xenograft Text: The defect edges were debeveled with a #15 scalpel blade. Given the location of the defect, shape of the defect and the proximity to free margins a xenograft was deemed most appropriate.  The graft was then trimmed to fit the size of the defect.  The graft was then placed in the primary defect and oriented appropriately.
Complex Repair And Split-Thickness Skin Graft Text: The defect edges were debeveled with a #15 scalpel blade.  The primary defect was closed partially with a complex linear closure.  Given the location of the defect, shape of the defect and the proximity to free margins a split thickness skin graft was deemed most appropriate to repair the remaining defect.  The graft was trimmed to fit the size of the remaining defect.  The graft was then placed in the primary defect, oriented appropriately, and sutured into place.
Complex Repair And W Plasty Text: The defect edges were debeveled with a #15 scalpel blade.  The primary defect was closed partially with a complex linear closure.  Given the location of the remaining defect, shape of the defect and the proximity to free margins a W plasty was deemed most appropriate for complete closure of the defect.  Using a sterile surgical marker, an appropriate advancement flap was drawn incorporating the defect and placing the expected incisions within the relaxed skin tension lines where possible. The area thus outlined was incised deep to adipose tissue with a #15 scalpel blade. The skin margins were undermined to an appropriate distance in all directions utilizing iris scissors and carried over to close the primary defect.
Complex Repair And O-T Advancement Flap Text: The defect edges were debeveled with a #15 scalpel blade.  The primary defect was closed partially with a complex linear closure.  Given the location of the remaining defect, shape of the defect and the proximity to free margins an O-T advancement flap was deemed most appropriate for complete closure of the defect.  Using a sterile surgical marker, an appropriate advancement flap was drawn incorporating the defect and placing the expected incisions within the relaxed skin tension lines where possible. The area thus outlined was incised deep to adipose tissue with a #15 scalpel blade. The skin margins were undermined to an appropriate distance in all directions utilizing iris scissors and carried over to close the primary defect.
Hemostasis: Electrocautery
Burow's Graft Text: The defect edges were debeveled with a #15 scalpel blade. Given the location of the defect, shape of the defect, the proximity to free margins and the presence of a standing cone deformity a Burow's skin graft was deemed most appropriate. The standing cone was removed and this tissue was then trimmed to the shape of the primary defect. The adipose tissue was also removed until only dermis and epidermis were left.  The skin margins of the secondary defect were undermined to an appropriate distance in all directions utilizing iris scissors.  The secondary defect was closed with interrupted buried subcutaneous sutures.  The skin edges were then re-apposed with running  sutures.  The skin graft was then placed in the primary defect and oriented appropriately.
Hemigard Postcare Instructions: The HEMIGARD strips are to remain completely dry for at least 5-7 days.
Dermal Closure: buried vertical mattress
Purse String (Intermediate) Text: Given the location of the defect and the characteristics of the surrounding skin a purse string intermediate closure was deemed most appropriate.  Undermining was performed circumferentially around the surgical defect.  A purse string suture was then placed and tightened.
V-Y Plasty Text: The defect edges were debeveled with a #15 scalpel blade. Given the location of the defect, shape of the defect and the proximity to free margins an V-Y advancement flap was deemed most appropriate. Using a sterile surgical marker, an appropriate advancement flap was drawn incorporating the defect and placing the expected incisions within the relaxed skin tension lines where possible. The area thus outlined was incised deep to adipose tissue with a #15 scalpel blade. The skin margins were undermined to an appropriate distance in all directions utilizing iris scissors. Following this, the designed flap was advanced and carried over into the primary defect and sutured into place.
Mustarde Flap Text: The defect edges were debeveled with a #15 scalpel blade.  Given the size, depth and location of the defect and the proximity to free margins a Mustarde flap was deemed most appropriate. Using a sterile surgical marker, an appropriate flap was drawn incorporating the defect. The area thus outlined was incised with a #15 scalpel blade. The skin margins were undermined to an appropriate distance in all directions utilizing iris scissors. Following this, the designed flap was carried into the primary defect and sutured into place.
Complex Repair And Double M Plasty Text: The defect edges were debeveled with a #15 scalpel blade.  The primary defect was closed partially with a complex linear closure.  Given the location of the remaining defect, shape of the defect and the proximity to free margins a double M plasty was deemed most appropriate for complete closure of the defect.  Using a sterile surgical marker, an appropriate advancement flap was drawn incorporating the defect and placing the expected incisions within the relaxed skin tension lines where possible. The area thus outlined was incised deep to adipose tissue with a #15 scalpel blade. The skin margins were undermined to an appropriate distance in all directions utilizing iris scissors and carried over to close the primary defect.
Mucosal Advancement Flap Text: Given the location of the defect, shape of the defect and the proximity to free margins a mucosal advancement flap was deemed most appropriate. Incisions were made with a 15 blade scalpel in the appropriate fashion along the cutaneous vermilion border and the mucosal lip. The remaining actinically damaged mucosal tissue was excised.  The mucosal advancement flap was then elevated to the gingival sulcus with care taken to preserve the neurovascular structures and advanced into the primary defect. Care was taken to ensure that precise realignment of the vermilion border was achieved.
H Plasty Text: Given the location of the defect, shape of the defect and the proximity to free margins a H-plasty was deemed most appropriate for repair. Using a sterile surgical marker, the appropriate advancement arms of the H-plasty were drawn incorporating the defect and placing the expected incisions within the relaxed skin tension lines where possible. The area thus outlined was incised deep to adipose tissue with a #15 scalpel blade. The skin margins were undermined to an appropriate distance in all directions utilizing iris scissors.  The opposing advancement arms were then advanced and carried over into place in opposite direction and anchored with interrupted buried subcutaneous sutures.
Bilateral Helical Rim Advancement Flap Text: The defect edges were debeveled with a #15 blade scalpel.  Given the location of the defect and the proximity to free margins (helical rim) a bilateral helical rim advancement flap was deemed most appropriate. Using a sterile surgical marker, the appropriate advancement flaps were drawn incorporating the defect and placing the expected incisions between the helical rim and antihelix where possible.  The area thus outlined was incised through and through with a #15 scalpel blade.  With a skin hook and iris scissors, the flaps were gently and sharply undermined and freed up. Following this, the designed flaps were placed into the primary defect and sutured into place.
O-T Plasty Text: The defect edges were debeveled with a #15 scalpel blade. Given the location of the defect, shape of the defect and the proximity to free margins an O-T plasty was deemed most appropriate. Using a sterile surgical marker, an appropriate O-T plasty was drawn incorporating the defect and placing the expected incisions within the relaxed skin tension lines where possible. The area thus outlined was incised deep to adipose tissue with a #15 scalpel blade. The skin margins were undermined to an appropriate distance in all directions utilizing iris scissors. Following this, the designed flap was carried over into the primary defect and sutured into place.
Bi-Rhombic Flap Text: The defect edges were debeveled with a #15 scalpel blade. Given the location of the defect and the proximity to free margins a bi-rhombic flap was deemed most appropriate. Using a sterile surgical marker, an appropriate rhombic flap was drawn incorporating the defect. The area thus outlined was incised deep to adipose tissue with a #15 scalpel blade. The skin margins were undermined to an appropriate distance in all directions utilizing iris scissors. Following this, the designed flap was carried over into the primary defect and sutured into place.
Peng Advancement Flap Text: The defect edges were debeveled with a #15 scalpel blade. Given the location of the defect, shape of the defect and the proximity to free margins a Peng advancement flap was deemed most appropriate. Using a sterile surgical marker, an appropriate advancement flap was drawn incorporating the defect and placing the expected incisions within the relaxed skin tension lines where possible. The area thus outlined was incised deep to adipose tissue with a #15 scalpel blade. The skin margins were undermined to an appropriate distance in all directions utilizing iris scissors. Following this, the designed flap was advanced and carried over into the primary defect and sutured into place.
Path Notes (To The Dermatopathologist): Please check margins.
Bilobed Flap Text: The defect edges were debeveled with a #15 scalpel blade. Given the location of the defect and the proximity to free margins a bilobe flap was deemed most appropriate. Using a sterile surgical marker, an appropriate bilobe flap drawn around the defect. The area thus outlined was incised deep to adipose tissue with a #15 scalpel blade. The skin margins were undermined to an appropriate distance in all directions utilizing iris scissors. Following this, the designed flap was carried over into the primary defect and sutured into place.
No Repair - Repaired With Adjacent Surgical Defect Text (Leave Blank If You Do Not Want): After the excision the defect was repaired concurrently with another surgical defect which was in close approximation.
Complex Repair And Melolabial Flap Text: The defect edges were debeveled with a #15 scalpel blade.  The primary defect was closed partially with a complex linear closure.  Given the location of the remaining defect, shape of the defect and the proximity to free margins a melolabial flap was deemed most appropriate for complete closure of the defect.  Using a sterile surgical marker, an appropriate advancement flap was drawn incorporating the defect and placing the expected incisions within the relaxed skin tension lines where possible. The area thus outlined was incised deep to adipose tissue with a #15 scalpel blade. The skin margins were undermined to an appropriate distance in all directions utilizing iris scissors and carried over to close the primary defect.
Cheek Interpolation Flap Text: A decision was made to reconstruct the defect utilizing an interpolation axial flap and a staged reconstruction.  A telfa template was made of the defect.  This telfa template was then used to outline the Cheek Interpolation flap.  The donor area for the pedicle flap was then injected with anesthesia.  The flap was excised through the skin and subcutaneous tissue down to the layer of the underlying musculature.  The interpolation flap was carefully excised within this deep plane to maintain its blood supply.  The edges of the donor site were undermined.   The donor site was closed in a primary fashion.  The pedicle was then rotated into position and sutured.  Once the tube was sutured into place, adequate blood supply was confirmed with blanching and refill.  The pedicle was then wrapped with xeroform gauze and dressed appropriately with a telfa and gauze bandage to ensure continued blood supply and protect the attached pedicle.
Detail Level: Detailed
Where Do You Want The Question To Include Opioid Counseling Located?: Case Summary Tab
Modified Advancement Flap Text: The defect edges were debeveled with a #15 scalpel blade. Given the location of the defect, shape of the defect and the proximity to free margins a modified advancement flap was deemed most appropriate. Using a sterile surgical marker, an appropriate advancement flap was drawn incorporating the defect and placing the expected incisions within the relaxed skin tension lines where possible. The area thus outlined was incised deep to adipose tissue with a #15 scalpel blade. The skin margins were undermined to an appropriate distance in all directions utilizing iris scissors. Following this, the designed flap was advanced and carried over into the primary defect and sutured into place.
Complex Repair And A-T Advancement Flap Text: The defect edges were debeveled with a #15 scalpel blade.  The primary defect was closed partially with a complex linear closure.  Given the location of the remaining defect, shape of the defect and the proximity to free margins an A-T advancement flap was deemed most appropriate for complete closure of the defect.  Using a sterile surgical marker, an appropriate advancement flap was drawn incorporating the defect and placing the expected incisions within the relaxed skin tension lines where possible. The area thus outlined was incised deep to adipose tissue with a #15 scalpel blade. The skin margins were undermined to an appropriate distance in all directions utilizing iris scissors and carried over to close the primary defect.
Pre-Excision Curettage Text (Leave Blank If You Do Not Want): Prior to drawing the surgical margin the visible lesion was removed with curettage to clearly define the lesion size.
Estimated Blood Loss (Cc): minimal
Perilesional Excision Additional Text (Leave Blank If You Do Not Want): The margin was drawn around the clinically apparent lesion. Incisions were then made along these lines to the appropriate tissue plane and the lesion was extirpated.
Complex Repair And Epidermal Autograft Text: The defect edges were debeveled with a #15 scalpel blade.  The primary defect was closed partially with a complex linear closure.  Given the location of the defect, shape of the defect and the proximity to free margins an epidermal autograft was deemed most appropriate to repair the remaining defect.  The graft was trimmed to fit the size of the remaining defect.  The graft was then placed in the primary defect, oriented appropriately, and sutured into place.
Number Of Hemigard Strips Per Side: 1
Trilobed Flap Text: The defect edges were debeveled with a #15 scalpel blade. Given the location of the defect and the proximity to free margins a trilobed flap was deemed most appropriate. Using a sterile surgical marker, an appropriate trilobed flap was drawn around the defect. The area thus outlined was incised deep to adipose tissue with a #15 scalpel blade. The skin margins were undermined to an appropriate distance in all directions utilizing iris scissors. Following this, the designed flap was carried into the primary defect and sutured into place.
Abbe Flap (Upper To Lower Lip) Text: The defect of the lower lip was assessed and measured.  Given the location and size of the defect, an Abbe flap was deemed most appropriate. Using a sterile surgical marker, an appropriate Abbe flap was measured and drawn on the upper lip. Local anesthesia was then infiltrated.  A scalpel was then used to incise the upper lip through and through the skin, vermilion, muscle and mucosa, leaving the flap pedicled on the opposite side.  The flap was then rotated and transferred to the lower lip defect.  The flap was then sutured into place with a three layer technique, closing the orbicularis oris muscle layer with subcutaneous buried sutures, followed by a mucosal layer and an epidermal layer.
Length To Time In Minutes Device Was In Place: 10
Rhombic Flap Text: The defect edges were debeveled with a #15 scalpel blade. Given the location of the defect and the proximity to free margins a rhombic flap was deemed most appropriate. Using a sterile surgical marker, an appropriate rhombic flap was drawn incorporating the defect. The area thus outlined was incised deep to adipose tissue with a #15 scalpel blade. The skin margins were undermined to an appropriate distance in all directions utilizing iris scissors. Following this, the designed flap was carried over into the primary defect and sutured into place.
Repair Type: None - Secondary Intention
Double Island Pedicle Flap Text: The defect edges were debeveled with a #15 scalpel blade. Given the location of the defect, shape of the defect and the proximity to free margins a double island pedicle advancement flap was deemed most appropriate. Using a sterile surgical marker, an appropriate advancement flap was drawn incorporating the defect, outlining the appropriate donor tissue and placing the expected incisions within the relaxed skin tension lines where possible. The area thus outlined was incised deep to adipose tissue with a #15 scalpel blade. The skin margins were undermined to an appropriate distance in all directions around the primary defect and laterally outward around the island pedicle utilizing iris scissors.  There was minimal undermining beneath the pedicle flap. Following this, the flap was carried over into the primary defect and sutured into place.
Repair Depth: use same depth as excision depth
Nasalis-Muscle-Based Myocutaneous Island Pedicle Flap Text: Using a #15 blade, an incision was made around the donor flap to the level of the nasalis muscle. Wide lateral undermining was then performed in both the subcutaneous plane above the nasalis muscle, and in a submuscular plane just above periosteum. This allowed the formation of a free nasalis muscle axial pedicle (based on the angular artery) which was still attached to the actual cutaneous flap, increasing its mobility and vascular viability. Hemostasis was obtained with pinpoint electrocoagulation. The flap was mobilized into position and the pivotal anchor points positioned and stabilized with buried interrupted sutures. Subcutaneous and dermal tissues were closed in a multilayered fashion with sutures. Tissue redundancies were excised, and the epidermal edges were apposed without significant tension and sutured with sutures.
Hemigard Intro: Due to skin fragility and wound tension, it was decided to use HEMIGARD adhesive retention suture devices to permit a linear closure. The skin was cleaned and dried for a 6cm distance away from the wound. Excessive hair, if present, was removed to allow for adhesion.
Double O-Z Plasty Text: The defect edges were debeveled with a #15 scalpel blade. Given the location of the defect, shape of the defect and the proximity to free margins a Double O-Z plasty (double transposition flap) was deemed most appropriate. Using a sterile surgical marker, the appropriate transposition flaps were drawn incorporating the defect and placing the expected incisions within the relaxed skin tension lines where possible. The area thus outlined was incised deep to adipose tissue with a #15 scalpel blade. The skin margins were undermined to an appropriate distance in all directions utilizing iris scissors. Hemostasis was achieved with electrocautery. The flaps were then transposed and carried over into place, one clockwise and the other counterclockwise, and anchored with interrupted buried subcutaneous sutures.
Information: Selecting Yes will display possible errors in your note based on the variables you have selected. This validation is only offered as a suggestion for you. PLEASE NOTE THAT THE VALIDATION TEXT WILL BE REMOVED WHEN YOU FINALIZE YOUR NOTE. IF YOU WANT TO FAX A PRELIMINARY NOTE YOU WILL NEED TO TOGGLE THIS TO 'NO' IF YOU DO NOT WANT IT IN YOUR FAXED NOTE.
Cartilage Graft Text: The defect edges were debeveled with a #15 scalpel blade. Given the location of the defect, shape of the defect, the fact the defect involved a full thickness cartilage defect a cartilage graft was deemed most appropriate.  An appropriate donor site was identified, cleansed, and anesthetized. The cartilage graft was then harvested and transferred to the recipient site, oriented appropriately and then sutured into place.  The secondary defect was then repaired using a primary closure.
Slit Excision Additional Text (Leave Blank If You Do Not Want): A linear line was drawn on the skin overlying the lesion. An incision was made slowly until the lesion was visualized.  Once visualized, the lesion was removed with blunt dissection.
Rotation Flap Text: The defect edges were debeveled with a #15 scalpel blade. Given the location of the defect, shape of the defect and the proximity to free margins a rotation flap was deemed most appropriate. Using a sterile surgical marker, an appropriate rotation flap was drawn incorporating the defect and placing the expected incisions within the relaxed skin tension lines where possible. The area thus outlined was incised deep to adipose tissue with a #15 scalpel blade. The skin margins were undermined to an appropriate distance in all directions utilizing iris scissors. Following this, the designed flap was carried over into the primary defect and sutured into place.
Complex Repair And Single Advancement Flap Text: The defect edges were debeveled with a #15 scalpel blade.  The primary defect was closed partially with a complex linear closure.  Given the location of the remaining defect, shape of the defect and the proximity to free margins a single advancement flap was deemed most appropriate for complete closure of the defect.  Using a sterile surgical marker, an appropriate advancement flap was drawn incorporating the defect and placing the expected incisions within the relaxed skin tension lines where possible. The area thus outlined was incised deep to adipose tissue with a #15 scalpel blade. The skin margins were undermined to an appropriate distance in all directions utilizing iris scissors and carried over to close the primary defect.
Complex Repair And Skin Substitute Graft Text: The defect edges were debeveled with a #15 scalpel blade.  The primary defect was closed partially with a complex linear closure.  Given the location of the remaining defect, shape of the defect and the proximity to free margins a skin substitute graft was deemed most appropriate to repair the remaining defect.  The graft was trimmed to fit the size of the remaining defect.  The graft was then placed in the primary defect, oriented appropriately, and sutured into place.
Epidermal Closure Graft Donor Site (Optional): simple interrupted
Orbicularis Oris Muscle Flap Text: The defect edges were debeveled with a #15 scalpel blade.  Given that the defect affected the competency of the oral sphincter an orbicularis oris muscle flap was deemed most appropriate to restore this competency and normal muscle function.  Using a sterile surgical marker, an appropriate flap was drawn incorporating the defect. The area thus outlined was incised with a #15 scalpel blade. Following this, the designed flap was carried over into the primary defect and sutured into place.
Curvilinear Excision Additional Text (Leave Blank If You Do Not Want): The margin was drawn around the clinically apparent lesion.  A curvilinear shape was then drawn on the skin incorporating the lesion and margins.  Incisions were then made along these lines to the appropriate tissue plane and the lesion was extirpated.
Complex Repair And Ftsg Text: The defect edges were debeveled with a #15 scalpel blade.  The primary defect was closed partially with a complex linear closure.  Given the location of the defect, shape of the defect and the proximity to free margins a full thickness skin graft was deemed most appropriate to repair the remaining defect.  The graft was trimmed to fit the size of the remaining defect.  The graft was then placed in the primary defect, oriented appropriately, and sutured into place.
Complex Repair And Transposition Flap Text: The defect edges were debeveled with a #15 scalpel blade.  The primary defect was closed partially with a complex linear closure.  Given the location of the remaining defect, shape of the defect and the proximity to free margins a transposition flap was deemed most appropriate for complete closure of the defect.  Using a sterile surgical marker, an appropriate advancement flap was drawn incorporating the defect and placing the expected incisions within the relaxed skin tension lines where possible. The area thus outlined was incised deep to adipose tissue with a #15 scalpel blade. The skin margins were undermined to an appropriate distance in all directions utilizing iris scissors and carried over to close the primary defect.
Lab: [id number]
Dermal Autograft Text: The defect edges were debeveled with a #15 scalpel blade. Given the location of the defect, shape of the defect and the proximity to free margins a dermal autograft was deemed most appropriate. Using a sterile surgical marker, the primary defect shape was transferred to the donor site. The area thus outlined was incised deep to adipose tissue with a #15 scalpel blade.  The harvested graft was then trimmed of adipose and epidermal tissue until only dermis was left.  The skin graft was then placed in the primary defect and oriented appropriately.
Complex Repair And Rotation Flap Text: The defect edges were debeveled with a #15 scalpel blade.  The primary defect was closed partially with a complex linear closure.  Given the location of the remaining defect, shape of the defect and the proximity to free margins a rotation flap was deemed most appropriate for complete closure of the defect.  Using a sterile surgical marker, an appropriate advancement flap was drawn incorporating the defect and placing the expected incisions within the relaxed skin tension lines where possible. The area thus outlined was incised deep to adipose tissue with a #15 scalpel blade. The skin margins were undermined to an appropriate distance in all directions utilizing iris scissors and carried over to close the primary defect.
Suture Removal: 14 days
Repair Performed By Another Provider Text (Leave Blank If You Do Not Want): After the tissue was excised the defect was repaired by another provider.
Melolabial Transposition Flap Text: The defect edges were debeveled with a #15 scalpel blade. Given the location of the defect and the proximity to free margins a melolabial flap was deemed most appropriate. Using a sterile surgical marker, an appropriate melolabial transposition flap was drawn incorporating the defect. The area thus outlined was incised deep to adipose tissue with a #15 scalpel blade. The skin margins were undermined to an appropriate distance in all directions utilizing iris scissors. Following this, the designed flap was carried over into the primary defect and sutured into place.
Cheek-To-Nose Interpolation Flap Text: A decision was made to reconstruct the defect utilizing an interpolation axial flap and a staged reconstruction.  A telfa template was made of the defect.  This telfa template was then used to outline the Cheek-To-Nose Interpolation flap.  The donor area for the pedicle flap was then injected with anesthesia.  The flap was excised through the skin and subcutaneous tissue down to the layer of the underlying musculature.  The interpolation flap was carefully excised within this deep plane to maintain its blood supply.  The edges of the donor site were undermined.   The donor site was closed in a primary fashion.  The pedicle was then rotated into position and sutured.  Once the tube was sutured into place, adequate blood supply was confirmed with blanching and refill.  The pedicle was then wrapped with xeroform gauze and dressed appropriately with a telfa and gauze bandage to ensure continued blood supply and protect the attached pedicle.
O-L Flap Text: The defect edges were debeveled with a #15 scalpel blade. Given the location of the defect, shape of the defect and the proximity to free margins an O-L flap was deemed most appropriate. Using a sterile surgical marker, an appropriate advancement flap was drawn incorporating the defect and placing the expected incisions within the relaxed skin tension lines where possible. The area thus outlined was incised deep to adipose tissue with a #15 scalpel blade. The skin margins were undermined to an appropriate distance in all directions utilizing iris scissors. Following this, the designed flap was advanced and carried over into the primary defect and sutured into place.
Alar Island Pedicle Flap Text: The defect edges were debeveled with a #15 scalpel blade. Given the location of the defect, shape of the defect and the proximity to the alar rim an island pedicle advancement flap was deemed most appropriate. Using a sterile surgical marker, an appropriate advancement flap was drawn incorporating the defect, outlining the appropriate donor tissue and placing the expected incisions within the nasal ala running parallel to the alar rim. The area thus outlined was incised with a #15 scalpel blade. The skin margins were undermined minimally to an appropriate distance in all directions around the primary defect and laterally outward around the island pedicle utilizing iris scissors.  There was minimal undermining beneath the pedicle flap. Following this, the designed flap was carried over into the primary defect and sutured into place.
Chonodrocutaneous Helical Advancement Flap Text: The defect edges were debeveled with a #15 scalpel blade. Given the location of the defect and the proximity to free margins a chondrocutaneous helical advancement flap was deemed most appropriate. Using a sterile surgical marker, the appropriate advancement flap was drawn incorporating the defect and placing the expected incisions within the relaxed skin tension lines where possible. The area thus outlined was incised deep to adipose tissue with a #15 scalpel blade. The skin margins were undermined to an appropriate distance in all directions utilizing iris scissors. Following this, the designed flap was advanced and carried over into the primary defect and sutured into place.
Eye Clamp Note Details: An eye clamp was used during the procedure.
Billing Type: Third-Party Bill
Island Pedicle Flap Text: The defect edges were debeveled with a #15 scalpel blade. Given the location of the defect, shape of the defect and the proximity to free margins an island pedicle advancement flap was deemed most appropriate. Using a sterile surgical marker, an appropriate advancement flap was drawn incorporating the defect, outlining the appropriate donor tissue and placing the expected incisions within the relaxed skin tension lines where possible. The area thus outlined was incised deep to adipose tissue with a #15 scalpel blade. The skin margins were undermined to an appropriate distance in all directions around the primary defect and laterally outward around the island pedicle utilizing iris scissors.  There was minimal undermining beneath the pedicle flap. Following this, the flap was carried over into the primary defect and sutured into place.
Zygomaticofacial Flap Text: Given the location of the defect, shape of the defect and the proximity to free margins a zygomaticofacial flap was deemed most appropriate for repair. Using a sterile surgical marker, the appropriate flap was drawn incorporating the defect and placing the expected incisions within the relaxed skin tension lines where possible. The area thus outlined was incised deep to adipose tissue with a #15 scalpel blade with preservation of a vascular pedicle.  The skin margins were undermined to an appropriate distance in all directions utilizing iris scissors. The flap was then carried over into the defect and anchored with interrupted buried subcutaneous sutures.
Pinch Graft Text: The defect edges were debeveled with a #15 scalpel blade. Given the location of the defect, shape of the defect and the proximity to free margins a pinch graft was deemed most appropriate. Using a sterile surgical marker, the primary defect shape was transferred to the donor site. The area thus outlined was incised deep to adipose tissue with a #15 scalpel blade.  The harvested graft was then trimmed of adipose tissue until only dermis and epidermis was left. The skin margins of the secondary defect were undermined to an appropriate distance in all directions utilizing iris scissors.  The secondary defect was closed with interrupted buried subcutaneous sutures.  The skin edges were then re-apposed with running  sutures.  The skin graft was then placed in the primary defect and oriented appropriately.
Composite Graft Text: The defect edges were debeveled with a #15 scalpel blade. Given the location of the defect, shape of the defect, the proximity to free margins and the fact the defect was full thickness a composite graft was deemed most appropriate.  The defect was outline and then transferred to the donor site.  A full thickness graft was then excised from the donor site. The graft was then placed in the primary defect, oriented appropriately and then sutured into place.  The secondary defect was then repaired using a primary closure.
Skin Substitute Text: The defect edges were debeveled with a #15 scalpel blade. Given the location of the defect, shape of the defect and the proximity to free margins a skin substitute graft was deemed most appropriate.  The graft material was trimmed to fit the size of the defect. The graft was then placed in the primary defect and oriented appropriately.
Banner Transposition Flap Text: The defect edges were debeveled with a #15 scalpel blade. Given the location of the defect and the proximity to free margins a Banner transposition flap was deemed most appropriate. Using a sterile surgical marker, an appropriate flap was drawn around the defect. The area thus outlined was incised deep to adipose tissue with a #15 scalpel blade. The skin margins were undermined to an appropriate distance in all directions utilizing iris scissors. Following this, the designed flap was carried into the primary defect and sutured into place.
Complex Repair And Bilobe Flap Text: The defect edges were debeveled with a #15 scalpel blade.  The primary defect was closed partially with a complex linear closure.  Given the location of the remaining defect, shape of the defect and the proximity to free margins a bilobe flap was deemed most appropriate for complete closure of the defect.  Using a sterile surgical marker, an appropriate advancement flap was drawn incorporating the defect and placing the expected incisions within the relaxed skin tension lines where possible. The area thus outlined was incised deep to adipose tissue with a #15 scalpel blade. The skin margins were undermined to an appropriate distance in all directions utilizing iris scissors and carried over to close the primary defect.

## 2025-03-28 ENCOUNTER — ANTI-COAG VISIT (OUTPATIENT)
Age: 85
End: 2025-03-28
Attending: INTERNAL MEDICINE
Payer: MEDICARE

## 2025-03-28 DIAGNOSIS — I82.4Y3 ACUTE DEEP VEIN THROMBOSIS (DVT) OF PROXIMAL VEIN OF BOTH LOWER EXTREMITIES (HCC): ICD-10-CM

## 2025-03-28 DIAGNOSIS — Z79.01 CHRONIC ANTICOAGULATION: ICD-10-CM

## 2025-03-28 DIAGNOSIS — I82.409 RECURRENT DEEP VEIN THROMBOSIS (DVT) (HCC): ICD-10-CM

## 2025-03-28 LAB
INR BLD: 2.55 (ref 0.8–1.2)
PROTHROMBIN TIME: 27 SECONDS (ref 11.6–14.8)

## 2025-04-25 ENCOUNTER — APPOINTMENT (OUTPATIENT)
Age: 85
End: 2025-04-25
Attending: INTERNAL MEDICINE
Payer: MEDICARE

## 2025-05-02 ENCOUNTER — ANTI-COAG VISIT (OUTPATIENT)
Age: 85
End: 2025-05-02
Attending: INTERNAL MEDICINE
Payer: MEDICARE

## 2025-05-02 DIAGNOSIS — D47.2 MGUS (MONOCLONAL GAMMOPATHY OF UNKNOWN SIGNIFICANCE): ICD-10-CM

## 2025-05-02 DIAGNOSIS — I82.409 RECURRENT DEEP VEIN THROMBOSIS (DVT) (HCC): ICD-10-CM

## 2025-05-02 DIAGNOSIS — D64.9 NORMOCYTIC ANEMIA: ICD-10-CM

## 2025-05-02 DIAGNOSIS — Z79.01 CHRONIC ANTICOAGULATION: ICD-10-CM

## 2025-05-02 DIAGNOSIS — I82.4Y3 ACUTE DEEP VEIN THROMBOSIS (DVT) OF PROXIMAL VEIN OF BOTH LOWER EXTREMITIES (HCC): ICD-10-CM

## 2025-05-02 LAB
ALBUMIN SERPL-MCNC: 4.1 G/DL (ref 3.2–4.8)
ALBUMIN/GLOB SERPL: 1.4 {RATIO} (ref 1–2)
ALP LIVER SERPL-CCNC: 123 U/L (ref 45–117)
ALT SERPL-CCNC: 20 U/L (ref 10–49)
ANION GAP SERPL CALC-SCNC: 5 MMOL/L (ref 0–18)
AST SERPL-CCNC: 23 U/L (ref ?–34)
BASOPHILS # BLD AUTO: 0.04 X10(3) UL (ref 0–0.2)
BASOPHILS NFR BLD AUTO: 0.6 %
BILIRUB SERPL-MCNC: 1.4 MG/DL (ref 0.2–1.1)
BUN BLD-MCNC: 15 MG/DL (ref 9–23)
CALCIUM BLD-MCNC: 10.3 MG/DL (ref 8.7–10.6)
CHLORIDE SERPL-SCNC: 105 MMOL/L (ref 98–112)
CO2 SERPL-SCNC: 31 MMOL/L (ref 21–32)
CREAT BLD-MCNC: 1.18 MG/DL (ref 0.7–1.3)
CRP SERPL-MCNC: 1.1 MG/DL (ref ?–0.5)
DEPRECATED HBV CORE AB SER IA-ACNC: 76 NG/ML (ref 50–336)
EGFRCR SERPLBLD CKD-EPI 2021: 61 ML/MIN/1.73M2 (ref 60–?)
EOSINOPHIL # BLD AUTO: 0.14 X10(3) UL (ref 0–0.7)
EOSINOPHIL NFR BLD AUTO: 2 %
ERYTHROCYTE [DISTWIDTH] IN BLOOD BY AUTOMATED COUNT: 13.4 %
GLOBULIN PLAS-MCNC: 2.9 G/DL (ref 2–3.5)
GLUCOSE BLD-MCNC: 115 MG/DL (ref 70–99)
HCT VFR BLD AUTO: 52.9 % (ref 39–53)
HGB BLD-MCNC: 17.7 G/DL (ref 13–17.5)
HGB RETIC QN AUTO: 34.9 PG (ref 28.2–36.6)
IMM GRANULOCYTES # BLD AUTO: 0.02 X10(3) UL (ref 0–1)
IMM GRANULOCYTES NFR BLD: 0.3 %
IMM RETICS NFR: 0.11 RATIO (ref 0.1–0.3)
INR BLD: 3.09 (ref 0.8–1.2)
LYMPHOCYTES # BLD AUTO: 0.93 X10(3) UL (ref 1–4)
LYMPHOCYTES NFR BLD AUTO: 13.4 %
MCH RBC QN AUTO: 31.8 PG (ref 26–34)
MCHC RBC AUTO-ENTMCNC: 33.5 G/DL (ref 31–37)
MCV RBC AUTO: 95 FL (ref 80–100)
MONOCYTES # BLD AUTO: 0.65 X10(3) UL (ref 0.1–1)
MONOCYTES NFR BLD AUTO: 9.4 %
NEUTROPHILS # BLD AUTO: 5.14 X10 (3) UL (ref 1.5–7.7)
NEUTROPHILS # BLD AUTO: 5.14 X10(3) UL (ref 1.5–7.7)
NEUTROPHILS NFR BLD AUTO: 74.3 %
OSMOLALITY SERPL CALC.SUM OF ELEC: 294 MOSM/KG (ref 275–295)
PLATELET # BLD AUTO: 177 10(3)UL (ref 150–450)
POTASSIUM SERPL-SCNC: 4.7 MMOL/L (ref 3.5–5.1)
PROT SERPL-MCNC: 7 G/DL (ref 5.7–8.2)
PROTHROMBIN TIME: 31.4 SECONDS (ref 11.6–14.8)
RBC # BLD AUTO: 5.57 X10(6)UL (ref 3.8–5.8)
RETICS # AUTO: 58.5 X10(3) UL (ref 22.5–147.5)
RETICS/RBC NFR AUTO: 1.1 % (ref 0.5–2.5)
SODIUM SERPL-SCNC: 141 MMOL/L (ref 136–145)
WBC # BLD AUTO: 6.9 X10(3) UL (ref 4–11)

## 2025-05-06 ENCOUNTER — OFFICE VISIT (OUTPATIENT)
Facility: LOCATION | Age: 85
End: 2025-05-06
Payer: MEDICARE

## 2025-05-06 DIAGNOSIS — G62.9 PERIPHERAL POLYNEUROPATHY: ICD-10-CM

## 2025-05-06 DIAGNOSIS — S86.311A TEAR OF PERONEAL TENDON OF RIGHT FOOT: ICD-10-CM

## 2025-05-06 DIAGNOSIS — E11.9 TYPE 2 DIABETES MELLITUS WITHOUT COMPLICATION, UNSPECIFIED WHETHER LONG TERM INSULIN USE (HCC): ICD-10-CM

## 2025-05-06 DIAGNOSIS — R60.0 BILATERAL LOWER EXTREMITY EDEMA: ICD-10-CM

## 2025-05-06 DIAGNOSIS — R26.9 GAIT DIFFICULTY: ICD-10-CM

## 2025-05-06 DIAGNOSIS — B35.1 ONYCHOMYCOSIS: Primary | ICD-10-CM

## 2025-05-06 PROCEDURE — 99213 OFFICE O/P EST LOW 20 MIN: CPT | Performed by: PODIATRIST

## 2025-05-06 NOTE — PROGRESS NOTES
[facility]  Progress Note      Boyd Recio is a 84 year old male.   Chief Complaint   Patient presents with    Toenail Care     F/u Toenail Care         HPI:     Patient is a pleasant 84-year-old diabetic male who is returning to clinic today for diabetic footcare.  Patient states he is doing well overall.  Denying any pain today.  States that he occasionally utilizes his ankle brace but has not needed it recently as he has not had pain.  He is utilizing a walker today and does have a cane as well.  He is ambulating in his supportive shoe gear.  He is unable to trim his toenails himself and is hoping to have them debrided today.  Denies recent signs of infection to his feet.  Continues to have nightly heel pain, which is resolved with a pillow underneath his legs.  No other concerns at this time.      Allergies: Patient has no known allergies.   Current Medications[1]   Past Medical History[2]   Past Surgical History[3]   Family History[4]   Social Hx on file[5]        REVIEW OF SYSTEMS:     10 point ROS completed and was negative unless stated in HPI.      EXAM:     GENERAL: well developed, well nourished, in no apparent distress  EXTREMITIES:              1. Integument: Nails x10 are thickened, elongated, discolored, dystrophic with subungual debris noted to right hallux toenail.  Normal skin temperature and decreased turgor.  Skin color changes consistent with hemosiderin deposition noted to bilateral lower extremities.  2. Vascular: Dorsalis pedis 2/4 bilateral and posterior tibial pulses 2/4 bilateral, capillary refill normal.  2+ pitting edema noted to bilateral lower extremities              3. Musculoskeletal: No pain elicited on palpation along the course of the peroneal tendons near the lateral malleolus.  Adequate eversion strength is noted with no signs of tendon rupture, right.  No pain with palpation noted to plantar medial aspect of right heel.  Decreased ankle joint dorsiflexion  noted with the knee extended, which does improve with the knee flexed consistent with equinus deformity, right.  Some discomfort with palpation of toenails of bilateral feet              4. Neurological: Sensation diminished via light touch to bilateral lower extremities.  Protective sensation diminished via [place] test bilaterally     Bilateral barefoot skin diabetic exam is abnormal with diabetic monofilament/sensation testing abnormal, acute swelling and/or acute deformity, and dystrophic nails and/or dry skin      ASSESSMENT AND PLAN:   Diagnoses and all orders for this visit:    Onychomycosis    Bilateral lower extremity edema    Gait difficulty    Tear of peroneal tendon of right foot    Type 2 diabetes mellitus without complication, unspecified whether long term insulin use (HCC)    Peripheral polyneuropathy        Plan:   -Patient was seen and evaluated today in clinic.  Chart history reviewed.    -Discussed importance of proper pedal hygiene, daily foot checks, and tight glycemic control.    -Patient to avoid walking barefoot. Recommend ambulating with supportive diabetic shoes and inserts.    -Patient to monitor for acute signs of infection and seek immediate medical attention if any signs of infection or other concerns arise.    -Discussed with patient proper care and hygiene for their feet.  Recommend daily foot soaks for 10 minutes with warm water and apple cider vinegar    -If thickness of the toenails are causing discomfort, advised patient to try utilizing Vicks VapoRub on her toenails every night.  The goal would be for the toenails to soften over time.  -Patient relates pain relief with intermittent toenail debridements.  -Patient elects for nail debridement today. Toenails 1-5 of the left foot and 1-5 of the right foot were debrided today.  They tolerated procedures well, without incident.    -Instrumentation used includes nail nippers.    -All of the patient's questions and concerns were  addressed.  They indicated their understanding of these issues and agrees to the plan.    RTC 2-3-months    Cameron Menard DPM, AACFAS        5/6/2025    [facility]  [address] [place] [id number]   [email or web address]            Dragon speech recognition software was used to prepare this note.  Errors in word recognition may occur.  Please contact me with any questions/concerns with this note.        [1]   Current Outpatient Medications   Medication Sig Dispense Refill    pantoprazole 40 MG Oral Tab EC Take 1 tablet (40 mg total) by mouth 2 (two) times daily. 180 tablet 3    furosemide 40 MG Oral Tab Take 1 tablet (40 mg total) by mouth daily. 90 tablet 3    silodosin 8 MG Oral Cap Take 1 capsule (8 mg total) by mouth daily. 90 capsule 5    finasteride 5 MG Oral Tab Take 1 tablet (5 mg total) by mouth daily. 90 tablet 5    Mirabegron ER (MYRBETRIQ) 50 MG Oral Tablet 24 Hr Take 1 tablet (50 mg total) by mouth daily. 90 tablet 5    WARFARIN 5 MG Oral Tab TAKE 1 TABLET AS DIRECTED BASED ON INR READINGS 90 tablet 3    ROSUVASTATIN 10 MG Oral Tab TAKE 1 TABLET NIGHTLY 90 tablet 3    clobetasol 0.05 % External Ointment APPLY OINTMENT TOPICALLY TO AFFECTED AREA TWICE DAILY ON MOIST SKIN AS NEEDED FOR FLARES      Vitamin D, Cholecalciferol, 25 MCG (1000 UT) Oral Cap Take by mouth daily.      acetaminophen 500 MG Oral Tab Take 1 tablet (500 mg total) by mouth every 6 (six) hours as needed for Pain.  0    latanoprost 0.005 % Ophthalmic Solution Place 1 drop into both eyes nightly.      Dicloxacillin Sodium 500 MG Oral Cap Take 1 capsule (500 mg total) by mouth 2 (two) times daily.     [2]   Past Medical History:   Arrhythmia    Back problem    Bull esophagus    BPH (benign prostatic hyperplasia)    Cataract    Cataract of both eyes    COVID    8/30/2022    Disorder of prostate    Diverticulosis of large intestine    DVT (deep venous thrombosis) (HCC)    Overview:  Overview:  3/11  after back surgery pulm emb-IVC filter Overview:  3/11 after back surgery pulm emb-IVC filter    Esophageal reflux    Frequent urination    Hearing impairment    bilateral hearing aids    Hearing loss    Wear hearing aids    Heartburn    Hemorrhoids    High cholesterol    Hx of endoscopy    Hyperlipidemia    Indigestion    Leaking of urine    Leg DVT (deep venous thromboembolism), acute, bilateral (HCC)    Leg swelling    Obesity    Pain in joints    Peripheral vascular disease    Pleurisy with effusion    Postoperative anemia due to acute blood loss    Prediabetes    Presence of IVC filter    cook celect filter.  Had 3 different IVC filters placed previous to current filter    Pulmonary embolism (HCC)    Stented coronary artery    aorta / iliac    UTI (urinary tract infection)    Venous ulcer of right leg (HCC)    Visual impairment    glasses    Wears glasses    Weight gain   [3]   Past Surgical History:  Procedure Laterality Date    Back surgery  2011,2012,2014, 2016,2019    Cataract  2012    Colonoscopy  09/12/2016    Colonoscopy  2019    Colonoscopy N/A 01/06/2022    Procedure: COLONOSCOPY;  Surgeon: Saeid Conde MD;  Location:  ENDOSCOPY    Colonoscopy N/A 09/21/2023    Procedure: COLONOSCOPY WITH COLD SNARE POLYPECTOMY;  Surgeon: Saeid Conde MD;  Location:  ENDOSCOPY    Endovas repair, infrarenl abdom aortic aneurysm/dissect  06/13/2017    Stent    Knee replacement surgery Right 04/04/2016    Other  06/2012    aortic and iliac artery stent    Other surgical history  1964    Pleuectomy    Other surgical history      IVC filter    Skin surgery  11/2023    Spinal fusion  10/12/2018    Spine surgery procedure unlisted      Back Surgery    Tonsillectomy      Vasectomy  1984   [4]   Family History  Problem Relation Age of Onset    Cancer Mother         Gall bladder    Cancer Father         Prostate    Colon Cancer Father         80+    Alcohol and Other Disorders Associated Sister          Alcoholism    Cancer Sister         Brain aneurysm    Cancer Sister         Cancer    Cancer Brother         Pancreas    Cancer Brother         Lung / Brain    DVT/VTE Neg    [5]   Social History  Socioeconomic History    Marital status:    Tobacco Use    Smoking status: Former     Current packs/day: 0.00     Average packs/day: 1 pack/day for 25.0 years (25.0 ttl pk-yrs)     Types: Cigarettes     Start date: 2/3/1962     Quit date: 2/3/1987     Years since quittin.2    Smokeless tobacco: Never   Vaping Use    Vaping status: Never Used   Substance and Sexual Activity    Alcohol use: Not Currently     Comment: Social drinker    Drug use: Never   Other Topics Concern    Caffeine Concern No    Exercise No    Seat Belt No    Special Diet No    Stress Concern No    Weight Concern No

## 2025-05-13 ENCOUNTER — OFFICE VISIT (OUTPATIENT)
Age: 85
End: 2025-05-13
Attending: INTERNAL MEDICINE
Payer: MEDICARE

## 2025-05-13 VITALS
BODY MASS INDEX: 41.72 KG/M2 | DIASTOLIC BLOOD PRESSURE: 69 MMHG | OXYGEN SATURATION: 94 % | RESPIRATION RATE: 18 BRPM | TEMPERATURE: 97 F | HEIGHT: 72.01 IN | HEART RATE: 84 BPM | WEIGHT: 308 LBS | SYSTOLIC BLOOD PRESSURE: 106 MMHG

## 2025-05-13 DIAGNOSIS — I82.409 RECURRENT DEEP VEIN THROMBOSIS (DVT) (HCC): Primary | ICD-10-CM

## 2025-05-13 DIAGNOSIS — Z95.828 PRESENCE OF IVC FILTER: ICD-10-CM

## 2025-05-13 DIAGNOSIS — I48.0 PAROXYSMAL ATRIAL FIBRILLATION (HCC): ICD-10-CM

## 2025-05-13 DIAGNOSIS — Z86.711 HISTORY OF PULMONARY EMBOLISM: ICD-10-CM

## 2025-05-13 DIAGNOSIS — Z79.01 CHRONIC ANTICOAGULATION: ICD-10-CM

## 2025-05-13 DIAGNOSIS — D50.0 IRON DEFICIENCY ANEMIA SECONDARY TO BLOOD LOSS (CHRONIC): ICD-10-CM

## 2025-05-13 DIAGNOSIS — D64.9 NORMOCYTIC ANEMIA: ICD-10-CM

## 2025-05-13 DIAGNOSIS — I82.4Y3 ACUTE DEEP VEIN THROMBOSIS (DVT) OF PROXIMAL VEIN OF BOTH LOWER EXTREMITIES (HCC): ICD-10-CM

## 2025-05-13 DIAGNOSIS — D47.2 MGUS (MONOCLONAL GAMMOPATHY OF UNKNOWN SIGNIFICANCE): ICD-10-CM

## 2025-05-13 NOTE — PROGRESS NOTES
Hematology Clinic Follow Up Visit    Patient Name: Boyd Recio  Medical Record Number: [id number]   YOB: 1940    PCP: Dr. Kari Hernandez   Other providers:  Dr. Ian Villarreal (urology), Dr. Isaiah Shin (renal)    Reason for Consultation:  Boyd Recio was seen today for the diagnosis of recurrent VTE, KVNG, MGUS    Hematologic History:  *Recurrent VTE  -3/2011- developed massive PE + DVT following surgery.  Developed cardiac arrest/code as a result.                  -on coumadin for at least several months, then stopped  -subsequently had a series of spine surgeries with IVC filter placements prior to each surgery- total of 4 filter placements- known dates as below (others unknown d/t incomplete medical records).   -3/10/14- infrarenal Narciso Tulip IVC filter placed as prophylaxis for spinal surgery  -7/10/14- removal of infrarenal Narciso Tulip IVC filter  -2/9/15- IVC filter (Cook Celect) placed- NOT REMOVED  -3/21/19- BLE venous doppler- + R posterior tibial DVT (provoked by surgery)                -started eliquis  -9/27/19- RLE venous doppler- no DVT  10/2019- stopped eliquis   -7/6/20- BLE venous doppler-  Extensive bilateral lower extremity DVT is present.  On the left there is DVT involving visualized left iliac veins extending to the calf veins.  On the right partial thrombus is seen within right iliac veins as well as right common femoral vein, right femoral vein, and right calf veins.  -7/6/20- CTA c/a/p- multiple scattered occlusive and nonocclusive pulmonary emboli within the lobar, segmental, and subsegmental branches of the pulmonary arterial vasculature bilaterally with an overall moderate embolic burden. No grace CT evidence of right heart strain. IVC filter noted.  The IVC is not well opacified on this exam due to phase of imaging.  IVC thrombus is not entirely excluded.  The common iliac veins and external iliac veins are dilated/distended and demonstrate surrounding fatty  infiltration which may be due to underlying thrombus. Oglesby catheter in the urinary bladder.  Urinary bladder wall is thickened and there is air in the urinary bladder.  There is surrounding fatty induration.  This may be due to underlying cystitis, however clinical correlation is recommended. Enlarged pelvic and left inguinal lymph nodes as above may be reactive, however clinical correlation recommended.                -started on UFH gtt  -7/8/20- underwent CDT to BLE by interventional cardiology   -started on warfarin with target INR 2-3  -10/2/20- LLE venous doppler- residual focal segment occlusive thrombus involving the proximal aspect of the left posterior tibial vein.   -4/7/21- BLE venous doppler- Negative exam, no evidence of lower extremity DVT.   -4/26/21- venous insuff doppler- Right: Negative for deep and superficial vein thrombosis. Negative for deep and superficial vein reflux (insufficiency). Left: Negative for deep and superficial vein thrombosis. Deep vein reflux involving the common femoral vein, femoral vein and profunda femoral vein.  Superficial vein reflux (insufficiency) involving the great and small saphenous vein     *MGUS-IgG lambda  -4/27/21- monoclonal protein study showed small nonquantifiable IgG lambda M spike.  -10/26/21- no detectable monoclonal protein on SPEP/MANDEEP  -10/21/22- small nonquantifiable IgG lambda M spike re-identified.   -10/20/23- small nonquantifiable IgG lambda M spike re-identified.   -11/7/24- small nonquantifiable IgG lambda M spike re-identified.     *Anemia- AoCD, KVNG  -4/16/19- wbc 6.2, hgb 9.8, plt 312  -10/11/19- wbc 6.5, hgb 11.6, plt 219  -7/6/20- wbc 9.2, hgb 10.9, plt 113, ferritin 104, CRP 18  -7/28/20- wbc 6.2, hgb 11.2, MCV 89, plt 229  -10/6/20- wbc 5.9, hgb 12.4, MCV 84, plt 179, CRP 0.8  -3/10/21- wbc 6.8, hgb 12.4, MCV 84, plt 221  -4/27/21- wbc 7.0, hgb 13.1, MCV 83, plt 218, ferritin 11, CRP 1.5  -5/13/21- received IV INFeD 1000mg  -10/26/21-  wbc 6.5, hgb 14.5, MCV 94, plt 168, ferritin 43  -11/10/21- IV INFeD 1000mg   -1/6/22- EGD/Colonoscopy (Dr. Conde)- White plaque without underlying exudate or inflammation noted in proximal esophagus, biopsy showed rare fungal organisms consistent with Candida species.  Colonoscopy revealed 9 colonic/rectal polyps that were removed by polypectomy-pathology showed tubular and tubulovillous adenomas other than the rectal polyp was hyperplastic. +diverticulosis.  No evidence of bleeding source.  Normal duodenal biopsy.  -1/31/22- capsule endoscopy (Dr. Durán)- The small bowel mucosa has a normal villous appearance.  There are no masses, polyps, ulcers, erosions, diverticula, or strictures.  2 small non-bleeding vascular ectasias are appreciated. Both are likely out of reach for conventional push enteroscopy.   -4/7/22- wbc 5.8, hgb 15.2, MCV 98, plt 195, ferritin 115, TSAT 26%  -10/21/22- wbc 5.5, hgb 15.7, MCV 94, plt 171, ferritin 135, TSAT 31%  -4/26/23- wbc 6.2, hgb 16.0, MCV 94, plt 164, ferritin 73, TSAT 23%  -9/21/23- colonoscopy (Dr. Conde)- Two 2-4 mm sessile polyps in the descending colon, removed with a cold snare polypectomy. 3 mm sessile polyp in the sigmoid colon, removed with a cold snare polypectomy. Diverticulosis in the sigmoid colon. Large internal hemorrhoids.  Pathology of polyps showed tubular adenomata.   -10/20/23- wbc 6.4, hgb 15.9, MCV 94, plt 175, ferritin 94  -5/3/24- wbc 6.4, hgb 16.5, MCV 94, plt 168, ferritin 66, CRP 0.8  -11/7/24- wbc 6.7, hgb 16.8, MCV 95, plt 161, ferritin 66  -5/2/25- wbc 6.9, hgb 17.7, MCV 95, plt 177, ferritin 76, CRP 1.1    ============================  Interval events: Presents for follow-up.  He is feeling about the same overall.  He is right foot pain is less of an issue lately.  He is walking better but is still particularly active.  He mowed the lawn recently but had to take many breaks.      Stable baseline dyspnea with exertion is unchanged.  He had a  single episode of lightheadedness a couple weeks ago that was accompanied by emesis.  He felt better in a day or 2 and has not had any related issues since.  No chest pain or lightheadedness otherwise.  Leg swelling remains stable at baseline without any leg pains.  No recent fevers or infections.    Remains on warfarin.  He denies any bleeding or dark stools. No bowel or bladder changes.      Of note he has a history of an aortic and iliac arterial stent that was placed in 2012.  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  He remains on chronic suppressive dicloxacillin.      Past Medical History:  Past Medical History:    Arrhythmia    Back problem    Bull esophagus    BPH (benign prostatic hyperplasia)    Cataract    Cataract of both eyes    COVID    8/30/2022    Disorder of prostate    Diverticulosis of large intestine    DVT (deep venous thrombosis) (HCC)    Overview:  Overview:  3/11 after back surgery pulm emb-IVC filter Overview:  3/11 after back surgery pulm emb-IVC filter    Esophageal reflux    Frequent urination    Hearing impairment    bilateral hearing aids    Hearing loss    Wear hearing aids    Heartburn    Hemorrhoids    High cholesterol    Hx of endoscopy    Hyperlipidemia    Indigestion    Leaking of urine    Leg DVT (deep venous thromboembolism), acute, bilateral (HCC)    Leg swelling    Obesity    Pain in joints    Peripheral vascular disease    Pleurisy with effusion    Postoperative anemia due to acute blood loss    Prediabetes    Presence of IVC filter    cook celect filter.  Had 3 different IVC filters placed previous to current filter    Pulmonary embolism (HCC)    Stented coronary artery    aorta / iliac    UTI (urinary tract infection)    Venous ulcer of right leg (HCC)    Visual impairment    glasses    Wears glasses    Weight gain     Past Surgical History:   Procedure Laterality Date    Back surgery  2011,2012,2014, 2016,2019    Cataract  2012     Colonoscopy  09/12/2016    Colonoscopy  2019    Colonoscopy N/A 01/06/2022    Procedure: COLONOSCOPY;  Surgeon: Saeid Conde MD;  Location:  ENDOSCOPY    Colonoscopy N/A 09/21/2023    Procedure: COLONOSCOPY WITH COLD SNARE POLYPECTOMY;  Surgeon: Saeid Conde MD;  Location:  ENDOSCOPY    Endovas repair, infrarenl abdom aortic aneurysm/dissect  06/13/2017    Stent    Knee replacement surgery Right 04/04/2016    Other  06/2012    aortic and iliac artery stent    Other surgical history  1964    Pleuectomy    Other surgical history      IVC filter    Skin surgery  11/2023    Spinal fusion  10/12/2018    Spine surgery procedure unlisted      Back Surgery    Tonsillectomy      Vasectomy  1984     Home Medications:   pantoprazole 40 MG Oral Tab EC Take 1 tablet (40 mg total) by mouth 2 (two) times daily. 180 tablet 3    furosemide 40 MG Oral Tab Take 1 tablet (40 mg total) by mouth daily. 90 tablet 3    silodosin 8 MG Oral Cap Take 1 capsule (8 mg total) by mouth daily. 90 capsule 5    finasteride 5 MG Oral Tab Take 1 tablet (5 mg total) by mouth daily. 90 tablet 5    Mirabegron ER (MYRBETRIQ) 50 MG Oral Tablet 24 Hr Take 1 tablet (50 mg total) by mouth daily. 90 tablet 5    WARFARIN 5 MG Oral Tab TAKE 1 TABLET AS DIRECTED BASED ON INR READINGS 90 tablet 3    ROSUVASTATIN 10 MG Oral Tab TAKE 1 TABLET NIGHTLY 90 tablet 3    clobetasol 0.05 % External Ointment       Vitamin D, Cholecalciferol, 25 MCG (1000 UT) Oral Cap Take by mouth in the morning.      acetaminophen 500 MG Oral Tab Take 1 tablet (500 mg total) by mouth every 6 (six) hours as needed for Pain.  0    latanoprost 0.005 % Ophthalmic Solution Place 1 drop into both eyes nightly.      dicloxacillin 250 MG Oral Cap Take 1 capsule (250 mg total) by mouth in the morning and 1 capsule (250 mg total) before bedtime.       Allergies:   No Known Allergies    Psychosocial History:  Social History     Social History Narrative    , lives with wife.   Has 4 children, 1 of which is with his current wife.  Has grandchildren and 2 great-grandchildren as well.  Retired, previously worked in voice and data operations.     Social History     Socioeconomic History    Marital status:    Tobacco Use    Smoking status: Former     Current packs/day: 0.00     Average packs/day: 1 pack/day for 25.0 years (25.0 ttl pk-yrs)     Types: Cigarettes     Start date: 2/3/1962     Quit date: 2/3/1987     Years since quittin.2    Smokeless tobacco: Never   Vaping Use    Vaping status: Never Used   Substance and Sexual Activity    Alcohol use: Not Currently     Comment: Social drinker    Drug use: Never   Other Topics Concern    Caffeine Concern No    Exercise No    Seat Belt No    Special Diet No    Stress Concern No    Weight Concern No   Social History Narrative    , lives with wife.  Has 4 children, 1 of which is with his current wife.  Has grandchildren and 2 great-grandchildren as well.  Retired, previously worked in voice and data operations.     Family Medical History:  Family History   Problem Relation Age of Onset    Cancer Mother         Gall bladder    Cancer Father         Prostate    Colon Cancer Father         80+    Alcohol and Other Disorders Associated Sister         Alcoholism    Cancer Sister         Brain aneurysm    Cancer Sister         Cancer    Cancer Brother         Pancreas    Cancer Brother         Lung / Brain    DVT/VTE Neg      Review of Systems:  A 10-point ROS was done with pertinent positives and negative per the HPI    Vital Signs:  Height: 182.9 cm (6' 0.01\") ( 1020)  Weight: 139.7 kg (308 lb) ( 1020)  BSA (Calculated - sq m): 2.56 sq meters ( 1020)  Pulse: 84 ( 1020)  BP: 106/69 ( 1020)  Temp: 97.3 °F (36.3 °C) ( 1020)  Do Not Use - Resp Rate: --  SpO2: 94 % ( 1020)    Wt Readings from Last 6 Encounters:   25 (!) 139.7 kg (308 lb)   11/15/24 (!) 143.8 kg (317 lb)   24 (!) 142.4 kg (314  lb)   05/13/24 (!) 140.2 kg (309 lb)   05/07/24 (!) 140.5 kg (309 lb 11.2 oz)   04/23/24 (!) 140.2 kg (309 lb)     Physical Examination:  General: Patient is alert and oriented, not in acute distress  Psych: Mood and affect are appropriate  Eyes: EOMI  ENT: Oropharynx is clear  CV: Regular rate and rhythm, no murmurs  Respiratory: Lungs clear to auscultation bilaterally  GI/Abd: Soft, non-tender with normoactive bowel sounds, no hepatosplenomegaly  Neurological: Grossly intact   Lymphatics: No palpable lymphadenopathy  Skin: No rash or petechiae  Ext: mild symmetric BLE edema.  +known prominent chronic venous stasis skin changes    Laboratory:  Lab Results   Component Value Date    WBC 6.9 05/02/2025    WBC 6.7 11/07/2024    WBC 6.4 05/03/2024    HGB 17.7 (H) 05/02/2025    HGB 16.8 11/07/2024    HGB 16.5 05/03/2024    HCT 52.9 05/02/2025    MCV 95.0 05/02/2025    MCH 31.8 05/02/2025    MCHC 33.5 05/02/2025    RDW 13.4 05/02/2025    .0 05/02/2025    .0 11/07/2024    .0 05/03/2024     Lab Results   Component Value Date     (H) 05/02/2025    BUN 15 05/02/2025    BUNCREA 13.7 04/27/2021    CREATSERUM 1.18 05/02/2025    CREATSERUM 1.23 11/07/2024    CREATSERUM 1.21 05/03/2024    ANIONGAP 5 05/02/2025    GFRNAA 67 04/07/2022    GFRAA 77 04/07/2022    CA 10.3 05/02/2025    OSMOCALC 294 05/02/2025    ALKPHO 123 (H) 05/02/2025    AST 23 05/02/2025    ALT 20 05/02/2025    BILT 1.4 (H) 05/02/2025    TP 7.0 05/02/2025    ALB 4.1 05/02/2025    GLOBULIN 2.9 05/02/2025     05/02/2025    K 4.7 05/02/2025     05/02/2025    CO2 31.0 05/02/2025     Lab Results   Component Value Date    .1 (H) 07/15/2020    INR 3.09 (H) 05/02/2025     Lab Results   Component Value Date    REITCPERCENT 1.1 05/02/2025    REITCPERCENT 1.3 11/07/2024    REITCPERCENT 1.5 10/20/2023    REITCPERCENT 1.3 04/26/2023    REITCPERCENT 1.4 10/21/2022    REITCPERCENT 1.5 10/26/2021    REITCPERCENT 1.1 04/27/2021     REITCPERCENT 1.9 07/07/2020     Lab Results   Component Value Date    RETHE 34.9 05/02/2025    RETHE 35.5 11/07/2024    RETHE 34.8 10/20/2023    RETHE 36.9 (H) 04/26/2023    RETHE 35.1 10/21/2022    RETHE 34.2 10/26/2021    RETHE 29.0 04/27/2021    RETHE 28.9 07/07/2020     Lab Results   Component Value Date    KAREN 76 05/02/2025    KAREN 66 11/07/2024    KAREN 66.2 05/03/2024    KAREN 94.0 10/20/2023    KAREN 73.3 04/26/2023     Lab Results   Component Value Date    SAT 30 10/20/2023    SAT 26 04/26/2023    SAT 31 10/21/2022    SAT 26 04/07/2022     Soluble transferrin receptor  No results found for: \"STFRNR\"  Lab Results   Component Value Date    B12 359 11/07/2024    B12 366 04/27/2021     Lab Results   Component Value Date    MMA 0.34 04/27/2021     No results found for: \"FOLIC\"  No results found for: \"COPPER\"  Lab Results   Component Value Date    ESRML 15 (H) 04/27/2021    ESRML 23 (H) 07/07/2020     Lab Results   Component Value Date    CRP 1.10 (H) 05/02/2025    CRP 1.06 (H) 11/07/2024    CRP 0.82 (H) 05/03/2024    CRP 0.71 (H) 10/20/2023    CRP 0.95 (H) 04/26/2023    CRP 0.92 (H) 10/21/2022    CRP 1.50 (H) 04/27/2021    CRP 0.89 (H) 10/06/2020    CRP 18.70 (H) 07/07/2020     Lab Results   Component Value Date     04/27/2021     07/28/2020     (H) 07/07/2020     No results found for: \"HAPT\"     Component      Latest Ref Rng & Units 4/27/2021   SPE INTERPRETATION       Small abnormality in the gamma region. . . .   IMMUNOFIXATION       Monoclonal IgG lambda. If clinically indicated, suggest 24 hour urine monoclonal protein studies. . . .     No results found for: \"ELECTMS1\"  Lab Results   Component Value Date    KAPPALTCHN 4.448 (H) 11/07/2024    KAPPALTCHN 4.064 (H) 10/20/2023    KAPPALTCHN 4.707 (H) 10/21/2022    KAPPALTCHN 2.818 (H) 10/26/2021    KAPPALTCHN 4.192 (H) 04/27/2021      Lab Results   Component Value Date    LAMBDALTCH 2.765 (H) 11/07/2024    LAMBDALTCH 2.803 (H) 10/20/2023     LAMBDALTCH 3.222 (H) 10/21/2022    LAMBDALTCH 2.666 (H) 10/26/2021    LAMBDALTCH 2.927 (H) 04/27/2021     Lab Results   Component Value Date    KAPLAMRATIO 1.61 11/07/2024    KAPLAMRATIO 1.45 10/20/2023    KAPLAMRATIO 1.46 10/21/2022    KAPLAMRATIO 1.06 10/26/2021    KAPLAMRATIO 1.43 04/27/2021     Lab Results   Component Value Date    IGG 1,385 11/07/2024    IGG 1,380 10/20/2023     Lab Results   Component Value Date    TSH 1.652 11/07/2024    TSH 1.690 05/03/2024    TSH 1.890 11/01/2023    TSH 1.840 12/16/2022    TSH 2.140 04/07/2022    TSH 2.290 09/09/2020     No results found for: \"T4F\"  No results found for: \"T3TOT\"    Lab Results   Component Value Date    DDIMER 0.70 10/06/2020     Component      Latest Ref Rng 11/10/2022   PROTEIN URINE CALC      <149.1 mg/24 hr 159.8 (H)    Urine Volume 24Hr      mL 1,700    URINE BENCE HERNANDEZ PROTEIN 24HR 24-hour urine concentrated 50X is negative for Free Monoclonal Light Chains (Bence Hernandez Protein).    Reviewed By: Reviewed by Cathryn Goldberg, M.D. Pathology 11/18/22 at 5:30 PM       Imaging:    As reviewed above    CT a/p 10/20/21:   FINDINGS:     LIVER:  No enlargement, atrophy, abnormal density, or significant focal lesion.     BILIARY:  No visible dilatation or calcification.     PANCREAS:  No lesion, fluid collection, ductal dilatation, or atrophy.     SPLEEN:  No enlargement or focal lesion.     KIDNEYS:  No mass, obstruction, or calcification.  Stable upper pole cyst of left kidney.   ADRENALS:  Stable coarse right adrenal calcifications.     AORTA/VASCULAR:  Aortoiliac stent in place.  No aneurysm or endoleak.  IVC filter is unchanged in appearance.   RETROPERITONEUM:  No mass or adenopathy.     BOWEL/MESENTERY:  No visible mass, obstruction, or bowel wall thickening.  Few uncomplicated colonic diverticula.  Normal appendix.   ABDOMINAL WALL:  No mass or hernia.     URINARY BLADDER:  No visible focal wall thickening, lesion, or calculus.     PELVIC NODES:  Left  external iliac lymph node with central fat measures 2.5 x 1.3 cm, previously 2.7 x 1.3 cm.  2.1 x 1.1 cm left inguinal lymph node previously measured 2.1 x 1.2 cm.  Other smaller lymph nodes are stable.   PELVIC ORGANS:  No visible mass.  Pelvic organs appropriate for patient age.     BONES:  Extensive postsurgical changes throughout the spine.   LUNG BASES:  No visible pulmonary or pleural disease.       Impression   CONCLUSION:  Slight decrease in size of left external iliac lymph node, otherwise no change from 4/1/2021.      Impression & Plan:     *Extensive BLE DVT to iliac veins + PE, recurrent VTE  -While his other VTE events were provoked by surgery, most recent event in 2020 was unprovoked.  Indefinite duration anticoagulation is recommended.  S/p CDT to BLE by interventional cardiology given extensive clot burden with good clinical improvement.  Repeat doppler shows resolution of DVT though has some LLE venous insufficiency noted on doppler.   -Given his CKD and weight greater than 120 kg a DOAC is not favored. Continue warfarin, dose adjusted to goal INR 2-3  -last IVC filter placed in 2015- will not be able to be remove since it has been in place for too long.   -Repeat CBC and CMP at least q6mths while remains on anticoagulation     *normocytic anemia- KVNG, AoCD/inflammation  -previously with a component of AoCD/inflammation with elevated CRP. Iron deficiency replaced with IV iron with improvement.  Source of KVNG likely related to intestinal AVMs noted on capsule endoscopy 1/31/22.  No recent s/s of active bleeding.  -Current labs indicate normal hemoglobin and adequate iron stores.    -Repeat CBC, iron studies, and CRP in 6 months    *Bull's esophagus, multiple prior colon polyps  -follows with GI for surveillance endoscopy    *MGUS  -Small monoclonal IgG lambda M spike 4/27/21 without any other associated s/s of multiple myeloma.  Monoclonal gammopathy resolved on repeat testing on 10/26/21, however  was reidentified again on 10/21/22.  -Negative 24 urine Bence-Hernandez protein collection 11/2022  -Repeat monoclonal protein study and quantitative immunoglobulin testing annually- due next 11/2025.  -he is considered low risk, therefore no bone marrow biopsy or bone imaging assessment is indicated at this point.    *Afib  -follows with cardiology.  Rates have been controlled.  On Coumadin already for history of DVT as above.    *CKD  -baseline cre 1.3-1.9.  Better lately.      *BLE edema  -At current baseline, perhaps somewhat better lately.  Related to volume overload + component of venous insuff.  Limit salt intake and continue compression stockings prn. Follows with cardiology and vascular. On diuretics     *chronic spinal hardware infection  -no recent issues per discussion with wife. On suppressive dicloxacillin.      RTC in 6 mths    Nakul Carpenter MD  Hematology/Medical Oncology  [facility]     ongoing continuity of care

## 2025-05-13 NOTE — PROGRESS NOTES
Education Record     Learner:  Patient and Spouse     Disease / Diagnosis:DVT/PE     Barriers / Limitations:  None                Comments:     Method:  Brief focused                Comments:     General Topics:  Plan of care reviewed                Comments:     Outcome:  Shows understanding                Comments: here for 6 month MD f/up. Pt states he is taking coumadin as directed and has been tolerating without complications. Reports energy is low.

## 2025-05-16 ENCOUNTER — OFFICE VISIT (OUTPATIENT)
Dept: FAMILY MEDICINE CLINIC | Facility: CLINIC | Age: 85
End: 2025-05-16
Payer: MEDICARE

## 2025-05-16 ENCOUNTER — LAB ENCOUNTER (OUTPATIENT)
Dept: LAB | Age: 85
End: 2025-05-16
Attending: FAMILY MEDICINE
Payer: MEDICARE

## 2025-05-16 VITALS
DIASTOLIC BLOOD PRESSURE: 68 MMHG | RESPIRATION RATE: 18 BRPM | SYSTOLIC BLOOD PRESSURE: 98 MMHG | OXYGEN SATURATION: 97 % | BODY MASS INDEX: 39.27 KG/M2 | WEIGHT: 306 LBS | HEIGHT: 74 IN

## 2025-05-16 DIAGNOSIS — E11.59 TYPE 2 DIABETES MELLITUS WITH OTHER CIRCULATORY COMPLICATION, WITHOUT LONG-TERM CURRENT USE OF INSULIN (HCC): ICD-10-CM

## 2025-05-16 DIAGNOSIS — R60.0 BILATERAL LEG EDEMA: ICD-10-CM

## 2025-05-16 DIAGNOSIS — I95.1 ORTHOSTATIC HYPOTENSION: ICD-10-CM

## 2025-05-16 DIAGNOSIS — E11.59 TYPE 2 DIABETES MELLITUS WITH OTHER CIRCULATORY COMPLICATION, WITHOUT LONG-TERM CURRENT USE OF INSULIN (HCC): Primary | ICD-10-CM

## 2025-05-16 LAB
CREAT UR-SCNC: 156.6 MG/DL
HEMOGLOBIN A1C: 6.1 % (ref 4.3–5.6)
MICROALBUMIN UR-MCNC: 0.9 MG/DL
MICROALBUMIN/CREAT 24H UR-RTO: 5.7 UG/MG (ref ?–30)

## 2025-05-16 PROCEDURE — 83036 HEMOGLOBIN GLYCOSYLATED A1C: CPT | Performed by: FAMILY MEDICINE

## 2025-05-16 PROCEDURE — 99214 OFFICE O/P EST MOD 30 MIN: CPT | Performed by: FAMILY MEDICINE

## 2025-05-16 PROCEDURE — 82043 UR ALBUMIN QUANTITATIVE: CPT

## 2025-05-16 PROCEDURE — 82570 ASSAY OF URINE CREATININE: CPT

## 2025-05-16 RX ORDER — FUROSEMIDE 20 MG/1
20 TABLET ORAL DAILY
COMMUNITY
Start: 2025-05-16

## 2025-05-16 NOTE — PROGRESS NOTES
The following individual(s) verbally consented to be recorded using ambient AI listening technology and understand that they can each withdraw their consent to this listening technology at any point by asking the clinician to turn off or pause the recording:    Patient name: Boyd Kennedy Almita  Additional names:  Alicja Almita

## 2025-05-16 NOTE — PROGRESS NOTES
Subjective:   Boyd Recio is a 84 year old male who presents for Follow - Up (Follow up )       History/Other:   History of Present Illness  Boyd Recio is an 84-year-old male who presents with episodes of dizziness and lightheadedness.    Three weeks ago, he experienced severe lightheadedness and dizziness upon standing, leading him to lie down. This episode was followed by vomiting after dinner. The dizziness worsened when lying down, necessitating sleeping in a recliner for a couple of nights. By Friday of that week, he felt better, but he continues to experience episodes of dizziness, particularly upon standing, affecting his balance and making him feel unsafe walking.    The dizziness is not associated with chest pain, difficulty breathing, or hearing loss. He describes the sensation as more than just lightheadedness, and at times, he feels unable to function. No numbness or loss of control over facial muscles or limbs, except for chronic numbness in his fingers. The dizziness does not occur daily but has recurred, with a notable episode yesterday.    He has a history of low blood pressure and has experienced episodes that seemed like hypoglycemia, where he felt shaky and improved after eating. His blood pressure was recorded at 106/? and today it was 120/?. He notes that his blood pressure might be affected by activity levels before measurement. He typically eats breakfast and then does not eat again until dinner, which might contribute to his symptoms.    He denies recent fevers, diarrhea, or pain during urination.  Recent blood work showed normal blood sugar, kidney function, and liver enzymes. He is not on any blood pressure medication except for a diuretic (furosemide), which he takes at a dose of 40 mg, and he has lost about ten pounds since his last visit.    His wife mentions that he drinks a lot of sweetened iced tea and occasionally apple juice, which might affect his blood sugar levels. He  is a diabetic, controlled with diet. He notes that certain head positions can trigger lightheadedness.       Chief Complaint Reviewed and Verified  Nursing Notes Reviewed and   Verified  Tobacco Reviewed  Allergies Reviewed  Medications Reviewed    Problem List Reviewed  Medical History Reviewed  Surgical History   Reviewed  Family History Reviewed  Social History Reviewed         Tobacco:  He smoked tobacco in the past but quit greater than 12 months ago.  Tobacco Use[1]     Current Medications[2]      Review of Systems:  Review of Systems   All other systems reviewed and are negative.        Objective:   BP 98/68   Resp 18   Ht 6' 2\" (1.88 m)   Wt (!) 306 lb (138.8 kg)   SpO2 97%   BMI 39.29 kg/m²  Estimated body mass index is 39.29 kg/m² as calculated from the following:    Height as of this encounter: 6' 2\" (1.88 m).    Weight as of this encounter: 306 lb (138.8 kg).  Physical Exam  Constitutional:       General: He is not in acute distress.     Appearance: He is obese.   Cardiovascular:      Rate and Rhythm: Normal rate and regular rhythm.   Pulmonary:      Effort: Pulmonary effort is normal.      Breath sounds: Normal breath sounds. No wheezing or rhonchi.   Musculoskeletal:      Right lower leg: Edema present.      Left lower leg: Edema present.   Skin:     Coloration: Skin is not jaundiced or pale.      Findings: No rash.   Neurological:      General: No focal deficit present.      Mental Status: He is alert and oriented to person, place, and time.   Psychiatric:         Mood and Affect: Mood normal.         Behavior: Behavior normal.       Results          Assessment & Plan:   1. Type 2 diabetes mellitus with other circulatory complication, without long-term current use of insulin ([facility]) (Primary)  -     Microalb/Creat Ratio, Random Urine; Future; Expected date: 05/16/2025  -     POC Hemoglobin A1C  -     Ophthalmology Referral - In Network  well-controlled with diet, recent blood work  indicates well-managed blood sugar levels.  - Check A1c to ensure blood sugar levels remain controlled.    2. Bilateral leg edema  Improved overall   Cont supportive care, limit salt and track fluid intake   Daily weights     3. Orthostatic hypotension  Episodes of dizziness and lightheadedness upon standing with blood pressure at 108/64 mmHg suggest orthostatic hypotension, likely exacerbated by furosemide.  - Decrease furosemide dosage from 40 mg to 20 mg.  - Monitor blood pressure sitting and standing.  - Encourage regular weight monitoring at home, call if weight exceeds 308 lbs.  - Advise reducing sweetened beverages, increase water or electrolyte drinks without sugar.  - Check A1c to rule out blood sugar issues.  - Obtain urine sample if possible.         Return in about 6 months (around 2025).      OJSE COLON MD, 2025, 11:02 AM              [1]   Social History  Tobacco Use   Smoking Status Former    Current packs/day: 0.00    Average packs/day: 1 pack/day for 25.0 years (25.0 ttl pk-yrs)    Types: Cigarettes    Start date: 2/3/1962    Quit date: 2/3/1987    Years since quittin.4   Smokeless Tobacco Never   [2]   Current Outpatient Medications   Medication Sig Dispense Refill    furosemide 20 MG Oral Tab Take 1 tablet (20 mg total) by mouth daily.      pantoprazole 40 MG Oral Tab EC Take 1 tablet (40 mg total) by mouth 2 (two) times daily. 180 tablet 3    silodosin 8 MG Oral Cap Take 1 capsule (8 mg total) by mouth daily. 90 capsule 5    finasteride 5 MG Oral Tab Take 1 tablet (5 mg total) by mouth daily. 90 tablet 5    Mirabegron ER (MYRBETRIQ) 50 MG Oral Tablet 24 Hr Take 1 tablet (50 mg total) by mouth daily. 90 tablet 5    WARFARIN 5 MG Oral Tab TAKE 1 TABLET AS DIRECTED BASED ON INR READINGS 90 tablet 3    clobetasol 0.05 % External Ointment       Vitamin D, Cholecalciferol, 25 MCG (1000 UT) Oral Cap Take by mouth in the morning.      acetaminophen 500 MG Oral Tab Take 1 tablet (500 mg  total) by mouth every 6 (six) hours as needed for Pain.  0    latanoprost 0.005 % Ophthalmic Solution Place 1 drop into both eyes nightly.      dicloxacillin 250 MG Oral Cap Take 1 capsule (250 mg total) by mouth in the morning and 1 capsule (250 mg total) before bedtime.      rosuvastatin 10 MG Oral Tab Take 1 tablet (10 mg total) by mouth nightly. 90 tablet 3

## 2025-05-30 ENCOUNTER — ANTI-COAG VISIT (OUTPATIENT)
Age: 85
End: 2025-05-30
Attending: INTERNAL MEDICINE
Payer: MEDICARE

## 2025-05-30 DIAGNOSIS — I82.4Y3 ACUTE DEEP VEIN THROMBOSIS (DVT) OF PROXIMAL VEIN OF BOTH LOWER EXTREMITIES (HCC): ICD-10-CM

## 2025-05-30 DIAGNOSIS — I82.409 RECURRENT DEEP VEIN THROMBOSIS (DVT) (HCC): ICD-10-CM

## 2025-05-30 DIAGNOSIS — Z79.01 CHRONIC ANTICOAGULATION: ICD-10-CM

## 2025-05-30 LAB
INR BLD: 2.61 (ref 0.8–1.2)
PROTHROMBIN TIME: 27.5 SECONDS (ref 11.6–14.8)

## 2025-06-18 RX ORDER — ROSUVASTATIN CALCIUM 10 MG/1
10 TABLET, COATED ORAL NIGHTLY
Qty: 90 TABLET | Refills: 3 | Status: SHIPPED | OUTPATIENT
Start: 2025-06-18

## 2025-06-18 NOTE — TELEPHONE ENCOUNTER
Refill passed per Clinic protocol.  Requested Prescriptions   Pending Prescriptions Disp Refills    ROSUVASTATIN 10 MG Oral Tab [Pharmacy Med Name: ROSUVASTATIN TABS 10MG] 90 tablet 3     Sig: TAKE 1 TABLET NIGHTLY       Cholesterol Medication Protocol Passed - 6/18/2025  8:10 AM        Passed - ALT < 80     Lab Results   Component Value Date    ALT 20 05/02/2025             Passed - ALT resulted within past year        Passed - Lipid panel within past 12 months     Lab Results   Component Value Date    CHOLEST 114 11/07/2024    TRIG 125 11/07/2024    HDL 32 (L) 11/07/2024    LDL 59 11/07/2024    VLDL 18 11/07/2024    NONHDLC 82 11/07/2024             Passed - In person appointment or virtual visit in the past 12 mos or appointment in next 3 mos     Recent Outpatient Visits              1 month ago Type 2 diabetes mellitus with other circulatory complication, without long-term current use of insulin ([facility])    [address] Kari Hernandez MD    Office Visit    1 month ago Recurrent deep vein thrombosis (DVT) ([facility])    [facility] Hematology Oncology [facility] Nakul Carpenter MD    Office Visit    1 month ago Onychomycosis    [address] Kyle Menard DPM    Office Visit    3 months ago Urge incontinence    [facility] Ian Villarreal MD    Office Visit    3 months ago Onychomycosis    [address] Kyle Menard DPM    Office Visit          Future Appointments         Provider Department Appt Notes    In 1 week PF OOT [facility] Infusion [facility] INR    In 1 month Kyle Menard DPM [address] recheck    In 5 months Nakul Carpenter MD [facility] Hematology Oncology [facility] md (pt said he will do pl in combination  with inr check around the same time)    In 9 months Ian Villarreal MD [facility] review status                    Passed - Medication is active on med list

## 2025-06-27 ENCOUNTER — ANTI-COAG VISIT (OUTPATIENT)
Age: 85
End: 2025-06-27
Attending: INTERNAL MEDICINE
Payer: MEDICARE

## 2025-06-27 DIAGNOSIS — I82.409 RECURRENT DEEP VEIN THROMBOSIS (DVT) (HCC): ICD-10-CM

## 2025-06-27 DIAGNOSIS — Z79.01 CHRONIC ANTICOAGULATION: ICD-10-CM

## 2025-06-27 DIAGNOSIS — I82.4Y3 ACUTE DEEP VEIN THROMBOSIS (DVT) OF PROXIMAL VEIN OF BOTH LOWER EXTREMITIES (HCC): ICD-10-CM

## 2025-06-27 LAB
INR BLD: 2.51 (ref 0.8–1.2)
PROTHROMBIN TIME: 26.7 SECONDS (ref 11.6–14.8)

## 2025-07-22 ENCOUNTER — OFFICE VISIT (OUTPATIENT)
Facility: LOCATION | Age: 85
End: 2025-07-22
Payer: MEDICARE

## 2025-07-22 DIAGNOSIS — G62.9 PERIPHERAL POLYNEUROPATHY: ICD-10-CM

## 2025-07-22 DIAGNOSIS — B35.1 ONYCHOMYCOSIS: Primary | ICD-10-CM

## 2025-07-22 DIAGNOSIS — R26.9 GAIT DIFFICULTY: ICD-10-CM

## 2025-07-22 DIAGNOSIS — E11.9 TYPE 2 DIABETES MELLITUS WITHOUT COMPLICATION, UNSPECIFIED WHETHER LONG TERM INSULIN USE (HCC): ICD-10-CM

## 2025-07-22 DIAGNOSIS — R60.0 BILATERAL LOWER EXTREMITY EDEMA: ICD-10-CM

## 2025-07-22 PROCEDURE — 99213 OFFICE O/P EST LOW 20 MIN: CPT | Performed by: PODIATRIST

## 2025-07-22 NOTE — PROGRESS NOTES
[facility]  Progress Note      Boyd Recio is a 84 year old male.   Chief Complaint   Patient presents with    Diabetic Foot Care     Nail care and diabetic foot check  Diabetic patient  A1c on 5/16: 6.1  LOV with PCP: 5/16/2025 with Dr. Kari Hernandez           HPI:     Patient is a pleasant 84-year-old diabetic male who is returning to clinic today for diabetic footcare.  He is accompanied by his wife.  He is ambulating with a walker and denies any concerns.  He does have supportive  slip events.  Patient is unable to trim his toenails himself safely and is inquiring about a toenail debridement today.  Denies open wounds, recent injuries, or other pedal complaints at this time.      Allergies: Patient has no known allergies.   Current Medications[1]   Past Medical History[2]   Past Surgical History[3]   Family History[4]   Social Hx on file[5]        REVIEW OF SYSTEMS:     10 point ROS completed and was negative unless stated in HPI.      EXAM:     GENERAL: well developed, well nourished, in no apparent distress  EXTREMITIES:              1. Integument: Nails x10 are thickened, elongated, discolored, dystrophic with subungual debris noted to right hallux toenail.  Normal skin temperature and decreased turgor.  Skin color changes consistent with hemosiderin deposition noted to bilateral lower extremities.  2. Vascular: Dorsalis pedis 2/4 bilateral and posterior tibial pulses difficult to palpate today secondary to edema, capillary refill normal.  2+ pitting edema noted to bilateral lower extremities              3. Musculoskeletal: No pain elicited on palpation along the course of the peroneal tendons near the lateral malleolus, right.  Adequate eversion strength is noted with no signs of tendon rupture, right.  No pain with palpation noted to plantar medial aspect of right heel.  Decreased ankle joint dorsiflexion noted with the knee extended, which does improve with the knee flexed consistent  with equinus deformity, right.  No discomfort with palpation of toenails of bilateral feet              4. Neurological: Sensation diminished via light touch to bilateral lower extremities.  Protective sensation diminished via [place]ga test bilaterally       ASSESSMENT AND PLAN:   Diagnoses and all orders for this visit:    Onychomycosis    Bilateral lower extremity edema    Gait difficulty    Type 2 diabetes mellitus without complication, unspecified whether long term insulin use (HCC)    Peripheral polyneuropathy        Plan:   -Patient was seen and evaluated today in clinic.  Chart history reviewed.    -Discussed importance of proper pedal hygiene, daily foot checks, and tight glycemic control.     -Patient to avoid walking barefoot. Recommend ambulating with supportive diabetic shoes and inserts.     -Patient to monitor for acute signs of infection and seek immediate medical attention if any signs of infection or other concerns arise.    -Patient relates pain relief with intermittent toenail debridements.  -Patient elects for nail debridement today. Toenails 1-5 of the left foot and 1-5 of the right foot were debrided today.  They tolerated procedures well, without incident.    -Instrumentation used includes nail nippers and emery board.    -All of the patient's questions and concerns were addressed.  They indicated their understanding of these issues and agrees to the plan.      RTC 2-3 months    Cameron Menard DPM, AACFAS        7/22/2025    [facility]  [address] [place] [id number]   [email or web address]            Dragon speech recognition software was used to prepare this note.  Errors in word recognition may occur.  Please contact me with any questions/concerns with this note.          [1]   Current Outpatient Medications   Medication Sig Dispense Refill    rosuvastatin 10 MG Oral Tab Take 1 tablet (10 mg total) by mouth nightly. 90 tablet 3    furosemide 20 MG  Oral Tab Take 1 tablet (20 mg total) by mouth daily.      pantoprazole 40 MG Oral Tab EC Take 1 tablet (40 mg total) by mouth 2 (two) times daily. 180 tablet 3    silodosin 8 MG Oral Cap Take 1 capsule (8 mg total) by mouth daily. 90 capsule 5    finasteride 5 MG Oral Tab Take 1 tablet (5 mg total) by mouth daily. 90 tablet 5    Mirabegron ER (MYRBETRIQ) 50 MG Oral Tablet 24 Hr Take 1 tablet (50 mg total) by mouth daily. 90 tablet 5    WARFARIN 5 MG Oral Tab TAKE 1 TABLET AS DIRECTED BASED ON INR READINGS 90 tablet 3    clobetasol 0.05 % External Ointment       Vitamin D, Cholecalciferol, 25 MCG (1000 UT) Oral Cap Take by mouth in the morning.      acetaminophen 500 MG Oral Tab Take 1 tablet (500 mg total) by mouth every 6 (six) hours as needed for Pain.  0    latanoprost 0.005 % Ophthalmic Solution Place 1 drop into both eyes nightly.      dicloxacillin 250 MG Oral Cap Take 1 capsule (250 mg total) by mouth in the morning and 1 capsule (250 mg total) before bedtime.     [2]   Past Medical History:   Arrhythmia    Back problem    Bull esophagus    BPH (benign prostatic hyperplasia)    Cataract    Cataract of both eyes    COVID    8/30/2022    Disorder of prostate    Diverticulosis of large intestine    DVT (deep venous thrombosis) (HCC)    Overview:  Overview:  3/11 after back surgery pulm emb-IVC filter Overview:  3/11 after back surgery pulm emb-IVC filter    Esophageal reflux    Frequent urination    Hearing impairment    bilateral hearing aids    Hearing loss    Wear hearing aids    Heartburn    Hemorrhoids    High cholesterol    Hx of endoscopy    Hyperlipidemia    Indigestion    Leaking of urine    Leg DVT (deep venous thromboembolism), acute, bilateral (HCC)    Leg swelling    Obesity    Pain in joints    Peripheral vascular disease    Pleurisy with effusion    Postoperative anemia due to acute blood loss    Prediabetes    Presence of IVC filter    Dashbid celect filter.  Had 3 different IVC filters placed  previous to current filter    Pulmonary embolism (HCC)    Stented coronary artery    aorta / iliac    UTI (urinary tract infection)    Venous ulcer of right leg (HCC)    Visual impairment    glasses    Wears glasses    Weight gain   [3]   Past Surgical History:  Procedure Laterality Date    Back surgery  ,,, ,    Cataract  2012    Colonoscopy  2016    Colonoscopy  2019    Colonoscopy N/A 2022    Procedure: COLONOSCOPY;  Surgeon: Saeid Conde MD;  Location:  ENDOSCOPY    Colonoscopy N/A 2023    Procedure: COLONOSCOPY WITH COLD SNARE POLYPECTOMY;  Surgeon: Saeid Conde MD;  Location:  ENDOSCOPY    Endovas repair, infrarenl abdom aortic aneurysm/dissect  2017    Stent    Knee replacement surgery Right 2016    Other  2012    aortic and iliac artery stent    Other surgical history  1964    Pleuectomy    Other surgical history      IVC filter    Skin surgery  2023    Spinal fusion  10/12/2018    Spine surgery procedure unlisted      Back Surgery    Tonsillectomy      Vasectomy  1984   [4]   Family History  Problem Relation Age of Onset    Cancer Mother         Gall bladder    Cancer Father         Prostate    Colon Cancer Father         80+    Alcohol and Other Disorders Associated Sister         Alcoholism    Cancer Sister         Cancer    Cancer Sister         Cancer    Cancer Brother         Pancreas    Cancer Brother         Lung / Brain    DVT/VTE Neg    [5]   Social History  Socioeconomic History    Marital status:    Tobacco Use    Smoking status: Former     Current packs/day: 0.00     Average packs/day: 1 pack/day for 25.0 years (25.0 ttl pk-yrs)     Types: Cigarettes     Start date: 2/3/1962     Quit date: 2/3/1987     Years since quittin.4    Smokeless tobacco: Never   Vaping Use    Vaping status: Never Used   Substance and Sexual Activity    Alcohol use: Not Currently     Comment: Social drinker    Drug use: Never   Other  Topics Concern    Caffeine Concern No    Exercise No    Seat Belt No    Special Diet No    Stress Concern No    Weight Concern No

## 2025-07-23 ENCOUNTER — TELEPHONE (OUTPATIENT)
Dept: FAMILY MEDICINE CLINIC | Facility: CLINIC | Age: 85
End: 2025-07-23

## 2025-07-25 ENCOUNTER — ANTI-COAG VISIT (OUTPATIENT)
Facility: LOCATION | Age: 85
End: 2025-07-25
Attending: INTERNAL MEDICINE
Payer: MEDICARE

## 2025-07-25 DIAGNOSIS — I82.409 RECURRENT DEEP VEIN THROMBOSIS (DVT) (HCC): ICD-10-CM

## 2025-07-25 DIAGNOSIS — I82.4Y3 ACUTE DEEP VEIN THROMBOSIS (DVT) OF PROXIMAL VEIN OF BOTH LOWER EXTREMITIES (HCC): ICD-10-CM

## 2025-07-25 DIAGNOSIS — Z79.01 CHRONIC ANTICOAGULATION: ICD-10-CM

## 2025-07-25 LAB
INR BLD: 2.79 (ref 0.8–1.2)
PROTHROMBIN TIME: 29 SECONDS (ref 11.6–14.8)

## 2025-07-31 ENCOUNTER — MED REC SCAN ONLY (OUTPATIENT)
Dept: FAMILY MEDICINE CLINIC | Facility: CLINIC | Age: 85
End: 2025-07-31

## 2025-08-22 ENCOUNTER — ANTI-COAG VISIT (OUTPATIENT)
Facility: LOCATION | Age: 85
End: 2025-08-22
Attending: INTERNAL MEDICINE

## 2025-08-22 DIAGNOSIS — I82.409 RECURRENT DEEP VEIN THROMBOSIS (DVT) (HCC): ICD-10-CM

## 2025-08-22 DIAGNOSIS — Z79.01 CHRONIC ANTICOAGULATION: ICD-10-CM

## 2025-08-22 DIAGNOSIS — I82.4Y3 ACUTE DEEP VEIN THROMBOSIS (DVT) OF PROXIMAL VEIN OF BOTH LOWER EXTREMITIES (HCC): ICD-10-CM

## 2025-08-22 LAB
INR BLD: 1.9 (ref 0.8–1.2)
PROTHROMBIN TIME: 21.8 SECONDS (ref 11.6–14.8)

## (undated) DIAGNOSIS — I82.4Y3 ACUTE DEEP VEIN THROMBOSIS (DVT) OF PROXIMAL VEIN OF BOTH LOWER EXTREMITIES (HCC): ICD-10-CM

## (undated) DEVICE — MEDI-VAC SUCTION HANDLE REGULAR CAPACITY: Brand: CARDINAL HEALTH

## (undated) DEVICE — FORCEP BIOPSY RJ4 LG CAP W/ND

## (undated) DEVICE — SNARE 9MM 230CM 2.4MM EXACTO

## (undated) DEVICE — 1200CC GUARDIAN II: Brand: GUARDIAN

## (undated) DEVICE — 10FT COMBINED O2 DELIVERY/CO2 MONITORING. FILTER WITH MICROSTREAM TYPE LUER: Brand: DUAL ADULT NASAL CANNULA

## (undated) DEVICE — ENDOSCOPY PACK UPPER: Brand: MEDLINE INDUSTRIES, INC.

## (undated) DEVICE — FILTERLINE NASAL ADULT O2/CO2

## (undated) DEVICE — VALVE KIT SGL USE DEFENDO

## (undated) DEVICE — BIOGUARD CLEANING ADAPTER

## (undated) DEVICE — TRAP SPEC REMOVAL ETRAP 15CM

## (undated) DEVICE — Device: Brand: DEFENDO AIR/WATER/SUCTION AND BIOPSY VALVE

## (undated) DEVICE — ENDOSCOPY PACK - LOWER: Brand: MEDLINE INDUSTRIES, INC.

## (undated) DEVICE — MEDI-VAC NON-CONDUCTIVE SUCTION TUBING: Brand: CARDINAL HEALTH

## (undated) DEVICE — LASSO POLYPECTOMY SNARE: Brand: LASSO

## (undated) DEVICE — 3M™ RED DOT™ MONITORING ELECTRODE WITH FOAM TAPE AND STICKY GEL, 50/BAG, 20/CASE, 72/PLT 2570: Brand: RED DOT™

## (undated) DEVICE — STERIS KITS

## (undated) NOTE — LETTER
Damion Babcock. Carol Valdez M.D., F.A.C.S.     Surgical Clearance Needed    Date: 8/20/2019                                                                       From: Delfino Diaz RN    Attn: DR. Randy Leach                                                                    Fax

## (undated) NOTE — Clinical Note
I had the pleasure of seeing Mr. Recio in my office today. Please see my attached note.     Marialuisa Salas

## (undated) NOTE — LETTER
[facility] [address], [address]  Consent for Procedure/Sedation    Date: July 9,2020     Time: 0745      1.  I authorize the performance upon Chet Laser the following: Peripheral angiography, catheter directed thrombolytic ther Relationship to  to consent for patient: ___________________________   patient: ___________________    Witness: ______________________________________  Date: _____________________    Patient Name: Rosemarie LOMBARDO: 1940                 Printed: Juana Huffman

## (undated) NOTE — LETTER
01/05/19        Cecily Malone [id number]      Dear Jennifer Bullard,    [facility] records indicate that you have outstanding lab work and or testing that was ordered for you and has not yet been completed:  Orders Placed This Encounter        Rachel Tavarez

## (undated) NOTE — LETTER
[facility] [address], [address]  Consent for Procedure/Sedation    Date: July 9,2020     Time: 0745      1.  I authorize the performance upon Dorina Hammans the following: Peripheral angiography, atherectomy, percutaneous translumi _______________________________________________________    Signature of person authorized                                           Relationship to  to consent for patient: ___________________________   patient: ___________________    Witness: ____________

## (undated) NOTE — LETTER
[facility] [address], [address]  Consent for Procedure/Sedation    Date: 7/7/2020    Time: 1600      1.  I authorize the performance upon Elise June the following:catheter directed thrombolytic therapy to bilateral lower extrem 2020   3:35 PM  Patient Name: Tyrone Baez        : 1940       Medical Record #: [id number]

## (undated) NOTE — LETTER
06/03/19        Sakina Webster [id number]      Dear Douglas Eldridge,    [facility] records indicate that you have outstanding lab work and or testing that was ordered for you and has not yet been completed:  Orders Placed This Encounter        CB